# Patient Record
Sex: FEMALE | Race: WHITE | HISPANIC OR LATINO | Employment: FULL TIME | URBAN - METROPOLITAN AREA
[De-identification: names, ages, dates, MRNs, and addresses within clinical notes are randomized per-mention and may not be internally consistent; named-entity substitution may affect disease eponyms.]

---

## 2017-04-10 ENCOUNTER — OFFICE VISIT (OUTPATIENT)
Dept: URGENT CARE | Facility: URGENT CARE | Age: 27
End: 2017-04-10
Payer: MEDICAID

## 2017-04-10 VITALS
TEMPERATURE: 98.6 F | SYSTOLIC BLOOD PRESSURE: 133 MMHG | OXYGEN SATURATION: 99 % | RESPIRATION RATE: 24 BRPM | HEART RATE: 70 BPM | WEIGHT: 293 LBS | DIASTOLIC BLOOD PRESSURE: 94 MMHG

## 2017-04-10 DIAGNOSIS — J06.9 VIRAL URI: Primary | ICD-10-CM

## 2017-04-10 PROCEDURE — 99213 OFFICE O/P EST LOW 20 MIN: CPT | Performed by: FAMILY MEDICINE

## 2017-04-10 RX ORDER — BENZONATATE 200 MG/1
200 CAPSULE ORAL 3 TIMES DAILY PRN
Qty: 21 CAPSULE | Refills: 0 | Status: SHIPPED | OUTPATIENT
Start: 2017-04-10 | End: 2017-04-17

## 2017-04-10 NOTE — NURSING NOTE
Chief Complaint   Patient presents with     Cough     cough and wheezing x over 2 weeks. Pt was treated for bronchitis ( prednisone 20 and cefdinir 300 mg  ) few weeks ago and states she never finnished it.      Back Pain     x yesterday        Initial BP (!) 133/94 (BP Location: Other (Comment), Patient Position: Chair, Cuff Size: Adult Large)  Pulse 70  Temp 98.6  F (37  C) (Oral)  Resp 24  Wt (!) 344 lb 9.6 oz (156.3 kg)  SpO2 99% There is no height or weight on file to calculate BMI.  Medication Reconciliation: complete

## 2017-04-10 NOTE — MR AVS SNAPSHOT
After Visit Summary   4/10/2017    Princess TRISTIN Machado    MRN: [id number]           Patient Information     Date Of Birth          1990        Visit Information        Provider Department      4/10/2017 2:45 PM David Mendoza,  [facility]        Today's Diagnoses     Viral URI    -  1       Follow-ups after your visit        Your next 10 appointments already scheduled     May 09, 2017  8:45 AM CDT   RETURN GENERAL with Kristopher Franks MD   Eye Clinic ([facility])    [address]  [address] [facility]  9th Fl Clin 9a  [facility] [id number] [phone]              Who to contact     If you have questions or need follow up information about today's clinic visit or your schedule please contact [facility] directly at [phone].  Normal or non-critical lab and imaging results will be communicated to you by MyChart, letter or phone within 4 business days after the clinic has received the results. If you do not hear from us within 7 days, please contact the clinic through MyChart or phone. If you have a critical or abnormal lab result, we will notify you by phone as soon as possible.  Submit refill requests through [employer] or call your pharmacy and they will forward the refill request to us. Please allow 3 business days for your refill to be completed.          Additional Information About Your Visit        MyChart Information     [employer] gives you secure access to your electronic health record. If you see a primary care provider, you can also send messages to your care team and make appointments. If you have questions, please call your primary care clinic.  If you do not have a primary care provider, please call [phone] and they will assist you.        Care EveryWhere ID     This is your Care EveryWhere ID. This could be used by other organizations to access your [place] medical records  [id number]         Your Vitals Were     Pulse Temperature Respirations Pulse Oximetry          70 98.6  F (37  C) (Oral) 24 99%         Blood Pressure from Last 3 Encounters:   04/10/17 (!) 133/94   09/09/16 122/47   08/25/16 142/72    Weight from Last 3 Encounters:   04/10/17 (!) 344 lb 9.6 oz (156.3 kg)   04/30/14 (!) 322 lb (146.1 kg)   04/25/12 (!) 308 lb (139.7 kg)              Today, you had the following     No orders found for display         Today's Medication Changes          These changes are accurate as of: 4/10/17  4:40 PM.  If you have any questions, ask your nurse or doctor.               Start taking these medicines.        Dose/Directions    benzonatate 200 MG capsule   Commonly known as:  TESSALON   Used for:  Viral URI   Started by:  David Mendoza DO        Dose:  200 mg   Take 1 capsule (200 mg) by mouth 3 times daily as needed for cough   Quantity:  21 capsule   Refills:  0            Where to get your medicines      These medications were sent to [facility] [address] [place] [address] [facility]  [address] [facility] [address]     Phone:  [phone]     benzonatate 200 MG capsule                Primary Care Provider Office Phone # Fax #    Natividad Lopez [phone]       [facility]  [address] [place] [address]        Thank you!     Thank you for choosing [facility]  for your care. Our goal is always to provide you with excellent care. Hearing back from our patients is one way we can continue to improve our services. Please take a few minutes to complete the written survey that you may receive in the mail after your visit with us. Thank you!             Your Updated Medication List - Protect others around you: Learn how to safely use, store and throw away your medicines at www.disposemymeds.org.          This list is accurate as of: 4/10/17  4:40 PM.  Always use your most recent med list.                   Brand Name  Dispense Instructions for use    benzonatate 200 MG capsule    TESSALON    21 capsule    Take 1 capsule (200 mg) by mouth 3 times daily as needed for cough       BIOTIN PO      Take 10 mcg by mouth       COPAXONE SC          DOXY-CAPS PO      Take 100 mg by mouth 2 times daily       FLUOXETINE HCL PO      Take 20 mg by mouth daily       norethindrone 0.35 MG per tablet    MICRONOR     Take 1 tablet by mouth daily       predniSONE 20 MG tablet    DELTASONE    33 tablet    Take 1 tablet (20 mg) by mouth 3 times daily       prenatal multivitamin  plus iron 27-0.8 MG Tabs per tablet      Take 1 tablet by mouth daily       VITAMIN D3 PO      Take 1,000 Int'l Units by mouth daily

## 2017-04-10 NOTE — LETTER
[facility]  [address] [id number] [phone]      April 10, 2017    RE:  Princess TRISTIN Machado                                                                                                                                                       [address]   [facility] [address]            To whom it may concern:    Princess TRISTIN Machado is under my professional care for Medical.   She  may return to work with the following: No working or lifting restrictions on or about 4-11-17.          Sincerely,        David Mendoza    [facility]

## 2017-04-26 NOTE — PROGRESS NOTES
"SUBJECTIVE: Princess TRISTIN Machado is a 26 year old female presenting with a chief complaint of nasal congestion and cough .  Onset of symptoms was day(s) ago.  Course of illness is same.    Severity moderate  Current and Associated symptoms: \"cold symptoms\"  Treatment measures tried include OTC meds.  Predisposing factors include None.    Past Medical History:   Diagnosis Date     Anxiety      Depressive disorder      Hidradenitis suppurativa 2010     MS (multiple sclerosis) (H)     diagnosis 10/2016     Allergies   Allergen Reactions     Accutane Rash     Isotretinoin Rash     Social History   Substance Use Topics     Smoking status: Never Smoker     Smokeless tobacco: Never Used     Alcohol use No       ROS:  SKIN: no rash  GI: no vomiting    OBJECTIVE:  BP (!) 133/94 (BP Location: Other (Comment), Patient Position: Chair, Cuff Size: Adult Large)  Pulse 70  Temp 98.6  F (37  C) (Oral)  Resp 24  Wt (!) 344 lb 9.6 oz (156.3 kg)  SpO2 99%GENERAL APPEARANCE: healthy, alert and no distress  EYES: EOMI,  PERRL, conjunctiva clear  HENT: ear canals and TM's normal.  Nose and mouth without ulcers, erythema or lesions  NECK: supple, nontender, no lymphadenopathy  RESP: lungs clear to auscultation - no rales, rhonchi or wheezes  SKIN: no suspicious lesions or rashes      ICD-10-CM    1. Viral URI J06.9 benzonatate (TESSALON) 200 MG capsule    B97.89        Fluids/Rest, f/u if worse/not any better    "

## 2017-05-08 DIAGNOSIS — H46.9 LEFT OPTIC NEURITIS: Primary | ICD-10-CM

## 2017-05-09 ENCOUNTER — OFFICE VISIT (OUTPATIENT)
Dept: INTERNAL MEDICINE | Facility: CLINIC | Age: 27
End: 2017-05-09
Payer: COMMERCIAL

## 2017-05-09 VITALS
DIASTOLIC BLOOD PRESSURE: 74 MMHG | HEIGHT: 66 IN | TEMPERATURE: 98.4 F | HEART RATE: 85 BPM | OXYGEN SATURATION: 98 % | SYSTOLIC BLOOD PRESSURE: 114 MMHG

## 2017-05-09 DIAGNOSIS — F50.9 EATING DISORDER: ICD-10-CM

## 2017-05-09 DIAGNOSIS — G35 MULTIPLE SCLEROSIS (H): ICD-10-CM

## 2017-05-09 DIAGNOSIS — N92.6 MISSED PERIOD: Primary | ICD-10-CM

## 2017-05-09 DIAGNOSIS — F33.1 MAJOR DEPRESSIVE DISORDER, RECURRENT EPISODE, MODERATE (H): ICD-10-CM

## 2017-05-09 LAB — BETA HCG QUAL IFA URINE: NEGATIVE

## 2017-05-09 PROCEDURE — 99214 OFFICE O/P EST MOD 30 MIN: CPT | Performed by: INTERNAL MEDICINE

## 2017-05-09 PROCEDURE — 84703 CHORIONIC GONADOTROPIN ASSAY: CPT | Performed by: INTERNAL MEDICINE

## 2017-05-09 RX ORDER — CITALOPRAM HYDROBROMIDE 20 MG/1
20 TABLET ORAL DAILY
Qty: 30 TABLET | Refills: 1 | Status: SHIPPED | OUTPATIENT
Start: 2017-05-09 | End: 2017-06-27 | Stop reason: ALTCHOICE

## 2017-05-09 ASSESSMENT — ANXIETY QUESTIONNAIRES
6. BECOMING EASILY ANNOYED OR IRRITABLE: NEARLY EVERY DAY
1. FEELING NERVOUS, ANXIOUS, OR ON EDGE: NEARLY EVERY DAY
5. BEING SO RESTLESS THAT IT IS HARD TO SIT STILL: MORE THAN HALF THE DAYS
3. WORRYING TOO MUCH ABOUT DIFFERENT THINGS: NEARLY EVERY DAY
IF YOU CHECKED OFF ANY PROBLEMS ON THIS QUESTIONNAIRE, HOW DIFFICULT HAVE THESE PROBLEMS MADE IT FOR YOU TO DO YOUR WORK, TAKE CARE OF THINGS AT HOME, OR GET ALONG WITH OTHER PEOPLE: VERY DIFFICULT
2. NOT BEING ABLE TO STOP OR CONTROL WORRYING: MORE THAN HALF THE DAYS
7. FEELING AFRAID AS IF SOMETHING AWFUL MIGHT HAPPEN: NEARLY EVERY DAY
GAD7 TOTAL SCORE: 19

## 2017-05-09 ASSESSMENT — PATIENT HEALTH QUESTIONNAIRE - PHQ9: 5. POOR APPETITE OR OVEREATING: NEARLY EVERY DAY

## 2017-05-09 NOTE — PROGRESS NOTES
SUBJECTIVE:                                                    Princess TRISTIN Machado is a 26 year old female who presents to clinic today for the following health issues:     Pt is a 26 year old female who is seen here to day to establish care, and c/o symptoms of depression since 3 mths, c/o little interest in doing things, depressed, ,tired, sleep disturbed. Feeling bad about self, trouble concentrating on things. Restless, irritable, nervous, worries a lot, no suicidal thoughts or ideations. Pt was on Effexor and stopped 3 mths ago . Pt has also tried Zoloft, Prozac in the past.  Pt also c/o eating disorder, and would like referral . Pt also says she missed her periods , LMP 02/08/17. Has h/o irregular periods.       Patient Active Problem List   Diagnosis     Optic neuritis, left     Major depressive disorder, recurrent episode, moderate (H)     Multiple sclerosis (H)     Eating disorder     Past Surgical History:   Procedure Laterality Date     ENT SURGERY      tubes in ears     MYRINGOTOMY, INSERT TUBE BILATERAL, COMBINED  1990s       Social History   Substance Use Topics     Smoking status: Never Smoker     Smokeless tobacco: Never Used     Alcohol use No     Family History   Problem Relation Age of Onset     CEREBROVASCULAR DISEASE Mother      heart attack     CEREBROVASCULAR DISEASE Father      heart attack     MENTAL ILLNESS Sister      Post-Traumatic Stress Disorder (PTSD) Brother      Glaucoma No family hx of      Macular Degeneration No family hx of          Current Outpatient Prescriptions   Medication Sig Dispense Refill     glatiramer (COPAXONE) 20 MG/ML injection        citalopram (CELEXA) 20 MG tablet Take 1 tablet (20 mg) by mouth daily 30 tablet 1     norethindrone (MICRONOR) 0.35 MG per tablet Take 1 tablet by mouth daily       Cholecalciferol (VITAMIN D3 PO) Take 1,000 Int'l Units by mouth daily Reported on 5/9/2017       BIOTIN PO Take 10 mcg by mouth Reported on 5/9/2017         Reviewed and  "updated as needed this visit by clinical staff       Reviewed and updated as needed this visit by Provider         ROS:  C: NEGATIVE for fever, chills, change in weight  E/M: NEGATIVE for ear, mouth and throat problems  R: NEGATIVE for significant cough or SOB  CV: NEGATIVE for chest pain, palpitations or peripheral edema  ; missed period  PSYCHIATRIC: anxiety, depression  Cns; MS    OBJECTIVE:                                                    /74 (BP Location: Left arm, Cuff Size: Adult Large)  Pulse 85  Temp 98.4  F (36.9  C) (Oral)  Ht 5' 6\" (1.676 m)  LMP  (LMP Unknown)  SpO2 98%  Breastfeeding? No  There is no height or weight on file to calculate BMI.   GENERAL: healthy, alert, well nourished, well hydrated, no distress  EYES: Eyes grossly normal to inspection, extraocular movements - intact, and PERRL  NECK: no tenderness, no adenopathy, no asymmetry, no masses, no stiffness; thyroid- normal to palpation  RESP: lungs clear to auscultation - no rales, no rhonchi, no wheezes  CV: regular rates and rhythm, normal S1 S2, no S3 or S4 and no murmur, no click or rub -  MS: extremities- no gross deformities noted, no edema  NEURO: strength and tone- normal, sensory exam- grossly normal, mentation- intact, speech- normal, reflexes- symmetric         ASSESSMENT/PLAN:                                                      1. Major depressive disorder, recurrent episode, moderate (H)  --PHQ 9 score 16, has tried Zoloft, Prozac and Effexor in the past w/o much symptom relief. .   - started on citalopram (CELEXA) 20 MG tablet; Take 1 tablet (20 mg) by mouth daily as directed.explained clearly about the medication,insructions and side effects. Advised to f/u in 1 mth.     2. Multiple sclerosis (H)  --sees neurologist and is on Copaxone 3 x wk.    3. Missed period  - Beta HCG qual IFA urine- negative    4. Eating disorder  --resourses given to pt- [employer] program, [employer]. Pt will follow up with " them.    Please abstract the following data from this visit with this patient into the appropriate field in Epic:    Pap smear done on this date: 08/16  (approximately), by this group: [facility] , results were normal     .   Fallon Horne MD  [facility]

## 2017-05-09 NOTE — MR AVS SNAPSHOT
"              After Visit Summary   5/9/2017    Princess TRISTIN Machado    MRN: [id number]           Patient Information     Date Of Birth          1990        Visit Information        Provider Department      5/9/2017 1:40 PM Fallon Horne MD [facility]        Today's Diagnoses     Missed period    -  1    Major depressive disorder, recurrent episode, moderate (H)        Multiple sclerosis (H)        Eating disorder           Follow-ups after your visit        Who to contact     If you have questions or need follow up information about today's clinic visit or your schedule please contact [facility] directly at [phone].  Normal or non-critical lab and imaging results will be communicated to you by [employer], letter or phone within 4 business days after the clinic has received the results. If you do not hear from us within 7 days, please contact the clinic through [employer] or phone. If you have a critical or abnormal lab result, we will notify you by phone as soon as possible.  Submit refill requests through [employer] or call your pharmacy and they will forward the refill request to us. Please allow 3 business days for your refill to be completed.          Additional Information About Your Visit        MyChart Information     [employer] gives you secure access to your electronic health record. If you see a primary care provider, you can also send messages to your care team and make appointments. If you have questions, please call your primary care clinic.  If you do not have a primary care provider, please call [phone] and they will assist you.        Care EveryWhere ID     This is your Care EveryWhere ID. This could be used by other organizations to access your [place] medical records  [id number]        Your Vitals Were     Pulse Temperature Height Last Period Pulse Oximetry Breastfeeding?    85 98.4  F (36.9  C) (Oral) 5' 6\" (1.676 m) (LMP Unknown) 98% No       " Blood Pressure from Last 3 Encounters:   05/09/17 114/74   04/10/17 (!) 133/94   09/09/16 122/47    Weight from Last 3 Encounters:   04/10/17 (!) 344 lb 9.6 oz (156.3 kg)   04/30/14 (!) 322 lb (146.1 kg)   04/25/12 (!) 308 lb (139.7 kg)              We Performed the Following     Beta HCG qual IFA urine          Today's Medication Changes          These changes are accurate as of: 5/9/17 11:59 PM.  If you have any questions, ask your nurse or doctor.               Start taking these medicines.        Dose/Directions    citalopram 20 MG tablet   Commonly known as:  celeXA   Used for:  Major depressive disorder, recurrent episode, moderate (H)   Started by:  Fallon Horne MD        Dose:  20 mg   Take 1 tablet (20 mg) by mouth daily   Quantity:  30 tablet   Refills:  1            Where to get your medicines      These medications were sent to [facility] - [place], [place] - [address] [place] [address]     Phone:  [phone]     citalopram 20 MG tablet                Primary Care Provider Office Phone # Fax #    Fallon Horne [phone]       [facility] [address] [place] [address]        Thank you!     Thank you for choosing [facility]  for your care. Our goal is always to provide you with excellent care. Hearing back from our patients is one way we can continue to improve our services. Please take a few minutes to complete the written survey that you may receive in the mail after your visit with us. Thank you!             Your Updated Medication List - Protect others around you: Learn how to safely use, store and throw away your medicines at www.disposemymeds.org.          This list is accurate as of: 5/9/17 11:59 PM.  Always use your most recent med list.                   Brand Name Dispense Instructions for use    BIOTIN PO      Take 10 mcg by mouth Reported on 5/9/2017        citalopram 20 MG tablet    celeXA    30 tablet    Take 1 tablet (20 mg) by mouth daily       glatiramer 20 MG/ML injection    COPAXONE         norethindrone 0.35 MG per tablet    MICRONOR     Take 1 tablet by mouth daily       VITAMIN D3 PO      Take 1,000 Int'l Units by mouth daily Reported on 5/9/2017

## 2017-05-09 NOTE — NURSING NOTE
"Chief Complaint   Patient presents with     Recheck Medication     depression/anxiety-been on effexor, prozac, sertraline in past, sees a therapist, hx of bulemia       Initial /74 (BP Location: Left arm, Cuff Size: Adult Large)  Pulse 85  Temp 98.4  F (36.9  C) (Oral)  Ht 5' 6\" (1.676 m)  LMP  (LMP Unknown)  SpO2 98%  Breastfeeding? No There is no height or weight on file to calculate BMI.  Medication Reconciliation: complete   Jossie Guzman, CMA      "

## 2017-05-09 NOTE — Clinical Note
Please abstract the following data from this visit with this patient into the appropriate field in Epic:  Pap smear done on this date: 08/16  (approximately), by this group: [facility]ice vas[facility] , results were normal

## 2017-05-10 ASSESSMENT — PATIENT HEALTH QUESTIONNAIRE - PHQ9: SUM OF ALL RESPONSES TO PHQ QUESTIONS 1-9: 16

## 2017-05-10 ASSESSMENT — ANXIETY QUESTIONNAIRES: GAD7 TOTAL SCORE: 19

## 2017-05-14 RX ORDER — GLATIRAMER ACETATE 20 MG/ML
INJECTION, SOLUTION SUBCUTANEOUS
COMMUNITY
End: 2017-12-12

## 2017-05-16 ENCOUNTER — TRANSFERRED RECORDS (OUTPATIENT)
Dept: HEALTH INFORMATION MANAGEMENT | Facility: CLINIC | Age: 27
End: 2017-05-16

## 2017-05-30 ENCOUNTER — TRANSFERRED RECORDS (OUTPATIENT)
Dept: HEALTH INFORMATION MANAGEMENT | Facility: CLINIC | Age: 27
End: 2017-05-30

## 2017-06-13 ENCOUNTER — OFFICE VISIT (OUTPATIENT)
Dept: INTERNAL MEDICINE | Facility: CLINIC | Age: 27
End: 2017-06-13
Payer: COMMERCIAL

## 2017-06-13 VITALS
OXYGEN SATURATION: 99 % | DIASTOLIC BLOOD PRESSURE: 70 MMHG | HEART RATE: 76 BPM | TEMPERATURE: 98 F | SYSTOLIC BLOOD PRESSURE: 112 MMHG | HEIGHT: 66 IN

## 2017-06-13 DIAGNOSIS — N89.8 VAGINAL DISCHARGE: Primary | ICD-10-CM

## 2017-06-13 DIAGNOSIS — N89.8 VAGINAL ITCHING: ICD-10-CM

## 2017-06-13 DIAGNOSIS — L73.2 HYDRADENITIS: ICD-10-CM

## 2017-06-13 LAB
MICRO REPORT STATUS: NORMAL
SPECIMEN SOURCE: NORMAL
WET PREP SPEC: NORMAL

## 2017-06-13 PROCEDURE — 87210 SMEAR WET MOUNT SALINE/INK: CPT | Performed by: PHYSICIAN ASSISTANT

## 2017-06-13 PROCEDURE — 99213 OFFICE O/P EST LOW 20 MIN: CPT | Performed by: PHYSICIAN ASSISTANT

## 2017-06-13 RX ORDER — MUPIROCIN 20 MG/G
OINTMENT TOPICAL
COMMUNITY
Start: 2017-03-05

## 2017-06-13 RX ORDER — FLUCONAZOLE 150 MG/1
150 TABLET ORAL ONCE
Qty: 1 TABLET | Refills: 0 | Status: SHIPPED | OUTPATIENT
Start: 2017-06-13 | End: 2017-06-13

## 2017-06-13 RX ORDER — DOXYCYCLINE 100 MG/1
100 CAPSULE ORAL 2 TIMES DAILY
Qty: 20 CAPSULE | Refills: 0 | Status: SHIPPED | OUTPATIENT
Start: 2017-06-13 | End: 2017-06-27

## 2017-06-13 NOTE — MR AVS SNAPSHOT
After Visit Summary   6/13/2017    Princess TRISTIN Machado    MRN: [id number]           Patient Information     Date Of Birth          1990        Visit Information        Provider Department      6/13/2017 6:20 PM Margy Villalobos PA-C [facility]        Today's Diagnoses     Vaginal discharge    -  1    Vaginal itching        Hydradenitis           Follow-ups after your visit        Your next 10 appointments already scheduled     Jun 27, 2017  9:00 AM CDT   [facility]    [address]  [facility] [id number] [phone]              Who to contact     If you have questions or need follow up information about today's clinic visit or your schedule please contact [facility] directly at [phone].  Normal or non-critical lab and imaging results will be communicated to you by MyChart, letter or phone within 4 business days after the clinic has received the results. If you do not hear from us within 7 days, please contact the clinic through MyChart or phone. If you have a critical or abnormal lab result, we will notify you by phone as soon as possible.  Submit refill requests through [employer] or call your pharmacy and they will forward the refill request to us. Please allow 3 business days for your refill to be completed.          Additional Information About Your Visit        MyChart Information     [employer] gives you secure access to your electronic health record. If you see a primary care provider, you can also send messages to your care team and make appointments. If you have questions, please call your primary care clinic.  If you do not have a primary care provider, please call [phone] and they will assist you.        Care EveryWhere ID     This is your Care EveryWhere ID. This could be used by other organizations to access your [place]  "medical records  [id number]        Your Vitals Were     Pulse Temperature Height Last Period Pulse Oximetry       76 98  F (36.7  C) (Oral) 5' 6\" (1.676 m) 03/14/2017 99%        Blood Pressure from Last 3 Encounters:   06/13/17 112/70   05/09/17 114/74   04/10/17 (!) 133/94    Weight from Last 3 Encounters:   04/10/17 (!) 344 lb 9.6 oz (156.3 kg)   04/30/14 (!) 322 lb (146.1 kg)   04/25/12 (!) 308 lb (139.7 kg)              We Performed the Following     Wet prep          Today's Medication Changes          These changes are accurate as of: 6/13/17  6:55 PM.  If you have any questions, ask your nurse or doctor.               Start taking these medicines.        Dose/Directions    doxycycline 100 MG capsule   Commonly known as:  VIBRAMYCIN   Used for:  Hydradenitis   Started by:  Margy Villalobos PA-C        Dose:  100 mg   Take 1 capsule (100 mg) by mouth 2 times daily   Quantity:  20 capsule   Refills:  0       fluconazole 150 MG tablet   Commonly known as:  DIFLUCAN   Used for:  Vaginal discharge, Vaginal itching   Started by:  Margy Villalobos PA-C        Dose:  150 mg   Take 1 tablet (150 mg) by mouth once for 1 dose   Quantity:  1 tablet   Refills:  0            Where to get your medicines      These medications were sent to [address] [address]     Phone:  [phone]     doxycycline 100 MG capsule    fluconazole 150 MG tablet                Primary Care Provider Office Phone # Fax #    Krvadim Horne [phone]       [facility] [address] [address]        Thank you!     Thank you for choosing [facility]  for your care. Our goal is always to provide you with excellent care. Hearing back from our patients is one way we can continue to improve our services. Please take a few minutes to complete the written survey that you may " receive in the mail after your visit with us. Thank you!             Your Updated Medication List - Protect others around you: Learn how to safely use, store and throw away your medicines at www.disposemymeds.org.          This list is accurate as of: 6/13/17  6:55 PM.  Always use your most recent med list.                   Brand Name Dispense Instructions for use    BIOTIN PO      Take 10 mcg by mouth Reported on 5/9/2017       citalopram 20 MG tablet    celeXA    30 tablet    Take 1 tablet (20 mg) by mouth daily       doxycycline 100 MG capsule    VIBRAMYCIN    20 capsule    Take 1 capsule (100 mg) by mouth 2 times daily       fluconazole 150 MG tablet    DIFLUCAN    1 tablet    Take 1 tablet (150 mg) by mouth once for 1 dose       glatiramer 20 MG/ML injection    COPAXONE         mupirocin 2 % ointment    BACTROBAN         norethindrone 0.35 MG per tablet    MICRONOR     Take 1 tablet by mouth daily       VITAMIN D3 PO      Take 1,000 Int'l Units by mouth daily Reported on 5/9/2017

## 2017-06-13 NOTE — NURSING NOTE
"Chief Complaint   Patient presents with     Vaginal Problem     x1 month        Initial /70 (BP Location: Left arm, Patient Position: Chair, Cuff Size: Adult Large)  Pulse 76  Temp 98  F (36.7  C) (Oral)  Ht 5' 6\" (1.676 m)  LMP 03/14/2017  SpO2 99% There is no height or weight on file to calculate BMI.  Medication Reconciliation: complete    "

## 2017-06-13 NOTE — PROGRESS NOTES
"  SUBJECTIVE:                                                    Princess TRISTIN Machado is a 26 year old female who presents to clinic today for the following health issues:      Vaginal Symptoms     Onset: x1 month     Description:  Vaginal Discharge: white   Itching (Pruritis): YES  Burning sensation:  no   Odor: no     Accompanying Signs & Symptoms:  Pain with Urination: no   Abdominal Pain: no   Fever: no    History:   Sexually active: YES  New Partner: no   Possibility of Pregnancy:  No    Precipitating factors:   Recent Antibiotic Use: no     Alleviating factors:  na   Therapies Tried and outcome:   ? Cyst on the private area, - hydradenitis hx as well.   Has been having some small painful areas that open and drain, similar to hydradenitis that she has in the axilla.   Has appt with Derm in July but hoping something can be done before then      -------------------------------------    Problem list and histories reviewed & adjusted, as indicated.  Additional history: as documented    Labs reviewed in EPIC    Reviewed and updated as needed this visit by clinical staff  Tobacco  Allergies       Reviewed and updated as needed this visit by Provider  Allergies  Meds         ROS:  Constitutional, HEENT, cardiovascular, pulmonary, gi and gu systems are negative, except as otherwise noted.    OBJECTIVE:                                                    /70 (BP Location: Left arm, Patient Position: Chair, Cuff Size: Adult Large)  Pulse 76  Temp 98  F (36.7  C) (Oral)  Ht 5' 6\" (1.676 m)  LMP 03/14/2017  SpO2 99%  There is no height or weight on file to calculate BMI.  GENERAL: healthy, alert and no distress  RESP: lungs clear to auscultation - no rales, rhonchi or wheezes  CV: regular rate and rhythm, normal S1 S2, no S3 or S4, no murmur, click or rub, no peripheral edema and peripheral pulses strong  ABDOMEN: soft, nontender, no hepatosplenomegaly, no masses and bowel sounds normal   (female): normal " female external genitalia, normal urethral meatus , vaginal discharge - scant and brown, vaginal skin findings: with few scattered small draining nodules and normal cervix,     Diagnostic Test Results:  Results for orders placed or performed in visit on 06/13/17 (from the past 24 hour(s))   Wet prep   Result Value Ref Range    Specimen Description Vagina     Wet Prep       No Trichomonas seen  No clue cells seen  No yeast seen      Micro Report Status FINAL 06/13/2017         ASSESSMENT/PLAN:                                                            1. Vaginal discharge    - Wet prep  - fluconazole (DIFLUCAN) 150 MG tablet; Take 1 tablet (150 mg) by mouth once for 1 dose  Dispense: 1 tablet; Refill: 0    2. Vaginal itching    - Wet prep  - fluconazole (DIFLUCAN) 150 MG tablet; Take 1 tablet (150 mg) by mouth once for 1 dose  Dispense: 1 tablet; Refill: 0    3. Hydradenitis    - doxycycline (VIBRAMYCIN) 100 MG capsule; Take 1 capsule (100 mg) by mouth 2 times daily  Dispense: 20 capsule; Refill: 0    Reviewed exam and symptoms,   Treatment with diflucan for the itching, recheck if not improving.   For hydradenitis, 10 day course of doxycycline  Info given for Primary skin care clinic in [place]- to follow up on hydradenitis. - she was getting injections in the past as needed for axillary lesions.       Margy Villalobos PA-C  [facility]

## 2017-06-16 ENCOUNTER — OFFICE VISIT (OUTPATIENT)
Dept: FAMILY MEDICINE | Facility: CLINIC | Age: 27
End: 2017-06-16
Payer: COMMERCIAL

## 2017-06-16 VITALS
WEIGHT: 293 LBS | HEIGHT: 66 IN | DIASTOLIC BLOOD PRESSURE: 82 MMHG | OXYGEN SATURATION: 97 % | SYSTOLIC BLOOD PRESSURE: 136 MMHG | BODY MASS INDEX: 47.09 KG/M2 | HEART RATE: 81 BPM | TEMPERATURE: 98.6 F | RESPIRATION RATE: 20 BRPM

## 2017-06-16 DIAGNOSIS — L98.9 SKIN LESION: ICD-10-CM

## 2017-06-16 DIAGNOSIS — L70.0 ACNE VULGARIS: ICD-10-CM

## 2017-06-16 DIAGNOSIS — L08.9 SKIN PUSTULE: ICD-10-CM

## 2017-06-16 DIAGNOSIS — B07.8 COMMON WART: Primary | ICD-10-CM

## 2017-06-16 DIAGNOSIS — E66.01 MORBID OBESITY DUE TO EXCESS CALORIES (H): ICD-10-CM

## 2017-06-16 PROCEDURE — 17110 DESTRUCTION B9 LES UP TO 14: CPT | Performed by: FAMILY MEDICINE

## 2017-06-16 PROCEDURE — 99213 OFFICE O/P EST LOW 20 MIN: CPT | Mod: 25 | Performed by: FAMILY MEDICINE

## 2017-06-16 NOTE — PROGRESS NOTES
SUBJECTIVE:                                                    Princess TRISTIN Machado is a 26 year old female who presents to clinic today for the following health issues:    SKIN LESIONS      Onset: NOTED x 2 days     pPMH of palmar warts yrs ago     Description (location/number): Bothlat  Hands    Accompanying signs and symptoms: Painful: no     History: prior warts: no& no known exposure     Therapies tried and outcome: None     Pustule IN Pt with ACNE VULGARIS       Duration: x 1 month     piercing done in 3-17 and healed normally at 1st with only redness around the 2 external pins of the thru and thru piercing lat to OD but then over a mo developed a red pimple that she can squeeze pus out of     Description (location/character/radiation): Right Side of Face, Upper Cheek Bone    Intensity:  moderate    Accompanying signs and symptoms: Inflammation and Drainage    History (similar episodes/previous evaluation): None    Precipitating or alleviating factors: None as she has a hx of acne vulgaris, this is likely an infected acne pustule     Therapies tried and outcome: Sterile Water, Bacitracin and Bactroban/Some Relief but no real change        MORBID OBESITY    -BMI = 55   -sedentary job     Problem list and histories reviewed & adjusted, as indicated.  Additional history: as documented    Labs reviewed in EPIC    Reviewed and updated as needed this visit by clinical staff       Reviewed and updated as needed this visit by Provider         ROS:  C: NEGATIVE for fever, chills, change in weight  I: NEGATIVE for worrisome rashes, moles ; lesions-  On the lat of both hands   E: NEGATIVE for vision changes or irritation  E/M: NEGATIVE for ear, mouth and throat problems  R: NEGATIVE for significant cough or SOB  B: NEGATIVE for masses, tenderness or discharge  CV: NEGATIVE for chest pain, palpitations or peripheral edema  GI: NEGATIVE for nausea, abdominal pain, heartburn, or change in bowel habits  : NEGATIVE for  "frequency, dysuria, or hematuria  M: NEGATIVE for significant arthralgias or myalgia  N: NEGATIVE for weakness, dizziness or paresthesias  E: NEGATIVE for temperature intolerance, skin/hair changes  H: NEGATIVE for bleeding problems  P: NEGATIVE for changes in mood or affect    OBJECTIVE:                                                    /82  Pulse 81  Temp 98.6  F (37  C) (Temporal)  Resp 20  Ht 5' 6\" (1.676 m)  Wt (!) 344 lb (156 kg)  LMP 03/14/2017  SpO2 97%  Breastfeeding? No  BMI 55.52 kg/m2  Body mass index is 55.52 kg/(m^2).  GENERAL: healthy, alert, no distress and obese  EYES: Eyes grossly normal to inspection, PERRL and conjunctivae and sclerae normal  MS: no gross musculoskeletal defects noted, no edema  SKIN: no suspicious lesions or rashes  1) 3 small papular < 1mm and skin color: 2 on RT and one on Lt lat hand  2) 2mm diam prox Lt lat hand with lack of skin whorls in 2 small spots x 1 mm @ and yellow & scaley in color   3) 2mm diam pustule that is blue to red with central pointing and red around for 1-2 mm between the 2 posts of the under the sking pinning lat to OD   Of note is that she has other more quiet acne pimples, and that the other piercings of the ears x 2 and the Lt nose are all OK   NEURO: Normal strength and tone, mentation intact and speech normal  PSYCH: mentation appears normal, affect normal/bright    Diagnostic Test Results:  none      PROCEDURE     LIQUID NITROGEN  Application to Lt hand wart like structure with good white out weveral times and occluded with tape   ASSESSMENT/PLAN:                                                              ICD-10-CM    1. Common wart: L t lat hand - liquid nitrogen #1  B07.8    2. Skin pustule lat to OD in between piercing w 2 outlets  L08.9    3. Acne vulgaris L70.0    4. Skin lesion- papules on lat hands  L98.9    5. Morbid obesity due to excess calories (H) BMI 50-60 E66.01        Patient Instructions   1. I would observe the " remaining 3 as they are not typical of warts     2. Keep wart covered with several layers at all times.  Do not pick at it with your fingernails, as you might get warts deep under your nails  You may cause more acid burn to treat the wart by grinding up aspirin and placing on it   Duct tape is also acidic and can be used as the 1st cover under the occluding tape Return in 2-3 weeks for retreatment    3. For the sore near the eye.  This looks like an infected pimple.   Hydrogen peroxide , then warm water packs, then press dry to get the water and pus out  2 x a day      4.  Weight Loss Tips  1. Do not eat after 6 hrs before your expected bedtime  2. Have your heaviest meal for breakfast, a slightly lighter meal at lunch and a snack 6 hrs before bed  3. No sugar/calorie drinks except milk ie no fruit juice, pop, alcohol.  4. Drink milk 30min before meals to decrease your hunger. Also it is excellent as part of your last meal of the day snack  5. Drink lots of water  6. Increase fiber in diet: all bran cereal, salads, popcorn etc  7. Have only one small serving of fruit a day about 1/2 cup (as this is high in sugar)  8. EXERCISE is the bottom line. Without it, you will gain weight even on a low calorie diet. Best if done 2-3X a day as can    Being overweight contributes to high blood pressure and high cholesterol, both of which cause heart attacks, strokes and kidney failure, prediabetes and diabetes, arthritis, and liver disease         Noelle Anguiano MD  [facility]      as she has a hx of acne vulgaris, the one on the face  is likely an infected acne pustule     Not from the metal of the pin, as the 2 ears and the Lt nostril piercings are fine      the 2 on the Rt and the one on the left hand look like pimples     Weight management plan: Discussed healthy diet and exercise guidelines and patient will follow up in 1 month in clinic to re-evaluate.      Noelle Anguiano MD

## 2017-06-16 NOTE — PATIENT INSTRUCTIONS
1. I would observe the remaining 3 as they are not typical of warts     2. Keep wart covered with several layers at all times.  Do not pick at it with your fingernails, as you might get warts deep under your nails  You may cause more acid burn to treat the wart by grinding up aspirin and placing on it   Duct tape is also acidic and can be used as the 1st cover under the occluding tape Return in 2-3 weeks for retreatment    3. For the sore near the eye.  This looks like an infected pimple.   Hydrogen peroxide , then warm water packs, then press dry to get the water and pus out  2 x a day      4.  Weight Loss Tips  1. Do not eat after 6 hrs before your expected bedtime  2. Have your heaviest meal for breakfast, a slightly lighter meal at lunch and a snack 6 hrs before bed  3. No sugar/calorie drinks except milk ie no fruit juice, pop, alcohol.  4. Drink milk 30min before meals to decrease your hunger. Also it is excellent as part of your last meal of the day snack  5. Drink lots of water  6. Increase fiber in diet: all bran cereal, salads, popcorn etc  7. Have only one small serving of fruit a day about 1/2 cup (as this is high in sugar)  8. EXERCISE is the bottom line. Without it, you will gain weight even on a low calorie diet. Best if done 2-3X a day as can    Being overweight contributes to high blood pressure and high cholesterol, both of which cause heart attacks, strokes and kidney failure, prediabetes and diabetes, arthritis, and liver disease

## 2017-06-16 NOTE — MR AVS SNAPSHOT
After Visit Summary   6/16/2017    Princess TRISTIN Machado    MRN: [id number]           Patient Information     Date Of Birth          1990        Visit Information        Provider Department      6/16/2017 4:20 PM Noelle Anguiano MD [facility]        Care Instructions    1. I would observe the remaining 3 as they are not typical of warts     2. Keep wart covered with several layers at all times.  Do not pick at it with your fingernails, as you might get warts deep under your nails  You may cause more acid burn to treat the wart by grinding up aspirin and placing on it   Duct tape is also acidic and can be used as the 1st cover under the occluding tape Return in 2-3 weeks for retreatment    3. For the sore near the eye.  This looks like an infected pimple.   Hydrogen peroxide , then warm water packs, then press dry to get the water and pus out  2 x a day      4.  Weight Loss Tips  1. Do not eat after 6 hrs before your expected bedtime  2. Have your heaviest meal for breakfast, a slightly lighter meal at lunch and a snack 6 hrs before bed  3. No sugar/calorie drinks except milk ie no fruit juice, pop, alcohol.  4. Drink milk 30min before meals to decrease your hunger. Also it is excellent as part of your last meal of the day snack  5. Drink lots of water  6. Increase fiber in diet: all bran cereal, salads, popcorn etc  7. Have only one small serving of fruit a day about 1/2 cup (as this is high in sugar)  8. EXERCISE is the bottom line. Without it, you will gain weight even on a low calorie diet. Best if done 2-3X a day as can    Being overweight contributes to high blood pressure and high cholesterol, both of which cause heart attacks, strokes and kidney failure, prediabetes and diabetes, arthritis, and liver disease             Follow-ups after your visit        Your next 10 appointments already scheduled     Jun 27, 2017  9:00 AM CDT   [facility] with  Fallon Horne MD   [facility]    [address]  [facility] [id number] [phone]            Jun 27, 2017 10:40 AM CDT   Office Visit with Shelly Whipple MD   [facility] ([facility] )    [address] [place] [address]  [address]  [place] [address] [phone]           Bring a current list of meds and any records pertaining to this visit.  For Physicals, please bring immunization records and any forms needing to be filled out.  Please arrive 10 minutes early to complete paperwork.              Who to contact     If you have questions or need follow up information about today's clinic visit or your schedule please contact [facility] directly at [phone].  Normal or non-critical lab and imaging results will be communicated to you by MyChart, letter or phone within 4 business days after the clinic has received the results. If you do not hear from us within 7 days, please contact the clinic through [employer] or phone. If you have a critical or abnormal lab result, we will notify you by phone as soon as possible.  Submit refill requests through [employer] or call your pharmacy and they will forward the refill request to us. Please allow 3 business days for your refill to be completed.          Additional Information About Your Visit        MyChart Information     [employer] gives you secure access to your electronic health record. If you see a primary care provider, you can also send messages to your care team and make appointments. If you have questions, please call your primary care clinic.  If you do not have a primary care provider, please call [phone] and they will assist you.        Care EveryWhere ID     This is your Care EveryWhere ID. This could be used by other organizations to access your [place] medical records  [id number]        Your  "Vitals Were     Pulse Temperature Respirations Height Last Period Pulse Oximetry    81 98.6  F (37  C) (Temporal) 20 5' 6\" (1.676 m) 03/14/2017 97%    Breastfeeding? BMI (Body Mass Index)                No 55.52 kg/m2           Blood Pressure from Last 3 Encounters:   06/16/17 136/82   06/13/17 112/70   05/09/17 114/74    Weight from Last 3 Encounters:   06/16/17 (!) 344 lb (156 kg)   04/10/17 (!) 344 lb 9.6 oz (156.3 kg)   04/30/14 (!) 322 lb (146.1 kg)              We Performed the Following     MIGRAINE ACTION PLAN        Primary Care Provider Office Phone # Fax #    Freddycarson CASTILLO Horne [phone]       [facility] [address] [facility] [address]        Thank you!     Thank you for choosing [facility]  for your care. Our goal is always to provide you with excellent care. Hearing back from our patients is one way we can continue to improve our services. Please take a few minutes to complete the written survey that you may receive in the mail after your visit with us. Thank you!             Your Updated Medication List - Protect others around you: Learn how to safely use, store and throw away your medicines at www.disposemymeds.org.          This list is accurate as of: 6/16/17  5:04 PM.  Always use your most recent med list.                   Brand Name Dispense Instructions for use    BIOTIN PO      Take 10 mcg by mouth Reported on 5/9/2017       citalopram 20 MG tablet    celeXA    30 tablet    Take 1 tablet (20 mg) by mouth daily       doxycycline 100 MG capsule    VIBRAMYCIN    20 capsule    Take 1 capsule (100 mg) by mouth 2 times daily       glatiramer 20 MG/ML injection    COPAXONE         mupirocin 2 % ointment    BACTROBAN         norethindrone 0.35 MG per tablet    MICRONOR     Take 1 tablet by mouth daily       VITAMIN D3 PO      Take 1,000 Int'l Units by mouth daily Reported on 5/9/2017         "

## 2017-06-16 NOTE — NURSING NOTE
"Chief Complaint   Patient presents with     Derm Problem     /82  Pulse 81  Temp 98.6  F (37  C) (Temporal)  Resp 20  Ht 5' 6\" (1.676 m)  Wt (!) 344 lb (156 kg)  LMP 03/14/2017  SpO2 97%  Breastfeeding? No  BMI 55.52 kg/m2 Estimated body mass index is 55.52 kg/(m^2) as calculated from the following:    Height as of this encounter: 5' 6\" (1.676 m).    Weight as of this encounter: 344 lb (156 kg).  BP completed using cuff size: gordon Dykes CMA    There are no preventive care reminders to display for this patient.  Health Maintenance reviewed at today's visit patient asked to schedule/complete:   None, Health Maintenance up to date.    "

## 2017-06-27 ENCOUNTER — OFFICE VISIT (OUTPATIENT)
Dept: INTERNAL MEDICINE | Facility: CLINIC | Age: 27
End: 2017-06-27
Payer: COMMERCIAL

## 2017-06-27 ENCOUNTER — OFFICE VISIT (OUTPATIENT)
Dept: FAMILY MEDICINE | Facility: CLINIC | Age: 27
End: 2017-06-27
Payer: COMMERCIAL

## 2017-06-27 VITALS
OXYGEN SATURATION: 100 % | BODY MASS INDEX: 47.09 KG/M2 | WEIGHT: 293 LBS | HEIGHT: 66 IN | DIASTOLIC BLOOD PRESSURE: 72 MMHG | SYSTOLIC BLOOD PRESSURE: 112 MMHG | TEMPERATURE: 98 F | HEART RATE: 83 BPM

## 2017-06-27 DIAGNOSIS — E66.01 MORBID OBESITY DUE TO EXCESS CALORIES (H): ICD-10-CM

## 2017-06-27 DIAGNOSIS — L73.2 HIDRADENITIS AXILLARIS: Primary | ICD-10-CM

## 2017-06-27 DIAGNOSIS — F33.9 RECURRENT MAJOR DEPRESSIVE EPISODES (H): Primary | ICD-10-CM

## 2017-06-27 PROCEDURE — 11900 INJECT SKIN LESIONS </W 7: CPT | Performed by: FAMILY MEDICINE

## 2017-06-27 PROCEDURE — 99214 OFFICE O/P EST MOD 30 MIN: CPT | Performed by: INTERNAL MEDICINE

## 2017-06-27 PROCEDURE — 99212 OFFICE O/P EST SF 10 MIN: CPT | Mod: 25 | Performed by: FAMILY MEDICINE

## 2017-06-27 RX ORDER — DOXYCYCLINE 100 MG/1
CAPSULE ORAL
Qty: 28 CAPSULE | Refills: 0 | Status: SHIPPED | OUTPATIENT
Start: 2017-06-27 | End: 2017-12-12

## 2017-06-27 RX ORDER — BUPROPION HYDROCHLORIDE 150 MG/1
150 TABLET ORAL EVERY MORNING
Qty: 30 TABLET | Refills: 1 | Status: SHIPPED | OUTPATIENT
Start: 2017-06-27 | End: 2017-10-01 | Stop reason: ALTCHOICE

## 2017-06-27 ASSESSMENT — ANXIETY QUESTIONNAIRES
6. BECOMING EASILY ANNOYED OR IRRITABLE: SEVERAL DAYS
7. FEELING AFRAID AS IF SOMETHING AWFUL MIGHT HAPPEN: MORE THAN HALF THE DAYS
3. WORRYING TOO MUCH ABOUT DIFFERENT THINGS: NEARLY EVERY DAY
2. NOT BEING ABLE TO STOP OR CONTROL WORRYING: SEVERAL DAYS
IF YOU CHECKED OFF ANY PROBLEMS ON THIS QUESTIONNAIRE, HOW DIFFICULT HAVE THESE PROBLEMS MADE IT FOR YOU TO DO YOUR WORK, TAKE CARE OF THINGS AT HOME, OR GET ALONG WITH OTHER PEOPLE: SOMEWHAT DIFFICULT
1. FEELING NERVOUS, ANXIOUS, OR ON EDGE: MORE THAN HALF THE DAYS
5. BEING SO RESTLESS THAT IT IS HARD TO SIT STILL: NOT AT ALL
GAD7 TOTAL SCORE: 10

## 2017-06-27 ASSESSMENT — PATIENT HEALTH QUESTIONNAIRE - PHQ9: 5. POOR APPETITE OR OVEREATING: SEVERAL DAYS

## 2017-06-27 NOTE — NURSING NOTE
"Chief Complaint   Patient presents with     Recheck Medication     celexa, pt upped herself to 40 mg for the last 3 weeks       Initial /72 (BP Location: Right arm, Cuff Size: Adult Large)  Pulse 83  Temp 98  F (36.7  C) (Oral)  Ht 5' 6\" (1.676 m)  LMP 03/14/2017 (Approximate)  SpO2 100%  Breastfeeding? No Estimated body mass index is 55.52 kg/(m^2) as calculated from the following:    Height as of 6/16/17: 5' 6\" (1.676 m).    Weight as of 6/16/17: 344 lb (156 kg).  Medication Reconciliation: complete   Jossie Guzman CMA      "

## 2017-06-27 NOTE — PROGRESS NOTES
[facility]    CC : Hidradenitis   SUBJECTIVE:                                                    Princess TRISTIN Machado is a 26 year old female who presents to clinic today because of hidradenitis as well as breaking out under arms and groin area. Patient notes this has been a recurring issue throughout her life. Areas of involvement in the right and left axilla and groin.      Patient notes that her hidradenitis symptoms are severe. Reports that it'll soak through her clothes under her armpits. Her groin area has a distinct odor. States that the cysts have been draining for the last 2 - 2.5 months.    Patient has tried various kinds of treatments: celpahexin, Humira (which was somewhat helpful, but is now unable to take it), Hibiclens, clindamycin, Bactroban, amoxicillin, bactrim, and doxycycline (was thought to possibly have yeast infection). She reports that overall, these have not helped with her current situation. She has not tried any other antibiotics recently. She has not tried any surgeries. Has not tried zinc or tumeric.            Personal Medical History  Skin Cancer : NO  Eczema Psoriasis Rosacea Autoimmune   NO NO NO Yes - multiple sclerosis   Other : None    Family Medical History  Skin Cancer : NO  Eczema Psoriasis Rosacea Autoimmune   NO NO NO NO   Other : none.    Occupation : NA (NA).    Refer to electronic medical record (EMR) for past medical history and medications.    ROS : 14 point review of systems was negative except the symptoms listed above in the HPI.    This document serves as a record of the services and decisions personally performed and made by Tiffanie Whipple MD. It was created on her behalf by Lily Black, a trained medical scribe. The creation of this document is based the provider's statements to the medical scribe.    Sudeep Black 11:50 AM 6/27/2017      OBJECTIVE:                                                    GENERAL: healthy, alert and no  distress  SKIN: A[place]a Skin Type - II.  Axilla and Mons pubis were examined. The dermatoscope was used to help evaluate pigmented lesions.  Skin Pertinent Findings:  Axilla (Left>Right) - previous areas of hyperpigmentation from previous inflammation, with palpable tracks, sites of granulation tissue with drainage    Mons pubis - laterally, inflamed tender nodules with residual hyperpigmentation.     Name : Triamcinolone acetonide (Kenalog) injection.  Indication : inflamed cyst.  Location(s) : bilateral axilla, mons pubis.  Completed by : Tiffanie Whipple MD  Note : Prior to the procedure, we discussed possible hypo- and hyperpigmentation or depression of the skin post-procedure. Explained that more then one injection, up to six injections, spaced 4-6 weeks apart may be necessary.    Skin was cleansed with alcohol. 0.7 mL triamcinolone acetonide 7 mg/mL mixed with 0.3 mL of Lidocaine 2% plain and injected intralesionally into the left axilla.    Skin was cleansed with alcohol. 0.7 mL triamcinolone acetonide 3 mg/mL mixed with 0.3 mL of Lidocaine 2% plain and injected intralesionally into the right axilla    Skin was cleansed with alcohol. 0.7 mL triamcinolone acetonide 2 mg/mL mixed with 0.3 mL of Lidocaine 2% plain and injected intralesionally into the mons pubis    Total injected : 1.8 mL     Blanching was present during the injection. Minimal bleeding occurred. Patient left in satisfactory condition.  Total number treated : 5.    Diagnostic Test Results:  none     MDM : discussed alternative treatments with some supplements, intralesional injection with steroid. Ultimately if we cannot get good control, she is ready to consider surgery.      ASSESSMENT:                                                      Encounter Diagnosis   Name Primary?     Hidradenitis axillaris Yes           PLAN:                                                      Patient Instructions   Follow up appointment:   Follow up in 2  months.    Medications:  Recommended patient to begin alternative medicine as follows:   Zinc supplement - 40 mg daily   Tumeric supplement - 500 mg or 1000 mg daily.              Doxycycline 100 mg one tab two times per day for 14 days    Other non-surgical options:  Risks and perceived benefits of a steroid injection were discussed in clinic today. Patient elects to proceed.      PROCEDURES:                                                    None.    TT : 25 minutes.  CT : 15 minutes.      The information in this document, created by the medical scribe for me, accurately reflects the services I personally performed and the decisions made by me. I have reviewed and approved this document for accuracy prior to leaving the patient care area.  Tiffanie Whipple MD June 27, 2017 7:07 AM  [facility] - PR[facility]

## 2017-06-27 NOTE — MR AVS SNAPSHOT
After Visit Summary   6/27/2017    Princess TRISTIN Machado    MRN: [id number]           Patient Information     Date Of Birth          1990        Visit Information        Provider Department      6/27/2017 10:40 AM Shelly Whipple MD [facility]        Today's Diagnoses     Hidradenitis axillaris    -  1      Care Instructions    Follow up appointment:   Follow up in 2 months.    Medications:  Recommended patient to begin alternative medicine as follows:   Zinc supplement - 40 mg daily   Tumeric supplement - 500 mg or 1000 mg daily.              Doxycycline 100 mg one tab two times per day for 14 days    Other non-surgical options:  Risks and perceived benefits of a steroid injection were discussed in clinic today. Patient elects to proceed.           Follow-ups after your visit        Your next 10 appointments already scheduled     Aug 01, 2017 10:40 AM CDT   [facility] Fallon Horne [facility]   [facility]    [address] [facility] [address]   [phone]            Aug 29, 2017 10:40 AM CDT   Office Visit with Shelly Whipple MD   [facility] ([facility] )    [address] [place] [address]  [address] [place] [address] [phone]           Bring a current list of meds and any records pertaining to this visit.  For Physicals, please bring immunization records and any forms needing to be filled out.  Please arrive 10 minutes early to complete paperwork.              Who to contact     If you have questions or need follow up information about today's clinic visit or your schedule please contact [facility]ARE SK[facility] directly at [phone].  Normal or non-critical lab and imaging results will be communicated to you by MyChart, letter or phone within 4 business days after the clinic has received the results. If you do  not hear from us within 7 days, please contact the clinic through [employer] or phone. If you have a critical or abnormal lab result, we will notify you by phone as soon as possible.  Submit refill requests through [employer] or call your pharmacy and they will forward the refill request to us. Please allow 3 business days for your refill to be completed.          Additional Information About Your Visit        [employer] Information     [employer] gives you secure access to your electronic health record. If you see a primary care provider, you can also send messages to your care team and make appointments. If you have questions, please call your primary care clinic.  If you do not have a primary care provider, please call [phone] and they will assist you.        Care EveryWhere ID     This is your Care EveryWhere ID. This could be used by other organizations to access your [place] medical records  [id number]        Your Vitals Were     Last Period                   03/14/2017 (Approximate)            Blood Pressure from Last 3 Encounters:   06/27/17 112/72   06/16/17 136/82   06/13/17 112/70    Weight from Last 3 Encounters:   06/27/17 (!) 333 lb 9.6 oz (151.3 kg)   06/16/17 (!) 344 lb (156 kg)   04/10/17 (!) 344 lb 9.6 oz (156.3 kg)              Today, you had the following     No orders found for display         Today's Medication Changes          These changes are accurate as of: 6/27/17 11:12 AM.  If you have any questions, ask your nurse or doctor.               Start taking these medicines.        Dose/Directions    buPROPion 150 MG 24 hr tablet   Commonly known as:  WELLBUTRIN XL   Used for:  Recurrent major depressive episodes (H)   Started by:  Fallon Horne MD        Dose:  150 mg   Take 1 tablet (150 mg) by mouth every morning   Quantity:  30 tablet   Refills:  1         Stop taking these medicines if you haven't already. Please contact your care team if you have questions.     citalopram 20 MG  tablet   Commonly known as:  celeXA   Stopped by:  Fallon Horne MD                Where to get your medicines      These medications were sent to [facility] [address]  [address] [place] [address]     Phone:  [phone]     buPROPion 150 MG 24 hr tablet                Primary Care Provider Office Phone # Fax #    Fallon Horne [phone]       [facility] [address]  [facility] [address]        Equal Access to Services     [facility]: Hadii aad ku hadasho Soomaali, waaxda luqadaha, qaybta kaalmada adeegyada, amos ryan hayaan adetammy rehman . So [facility] [phone].    ATENCIÓN: Si habla español, tiene a shirley disposición servicios gratuitos de asistencia lingüística. Ll[facility] [phone].    We comply with applicable federal civil rights laws and [place] laws. We do not discriminate on the basis of race, color, national origin, age, disability sex, sexual orientation or gender identity.            Thank you!     Thank you for choosing [facility] - PRIMARY CARE SKIN  for your care. Our goal is always to provide you with excellent care. Hearing back from our patients is one way we can continue to improve our services. Please take a few minutes to complete the written survey that you may receive in the mail after your visit with us. Thank you!             Your Updated Medication List - Protect others around you: Learn how to safely use, store and throw away your medicines at www.disposemymeds.org.          This list is accurate as of: 6/27/17 11:12 AM.  Always use your most recent med list.                   Brand Name Dispense Instructions for use Diagnosis    BIOTIN PO      Take 10,000 mcg by mouth Reported on 5/9/2017        buPROPion 150 MG 24 hr tablet    WELLBUTRIN XL    30 tablet    Take 1 tablet (150 mg) by mouth every morning    Recurrent major depressive episodes (H)        glatiramer 20 MG/ML injection    COPAXONE          mupirocin 2 % ointment    BACTROBAN          norethindrone 0.35 MG per tablet    MICRONOR     Take 1 tablet by mouth daily        VITAMIN D3 PO      Take 1,000 Int'l Units by mouth daily Reported on 5/9/2017

## 2017-06-27 NOTE — PATIENT INSTRUCTIONS
Follow up appointment:   Follow up in 2 months.    Medications:  Recommended patient to begin alternative medicine as follows:   Zinc supplement - 40 mg daily   Tumeric supplement - 500 mg or 1000 mg daily.              Doxycycline 100 mg one tab two times per day for 14 days    Other non-surgical options:  Risks and perceived benefits of a steroid injection were discussed in clinic today. Patient elects to proceed.

## 2017-06-27 NOTE — MR AVS SNAPSHOT
After Visit Summary   6/27/2017    Princess TRISTIN Machado    MRN: [id number]           Patient Information     Date Of Birth          1990        Visit Information        Provider Department      6/27/2017 9:00 AM Fallon Horne MD [facility]        Today's Diagnoses     Recurrent major depressive episodes (H)    -  1    Morbid obesity due to excess calories (H) BMI 50-60           Follow-ups after your visit        Your next 10 appointments already scheduled     Aug 01, 2017 10:40 AM CDT   SHORT with Fallon Horne MD   [facility] ([facility])    [address]  [facility] [address]   [phone]            Aug 29, 2017 10:40 AM CDT   Office Visit with Shelly Whipple MD   [facility] ([facility] )    [address] [place] [address]  [address] [place] [id number] [phone]           Bring a current list of meds and any records pertaining to this visit.  For Physicals, please bring immunization records and any forms needing to be filled out.  Please arrive 10 minutes early to complete paperwork.              Who to contact     If you have questions or need follow up information about today's clinic visit or your schedule please contact [facility] directly at [phone].  Normal or non-critical lab and imaging results will be communicated to you by MyChart, letter or phone within 4 business days after the clinic has received the results. If you do not hear from us within 7 days, please contact the clinic through MyChart or phone. If you have a critical or abnormal lab result, we will notify you by phone as soon as possible.  Submit refill requests through [employer] or call your pharmacy and they will forward the refill request to us. Please allow 3 business days for your refill to be completed.          Additional Information About Your  "Visit        [employer] Information     [employer] gives you secure access to your electronic health record. If you see a primary care provider, you can also send messages to your care team and make appointments. If you have questions, please call your primary care clinic.  If you do not have a primary care provider, please call [phone] and they will assist you.        Care EveryWhere ID     This is your Care EveryWhere ID. This could be used by other organizations to access your [place] medical records  [id number]        Your Vitals Were     Pulse Temperature Height Last Period Pulse Oximetry Breastfeeding?    83 98  F (36.7  C) (Oral) 5' 6\" (1.676 m) 03/14/2017 (Approximate) 100% No    BMI (Body Mass Index)                   53.84 kg/m2            Blood Pressure from Last 3 Encounters:   06/27/17 112/72   06/16/17 136/82   06/13/17 112/70    Weight from Last 3 Encounters:   06/27/17 (!) 333 lb 9.6 oz (151.3 kg)   06/16/17 (!) 344 lb (156 kg)   04/10/17 (!) 344 lb 9.6 oz (156.3 kg)              Today, you had the following     No orders found for display         Today's Medication Changes          These changes are accurate as of: 6/27/17 12:33 PM.  If you have any questions, ask your nurse or doctor.               Start taking these medicines.        Dose/Directions    buPROPion 150 MG 24 hr tablet   Commonly known as:  WELLBUTRIN XL   Used for:  Recurrent major depressive episodes (H)   Started by:  Fallon Horne MD        Dose:  150 mg   Take 1 tablet (150 mg) by mouth every morning   Quantity:  30 tablet   Refills:  1         Stop taking these medicines if you haven't already. Please contact your care team if you have questions.     citalopram 20 MG tablet   Commonly known as:  celeXA   Stopped by:  Fallon Horne MD                Where to get your medicines      These medications were sent to [facility] [place], [place] - [address]  [address] " [facility] [address]     Phone:  [phone]     buPROPion 150 MG 24 hr tablet                Primary Care Provider Office Phone # Fax #    Fallon CASTILLO Horne [phone]       [facility] [address]  [facility] [address]        Equal Access to Services     DECLAN DAMIAN : Hadii aad ku hadasho Soomaali, waaxda luqadaha, qaybta kaalmada adeegyada, waxay idiin hayaan adeeg kharash la'aan ah. So [facility] [phone].    ATENCIÓN: Si habla español, tiene a shirley disposición servicios gratuitos de asistencia lingüística. Llame al [phone].    We comply with applicable federal civil rights laws and [place] laws. We do not discriminate on the basis of race, color, national origin, age, disability sex, sexual orientation or gender identity.            Thank you!     Thank you for choosing [facility]  for your care. Our goal is always to provide you with excellent care. Hearing back from our patients is one way we can continue to improve our services. Please take a few minutes to complete the written survey that you may receive in the mail after your visit with us. Thank you!             Your Updated Medication List - Protect others around you: Learn how to safely use, store and throw away your medicines at www.disposemymeds.org.          This list is accurate as of: 6/27/17 12:33 PM.  Always use your most recent med list.                   Brand Name Dispense Instructions for use Diagnosis    BIOTIN PO      Take 10,000 mcg by mouth Reported on 5/9/2017        buPROPion 150 MG 24 hr tablet    WELLBUTRIN XL    30 tablet    Take 1 tablet (150 mg) by mouth every morning    Recurrent major depressive episodes (H)       glatiramer 20 MG/ML injection    COPAXONE          mupirocin 2 % ointment    BACTROBAN          norethindrone 0.35 MG per tablet    MICRONOR     Take 1 tablet by mouth daily        VITAMIN D3 PO      Take 1,000 Int'l Units by mouth daily Reported on  5/9/2017

## 2017-06-28 ASSESSMENT — ANXIETY QUESTIONNAIRES: GAD7 TOTAL SCORE: 10

## 2017-06-28 ASSESSMENT — PATIENT HEALTH QUESTIONNAIRE - PHQ9: SUM OF ALL RESPONSES TO PHQ QUESTIONS 1-9: 14

## 2017-07-14 ENCOUNTER — TRANSFERRED RECORDS (OUTPATIENT)
Dept: HEALTH INFORMATION MANAGEMENT | Facility: CLINIC | Age: 27
End: 2017-07-14

## 2017-08-22 ENCOUNTER — MEDICAL CORRESPONDENCE (OUTPATIENT)
Dept: HEALTH INFORMATION MANAGEMENT | Facility: CLINIC | Age: 27
End: 2017-08-22

## 2017-08-22 DIAGNOSIS — G35 MULTIPLE SCLEROSIS (H): Primary | ICD-10-CM

## 2017-08-24 DIAGNOSIS — G35 MULTIPLE SCLEROSIS (H): ICD-10-CM

## 2017-08-24 LAB
ALBUMIN UR-MCNC: 30 MG/DL
APPEARANCE UR: CLEAR
BACTERIA #/AREA URNS HPF: ABNORMAL /HPF
BILIRUB UR QL STRIP: NEGATIVE
COLOR UR AUTO: YELLOW
GLUCOSE UR STRIP-MCNC: NEGATIVE MG/DL
HGB UR QL STRIP: ABNORMAL
KETONES UR STRIP-MCNC: ABNORMAL MG/DL
LEUKOCYTE ESTERASE UR QL STRIP: NEGATIVE
NITRATE UR QL: NEGATIVE
NON-SQ EPI CELLS #/AREA URNS LPF: ABNORMAL /LPF
PH UR STRIP: 7 PH (ref 5–7)
RBC #/AREA URNS AUTO: ABNORMAL /HPF
SOURCE: ABNORMAL
SP GR UR STRIP: 1.02 (ref 1–1.03)
UROBILINOGEN UR STRIP-ACNC: >=8 EU/DL (ref 0.2–1)
WBC #/AREA URNS AUTO: ABNORMAL /HPF

## 2017-08-24 PROCEDURE — 81001 URINALYSIS AUTO W/SCOPE: CPT | Performed by: PSYCHIATRY & NEUROLOGY

## 2017-09-03 ENCOUNTER — OFFICE VISIT (OUTPATIENT)
Dept: URGENT CARE | Facility: URGENT CARE | Age: 27
End: 2017-09-03
Payer: COMMERCIAL

## 2017-09-03 VITALS
TEMPERATURE: 99.8 F | DIASTOLIC BLOOD PRESSURE: 80 MMHG | OXYGEN SATURATION: 99 % | SYSTOLIC BLOOD PRESSURE: 120 MMHG | HEART RATE: 71 BPM

## 2017-09-03 DIAGNOSIS — J20.9 ACUTE BRONCHITIS WITH COEXISTING CONDITION REQUIRING PROPHYLACTIC TREATMENT: Primary | ICD-10-CM

## 2017-09-03 PROCEDURE — 99213 OFFICE O/P EST LOW 20 MIN: CPT | Performed by: PHYSICIAN ASSISTANT

## 2017-09-03 RX ORDER — ALBUTEROL SULFATE 90 UG/1
2 AEROSOL, METERED RESPIRATORY (INHALATION) EVERY 6 HOURS PRN
Qty: 1 INHALER | Refills: 0 | Status: SHIPPED | OUTPATIENT
Start: 2017-09-03 | End: 2017-12-12

## 2017-09-03 RX ORDER — DOXYCYCLINE 100 MG/1
100 CAPSULE ORAL 2 TIMES DAILY
Qty: 20 CAPSULE | Refills: 0 | Status: SHIPPED | OUTPATIENT
Start: 2017-09-03 | End: 2017-09-13

## 2017-09-03 NOTE — MR AVS SNAPSHOT
After Visit Summary   9/3/2017    Princess TRISTIN Machado    MRN: [id number]           Patient Information     Date Of Birth          1990        Visit Information        Provider Department      9/3/2017 3:20 PM Eleazar Mejia PA-C [facility]        Today's Diagnoses     Acute bronchitis with coexisting condition requiring prophylactic treatment    -  1      Care Instructions      Bronchitis, Antibiotic Treatment (Adult)    Bronchitis is an infection of the air passages (bronchial tubes) in your lungs. It often occurs when you have a cold. This illness is contagious during the first few days and is spread through the air by coughing and sneezing, or by direct contact (touching the sick person and then touching your own eyes, nose, or mouth).  Symptoms of bronchitis include cough with mucus (phlegm) and low-grade fever. Bronchitis usually lasts 7 to 14 days. Mild cases can be treated with simple home remedies. More severe infection is treated with an antibiotic.  Home care  Follow these guidelines when caring for yourself at home:    If your symptoms are severe, rest at home for the first 2 to 3 days. When you go back to your usual activities, don't let yourself get too tired.    Do not smoke. Also avoid being exposed to secondhand smoke.    You may use over-the-counter medicines to control fever or pain, unless another medicine was prescribed. (Note: If you have chronic liver or kidney disease or have ever had a stomach ulcer or gastrointestinal bleeding, talk with your healthcare provider before using these medicines. Also talk to your provider if you are taking medicine to prevent blood clots.) Aspirin should never be given to anyone younger than 18 years of age who is ill with a viral infection or fever. It may cause severe liver or brain damage.    Your appetite may be poor, so a light diet is fine. Avoid dehydration by drinking 6 to 8 glasses of fluids per day  (such as water, soft drinks, sports drinks, juices, tea, or soup). Extra fluids will help loosen secretions in the nose and lungs.    Over-the-counter cough, cold, and sore-throat medicines will not shorten the length of the illness, but they may be helpful to reduce symptoms. (Note: Do not use decongestants if you have high blood pressure.)    Finish all antibiotic medicine. Do this even if you are feeling better after only a few days.  Follow-up care  Follow up with your healthcare provider, or as advised. If you had an X-ray or ECG (electrocardiogram), a specialist will review it. You will be notified of any new findings that may affect your care.  Note: If you are age 65 or older, or if you have a chronic lung disease or condition that affects your immune system, or you smoke, talk to your healthcare provider about having pneumococcal vaccinations and a yearly influenza vaccination (flu shot).  When to seek medical advice  Call your healthcare provider right away if any of these occur:    Fever of 100.4 F (38 C) or higher    Coughing up increased amounts of colored sputum    Weakness, drowsiness, headache, facial pain, ear pain, or a stiff neck  Call 911, or get immediate medical care  Contact emergency services right away if any of these occur.    Coughing up blood    Worsening weakness, drowsiness, headache, or stiff neck    Trouble breathing, wheezing, or pain with breathing  Date Last Reviewed: 9/13/2015 2000-2017 [employer]. [address] [place] [address]. All rights reserved. This information is not intended as a substitute for professional medical care. Always follow your healthcare professional's instructions.                Follow-ups after your visit        Your next 10 appointments already scheduled     Sep 05, 2017 10:15 AM CDT   (Arrive by 10:00 AM)   [facility] Chiropractor with CANDIDA Adams CHIRO (BRYCE Delgadillo  )    [address] [facility]  [address]  [facility] [address] [phone]            Sep 26, 2017 10:15 AM CDT   [facility] Chiropractor with CANDIDA Adams Weston CHIRO (BRYCE Leona  )    [address] [facility]  [address]  [facility] [address] [phone]              Who to contact     If you have questions or need follow up information about today's clinic visit or your schedule please contact [facility] URGENT CARE directly at [phone].  Normal or non-critical lab and imaging results will be communicated to you by Atmailhart, letter or phone within 4 business days after the clinic has received the results. If you do not hear from us within 7 days, please contact the clinic through [employer] or phone. If you have a critical or abnormal lab result, we will notify you by phone as soon as possible.  Submit refill requests through [employer] or call your pharmacy and they will forward the refill request to us. Please allow 3 business days for your refill to be completed.          Additional Information About Your Visit        [employer] Information     [employer] gives you secure access to your electronic health record. If you see a primary care provider, you can also send messages to your care team and make appointments. If you have questions, please call your primary care clinic.  If you do not have a primary care provider, please call [phone] and they will assist you.        Care EveryWhere ID     This is your Care EveryWhere ID. This could be used by other organizations to access your [place] medical records  [id number]        Your Vitals Were     Pulse Temperature Pulse Oximetry             71 99.8  F (37.7  C) (Oral) 99%          Blood Pressure from Last 3 Encounters:   09/03/17 120/80   06/27/17 112/72   06/16/17 136/82    Weight from Last 3 Encounters:   06/27/17 (!) 333 lb 9.6 oz (151.3 kg)   06/16/17 (!) 344 lb (156 kg)   04/10/17 (!) 344 lb 9.6 oz (156.3 kg)              Today, you had the following     No  orders found for display         Today's Medication Changes          These changes are accurate as of: 9/3/17  3:47 PM.  If you have any questions, ask your nurse or doctor.               These medicines have changed or have updated prescriptions.        Dose/Directions    * doxycycline Monohydrate 100 MG Caps   This may have changed:  Another medication with the same name was added. Make sure you understand how and when to take each.   Used for:  Hidradenitis axillaris        1 tab po bid   Quantity:  28 capsule   Refills:  0       * doxycycline Monohydrate 100 MG Caps   This may have changed:  You were already taking a medication with the same name, and this prescription was added. Make sure you understand how and when to take each.   Used for:  Acute bronchitis with coexisting condition requiring prophylactic treatment        Dose:  100 mg   Take 1 capsule (100 mg) by mouth 2 times daily for 10 days   Quantity:  20 capsule   Refills:  0       * Notice:  This list has 2 medication(s) that are the same as other medications prescribed for you. Read the directions carefully, and ask your doctor or other care provider to review them with you.         Where to get your medicines      These medications were sent to [facility] [address] [place], [place] [address]     Phone:  [phone]     doxycycline Monohydrate 100 MG Caps                Primary Care Provider Office Phone # Fax #    Eyalmachocarson CASTILLO Horne [phone]       [address] [place] [address]        Equal Access to Services     [facility] : Hadii indy Garcia, waaxda lumary, qaybta kaalmada crissy, amos ramos. So [facility] [phone].    ATENCIÓN: Si habla español, tiene a shirley disposición servicios gratuitos de asistencia lingüística. Llame al [phone].    We comply with applicable federal civil rights laws and  [place] laws. We do not discriminate on the basis of race, color, national origin, age, disability sex, sexual orientation or gender identity.            Thank you!     Thank you for choosing [facility] URGENT CARE  for your care. Our goal is always to provide you with excellent care. Hearing back from our patients is one way we can continue to improve our services. Please take a few minutes to complete the written survey that you may receive in the mail after your visit with us. Thank you!             Your Updated Medication List - Protect others around you: Learn how to safely use, store and throw away your medicines at www.disposemymeds.org.          This list is accurate as of: 9/3/17  3:47 PM.  Always use your most recent med list.                   Brand Name Dispense Instructions for use Diagnosis    BIOTIN PO      Take 10,000 mcg by mouth Reported on 5/9/2017        buPROPion 150 MG 24 hr tablet    WELLBUTRIN XL    30 tablet    Take 1 tablet (150 mg) by mouth every morning    Recurrent major depressive episodes (H)       citalopram 40 MG tablet    celeXA    45 tablet    Take 1.5 tablets (60 mg) by mouth daily    Recurrent major depressive episodes (H)       * doxycycline Monohydrate 100 MG Caps     28 capsule    1 tab po bid    Hidradenitis axillaris       * doxycycline Monohydrate 100 MG Caps     20 capsule    Take 1 capsule (100 mg) by mouth 2 times daily for 10 days    Acute bronchitis with coexisting condition requiring prophylactic treatment       glatiramer 20 MG/ML injection    COPAXONE          mupirocin 2 % ointment    BACTROBAN          norethindrone 0.35 MG per tablet    MICRONOR     Take 1 tablet by mouth daily        REBIF TITRATION PACK 6X8.8 & 6X22 MCG Sosy   Generic drug:  Interferon Beta-1a      Inject 1 Units Subcutaneous three times a week        VITAMIN D3 PO      Take 1,000 Int'l Units by mouth daily Reported on 5/9/2017        * Notice:  This list has 2 medication(s) that are the  same as other medications prescribed for you. Read the directions carefully, and ask your doctor or other care provider to review them with you.

## 2017-09-03 NOTE — NURSING NOTE
"Chief Complaint   Patient presents with     Urgent Care     URI     Possible bronchitis- Severe cough, wheezing, runny nose, post nasal drip, chills, fever, nasal congestion x3 days.       Initial /80 (BP Location: Right arm, Patient Position: Chair, Cuff Size: Adult Regular)  Pulse 71  Temp 99.8  F (37.7  C) (Oral)  SpO2 99% Estimated body mass index is 53.84 kg/(m^2) as calculated from the following:    Height as of 6/27/17: 5' 6\" (1.676 m).    Weight as of 6/27/17: 333 lb 9.6 oz (151.3 kg).  Medication Reconciliation: complete     Suyapa Elizondo CMA (AAMA)        "

## 2017-09-03 NOTE — PATIENT INSTRUCTIONS
Bronchitis, Antibiotic Treatment (Adult)    Bronchitis is an infection of the air passages (bronchial tubes) in your lungs. It often occurs when you have a cold. This illness is contagious during the first few days and is spread through the air by coughing and sneezing, or by direct contact (touching the sick person and then touching your own eyes, nose, or mouth).  Symptoms of bronchitis include cough with mucus (phlegm) and low-grade fever. Bronchitis usually lasts 7 to 14 days. Mild cases can be treated with simple home remedies. More severe infection is treated with an antibiotic.  Home care  Follow these guidelines when caring for yourself at home:    If your symptoms are severe, rest at home for the first 2 to 3 days. When you go back to your usual activities, don't let yourself get too tired.    Do not smoke. Also avoid being exposed to secondhand smoke.    You may use over-the-counter medicines to control fever or pain, unless another medicine was prescribed. (Note: If you have chronic liver or kidney disease or have ever had a stomach ulcer or gastrointestinal bleeding, talk with your healthcare provider before using these medicines. Also talk to your provider if you are taking medicine to prevent blood clots.) Aspirin should never be given to anyone younger than 18 years of age who is ill with a viral infection or fever. It may cause severe liver or brain damage.    Your appetite may be poor, so a light diet is fine. Avoid dehydration by drinking 6 to 8 glasses of fluids per day (such as water, soft drinks, sports drinks, juices, tea, or soup). Extra fluids will help loosen secretions in the nose and lungs.    Over-the-counter cough, cold, and sore-throat medicines will not shorten the length of the illness, but they may be helpful to reduce symptoms. (Note: Do not use decongestants if you have high blood pressure.)    Finish all antibiotic medicine. Do this even if you are feeling better after only a  few days.  Follow-up care  Follow up with your healthcare provider, or as advised. If you had an X-ray or ECG (electrocardiogram), a specialist will review it. You will be notified of any new findings that may affect your care.  Note: If you are age 65 or older, or if you have a chronic lung disease or condition that affects your immune system, or you smoke, talk to your healthcare provider about having pneumococcal vaccinations and a yearly influenza vaccination (flu shot).  When to seek medical advice  Call your healthcare provider right away if any of these occur:    Fever of 100.4 F (38 C) or higher    Coughing up increased amounts of colored sputum    Weakness, drowsiness, headache, facial pain, ear pain, or a stiff neck  Call 911, or get immediate medical care  Contact emergency services right away if any of these occur.    Coughing up blood    Worsening weakness, drowsiness, headache, or stiff neck    Trouble breathing, wheezing, or pain with breathing  Date Last Reviewed: 9/13/2015 2000-2017 [employer]. [address] [place] [address], [place] [address]. All rights reserved. This information is not intended as a substitute for professional medical care. Always follow your healthcare professional's instructions.

## 2017-09-03 NOTE — PROGRESS NOTES
SUBJECTIVE:    Princess TRISTIN Machado is a 26 y.o. Woman with a pmhx that includes need for immunosuppressing medication for tx of MS and a self-reported hx of bronchitis with associated bronchospasms who presents to  today for evaluation of progressively worsening cough over past 4 days. States she is coughing up lots of green phlegm.     ROS:     HEENT: Positive nasal congestion.   RESP: Positive cough as per above. Despite cough, denies any severe SOB.  Positive hx of bronchospasms with URI trigger. Has used Albuterol and Methylprednisolone in past    GI: Denies any N/V/D. No abdominal pain. Normal BM's  SKIN: Denies rash          Past Medical History:   Diagnosis Date     Anxiety      Depressive disorder      Hidradenitis suppurativa 2010     MS (multiple sclerosis) (H)     diagnosis 10/2016     Current Outpatient Prescriptions   Medication     Interferon Beta-1a (REBIF TITRATION PACK) 6X8.8 & 6X22 MCG SOSY     doxycycline Monohydrate 100 MG CAPS     albuterol (PROAIR HFA/PROVENTIL HFA/VENTOLIN HFA) 108 (90 BASE) MCG/ACT Inhaler     citalopram (CELEXA) 40 MG tablet     buPROPion (WELLBUTRIN XL) 150 MG 24 hr tablet     doxycycline Monohydrate 100 MG CAPS     mupirocin (BACTROBAN) 2 % ointment     glatiramer (COPAXONE) 20 MG/ML injection     Cholecalciferol (VITAMIN D3 PO)     BIOTIN PO     norethindrone (MICRONOR) 0.35 MG per tablet     No current facility-administered medications for this visit.        Allergies   Allergen Reactions     Isotretinoin Rash and Hives     Accutane Rash       OBJECTIVE:  /80 (BP Location: Right arm, Patient Position: Chair, Cuff Size: Adult Regular)  Pulse 71  Temp 99.8  F (37.7  C) (Oral)  SpO2 99%    General appearance: alert and no apparent distress  Skin color is pink and without rash.  HEENT:   Conjunctiva not injected.  Sclera clear.  Left TM is normal: no effusions, no erythema, and normal landmarks.  Right TM is normal: no effusions, no erythema, and normal  "landmarks.  Nasal mucosa is congested   Oropharyngeal exam is normal: no lesions, erythema, adenopathy or exudate.  Neck is supple, FROM.  with no adenopathy  CARDIAC:NORMAL - regular rate and rhythm without murmur.  RESP: Normal - CTA without rales, rhonchi, or wheezing.      ASSESSMENT/PLAN:    (J20.9) Acute bronchitis with coexisting condition requiring prophylactic treatment  (primary encounter diagnosis)  Plan: doxycycline Monohydrate 100 MG CAPS, albuterol         (PROAIR HFA/PROVENTIL HFA/VENTOLIN HFA) 108 (90        BASE) MCG/ACT Inhaler       1. Doxy rx as per above   2. Albuterol MDI prn   3. Follow-up with PCP if sxs change, worsen or fail to fully resolve with above tx.  4.  In addition to the above, Bronchitis \"red flag\" signs and sxs are reviewed with pt both verbally and by way of printed educational material for home review.  Pt verbalizes understanding of and agrees to the above plan.         "

## 2017-09-05 ENCOUNTER — THERAPY VISIT (OUTPATIENT)
Dept: CHIROPRACTIC MEDICINE | Facility: CLINIC | Age: 27
End: 2017-09-05
Payer: COMMERCIAL

## 2017-09-05 DIAGNOSIS — M99.02 THORACIC SEGMENT DYSFUNCTION: ICD-10-CM

## 2017-09-05 DIAGNOSIS — M54.50 LUMBAGO: Primary | ICD-10-CM

## 2017-09-05 DIAGNOSIS — M99.04 SOMATIC DYSFUNCTION OF SACRAL REGION: ICD-10-CM

## 2017-09-05 DIAGNOSIS — M53.3 SACRAL PAIN: ICD-10-CM

## 2017-09-05 DIAGNOSIS — M62.838 MUSCLE SPASM: ICD-10-CM

## 2017-09-05 PROCEDURE — 98941 CHIROPRACT MANJ 3-4 REGIONS: CPT | Mod: AT | Performed by: CHIROPRACTOR

## 2017-09-05 PROCEDURE — 99213 OFFICE O/P EST LOW 20 MIN: CPT | Mod: 25 | Performed by: CHIROPRACTOR

## 2017-09-05 PROCEDURE — 97035 APP MDLTY 1+ULTRASOUND EA 15: CPT | Performed by: CHIROPRACTOR

## 2017-09-05 NOTE — PROGRESS NOTES
Initial Chiropractic Clinic Visit    PCP: Fallon Horne    Princess TRISTIN Machado is a 26 year old female who is seen  as a self referral presenting with chronic mid to low back pain . Patient reports that the onset was several years intermittently however worsened in the past 5 months. When asked, patient denies:, falling, slipping, bending and reaching or sleeping awkwardly. The pt reports mid to low back pain that has increased in the past 5 months. She grades the pain a 4/10 on VAS. She has been seeing a chiropractor for the issue with no long term improvement. She states she experiences 'flare-ups' in the mid to low back depending on what activity she is performing. She also reports pain in the upper shoulders. She is having difficulty performing Hermann exercises and washing dishes. She has MS and does not feel it is related to her current pain.   Prior to onset, the patient was able to wash dishes without pain. Patient notes that due to symptoms, they cannot perform exercises due to pain. Princess TRISTIN Machado notes   Washing dishes  rated at a 4/10 and  prior to this onset it was 2/10.        Injury: There was no injury related to this episode    Location of Pain: bilateral mid to low back  at the following level(s) T1 , T7 , T9 , L4 , Sacrum  and PSIS Right   Duration of Pain: several years however increase in the past 5 months   Rating of Pain at worst: 4/10  Rating of Pain Currently: 4/10  Symptoms are better with:  Previous Chiropractic however no long term results  Symptoms are worse with: standing, sitting, doing dishes  Additional Features: none      Health History  as reported by the patient:    How does the patient rate their own health:   Poor    Current or past medical history:   Depression, Overweight and other: multiple sclerosis    Medical allergies  Other: isotretinoin     Past Traumas/Surgeries  Other:  Ear tubes     Family History  Family History   Problem Relation Age of Onset      CEREBROVASCULAR DISEASE Mother      heart attack     CEREBROVASCULAR DISEASE Father      heart attack     MENTAL ILLNESS Sister      Post-Traumatic Stress Disorder (PTSD) Brother      Glaucoma No family hx of      Macular Degeneration No family hx of      DIABETES No family hx of      Coronary Artery Disease No family hx of      Hypertension No family hx of      Hyperlipidemia No family hx of      Breast Cancer No family hx of      Colon Cancer No family hx of      Prostate Cancer No family hx of      Other Cancer No family hx of      Depression No family hx of      Anxiety Disorder No family hx of      Substance Abuse No family hx of      Anesthesia Reaction No family hx of      Asthma No family hx of      OSTEOPOROSIS No family hx of      Genetic Disorder No family hx of      Thyroid Disease No family hx of      Obesity No family hx of      Unknown/Adopted No family hx of        Medications:  Anti-depressants and Pain    Occupation:  Medical Assistant     Primary job tasks:   Repetitive tasks    Barriers as home/work:   none    Additional health Issues:     MS Mayer was asked to complete the Oswestry Low Back Disability Index and Chantelle Start Back screening tool.  today in the office.  Disability score: 10%. Keel Start Total Score:5 Sub Score: 4   NDI: 12    Review of Systems  Musculoskeletal: as above  Remainder of review of systems is negative including constitutional, CV, pulmonary, GI, Skin and Neurologic except as noted in HPI or medical history.    Past Medical History:   Diagnosis Date     Anxiety      Depressive disorder      Hidradenitis suppurativa 2010     MS (multiple sclerosis) (H)     diagnosis 10/2016     Past Surgical History:   Procedure Laterality Date     ENT SURGERY      tubes in ears     MYRINGOTOMY, INSERT TUBE BILATERAL, COMBINED  1990s     Objective  There were no vitals taken for this visit.      GENERAL APPEARANCE: healthy, alert and no distress   GAIT: NORMAL  SKIN: no  "suspicious lesions or rashes  NEURO: Normal strength and tone, mentation intact and speech normal  PSYCH:  mentation appears normal and affect normal/bright    Low back exam:    Inspection:  \"     no visible deformity in the low back       normal skin\",    ROM:       limited flexion due to pain       limited extension due to pain    Tender:       paraspinal muscles       B QL, B thoracic paraspinals     Non Tender:       remainder of lumbar spine    Strength:       hip flexion 5/5       knee extension 5/5       ankle dorsiflexion 5/5       ankle plantarflexion 5/5       dorsiflexion of the great toe 5/5    Reflexes:       patellar (L3, L4) symmetric normal       achilles tendons (S1) symmetric normal    Sensation:      grossly intact throughout lower extremities    Special tests:  Kemps - Right positive, low back pain and Left positive, low back pain, SLR - Right negative and Left negative, Gaenslen's - Right negative and Left negative and Fabere - Right positive, hip and low back pain and Left negative    Segmental spinal dysfunction/restrictions found at::.T1 , T7 , T9 , L4 , Sacrum  and PSIS Right     The following soft tissue hypotonicities were observed:Gluteal: right, referred pain: no  Quad lumb: bilateral, referred pain: no  T paraspinals: ache and dull pain, no    Trigger points were found in:none     Muscle spasm found in:Lumbar erector spine, Quad lumb and T-spine paraspinal      Radiology:  None     Assessment:    1. Lumbago    2. Sacral pain    3. Somatic dysfunction of sacral region    4. Thoracic segment dysfunction    5. Muscle spasm        RX ordered/plan of care  Anticipated outcomes  Possible risks and side effects    After discussing the risk and benefits of care, patient consented to treatment    Prognosis: Guarded      Patient's condition:  Patient had restrictions pre-manipulation    Treatment effectiveness:  Post manipulation there is better intersegmental movement and Patient claims to feel " looser post manipulation      Plan:    Procedures:  Evaluation and Management:  72444 Moderate level exam 30 min    CMT:  47044 Chiropractic manipulative treatment 3-4 regions performed   Thoracic: Diversified, T6, T9, Prone  Lumbar: Diversified, L5, Side posture  Pelvis: Drop Table, Sacrum , PSIS Right , Prone    Modalities:  07307: US:  1.6  Ritter/cm squared for 8  minutes at 1 mhz  Location: B Thoracic paraspinals     Therapeutic procedures:  None    Response to Treatment  No increase in sx pt stable       Treatment plan and goals:  Goals:  Return to Hermann  Washing dishes  Performing workouts    Frequency of care  Duration of care is estimated to be 4-8  weeks, from the initial treatment.  It is estimated that the patient will need a total of 4-8 visits to resolve this episode.  For the initial therapeutic trial of care, the frequency is recommended at 1  X week, once daily.  A reevaluation would be clinically appropriate in 4-8  visits, to determine progress and further course of care.    In-Office Treatment  Evaluation  Spinal Chiropractic Manipulative Therapy  ACP  US therapy        Recommendations:    Instructions:expect soreness    Follow-up:  Return to care in 1 week with ACP.       Discussed the assessment with the patient.      Disclaimer: This note consists of symbols derived from keyboarding, dictation and/or voice recognition software. As a result, there may be errors in the script that have gone undetected. Please consider this when interpreting information found in this chart.

## 2017-09-05 NOTE — MR AVS SNAPSHOT
After Visit Summary   9/5/2017    Princess TRISTIN Machado    MRN: [id number]           Patient Information     Date Of Birth          1990        Visit Information        Provider Department      9/5/2017 10:15 AM Amparo Londono [facility]        Today's Diagnoses     Lumbago    -  1    Sacral pain        Somatic dysfunction of sacral region        Thoracic segment dysfunction        Muscle spasm           Follow-ups after your visit        Your next 10 appointments already scheduled     Sep 12, 2017 11:45 AM CDT   [facility] with CANDIDA Adams ([facility] [place]  )    [address]  [address]  [facility] [address]   [phone]            Sep 19, 2017  2:00 PM CDT   [facility]ractor with CANDIDA Adams ([facility] [place]  )    [address]  [address]  [facility] [id number] [phone]            Sep 26, 2017 10:15 AM CDT   [facility]ractor with CANDIDA Adams ([facility]  )    [address]  [address] [place] [id number] [phone]              Who to contact     If you have questions or need follow up information about today's clinic visit or your schedule please contact [facility] directly at [phone].  Normal or non-critical lab and imaging results will be communicated to you by MyChart, letter or phone within 4 business days after the clinic has received the results. If you do not hear from us within 7 days, please contact the clinic through [employer] or phone. If you have a critical or abnormal lab result, we will notify you by phone as soon as possible.  Submit refill requests through [employer] or call your pharmacy and they will forward the refill request to us. Please allow 3 business days for your refill to be completed.          Additional Information About Your Visit        MyChart Information     [facility] gives  you secure access to your electronic health record. If you see a primary care provider, you can also send messages to your care team and make appointments. If you have questions, please call your primary care clinic.  If you do not have a primary care provider, please call [phone] and they will assist you.        Care EveryWhere ID     This is your Care EveryWhere ID. This could be used by other organizations to access your [facility] medical records  [id number]         Blood Pressure from Last 3 Encounters:   09/03/17 120/80   06/27/17 112/72   06/16/17 136/82    Weight from Last 3 Encounters:   06/27/17 (!) 151.3 kg (333 lb 9.6 oz)   06/16/17 (!) 156 kg (344 lb)   04/10/17 (!) 156.3 kg (344 lb 9.6 oz)              We Performed the Following     CHIROPRAC MANIP,SPINAL,3-4 REGIONS     OFFICE/OUTPT VISIT,EST,LEVL III     ULTRASOUND THERAPY        Primary Care Provider Office Phone # Fax #    Krishnakumari G MD Ozzie [phone]       [address] [facility] [address]        Equal Access to Services     [facility]: Hadii aad ku hadasho Soomaali, waaxda luqadaha, qaybta kaalmada adeegyada, amos ryan hayyemin kiran rehman ah. So [facility] [phone].    ATENCIÓN: Si habla español, tiene a shirley disposición servicios gratuitos de asistencia lingüística. Llame al [phone].    We comply with applicable federal civil rights laws and [place] laws. We do not discriminate on the basis of race, color, national origin, age, disability sex, sexual orientation or gender identity.            Thank you!     Thank you for choosing [facility]  for your care. Our goal is always to provide you with excellent care. Hearing back from our patients is one way we can continue to improve our services. Please take a few minutes to complete the written survey that you may receive in the mail after your visit with us. Thank you!             Your Updated Medication List - Protect others around  you: Learn how to safely use, store and throw away your medicines at www.disposemymeds.org.          This list is accurate as of: 9/5/17 11:59 PM.  Always use your most recent med list.                   Brand Name Dispense Instructions for use Diagnosis    albuterol 108 (90 BASE) MCG/ACT Inhaler    PROAIR HFA/PROVENTIL HFA/VENTOLIN HFA    1 Inhaler    Inhale 2 puffs into the lungs every 6 hours as needed for shortness of breath / dyspnea or wheezing    Acute bronchitis with coexisting condition requiring prophylactic treatment       BIOTIN PO      Take 10,000 mcg by mouth Reported on 5/9/2017        buPROPion 150 MG 24 hr tablet    WELLBUTRIN XL    30 tablet    Take 1 tablet (150 mg) by mouth every morning    Recurrent major depressive episodes (H)       citalopram 40 MG tablet    celeXA    45 tablet    Take 1.5 tablets (60 mg) by mouth daily    Recurrent major depressive episodes (H)       * doxycycline Monohydrate 100 MG Caps     28 capsule    1 tab po bid    Hidradenitis axillaris       * doxycycline Monohydrate 100 MG Caps     20 capsule    Take 1 capsule (100 mg) by mouth 2 times daily for 10 days    Acute bronchitis with coexisting condition requiring prophylactic treatment       glatiramer 20 MG/ML injection    COPAXONE          mupirocin 2 % ointment    BACTROBAN          norethindrone 0.35 MG per tablet    MICRONOR     Take 1 tablet by mouth daily        REBIF TITRATION PACK 6X8.8 & 6X22 MCG Sosy   Generic drug:  Interferon Beta-1a      Inject 1 Units Subcutaneous three times a week        VITAMIN D3 PO      Take 1,000 Int'l Units by mouth daily Reported on 5/9/2017        * Notice:  This list has 2 medication(s) that are the same as other medications prescribed for you. Read the directions carefully, and ask your doctor or other care provider to review them with you.

## 2017-09-06 PROBLEM — M99.04 SOMATIC DYSFUNCTION OF SACRAL REGION: Status: ACTIVE | Noted: 2017-09-06

## 2017-09-06 PROBLEM — M99.02 THORACIC SEGMENT DYSFUNCTION: Status: ACTIVE | Noted: 2017-09-06

## 2017-09-06 PROBLEM — M53.3 SACRAL PAIN: Status: ACTIVE | Noted: 2017-09-06

## 2017-09-06 PROBLEM — M62.838 MUSCLE SPASM: Status: ACTIVE | Noted: 2017-09-06

## 2017-09-20 ENCOUNTER — MYC MEDICAL ADVICE (OUTPATIENT)
Dept: INTERNAL MEDICINE | Facility: CLINIC | Age: 27
End: 2017-09-20

## 2017-09-21 DIAGNOSIS — R53.83 FATIGUE: ICD-10-CM

## 2017-09-21 DIAGNOSIS — G35 RELAPSING REMITTING MULTIPLE SCLEROSIS (H): Primary | ICD-10-CM

## 2017-09-21 DIAGNOSIS — E55.9 VITAMIN D DEFICIENCY: ICD-10-CM

## 2017-09-26 ENCOUNTER — OFFICE VISIT (OUTPATIENT)
Dept: INTERNAL MEDICINE | Facility: CLINIC | Age: 27
End: 2017-09-26
Payer: COMMERCIAL

## 2017-09-26 DIAGNOSIS — M51.34 DDD (DEGENERATIVE DISC DISEASE), THORACIC: Primary | ICD-10-CM

## 2017-09-26 DIAGNOSIS — F41.9 ANXIETY: ICD-10-CM

## 2017-09-26 DIAGNOSIS — F33.9 RECURRENT MAJOR DEPRESSIVE EPISODES (H): ICD-10-CM

## 2017-09-26 PROCEDURE — 99214 OFFICE O/P EST MOD 30 MIN: CPT | Performed by: INTERNAL MEDICINE

## 2017-09-26 RX ORDER — DULOXETIN HYDROCHLORIDE 60 MG/1
60 CAPSULE, DELAYED RELEASE ORAL DAILY
Qty: 30 CAPSULE | Refills: 1 | Status: SHIPPED | OUTPATIENT
Start: 2017-09-26 | End: 2017-12-23

## 2017-09-26 RX ORDER — METHOCARBAMOL 750 MG/1
750 TABLET, FILM COATED ORAL 2 TIMES DAILY PRN
Qty: 40 TABLET | Refills: 1 | Status: SHIPPED | OUTPATIENT
Start: 2017-09-26 | End: 2018-04-17

## 2017-09-26 RX ORDER — CITALOPRAM HYDROBROMIDE 40 MG/1
20 TABLET ORAL DAILY
Qty: 45 TABLET | Refills: 0 | COMMUNITY
Start: 2017-09-26 | End: 2017-12-12

## 2017-09-26 ASSESSMENT — ANXIETY QUESTIONNAIRES
3. WORRYING TOO MUCH ABOUT DIFFERENT THINGS: SEVERAL DAYS
IF YOU CHECKED OFF ANY PROBLEMS ON THIS QUESTIONNAIRE, HOW DIFFICULT HAVE THESE PROBLEMS MADE IT FOR YOU TO DO YOUR WORK, TAKE CARE OF THINGS AT HOME, OR GET ALONG WITH OTHER PEOPLE: SOMEWHAT DIFFICULT
5. BEING SO RESTLESS THAT IT IS HARD TO SIT STILL: NOT AT ALL
1. FEELING NERVOUS, ANXIOUS, OR ON EDGE: MORE THAN HALF THE DAYS
6. BECOMING EASILY ANNOYED OR IRRITABLE: MORE THAN HALF THE DAYS
GAD7 TOTAL SCORE: 9
2. NOT BEING ABLE TO STOP OR CONTROL WORRYING: SEVERAL DAYS
7. FEELING AFRAID AS IF SOMETHING AWFUL MIGHT HAPPEN: MORE THAN HALF THE DAYS

## 2017-09-26 ASSESSMENT — PATIENT HEALTH QUESTIONNAIRE - PHQ9: 5. POOR APPETITE OR OVEREATING: SEVERAL DAYS

## 2017-09-26 NOTE — NURSING NOTE
"Chief Complaint   Patient presents with     Anxiety       Initial Pulse 82  Temp 97.3  F (36.3  C) (Oral)  Ht 5' 6\" (1.676 m)  Wt (!) 333 lb (151 kg)  LMP 09/13/2017  SpO2 99%  BMI 53.75 kg/m2 Estimated body mass index is 53.75 kg/(m^2) as calculated from the following:    Height as of this encounter: 5' 6\" (1.676 m).    Weight as of this encounter: 333 lb (151 kg).  Medication Reconciliation: complete     Pamela Wasserman CMA      "

## 2017-09-26 NOTE — PROGRESS NOTES
SUBJECTIVE:   Princess TRISTIN Machado is a 27 year old female who presents to clinic today for the following health issues:    Depression and Anxiety Follow-Up    Status since last visit: slightly Improved but still has symptoms of depression and anxiety, on Celexa 60 mg daily.    Other associated symptoms:None    Complicating factors:     Significant life event: No     Current substance abuse: None    Pt has tried Zoloft, Prozac, Wellbutrin in the past     PHQ-9 SCORE 5/9/2017 6/27/2017 9/26/2017   Total Score 16 14 12     STACIA-7 SCORE 5/9/2017 6/27/2017 9/26/2017   Total Score 19 10 9        Pt is also here to f/u on back pain , has h/o DDDz ,disc protrusion T6-7 and T8-9 and mild spinal stenosis and gets on and off muscle spasms and back pain, taking OTC meds w/o relief.   Also has MS and see neurologist.         Amount of exercise or physical activity: 2-3 days/week for an average of 30-45 minutes    Problems taking medications regularly: No    Medication side effects: none  Diet: regular (no restrictions)         Patient Active Problem List   Diagnosis     Optic neuritis, left     Recurrent major depressive episodes (H)     Multiple sclerosis (H)     Bulimia nervosa     Acne vulgaris     Anxiety     Chronic back pain     Freckles     Headache     Other specified health status     Hidradenitis suppurativa     Hypertriglyceridemia     Hidradenitis     Migraine without status migrainosus, not intractable     Biomechanical lesion     Numbness of finger     Somatic dysfunction of cervical region     Spasm     Vitamin D deficiency     Common wart: L t lat hand -      Morbid obesity due to excess calories (H) BMI 50-60     Lumbago     Sacral pain     Somatic dysfunction of sacral region     Thoracic segment dysfunction     Muscle spasm     Recurrent major depression (H)     Past Surgical History:   Procedure Laterality Date     ENT SURGERY      tubes in ears     MYRINGOTOMY, INSERT TUBE BILATERAL, COMBINED  1990s        Social History   Substance Use Topics     Smoking status: Never Smoker     Smokeless tobacco: Never Used     Alcohol use No     Family History   Problem Relation Age of Onset     CEREBROVASCULAR DISEASE Mother      heart attack     CEREBROVASCULAR DISEASE Father      heart attack     MENTAL ILLNESS Sister      Post-Traumatic Stress Disorder (PTSD) Brother      Glaucoma No family hx of      Macular Degeneration No family hx of      DIABETES No family hx of      Coronary Artery Disease No family hx of      Hypertension No family hx of      Hyperlipidemia No family hx of      Breast Cancer No family hx of      Colon Cancer No family hx of      Prostate Cancer No family hx of      Other Cancer No family hx of      Depression No family hx of      Anxiety Disorder No family hx of      Substance Abuse No family hx of      Anesthesia Reaction No family hx of      Asthma No family hx of      OSTEOPOROSIS No family hx of      Genetic Disorder No family hx of      Thyroid Disease No family hx of      Obesity No family hx of      Unknown/Adopted No family hx of          Current Outpatient Prescriptions   Medication Sig Dispense Refill     methocarbamol (ROBAXIN) 750 MG tablet Take 1 tablet (750 mg) by mouth 2 times daily as needed for muscle spasms 40 tablet 1     DULoxetine (CYMBALTA) 60 MG EC capsule Take 1 capsule (60 mg) by mouth daily 30 capsule 1     citalopram (CELEXA) 40 MG tablet Take 0.5 tablets (20 mg) by mouth daily 45 tablet 0     Interferon Beta-1a (REBIF TITRATION PACK) 6X8.8 & 6X22 MCG SOSY Inject 1 Units Subcutaneous three times a week       albuterol (PROAIR HFA/PROVENTIL HFA/VENTOLIN HFA) 108 (90 BASE) MCG/ACT Inhaler Inhale 2 puffs into the lungs every 6 hours as needed for shortness of breath / dyspnea or wheezing 1 Inhaler 0     doxycycline Monohydrate 100 MG CAPS 1 tab po bid 28 capsule 0     mupirocin (BACTROBAN) 2 % ointment        Cholecalciferol (VITAMIN D3 PO) Take 1,000 Int'l Units by mouth daily  "Reported on 5/9/2017       BIOTIN PO Take 10,000 mcg by mouth Reported on 5/9/2017       norethindrone (MICRONOR) 0.35 MG per tablet Take 1 tablet by mouth daily       glatiramer (COPAXONE) 20 MG/ML injection            Reviewed and updated as needed this visit by clinical staffTobacco  Allergies  Meds  Problems  Med Hx  Surg Hx  Fam Hx  Soc Hx        Reviewed and updated as needed this visit by Provider         ROS:  C: NEGATIVE for fever, chills, change in weight  R: NEGATIVE for significant cough or SOB  CV: NEGATIVE for chest pain, palpitations or peripheral edema  MUSCULOSKELETAL: back spasms  NEURO: MS  PSYCHIATRIC: anxiety/depression     OBJECTIVE:                                                    /74  Pulse 82  Temp 97.3  F (36.3  C) (Oral)  Ht 5' 6\" (1.676 m)  Wt (!) 333 lb (151 kg)  LMP 09/13/2017  SpO2 99%  BMI 53.75 kg/m2  Body mass index is 53.75 kg/(m^2).   GENERAL: healthy, alert, well nourished, well hydrated, no distress  EYES: Eyes grossly normal to inspection, extraocular movements - intact, and PERRL  HENT:   Mouth- no ulcers, no lesions  NECK: no tenderness, no adenopathy, no asymmetry, no masses, no stiffness; thyroid- normal to palpation  RESP: lungs clear to auscultation - no rales, no rhonchi, no wheezes  CV: regular rates and rhythm, normal S1 S2 -  MS: no vertebral tenderness, has tenderness on rt thoracic paravertebral muscles.  Extremities- no gross deformities noted, no edema, no calf tenderness   NEURO: strength and tone- normal, sensory exam- grossly normal, mentation- intact, speech- normal, reflexes- symmetric       ASSESSMENT/PLAN:                                                      1. Recurrent major depressive episodes (H)  2. Anxiety  - started on DULoxetine (CYMBALTA) 60 MG EC capsule; Take 1 capsule (60 mg) by mouth daily as directed.explained clearly about the medication,insructions and side effects. Decreased- citalopram (CELEXA) 40 MG tablet; Take 0.5 " tablets (20 mg) by mouth daily . Also referred to Psychiatrist.       2 DDD (degenerative disc disease), thoracic  - started on methocarbamol (ROBAXIN) 750 MG tablet; Take 1 tablet (750 mg) by mouth 2 times daily as needed for muscle spasms  explained clearly about the medication,insructions and side effects. Advised not to drive or operate any machinery while on this med.Advised not to drive or operate any machinery while on this med      Fallon Horne MD  [facility]

## 2017-09-26 NOTE — MR AVS SNAPSHOT
After Visit Summary   9/26/2017    Princess TRISTIN Machado    MRN: [id number]           Patient Information     Date Of Birth          1990        Visit Information        Provider Department      9/26/2017 1:40 PM Fallon Horne MD [facility]        Today's Diagnoses     DDD (degenerative disc disease), thoracic    -  1    Recurrent major depressive episodes (H)        Anxiety           Follow-ups after your visit        Your next 10 appointments already scheduled     Oct 31, 2017  7:20 AM CDT   Office Visit with Shelly Whipple MD   [facility] ([facility] )    [address] [place] [address]  [address]  [facility] [id number] [phone]           Bring a current list of meds and any records pertaining to this visit. For Physicals, please bring immunization records and any forms needing to be filled out. Please arrive 10 minutes early to complete paperwork.            Oct 31, 2017 11:00 AM CDT   MyChart Long with Fallon Horne MD   [facility]    [address]  [facility] [id number] [phone]              Who to contact     If you have questions or need follow up information about today's clinic visit or your schedule please contact [facility] directly at [phone].  Normal or non-critical lab and imaging results will be communicated to you by MyChart, letter or phone within 4 business days after the clinic has received the results. If you do not hear from us within 7 days, please contact the clinic through MyChart or phone. If you have a critical or abnormal lab result, we will notify you by phone as soon as possible.  Submit refill requests through [employer] or call your pharmacy and they will forward the refill request to us. Please allow 3 business days for your refill to be completed.          Additional Information  "About Your Visit        [employer] Information     [employer] gives you secure access to your electronic health record. If you see a primary care provider, you can also send messages to your care team and make appointments. If you have questions, please call your primary care clinic.  If you do not have a primary care provider, please call [phone] and they will assist you.        Care EveryWhere ID     This is your Care EveryWhere ID. This could be used by other organizations to access your [place] medical records  [id number]        Your Vitals Were     Pulse Temperature Height Last Period Pulse Oximetry BMI (Body Mass Index)    82 97.3  F (36.3  C) (Oral) 5' 6\" (1.676 m) 09/13/2017 99% 53.75 kg/m2       Blood Pressure from Last 3 Encounters:   09/26/17 120/74   09/03/17 120/80   06/27/17 112/72    Weight from Last 3 Encounters:   09/26/17 (!) 333 lb (151 kg)   06/27/17 (!) 333 lb 9.6 oz (151.3 kg)   06/16/17 (!) 344 lb (156 kg)              Today, you had the following     No orders found for display         Today's Medication Changes          These changes are accurate as of: 9/26/17 11:59 PM.  If you have any questions, ask your nurse or doctor.               Start taking these medicines.        Dose/Directions    DULoxetine 60 MG EC capsule   Commonly known as:  CYMBALTA   Used for:  Recurrent major depressive episodes (H)   Started by:  Fallon Horne MD        Dose:  60 mg   Take 1 capsule (60 mg) by mouth daily   Quantity:  30 capsule   Refills:  1       methocarbamol 750 MG tablet   Commonly known as:  ROBAXIN   Used for:  DDD (degenerative disc disease), thoracic   Started by:  Fallon Horne MD        Dose:  750 mg   Take 1 tablet (750 mg) by mouth 2 times daily as needed for muscle spasms   Quantity:  40 tablet   Refills:  1         These medicines have changed or have updated prescriptions.        Dose/Directions    citalopram 40 MG tablet   Commonly known as:  celeXA   This may " have changed:  how much to take   Used for:  Recurrent major depressive episodes (H)   Changed by:  Fallon Horne MD        Dose:  20 mg   Take 0.5 tablets (20 mg) by mouth daily   Quantity:  45 tablet   Refills:  0         Stop taking these medicines if you haven't already. Please contact your care team if you have questions.     buPROPion 150 MG 24 hr tablet   Commonly known as:  WELLBUTRIN XL   Stopped by:  Fallon Horne MD                Where to get your medicines      These medications were sent to [facility] [address] [address]     Phone:  [phone]     DULoxetine 60 MG EC capsule    methocarbamol 750 MG tablet                Primary Care Provider Office Phone # Fax #    Fallon Horne [phone]       [address] [facility] [address]        Equal Access to Services     [facility] AH: Hadii aad ku hadasho Soomaali, waaxda luqadaha, qaybta kaalmada adeegyada, waxay idiin hayaan kiran rehman . So [facility] [phone].    ATENCIÓN: Si habla español, tiene a shirley disposición servicios gratuitos de asistencia lingüística. Ll[facility] [phone].    We comply with applicable federal civil rights laws and [place] laws. We do not discriminate on the basis of race, color, national origin, age, disability, sex, sexual orientation, or gender identity.            Thank you!     Thank you for choosing [facility]  for your care. Our goal is always to provide you with excellent care. Hearing back from our patients is one way we can continue to improve our services. Please take a few minutes to complete the written survey that you may receive in the mail after your visit with us. Thank you!             Your Updated Medication List - Protect others around you: Learn how to safely use, store and throw away your medicines at www.disposemymeds.org.          This list is  accurate as of: 9/26/17 11:59 PM.  Always use your most recent med list.                   Brand Name Dispense Instructions for use Diagnosis    albuterol 108 (90 BASE) MCG/ACT Inhaler    PROAIR HFA/PROVENTIL HFA/VENTOLIN HFA    1 Inhaler    Inhale 2 puffs into the lungs every 6 hours as needed for shortness of breath / dyspnea or wheezing    Acute bronchitis with coexisting condition requiring prophylactic treatment       BIOTIN PO      Take 10,000 mcg by mouth Reported on 5/9/2017        citalopram 40 MG tablet    celeXA    45 tablet    Take 0.5 tablets (20 mg) by mouth daily    Recurrent major depressive episodes (H)       doxycycline Monohydrate 100 MG Caps     28 capsule    1 tab po bid    Hidradenitis axillaris       DULoxetine 60 MG EC capsule    CYMBALTA    30 capsule    Take 1 capsule (60 mg) by mouth daily    Recurrent major depressive episodes (H)       glatiramer 20 MG/ML injection    COPAXONE          methocarbamol 750 MG tablet    ROBAXIN    40 tablet    Take 1 tablet (750 mg) by mouth 2 times daily as needed for muscle spasms    DDD (degenerative disc disease), thoracic       mupirocin 2 % ointment    BACTROBAN          norethindrone 0.35 MG per tablet    MICRONOR     Take 1 tablet by mouth daily        REBIF TITRATION PACK 6X8.8 & 6X22 MCG Sosy   Generic drug:  Interferon Beta-1a      Inject 1 Units Subcutaneous three times a week        VITAMIN D3 PO      Take 1,000 Int'l Units by mouth daily Reported on 5/9/2017

## 2017-09-29 ASSESSMENT — PATIENT HEALTH QUESTIONNAIRE - PHQ9: SUM OF ALL RESPONSES TO PHQ QUESTIONS 1-9: 12

## 2017-09-30 ASSESSMENT — ANXIETY QUESTIONNAIRES: GAD7 TOTAL SCORE: 9

## 2017-10-01 VITALS
HEART RATE: 82 BPM | WEIGHT: 293 LBS | HEIGHT: 66 IN | SYSTOLIC BLOOD PRESSURE: 120 MMHG | BODY MASS INDEX: 47.09 KG/M2 | TEMPERATURE: 97.3 F | DIASTOLIC BLOOD PRESSURE: 74 MMHG | OXYGEN SATURATION: 99 %

## 2017-10-05 ENCOUNTER — MYC MEDICAL ADVICE (OUTPATIENT)
Dept: FAMILY MEDICINE | Facility: CLINIC | Age: 27
End: 2017-10-05

## 2017-10-05 DIAGNOSIS — L73.2 HIDRADENITIS SUPPURATIVA: Primary | ICD-10-CM

## 2017-10-06 RX ORDER — CLINDAMYCIN HCL 300 MG
300 CAPSULE ORAL 3 TIMES DAILY
Qty: 30 CAPSULE | Refills: 0 | Status: SHIPPED | OUTPATIENT
Start: 2017-10-06 | End: 2017-12-12

## 2017-10-24 ENCOUNTER — TRANSFERRED RECORDS (OUTPATIENT)
Dept: HEALTH INFORMATION MANAGEMENT | Facility: CLINIC | Age: 27
End: 2017-10-24

## 2017-10-26 DIAGNOSIS — E55.9 VITAMIN D DEFICIENCY: ICD-10-CM

## 2017-10-26 DIAGNOSIS — G35 RELAPSING REMITTING MULTIPLE SCLEROSIS (H): ICD-10-CM

## 2017-10-26 DIAGNOSIS — R53.83 FATIGUE: ICD-10-CM

## 2017-10-26 LAB
BASOPHILS # BLD AUTO: 0 10E9/L (ref 0–0.2)
BASOPHILS NFR BLD AUTO: 0.5 %
DIFFERENTIAL METHOD BLD: NORMAL
EOSINOPHIL # BLD AUTO: 0.2 10E9/L (ref 0–0.7)
EOSINOPHIL NFR BLD AUTO: 2.1 %
ERYTHROCYTE [DISTWIDTH] IN BLOOD BY AUTOMATED COUNT: 12.7 % (ref 10–15)
HCT VFR BLD AUTO: 37.2 % (ref 35–47)
HGB BLD-MCNC: 12.5 G/DL (ref 11.7–15.7)
LYMPHOCYTES # BLD AUTO: 2.7 10E9/L (ref 0.8–5.3)
LYMPHOCYTES NFR BLD AUTO: 34.3 %
MCH RBC QN AUTO: 31.1 PG (ref 26.5–33)
MCHC RBC AUTO-ENTMCNC: 33.6 G/DL (ref 31.5–36.5)
MCV RBC AUTO: 93 FL (ref 78–100)
MONOCYTES # BLD AUTO: 0.6 10E9/L (ref 0–1.3)
MONOCYTES NFR BLD AUTO: 8.2 %
NEUTROPHILS # BLD AUTO: 4.3 10E9/L (ref 1.6–8.3)
NEUTROPHILS NFR BLD AUTO: 54.9 %
PLATELET # BLD AUTO: 225 10E9/L (ref 150–450)
RBC # BLD AUTO: 4.02 10E12/L (ref 3.8–5.2)
WBC # BLD AUTO: 7.8 10E9/L (ref 4–11)

## 2017-10-26 PROCEDURE — 84436 ASSAY OF TOTAL THYROXINE: CPT | Performed by: PSYCHIATRY & NEUROLOGY

## 2017-10-26 PROCEDURE — 82607 VITAMIN B-12: CPT | Performed by: PSYCHIATRY & NEUROLOGY

## 2017-10-26 PROCEDURE — 36415 COLL VENOUS BLD VENIPUNCTURE: CPT | Performed by: PSYCHIATRY & NEUROLOGY

## 2017-10-26 PROCEDURE — 80050 GENERAL HEALTH PANEL: CPT | Performed by: PSYCHIATRY & NEUROLOGY

## 2017-10-26 PROCEDURE — 82306 VITAMIN D 25 HYDROXY: CPT | Performed by: PSYCHIATRY & NEUROLOGY

## 2017-10-27 LAB
ALBUMIN SERPL-MCNC: 3.3 G/DL (ref 3.4–5)
ALP SERPL-CCNC: 79 U/L (ref 40–150)
ALT SERPL W P-5'-P-CCNC: 25 U/L (ref 0–50)
ANION GAP SERPL CALCULATED.3IONS-SCNC: 8 MMOL/L (ref 3–14)
AST SERPL W P-5'-P-CCNC: 17 U/L (ref 0–45)
BILIRUB SERPL-MCNC: 0.1 MG/DL (ref 0.2–1.3)
BUN SERPL-MCNC: 9 MG/DL (ref 7–30)
CALCIUM SERPL-MCNC: 8.8 MG/DL (ref 8.5–10.1)
CHLORIDE SERPL-SCNC: 107 MMOL/L (ref 94–109)
CO2 SERPL-SCNC: 25 MMOL/L (ref 20–32)
CREAT SERPL-MCNC: 0.85 MG/DL (ref 0.52–1.04)
DEPRECATED CALCIDIOL+CALCIFEROL SERPL-MC: 21 UG/L (ref 20–75)
GFR SERPL CREATININE-BSD FRML MDRD: 79 ML/MIN/1.7M2
GLUCOSE SERPL-MCNC: 74 MG/DL (ref 70–99)
POTASSIUM SERPL-SCNC: 3.7 MMOL/L (ref 3.4–5.3)
PROT SERPL-MCNC: 7.5 G/DL (ref 6.8–8.8)
SODIUM SERPL-SCNC: 140 MMOL/L (ref 133–144)
T4 SERPL-MCNC: 7.9 UG/DL (ref 4.5–13.9)
TSH SERPL DL<=0.005 MIU/L-ACNC: 3.48 MU/L (ref 0.4–4)
VIT B12 SERPL-MCNC: 259 PG/ML (ref 193–986)

## 2017-11-07 ENCOUNTER — TELEPHONE (OUTPATIENT)
Dept: INTERNAL MEDICINE | Facility: CLINIC | Age: 27
End: 2017-11-07

## 2017-11-07 ASSESSMENT — PATIENT HEALTH QUESTIONNAIRE - PHQ9: SUM OF ALL RESPONSES TO PHQ QUESTIONS 1-9: 5

## 2017-11-07 NOTE — TELEPHONE ENCOUNTER
Panel Management Review      Patient has the following on her problem list:     Depression / Dysthymia review    Measure:  Needs PHQ-9 score of 4 or less during index window.  Administer PHQ-9 and if score is 5 or more, send encounter to provider for next steps.    5   7 month window range:     PHQ-9 SCORE 6/27/2017 9/26/2017 11/7/2017   Total Score 14 12 5       If PHQ-9 recheck is 5 or more, route to provider for next steps.    Patient is due for:  PHQ9        Composite cancer screening  Chart review shows that this patient is due/due soon for the following None  Summary:    Patient is due/failing the following:   PHQ9    Action needed:   Patient needs to do PHQ9.    Type of outreach:    Phone, spoke to patient.  PHQ-P completed    Questions for provider review:    None                                                                                                                                    Pamela Wasserman CMA       Chart routed to N/A .

## 2017-11-09 DIAGNOSIS — F33.9 RECURRENT MAJOR DEPRESSION (H): ICD-10-CM

## 2017-11-09 DIAGNOSIS — H46.9 OPTIC NEURITIS, LEFT: Primary | ICD-10-CM

## 2017-11-09 DIAGNOSIS — E55.9 VITAMIN D DEFICIENCY: ICD-10-CM

## 2017-11-09 DIAGNOSIS — G35 MULTIPLE SCLEROSIS (H): ICD-10-CM

## 2017-11-21 ENCOUNTER — NURSE TRIAGE (OUTPATIENT)
Dept: NURSING | Facility: CLINIC | Age: 27
End: 2017-11-21

## 2017-11-21 NOTE — TELEPHONE ENCOUNTER
"Onset of headache above and in the right eye since around 8pm last night.   Rates pain currently 7/10.   \"It's throbbing\"  History of MS and optic neuritis.   Denies fever.   No pain in the left eye but states she has redness and change in vision in that eye. \"I can see a fringe at the top of my vision.\"   Has not been taking any pain medication    Protocol and care advice reviewed  Patient is going to call her neurologist first, if she cannot speak with someone immediately she will go to the ER at [facility].    Reason for Disposition    Loss of vision or double vision (Exception: same as prior migraines)    Additional Information    Negative: Difficult to awaken or acting confused  (e.g., disoriented, slurred speech)    Negative: [1] Weakness of the face, arm or leg on one side of the body AND [2] new onset    Negative: [1] Numbness of the face, arm or leg on one side of the body AND [2] new onset    Negative: [1] Loss of speech or garbled speech AND [2] new onset    Negative: Passed out (i.e., lost consciousness, collapsed and was not responding)    Negative: Sounds like a life-threatening emergency to the triager    Negative: Followed a head injury within last 3 days    Negative: Pregnant    Negative: Traumatic Brain Injury (TBI) is suspected    Negative: Unable to walk, or can only walk with assistance (e.g., requires support)    Negative: Stiff neck (can't touch chin to chest)    Negative: Severe pain in one eye    Negative: [1] Other family members (or roommates) with headaches AND [2] possibility of carbon monoxide exposure    Negative: [1] SEVERE headache (e.g., excruciating) AND [2] \"worst headache\" of life    Negative: [1] SEVERE headache AND [2] sudden-onset (i.e., reaching maximum intensity within seconds)    Negative: [1] SEVERE headache AND [2] fever    Protocols used: HEADACHE-ADULT-    "

## 2017-12-05 DIAGNOSIS — Z86.69 HISTORY OF OPTIC NEURITIS: Primary | ICD-10-CM

## 2017-12-07 ENCOUNTER — TELEPHONE (OUTPATIENT)
Dept: FAMILY MEDICINE | Facility: CLINIC | Age: 27
End: 2017-12-07

## 2017-12-07 DIAGNOSIS — N92.0 EXCESSIVE OR FREQUENT MENSTRUATION: Primary | ICD-10-CM

## 2017-12-07 NOTE — TELEPHONE ENCOUNTER
Patient called reporting abnormal vaginal bleeding    Vaginal Bleeding (Dysmenorrhea)  Onset: 1 week    Description:   Duration of bleeding episodes: 2 weeks  Frequency between periods:  Every month  Describe bleeding/flow:   Clots: no  Number of pads/hour: < 1 an hour    Cramping: mild and just started today. 1/10    Accompanying Signs & Symptoms:  Weakness: no  Lightheadedness: no  Hot flashes: no  Nosebleeds/Easy bruising: no  Vaginal Discharge: YES, brownish discharge    History:  No LMP recorded.  Previous normal periods: YES  Contraceptive use: Yes, Mini Pill  Possibility of Pregnancy: YES  Any bleeding after intercourse: no  Age of first period (menarche): 10  Abnormal PAP Smears: YES, 10 years ago, did repeat and it was normal    Precipitating factors:   Period from 11/15/17-11/20/17, bleeding is intermittent and goes from brownish red to bright red.    Vaginal Symptoms  Onset: 3 days    Description:  Vaginal Discharge: brownish discharge    Itching (Pruritis): no   Burning sensation:  no   Odor: YES    Accompanying Signs & Symptoms:  Pain with Urination: no   Abdominal Pain: YES  Fever: no     History:   Sexually active: YES  New Partner: no   Possibility of Pregnancy:  Yes    Precipitating factors:   Recent Antibiotic Use: no     Alleviating factors:  None     Therapies Tried and outcome: showers instead of baths.              Recent Medication changes: could maybe have missed a mini pill       Advised: Follow up with clinic if: indicated by the provider, told patient to wait and see if odor gets worse or discharge changes to signs of infection to follow up with a provider/make an appointment.  Follow up with Emergent Care if: bright red bleeding, abdominal pain, fever over 103    References used: Telephone Triage Protocols for Nurses fifth edition    Claudia Martin RN  Triage RN  [facility]    Routing to provider for review and advice, And to ask for a serum HCG test to rule out  pregnancy/miscarriage, if provider thinks it appropriate.

## 2017-12-08 DIAGNOSIS — N92.0 EXCESSIVE OR FREQUENT MENSTRUATION: ICD-10-CM

## 2017-12-08 LAB — HCG SERPL QL: NEGATIVE

## 2017-12-08 PROCEDURE — 84703 CHORIONIC GONADOTROPIN ASSAY: CPT | Performed by: FAMILY MEDICINE

## 2017-12-12 ENCOUNTER — OFFICE VISIT (OUTPATIENT)
Dept: INTERNAL MEDICINE | Facility: CLINIC | Age: 27
End: 2017-12-12
Payer: COMMERCIAL

## 2017-12-12 VITALS
OXYGEN SATURATION: 99 % | TEMPERATURE: 97.9 F | BODY MASS INDEX: 53.94 KG/M2 | HEART RATE: 97 BPM | SYSTOLIC BLOOD PRESSURE: 122 MMHG | DIASTOLIC BLOOD PRESSURE: 70 MMHG | WEIGHT: 293 LBS

## 2017-12-12 DIAGNOSIS — G89.29 CHRONIC RIGHT-SIDED LOW BACK PAIN WITHOUT SCIATICA: Primary | ICD-10-CM

## 2017-12-12 DIAGNOSIS — M54.50 CHRONIC RIGHT-SIDED LOW BACK PAIN WITHOUT SCIATICA: Primary | ICD-10-CM

## 2017-12-12 DIAGNOSIS — F33.40 RECURRENT MAJOR DEPRESSIVE DISORDER, IN REMISSION (H): ICD-10-CM

## 2017-12-12 PROCEDURE — 99214 OFFICE O/P EST MOD 30 MIN: CPT | Performed by: INTERNAL MEDICINE

## 2017-12-12 RX ORDER — IBUPROFEN 600 MG/1
600 TABLET, FILM COATED ORAL EVERY 6 HOURS PRN
Qty: 60 TABLET | Refills: 0 | Status: SHIPPED | OUTPATIENT
Start: 2017-12-12 | End: 2018-02-05

## 2017-12-12 NOTE — PROGRESS NOTES
SUBJECTIVE:   Princess TRISTIN Machado is a 27 year old female who presents to clinic today for the following health issues:      She has back pain and would like to be put on tramadol. No injury.    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  Date 10/2016  Sees a specialist for back pain:  No  Therapies tried without relief: acetaminophen (Tylenol)    Alleviating factors:   Improved by: massage and surgery      Precipitating factors:  Worsened by: Bending and Exercise    Functional and Psychosocial Screen (Chantelle STarT Back):      Not performed today          Accompanying Signs & Symptoms:  Risk of Fracture:  None  Risk of Cauda Equina:  None  Risk of Infection:  None  Risk of Cancer:  None  Risk of Ankylosing Spondylitis:  Onset at age <35, male, AND morning back stiffness. no         Problem list and histories reviewed & adjusted, as indicated.  Additional history: as documented    Patient Active Problem List   Diagnosis     Optic neuritis, left     Recurrent major depressive episodes (H)     Multiple sclerosis (H)     Bulimia nervosa     Acne vulgaris     Anxiety     Chronic back pain     Freckles     Headache     Other specified health status     Hidradenitis suppurativa     Hypertriglyceridemia     Hidradenitis     Migraine without status migrainosus, not intractable     Biomechanical lesion     Numbness of finger     Somatic dysfunction of cervical region     Spasm     Vitamin D deficiency     Common wart: L t lat hand  -      Morbid obesity due to excess calories (H) BMI 50-60     Lumbago     Sacral pain     Somatic dysfunction of sacral region     Thoracic segment dysfunction     Muscle spasm     Recurrent major depression (H)     Past Surgical History:   Procedure Laterality Date     ENT SURGERY      tubes in ears     MYRINGOTOMY, INSERT TUBE BILATERAL, COMBINED  1990s       Social History   Substance Use Topics     Smoking status: Never Smoker     Smokeless tobacco: Never Used     Alcohol use No     Family History   Problem Relation Age of Onset     CEREBROVASCULAR DISEASE Mother      heart attack     CEREBROVASCULAR DISEASE Father      heart attack     MENTAL ILLNESS Sister      Post-Traumatic Stress Disorder (PTSD) Brother      MENTAL ILLNESS Brother      Glaucoma No family hx of      Macular Degeneration No family hx of      DIABETES No family hx of      Coronary Artery Disease No family hx of      Hypertension No family hx of      Hyperlipidemia No family hx of      Breast Cancer No family hx of      Colon Cancer No family hx of      Prostate Cancer No family hx of      Other Cancer No family hx of      Depression No family hx of      Anxiety Disorder No family hx of      Substance Abuse No family hx of      Anesthesia Reaction No family hx of      Asthma No family hx of      OSTEOPOROSIS No family hx of      Genetic Disorder No family hx of      Thyroid Disease No family hx of      Obesity No family hx of      Unknown/Adopted No family hx of              Reviewed and updated as needed this visit by clinical staffTobacco  Allergies  Med Hx  Surg Hx  Fam Hx  Soc Hx      Reviewed and updated as needed this visit by Provider         ROS:  C: NEGATIVE for fever, chills, change in weight  I: NEGATIVE for worrisome rashes, moles or lesions  E: NEGATIVE for vision changes or irritation  E/M: NEGATIVE for ear, mouth and throat problems  R: NEGATIVE for significant cough or SOB  CV: NEGATIVE for chest pain, palpitations or  peripheral edema  GI: NEGATIVE for nausea, abdominal pain, heartburn, or change in bowel habits  : NEGATIVE for frequency, dysuria, or hematuria  M: UPPER BACK: Back pain worse with strenuous exercise   N: NEGATIVE for weakness, dizziness or paresthesias  E: NEGATIVE for temperature intolerance, skin/hair changes  H: NEGATIVE for bleeding problems  P: NEGATIVE for changes in mood or affect    OBJECTIVE:     /70 (BP Location: Right arm, Patient Position: Sitting, Cuff Size: Adult Large)  Pulse 97  Temp 97.9  F (36.6  C) (Oral)  Wt (!) 334 lb 3.2 oz (151.6 kg)  LMP 11/21/2017 (Exact Date)  SpO2 99%  BMI 53.94 kg/m2  Body mass index is 53.94 kg/(m^2).  GENERAL: healthy, alert and no distress  EYES: Eyes grossly normal to inspection, PERRL and conjunctivae and sclerae normal  HENT: ear canals and TM's normal, nose and mouth without ulcers or lesions  NECK: no adenopathy, no asymmetry, masses, or scars and thyroid normal to palpation  RESP: lungs clear to auscultation - no rales, rhonchi or wheezes  CV: regular rate and rhythm, normal S1 S2, no S3 or S4, no murmur, click or rub, no peripheral edema and peripheral pulses strong  ABDOMEN: soft, nontender, no hepatosplenomegaly, no masses and bowel sounds normal  MS: no gross musculoskeletal defects noted, no edema  SKIN: no suspicious lesions or rashes  NEURO: Normal strength and tone, mentation intact and speech normal  PSYCH: mentation appears normal, affect normal/bright    Diagnostic Test Results:  none     ASSESSMENT/PLAN:     Depression; recurrent episode-- Partial remission   Associated with the following complications:    None  Patient reports improvement in mood on cymbalta   Plan:  No changes in the patient's current treatment plan        (M54.5,  G89.29) Chronic right-sided low back pain without sciatica  (primary encounter diagnosis)  Comment:   Had extensive discussion with patient today about her back pain.  Reviewed MRI images showing  multilevel degenerative disc disease of spine.  Patient is exercising with the aim of weight loss, but this also worsens her pain.  She is requesting tramadol but after discussion I talked to her about guidelines recommending we do a trial of PT before this, and NSAIDs.  I referred her to [facility] spine clinic.  In addition, I told her we should start with NSAIDS so I prescribed ibuprofen 600mg.  I went over side effects with her including risk of ulcers(upper GI bleeding) and kidney damage and instructed her only to take this as necessary.  I have also referred her to ortho as this was discussed earlier, to Ortho spine.    Plan: [facility] PT, HAND, AND CHIROPRACTIC REFERRAL,         ibuprofen (ADVIL/MOTRIN) 600 MG tablet, ORTHO          REFERRAL       (F33.40) Recurrent major depressive disorder, in remission (H)  Comment: Patient reports improved mood after she was started on cymbalta  Plan: Continue on current medication regimen      Mayra Weems MD  [facility]

## 2017-12-12 NOTE — NURSING NOTE
"Chief Complaint   Patient presents with     Pain     She has back pain and would like tramadol. No injury.       Initial /70 (BP Location: Right arm, Patient Position: Sitting, Cuff Size: Adult Large)  Pulse 97  Temp 97.9  F (36.6  C) (Oral)  Wt (!) 334 lb 3.2 oz (151.6 kg)  LMP 11/21/2017 (Exact Date)  SpO2 99%  BMI 53.94 kg/m2 Estimated body mass index is 53.94 kg/(m^2) as calculated from the following:    Height as of 9/26/17: 5' 6\" (1.676 m).    Weight as of this encounter: 334 lb 3.2 oz (151.6 kg).  Medication Reconciliation: complete   Martha Osuna MA      "

## 2017-12-12 NOTE — MR AVS SNAPSHOT
After Visit Summary   12/12/2017    Princess TRISTIN Machado    MRN: [id number]           Patient Information     Date Of Birth          1990        Visit Information        Provider Department      12/12/2017 5:00 PM Mayra Weems MD [facility]        Today's Diagnoses     Chronic right-sided low back pain without sciatica    -  1    Recurrent major depressive disorder, in remission (H)          Care Instructions    I have ordered a referral for PT, they will contact you with a referral              Follow-ups after your visit        Additional Services     [facility] PT, HAND, AND CHIROPRACTIC REFERRAL       **This order will print in the [facility] Scheduling Office**    Physical Therapy, Hand Therapy and Chiropractic Care are available through:    *[facility]  *[facility]  *[facility]    Call one number to schedule at any of the above locations: [phone].    Your provider has referred you to: Physical Therapy at [facility] or [facility]    Indication/Reason for Referral: Low Back Pain, Patient has Thoracic disease  Onset of Illness: Several months  Therapy Orders: Evaluate and Treat, Also please evaluate for massage therapy for patient for her thoracic cervical disease  Special Programs: Back clinic if possible  Special Request: Manual Therapy: Myofascial Release/Massage    Chantelle Lockhart      Additional Comments for the Therapist or Chiropractor: Patient has multilevel DJD of thoracic spine    Please be aware that coverage of these services is subject to the terms and limitations of your health insurance plan.  Call member services at your health plan with any benefit or coverage questions.      Please bring the following to your appointment:    *Your personal calendar for scheduling future appointments  *Comfortable clothing            ORTHO  REFERRAL       [facility] is referring you to the Orthopedic  Services  at [facility].       The  Representative will assist you in the coordination of your Orthopedic and Musculoskeletal Care as prescribed by your physician.    The  Representative will call you within 1 business day to help schedule your appointment, or you may contact the  Representative at:    All areas ~ [phone]     Type of Referral : Spine: Cervical / Thoracic: Cervical / Thoracic Spine Surgeon        Timeframe requested: 3 - 5 days    Coverage of these services is subject to the terms and limitations of your health insurance plan.  Please call member services at your health plan with any benefit or coverage questions.      If X-rays, CT or MRI's have been performed, please contact the facility where they were done to arrange for , prior to your scheduled appointment.  Please bring this referral request to your appointment and present it to your specialist.                  Your next 10 appointments already scheduled     Dec 26, 2017  8:45 AM CST   RETURN GENERAL with Kristopher Franks MD   Eye Clinic ([facility])    [facility]  [address]  [facility] [id number] [phone]            Jan 02, 2018 10:00 AM CST   Office Visit with Shelly Whipple MD   [facility] ([facility] )    [address] [place] [address]  [address] [place] [address] [phone]           Bring a current list of meds and any records pertaining to this visit. For Physicals, please bring immunization records and any forms needing to be filled out. Please arrive 10 minutes early to complete paperwork.            Jan 23, 2018  9:40 AM CST   MyChart Long with Fallon Horne MD   [facility] ([facility])    [address]  [facility] [address] [phone]              Who to contact     If you have questions or  need follow up information about today's clinic visit or your schedule please contact [facility] directly at [phone].  Normal or non-critical lab and imaging results will be communicated to you by Open mHealthhart, letter or phone within 4 business days after the clinic has received the results. If you do not hear from us within 7 days, please contact the clinic through [facility] or phone. If you have a critical or abnormal lab result, we will notify you by phone as soon as possible.  Submit refill requests through [employer] or call your pharmacy and they will forward the refill request to us. Please allow 3 business days for your refill to be completed.          Additional Information About Your Visit        Open mHealthharGoodApril Information     [employer] gives you secure access to your electronic health record. If you see a primary care provider, you can also send messages to your care team and make appointments. If you have questions, please call your primary care clinic.  If you do not have a primary care provider, please call [phone] and they will assist you.        Care EveryWhere ID     This is your Care EveryWhere ID. This could be used by other organizations to access your [facility] medical records  [id number]        Your Vitals Were     Pulse Temperature Last Period Pulse Oximetry BMI (Body Mass Index)       97 97.9  F (36.6  C) (Oral) 11/21/2017 (Exact Date) 99% 53.94 kg/m2        Blood Pressure from Last 3 Encounters:   12/12/17 122/70   09/26/17 120/74   09/03/17 120/80    Weight from Last 3 Encounters:   12/12/17 (!) 334 lb 3.2 oz (151.6 kg)   09/26/17 (!) 333 lb (151 kg)   06/27/17 (!) 333 lb 9.6 oz (151.3 kg)              We Performed the Following     [facility] PT, HAND, AND CHIROPRACTIC REFERRAL     ORTHO  REFERRAL          Today's Medication Changes          These changes are accurate as of: 12/12/17  5:49 PM.  If you have any questions, ask your nurse or doctor.               Start taking these  medicines.        Dose/Directions    ibuprofen 600 MG tablet   Commonly known as:  ADVIL/MOTRIN   Used for:  Chronic right-sided low back pain without sciatica   Started by:  Mayra Weems MD        Dose:  600 mg   Take 1 tablet (600 mg) by mouth every 6 hours as needed for moderate pain   Quantity:  60 tablet   Refills:  0            Where to get your medicines      These medications were sent to [facility] [address] [address]     Phone:  [phone]     ibuprofen 600 MG tablet                Primary Care Provider Office Phone # Fax #    Fallon CHONG MD [place] [phone]       [address]  [facility] [address]        Equal Access to Services     [facility] : Javier tiwari hadamandao Sodaoali, waaxda luqadaha, qaybta kaalmada adeegyada, amos rehman . So [facility] [phone].    ATENCIÓN: Si habla español, tiene a shirley disposición servicios gratuitos de asistencia lingüística. [facility] [phone].    We comply with applicable federal civil rights laws and [place] laws. We do not discriminate on the basis of race, color, national origin, age, disability, sex, sexual orientation, or gender identity.            Thank you!     Thank you for choosing [facility]  for your care. Our goal is always to provide you with excellent care. Hearing back from our patients is one way we can continue to improve our services. Please take a few minutes to complete the written survey that you may receive in the mail after your visit with us. Thank you!             Your Updated Medication List - Protect others around you: Learn how to safely use, store and throw away your medicines at www.disposemymeds.org.          This list is accurate as of: 12/12/17  5:49 PM.  Always use your most recent med list.                   Brand Name Dispense Instructions for use Diagnosis    BIOTIN PO      Take  10,000 mcg by mouth Reported on 5/9/2017        DULoxetine 60 MG EC capsule    CYMBALTA    30 capsule    Take 1 capsule (60 mg) by mouth daily    Recurrent major depressive episodes (H)       ibuprofen 600 MG tablet    ADVIL/MOTRIN    60 tablet    Take 1 tablet (600 mg) by mouth every 6 hours as needed for moderate pain    Chronic right-sided low back pain without sciatica       methocarbamol 750 MG tablet    ROBAXIN    40 tablet    Take 1 tablet (750 mg) by mouth 2 times daily as needed for muscle spasms    DDD (degenerative disc disease), thoracic       mupirocin 2 % ointment    BACTROBAN          norethindrone 0.35 MG per tablet    MICRONOR     Take 1 tablet by mouth daily        REBIF TITRATION PACK 6X8.8 & 6X22 MCG Sosy   Generic drug:  Interferon Beta-1a      Inject 1 Units Subcutaneous three times a week        VITAMIN D3 PO      Take 1,000 Int'l Units by mouth daily Reported on 5/9/2017

## 2017-12-23 ENCOUNTER — MYC REFILL (OUTPATIENT)
Dept: INTERNAL MEDICINE | Facility: CLINIC | Age: 27
End: 2017-12-23

## 2017-12-23 DIAGNOSIS — F33.9 RECURRENT MAJOR DEPRESSIVE EPISODES (H): ICD-10-CM

## 2017-12-26 RX ORDER — DULOXETIN HYDROCHLORIDE 60 MG/1
60 CAPSULE, DELAYED RELEASE ORAL DAILY
Qty: 30 CAPSULE | Refills: 1 | Status: SHIPPED | OUTPATIENT
Start: 2017-12-26 | End: 2018-03-06 | Stop reason: ALTCHOICE

## 2017-12-26 NOTE — TELEPHONE ENCOUNTER
Message from MyChart:  Original authorizing provider: MD Princess CONI Dueñas would like a refill of the following medications:  DULoxetine (CYMBALTA) 60 MG EC capsule [Fallon Horne MD]    Preferred pharmacy: [address]    Comment:

## 2017-12-26 NOTE — TELEPHONE ENCOUNTER
Cymbalta     Last Written Prescription Date: 9/26/17  Last Fill Quantity: 30, # refills: 1  Last Office Visit with [facility], [facility] or  Health prescribing provider: 12/12/17   Next 5 appointments (look out 90 days)     Jan 02, 2018 10:00 AM CST   Office Visit with Shelly Whipple MD   [facility] ([facility] )    [address] [place] [address]  [address] [place] [address]   [phone]            Jan 02, 2018  1:20 PM CST   MyChart Long with Mayra Weems MD   [facility] ([facility]    [address]  [facility] [address]   [phone]            Jan 23, 2018  9:40 AM CST   MyChart Long with Fallon Horne MD   [facility] ([facility])    [address]  [facility] [address]   [phone]                   BP Readings from Last 3 Encounters:   12/12/17 122/70   09/26/17 120/74   09/03/17 120/80     Pulse: (for Fetzima)  Creatinine   Date Value Ref Range Status   10/26/2017 0.85 0.52 - 1.04 mg/dL Final   ]    Last PHQ-9 score on record=   PHQ-9 SCORE 11/7/2017   Total Score 5         Labs showing if normal/abnormal  Data Unavailable  Lab Results   Component Value Date    AST 17 10/26/2017    ALT 25 10/26/2017      Lab Results   Component Value Date    CHOL 111 08/11/2016    TRIG 114 08/11/2016    HDL 26 08/11/2016    LDL 62 08/11/2016

## 2018-01-02 ENCOUNTER — OFFICE VISIT (OUTPATIENT)
Dept: INTERNAL MEDICINE | Facility: CLINIC | Age: 28
End: 2018-01-02
Payer: COMMERCIAL

## 2018-01-02 ENCOUNTER — OFFICE VISIT (OUTPATIENT)
Dept: NEUROSURGERY | Facility: CLINIC | Age: 28
End: 2018-01-02
Attending: NURSE PRACTITIONER
Payer: COMMERCIAL

## 2018-01-02 ENCOUNTER — OFFICE VISIT (OUTPATIENT)
Dept: FAMILY MEDICINE | Facility: CLINIC | Age: 28
End: 2018-01-02
Payer: COMMERCIAL

## 2018-01-02 ENCOUNTER — THERAPY VISIT (OUTPATIENT)
Dept: PHYSICAL THERAPY | Facility: CLINIC | Age: 28
End: 2018-01-02
Payer: COMMERCIAL

## 2018-01-02 VITALS
TEMPERATURE: 97.9 F | BODY MASS INDEX: 47.09 KG/M2 | SYSTOLIC BLOOD PRESSURE: 150 MMHG | HEART RATE: 62 BPM | HEIGHT: 66 IN | WEIGHT: 293 LBS | OXYGEN SATURATION: 100 % | DIASTOLIC BLOOD PRESSURE: 105 MMHG

## 2018-01-02 VITALS
WEIGHT: 293 LBS | SYSTOLIC BLOOD PRESSURE: 148 MMHG | OXYGEN SATURATION: 99 % | DIASTOLIC BLOOD PRESSURE: 98 MMHG | HEART RATE: 86 BPM | BODY MASS INDEX: 47.09 KG/M2 | TEMPERATURE: 98.1 F | HEIGHT: 66 IN

## 2018-01-02 DIAGNOSIS — M54.6 THORACOLUMBAR BACK PAIN: Primary | ICD-10-CM

## 2018-01-02 DIAGNOSIS — E55.9 VITAMIN D DEFICIENCY: ICD-10-CM

## 2018-01-02 DIAGNOSIS — G89.29 CHRONIC RIGHT-SIDED LOW BACK PAIN WITHOUT SCIATICA: Primary | ICD-10-CM

## 2018-01-02 DIAGNOSIS — R03.0 ELEVATED BLOOD PRESSURE READING WITHOUT DIAGNOSIS OF HYPERTENSION: ICD-10-CM

## 2018-01-02 DIAGNOSIS — M54.50 CHRONIC RIGHT-SIDED LOW BACK PAIN WITHOUT SCIATICA: Primary | ICD-10-CM

## 2018-01-02 DIAGNOSIS — F41.9 ANXIETY: ICD-10-CM

## 2018-01-02 DIAGNOSIS — M54.16 LUMBAR RADICULAR PAIN: Primary | ICD-10-CM

## 2018-01-02 DIAGNOSIS — F33.9 RECURRENT MAJOR DEPRESSIVE EPISODES (H): ICD-10-CM

## 2018-01-02 DIAGNOSIS — L73.2 HIDRADENITIS SUPPURATIVA: Primary | ICD-10-CM

## 2018-01-02 DIAGNOSIS — M54.50 THORACOLUMBAR BACK PAIN: Primary | ICD-10-CM

## 2018-01-02 PROCEDURE — 99204 OFFICE O/P NEW MOD 45 MIN: CPT | Performed by: NURSE PRACTITIONER

## 2018-01-02 PROCEDURE — G0463 HOSPITAL OUTPT CLINIC VISIT: HCPCS | Performed by: NURSE PRACTITIONER

## 2018-01-02 PROCEDURE — 99214 OFFICE O/P EST MOD 30 MIN: CPT | Performed by: INTERNAL MEDICINE

## 2018-01-02 PROCEDURE — 97110 THERAPEUTIC EXERCISES: CPT | Mod: GP | Performed by: PHYSICAL THERAPIST

## 2018-01-02 PROCEDURE — 99213 OFFICE O/P EST LOW 20 MIN: CPT | Performed by: FAMILY MEDICINE

## 2018-01-02 PROCEDURE — 97161 PT EVAL LOW COMPLEX 20 MIN: CPT | Mod: GP | Performed by: PHYSICAL THERAPIST

## 2018-01-02 RX ORDER — RIFAMPIN 300 MG/1
600 CAPSULE ORAL DAILY
Qty: 60 CAPSULE | Refills: 1 | Status: SHIPPED | OUTPATIENT
Start: 2018-01-02 | End: 2018-08-07

## 2018-01-02 RX ORDER — PROPRANOLOL HYDROCHLORIDE 10 MG/1
10 TABLET ORAL 3 TIMES DAILY PRN
Qty: 90 TABLET | Refills: 1 | Status: SHIPPED | OUTPATIENT
Start: 2018-01-02 | End: 2018-04-17

## 2018-01-02 RX ORDER — RIFAMPIN 300 MG/1
600 CAPSULE ORAL DAILY
Qty: 30 CAPSULE | Refills: 1 | Status: SHIPPED | OUTPATIENT
Start: 2018-01-02 | End: 2018-01-02

## 2018-01-02 RX ORDER — CLINDAMYCIN HCL 300 MG
CAPSULE ORAL
Qty: 60 CAPSULE | Refills: 1 | Status: SHIPPED | OUTPATIENT
Start: 2018-01-02 | End: 2018-05-15

## 2018-01-02 ASSESSMENT — ANXIETY QUESTIONNAIRES
IF YOU CHECKED OFF ANY PROBLEMS ON THIS QUESTIONNAIRE, HOW DIFFICULT HAVE THESE PROBLEMS MADE IT FOR YOU TO DO YOUR WORK, TAKE CARE OF THINGS AT HOME, OR GET ALONG WITH OTHER PEOPLE: SOMEWHAT DIFFICULT
GAD7 TOTAL SCORE: 8
3. WORRYING TOO MUCH ABOUT DIFFERENT THINGS: SEVERAL DAYS
2. NOT BEING ABLE TO STOP OR CONTROL WORRYING: MORE THAN HALF THE DAYS
1. FEELING NERVOUS, ANXIOUS, OR ON EDGE: SEVERAL DAYS
7. FEELING AFRAID AS IF SOMETHING AWFUL MIGHT HAPPEN: SEVERAL DAYS
5. BEING SO RESTLESS THAT IT IS HARD TO SIT STILL: NOT AT ALL
6. BECOMING EASILY ANNOYED OR IRRITABLE: SEVERAL DAYS

## 2018-01-02 ASSESSMENT — PATIENT HEALTH QUESTIONNAIRE - PHQ9: 5. POOR APPETITE OR OVEREATING: MORE THAN HALF THE DAYS

## 2018-01-02 NOTE — PATIENT INSTRUCTIONS
FUTURE APPOINTMENTS  Follow up in 4 weeks.  Follow up with referral for consultation with general surgery.    ORAL ANTIBIOTIC    Take by mouth 1 capsule/tablet of clindamycin 300 mg two time(s) a day for 4 weeks.    Take by mouth 2 capsule/tablet of rifampin 300 mg one time(s) a day for 4 weeks. While taking rifampin, use condoms as an extra measure due to potential of decreased effectiveness of oral contraceptive.    SUPPLEMENTS  Take by mouth a supplement of zinc gluconate 80 mg/day. If not tolerable, you may decrease to 40 mg/day.  Take by mouth a supplement of turmeric 500 mg/day.

## 2018-01-02 NOTE — PROGRESS NOTES
[facility] Initial Evaluation  Subjective:  Patient is a 27 year old female presenting with rehab back hpi. The history is provided by the patient. No  was used.   Princess TRISTIN Machado is a 27 year old female with a thoracic and lumbar condition.  Condition occurred with:  Insidious onset.  Condition occurred: for unknown reasons.  This is a chronic condition  Onset of right thoracolumbar pain of unknown etiology Summer 2017. Pt referred by MD for physical therapy on 12-12-17..    Patient reports pain:  Thoracic spine right and lumbar spine right.  Radiates to:  Gluteals right.  Pain is described as sharp and aching and is constant and reported as 6/10.  Associated symptoms:  Loss of strength and loss of motion. Pain is the same all the time.  Symptoms are exacerbated by twisting, walking, standing and lying down and relieved by rest and NSAID's.  Since onset symptoms are gradually worsening.  Special tests:  MRI.  Previous treatment includes chiropractic.  There was moderate improvement following previous treatment.  General health as reported by patient is poor.  Pertinent medical history includes:  Multiple sclerosis, depression and overweight.  Medical allergies: yes (isotretinoin).  Other surgeries include:  Other (ear tubes).  Current medications:  Anti-inflammatory and anti-depressants.  Current occupation is Medical assistant.  Patient is working in normal job without restrictions.          Red flags:  Changes in bowel and bladder habits.                        Objective:  Standing Alignment:        Lumbar deviations alignment: increased lumbar lordosis.  Pelvis deviations alignment: anterior rotation right inominate.              Flexibility/Screens:       Lower Extremity:  Decreased left lower extremity flexibility:Hamstrings    Decreased right lower extremity flexibility:  Hamstrings               Lumbar/SI Evaluation  ROM:    AROM Lumbar:   Flexion:             70%  Ext:                    60% pain   Side Bend:        Left:  75%    Right:  75%  Rotation:           Left:  60% pain    Right:  60% pain  Side Glide:        Left:     Right:         Strength: weak lower abdominals    Lumbar DTR's:  not assessed        Lumbar Dermtomes:  not assessed                Neural Tension/Mobility:      Left side:SLR  negative.     Right side:   SLR  negative.   Lumbar Palpation:  Palpation (lumbar): point tenderness lower thoracic spine and right sacral sulcus.            SI joint/Sacrum:    positive right Ishan's test          Sacral conclusion right:  Anterior inominate                                             General     ROS    Assessment/Plan:    Patient is a 27 year old female with thoracic, lumbar and sacral complaints.    Patient has the following significant findings with corresponding treatment plan.                Diagnosis 1:  Right thoracolumbar pain  Pain -  hot/cold therapy, manual therapy, self management, education and home program  Decreased ROM/flexibility - manual therapy, therapeutic exercise, therapeutic activity and home program  Decreased strength - therapeutic exercise, therapeutic activities and home program    Therapy Evaluation Codes:   1) History comprised of:   Personal factors that impact the plan of care:      None.    Comorbidity factors that impact the plan of care are:      Depression, Multiple sclerosis, Overweight and Weakness.     Medications impacting care: Anti-depressant and Anti-inflammatory.  2) Examination of Body Systems comprised of:   Body structures and functions that impact the plan of care:      Lumbar spine, Sacral illiac joint and Thoracic Spine.   Activity limitations that impact the plan of care are:      Bending, Dressing, Sitting, Standing, Walking and Sleeping.  3) Clinical presentation characteristics are:   Stable/Uncomplicated.  4) Decision-Making    Low complexity using standardized patient assessment instrument and/or  measureable assessment of functional outcome.  Cumulative Therapy Evaluation is: Low complexity.    Previous and current functional limitations:  (See Goal Flow Sheet for this information)    Short term and Long term goals: (See Goal Flow Sheet for this information)     Communication ability:  Patient appears to be able to clearly communicate and understand verbal and written communication and follow directions correctly.  Treatment Explanation - The following has been discussed with the patient:   RX ordered/plan of care  Anticipated outcomes  Possible risks and side effects  This patient would benefit from PT intervention to resume normal activities.   Rehab potential is good.    Frequency:  1 X week, once daily  Duration:  for 6 weeks  Discharge Plan:  Achieve all LTG.  Independent in home treatment program.  Reach maximal therapeutic benefit.    Please refer to the daily flowsheet for treatment today, total treatment time and time spent performing 1:1 timed codes.

## 2018-01-02 NOTE — MR AVS SNAPSHOT
After Visit Summary   1/2/2018    Princess TRISTIN Machado    MRN: [id number]           Patient Information     Date Of Birth          1990        Visit Information        Provider Department      1/2/2018 1:20 PM Mayra Weems MD [facility]        Today's Diagnoses     Chronic right-sided low back pain without sciatica    -  1    Anxiety           Follow-ups after your visit        Additional Services     PAIN MANAGEMENT REFERRAL       Chronic back pain.      Your provider has referred you to: N: [facility]  [place] [phone]   [email or web address]       Please call clinic directly to schedule appointment.    **ANY DIAGNOSTIC TESTS THAT ARE NOT IN EPIC SHOULD BE SENT TO THE PAIN CENTER**    REGARDING OPIOID MEDICATIONS:  We will always address appropriateness of opioid pain medications, but we generally will not automatically take on a prescribing role. When we do take on prescribing of opioids for chronic pain, it is in collaboration with the referring physician for an intermediate period of time (months), with an expectation that the primary physician or provider will assume the prescribing role if medications are effective at stable doses with demonstrated compliance.  Therefore, please do not assume that your prescribing responsibilities end on the day of pain clinic consultation.  Is this agreeable to you? YES    Please be aware that coverage of these services is subject to the terms and limitations of your health insurance plan.  Call member services at your health plan with any benefit or coverage questions.      Please bring the following with you to your appointment:    (1) Any X-Rays, CTs or MRIs which have been performed.  Contact the facility where they were done to arrange for  prior to your scheduled appointment.    (2) List of current medications   (3) This referral request   (4) Any documents/labs given to you for this referral                   Your next 10 appointments already scheduled     Jan 16, 2018 11:30 AM CST   CONSULT with Nguyen Deal MD   [facility]    [address]  [facility] [id number] [phone]            Jan 23, 2018  9:40 AM CST   MyChart Long with Fallon Horne MD   [facility]    [address]  [facility] [id number] [phone]              Future tests that were ordered for you today     Open Future Orders        Priority Expected Expires Ordered    MR Lumbar Spine w/o Contrast Routine  1/2/2019 1/2/2018            Who to contact     If you have questions or need follow up information about today's clinic visit or your schedule please contact [facility] directly at [phone].  Normal or non-critical lab and imaging results will be communicated to you by MyChart, letter or phone within 4 business days after the clinic has received the results. If you do not hear from us within 7 days, please contact the clinic through Sandboxhart or phone. If you have a critical or abnormal lab result, we will notify you by phone as soon as possible.  Submit refill requests through [employer] or call your pharmacy and they will forward the refill request to us. Please allow 3 business days for your refill to be completed.          Additional Information About Your Visit        Sandboxhart Information     [employer] gives you secure access to your electronic health record. If you see a primary care provider, you can also send messages to your care team and make appointments. If you have questions, please call your primary care clinic.  If you do not have a primary care provider, please call [phone] and they will assist you.        Care EveryWhere ID     This is your Care EveryWhere ID. This could be used by other organizations to access your [facility]  "records  [id number]        Your Vitals Were     Pulse Temperature Height Last Period Pulse Oximetry Breastfeeding?    86 98.1  F (36.7  C) (Oral) 5' 6\" (1.676 m) 12/05/2017 (Approximate) 99% No    BMI (Body Mass Index)                   54.23 kg/m2            Blood Pressure from Last 3 Encounters:   01/02/18 (!) 148/98   01/02/18 (!) 150/105   12/12/17 122/70    Weight from Last 3 Encounters:   01/02/18 (!) 336 lb (152.4 kg)   01/02/18 (!) 335 lb (152 kg)   12/12/17 (!) 334 lb 3.2 oz (151.6 kg)              We Performed the Following     PAIN MANAGEMENT REFERRAL          Today's Medication Changes          These changes are accurate as of: 1/2/18  2:15 PM.  If you have any questions, ask your nurse or doctor.               Start taking these medicines.        Dose/Directions    clindamycin 300 MG capsule   Commonly known as:  CLEOCIN   Used for:  Hidradenitis suppurativa   Started by:  Shelly Whipple MD        1 cap po bid   Quantity:  60 capsule   Refills:  1       propranolol 10 MG tablet   Commonly known as:  INDERAL   Used for:  Anxiety   Started by:  Mayra Weems MD        Dose:  10 mg   Take 1 tablet (10 mg) by mouth 3 times daily as needed   Quantity:  90 tablet   Refills:  1       rifampin 300 MG capsule   Commonly known as:  RIFADIN   Used for:  Hidradenitis suppurativa   Started by:  Shelly Whipple MD        Dose:  600 mg   Take 2 capsules (600 mg) by mouth daily   Quantity:  60 capsule   Refills:  1            Where to get your medicines      These medications were sent to [facility] [address] [address]     Phone:  [phone]     clindamycin 300 MG capsule    propranolol 10 MG tablet    rifampin 300 MG capsule                Primary Care Provider Office Phone # Fax #    Fallon Horne [phone]       [address]  [facility] [address]        Equal Access to Services  "    DECLAN DAMIAN : Hadii aad ku ayla Garcia, waaxda luqadaha, qaybta kaalmada adeariel, amos shelby cesardavid beck gorancynthia rehman . So [facility] [phone].    ATENCIÓN: Si habla casa, tiene a shirley disposición servicios gratuitos de asistencia lingüística. Llame al [phone].    We comply with applicable federal civil rights laws and [place] laws. We do not discriminate on the basis of race, color, national origin, age, disability, sex, sexual orientation, or gender identity.            Thank you!     Thank you for choosing [facility]  for your care. Our goal is always to provide you with excellent care. Hearing back from our patients is one way we can continue to improve our services. Please take a few minutes to complete the written survey that you may receive in the mail after your visit with us. Thank you!             Your Updated Medication List - Protect others around you: Learn how to safely use, store and throw away your medicines at www.disposemymeds.org.          This list is accurate as of: 1/2/18  2:15 PM.  Always use your most recent med list.                   Brand Name Dispense Instructions for use Diagnosis    BIOTIN PO      Take 10,000 mcg by mouth Reported on 5/9/2017        clindamycin 300 MG capsule    CLEOCIN    60 capsule    1 cap po bid    Hidradenitis suppurativa       DULoxetine 60 MG EC capsule    CYMBALTA    30 capsule    Take 1 capsule (60 mg) by mouth daily    Recurrent major depressive episodes (H)       ibuprofen 600 MG tablet    ADVIL/MOTRIN    60 tablet    Take 1 tablet (600 mg) by mouth every 6 hours as needed for moderate pain    Chronic right-sided low back pain without sciatica       methocarbamol 750 MG tablet    ROBAXIN    40 tablet    Take 1 tablet (750 mg) by mouth 2 times daily as needed for muscle spasms    DDD (degenerative disc disease), thoracic       mupirocin 2 % ointment    BACTROBAN          norethindrone 0.35 MG per tablet    MICRONOR      Take 1 tablet by mouth daily        propranolol 10 MG tablet    INDERAL    90 tablet    Take 1 tablet (10 mg) by mouth 3 times daily as needed    Anxiety       REBIF TITRATION PACK 6X8.8 & 6X22 MCG Sosy   Generic drug:  Interferon Beta-1a      Inject 1 Units Subcutaneous three times a week        rifampin 300 MG capsule    RIFADIN    60 capsule    Take 2 capsules (600 mg) by mouth daily    Hidradenitis suppurativa       VITAMIN D3 PO      Take 1,000 Int'l Units by mouth daily Reported on 5/9/2017

## 2018-01-02 NOTE — PROGRESS NOTES
[facility] - PRIMARY CARE SKIN    CC : hidradenitis suppurativa  SUBJECTIVE:                                                    Princess TRISTIN Machado is a 26 year old female who presents to clinic today for follow-up of chronic hidradenitis suppurativa in axillae and in groin. Drainage and odor have persisted in axillae and groin.    Current treatment : She has not been consistent with taking zinc and turmeric supplements due to side effects. Treatment of intralesional triamcinolone at most recent visit in June 2017.  Previous therapies include : Humira, oral doxycycline, cephalexin, amoxicillin, bactrim, topical clindamycin, Bactroban, Hibiclens.    Personal Medical History  Skin Cancer : NO  Eczema Psoriasis Rosacea Autoimmune   NO NO NO Yes - multiple sclerosis     Family Medical History  Skin Cancer : NO  Eczema Psoriasis Rosacea Autoimmune   NO NO NO NO     Refer to electronic medical record (EMR) for past medical history and medications.    ROS : 14 point review of systems was negative except the symptoms listed above in the HPI.    This document serves as a record of the services and decisions personally performed and made by Tiffanie Whipple MD. It was created on her behalf by Angel Mixon, a trained medical scribe.  The creation of this document is based on the scribe's personal observations and the provider's statements to the medical scribe.  Angel Mixon, January 2, 2018 9:30 AM      OBJECTIVE:                                                    GENERAL: healthy, alert and no distress  SKIN: Araya Skin Type - II.  Axilla and Mons pubis were examined. The dermatoscope was used to help evaluate pigmented lesions.  Skin Pertinent Findings:  Axillae : Significant residual scarring and evidence of tracking. 1 mm erythematous openings with spontaneous drainage in both axillae. No erythema, no induration.    Mons pubis : Scattered areas of residual inflammation and scarring.    Diagnostic Test Results:  none      Previous MDM : Because of the multiple scarring and tracking a surgical consult will be obtained regarding excsion.    ASSESSMENT:                                                      Encounter Diagnosis   Name Primary?     Hidradenitis suppurativa Yes         PLAN:                                                    Patient Instructions   FUTURE APPOINTMENTS  Follow up in 4 weeks.  Follow up with referral for consultation with general surgery.    ORAL ANTIBIOTIC  Take by mouth 1 capsule/tablet of clindamycin 300 mg two time(s) a day for 4 weeks.  Take by mouth 2 capsule/tablet of rifampin 300 mg one time(s) a day for 4 weeks. While taking rifampin, use condoms as an extra measure due to potential of decreased effectiveness of oral contraceptive.    SUPPLEMENTS  Take by mouth a supplement of zinc gluconate 80 mg/day. If not tolerable, you may decrease to 40 mg/day.  Take by mouth a supplement of turmeric 500 mg/day.        PROCEDURES:                                                    None.    TT : 20 minutes.  CT : 15 minutes.      The information in this document, created by the medical scribe for me, accurately reflects the services I personally performed and the decisions made by me. I have reviewed and approved this document for accuracy prior to leaving the patient care area.  Tiffanie Whipple MD January 2, 2018 9:30 AM  [facility] - PRIMARY CARE SKIN

## 2018-01-02 NOTE — MR AVS SNAPSHOT
After Visit Summary   1/2/2018    Princess TRISTIN Machado    MRN: [id number]           Patient Information     Date Of Birth          1990        Visit Information        Provider Department      1/2/2018 10:00 AM Shelly Whipple MD [facility] - Primary Care Skin        Today's Diagnoses     Hidradenitis suppurativa    -  1      Care Instructions    FUTURE APPOINTMENTS  Follow up in 4 weeks.  Follow up with referral for consultation with general surgery.    ORAL ANTIBIOTIC    Take by mouth 1 capsule/tablet of clindamycin 300 mg two time(s) a day for 4 weeks.    Take by mouth 2 capsule/tablet of rifampin 300 mg one time(s) a day for 4 weeks. While taking rifampin, use condoms as an extra measure due to potential of decreased effectiveness of oral contraceptive.    SUPPLEMENTS  Take by mouth a supplement of zinc gluconate 80 mg/day. If not tolerable, you may decrease to 40 mg/day.  Take by mouth a supplement of turmeric 500 mg/day.          Follow-ups after your visit        Additional Services     GENERAL SURG ADULT REFERRAL       Your provider has referred you to: FMG: [facility] [place] [phone]   [email or web address] consult for surgical intervention for severe scarring from hidradenitis suppurativa axillary     Please be aware that coverage of these services is subject to the terms and limitations of your health insurance plan.  Call member services at your health plan with any benefit or coverage questions.      Please bring the following with you to your appointment:    (1) Any X-Rays, CTs or MRIs which have been performed.  Contact the facility where they were done to arrange for  prior to your scheduled appointment.  Any new CT, MRI or other procedures ordered by your specialist must be performed at a [place] facility or coordinated by your clinic's referral office.    (2) List of current medications   (3) This  referral request   (4) Any documents/labs given to you for this referral                  Your next 10 appointments already scheduled     Jan 02, 2018 11:40 AM CST   New Visit with ARIEL Bassett CNP   [facility] ([facility]    [address] [place] [id number]   [phone]            Jan 02, 2018  1:20 PM CST   Medardot Long with Mayra Weems MD   [facility]    [address] [place]  [facility] [id number] [phone]            Jan 23, 2018  9:40 AM CST   Norbertohart Long with Fallon Horne MD   [facility]    [address] [place]  [facility] [id number] [phone]              Who to contact     If you have questions or need follow up information about today's clinic visit or your schedule please contact [facility] - PRIMARY CARE SKIN directly at [phone].  Normal or non-critical lab and imaging results will be communicated to you by Intercasting[employer], letter or phone within 4 business days after the clinic has received the results. If you do not hear from us within 7 days, please contact the clinic through Intercasting[employer] or phone. If you have a critical or abnormal lab result, we will notify you by phone as soon as possible.  Submit refill requests through [employer] or call your pharmacy and they will forward the refill request to us. Please allow 3 business days for your refill to be completed.          Additional Information About Your Visit        Inter[employer] Information     [employer] gives you secure access to your electronic health record. If you see a primary care provider, you can also send messages to your care team and make appointments. If you have questions, please call your primary care clinic.  If you do not have a primary care provider, please call [phone] and they will assist you.        Care EveryWhere ID     This  is your Care EveryWhere ID. This could be used by other organizations to access your [facility] medical records  [id number]        Your Vitals Were     Last Period                   11/21/2017 (Exact Date)            Blood Pressure from Last 3 Encounters:   12/12/17 122/70   09/26/17 120/74   09/03/17 120/80    Weight from Last 3 Encounters:   12/12/17 (!) 334 lb 3.2 oz (151.6 kg)   09/26/17 (!) 333 lb (151 kg)   06/27/17 (!) 333 lb 9.6 oz (151.3 kg)              We Performed the Following     GENERAL SURG ADULT REFERRAL          Today's Medication Changes          These changes are accurate as of: 1/2/18 10:12 AM.  If you have any questions, ask your nurse or doctor.               Start taking these medicines.        Dose/Directions    clindamycin 300 MG capsule   Commonly known as:  CLEOCIN   Used for:  Hidradenitis suppurativa   Started by:  Shelly Whipple MD        1 cap po bid   Quantity:  60 capsule   Refills:  1       rifampin 300 MG capsule   Commonly known as:  RIFADIN   Used for:  Hidradenitis suppurativa   Started by:  Shelly Whipple MD        Dose:  600 mg   Take 2 capsules (600 mg) by mouth daily   Quantity:  60 capsule   Refills:  1            Where to get your medicines      These medications were sent to [facility] [address] [address]     Phone:  [phone]     clindamycin 300 MG capsule    rifampin 300 MG capsule                Primary Care Provider Office Phone # Fax #    Freddyi CASTILLO Horne [phone]       [address] [facility] [address]        Equal Access to Services     BEBE DAMIAN AH: Hadii indy Garcia, wamargaritoda luqadaha, qaybta kaalmada crissy, amos ramos. So [facility] [phone].    ATENCIÓN: Si habla español, tiene a shirley disposición servicios gratuitos de asistencia lingüística. Llame al [phone].    We comply with  applicable federal civil rights laws and [place] laws. We do not discriminate on the basis of race, color, national origin, age, disability, sex, sexual orientation, or gender identity.            Thank you!     Thank you for choosing [facility] - PRIMARY CARE [facility]  for your care. Our goal is always to provide you with excellent care. Hearing back from our patients is one way we can continue to improve our services. Please take a few minutes to complete the written survey that you may receive in the mail after your visit with us. Thank you!             Your Updated Medication List - Protect others around you: Learn how to safely use, store and throw away your medicines at www.disposemymeds.org.          This list is accurate as of: 1/2/18 10:12 AM.  Always use your most recent med list.                   Brand Name Dispense Instructions for use Diagnosis    BIOTIN PO      Take 10,000 mcg by mouth Reported on 5/9/2017        clindamycin 300 MG capsule    CLEOCIN    60 capsule    1 cap po bid    Hidradenitis suppurativa       DULoxetine 60 MG EC capsule    CYMBALTA    30 capsule    Take 1 capsule (60 mg) by mouth daily    Recurrent major depressive episodes (H)       ibuprofen 600 MG tablet    ADVIL/MOTRIN    60 tablet    Take 1 tablet (600 mg) by mouth every 6 hours as needed for moderate pain    Chronic right-sided low back pain without sciatica       methocarbamol 750 MG tablet    ROBAXIN    40 tablet    Take 1 tablet (750 mg) by mouth 2 times daily as needed for muscle spasms    DDD (degenerative disc disease), thoracic       mupirocin 2 % ointment    BACTROBAN          norethindrone 0.35 MG per tablet    MICRONOR     Take 1 tablet by mouth daily        REBIF TITRATION PACK 6X8.8 & 6X22 MCG Sosy   Generic drug:  Interferon Beta-1a      Inject 1 Units Subcutaneous three times a week        rifampin 300 MG capsule    RIFADIN    60 capsule    Take 2 capsules (600 mg) by mouth daily    Hidradenitis suppurativa        VITAMIN D3 PO      Take 1,000 Int'l Units by mouth daily Reported on 5/9/2017

## 2018-01-02 NOTE — NURSING NOTE
"Chief Complaint   Patient presents with     Musculoskeletal Problem       Initial BP (!) 148/98 (BP Location: Right arm, Patient Position: Sitting, Cuff Size: Adult Large)  Pulse 86  Temp 98.1  F (36.7  C) (Oral)  Ht 5' 6\" (1.676 m)  Wt (!) 336 lb (152.4 kg)  LMP 12/05/2017 (Approximate)  SpO2 99%  Breastfeeding? No  BMI 54.23 kg/m2 Estimated body mass index is 54.23 kg/(m^2) as calculated from the following:    Height as of this encounter: 5' 6\" (1.676 m).    Weight as of this encounter: 336 lb (152.4 kg).  Medication Reconciliation: complete   Eduardo GARZA      "

## 2018-01-02 NOTE — LETTER
1/2/2018         RE: Princess TRISTIN Machado  [address]  [facility] [facility] [address]        Dear Colleague,    Thank you for referring your patient, Princess TRISTIN Machado, to the [facility] - PRIMARY CARE SKIN. Please see a copy of my visit note below.    [facility] - PRIMARY CARE SKIN    CC : hidradenitis suppurativa  SUBJECTIVE:                                                    Princess TRISTIN Machado is a 26 year old female who presents to clinic today for follow-up of chronic hidradenitis suppurativa in axillae and in groin. Drainage and odor have persisted in axillae and groin.    Current treatment : She has not been consistent with taking zinc and turmeric supplements due to side effects. Treatment of intralesional triamcinolone at most recent visit in June 2017.  Previous therapies include : Humira, oral doxycycline, cephalexin, amoxicillin, bactrim, topical clindamycin, Bactroban, Hibiclens.    Personal Medical History  Skin Cancer : NO  Eczema Psoriasis Rosacea Autoimmune   NO NO NO Yes - multiple sclerosis     Family Medical History  Skin Cancer : NO  Eczema Psoriasis Rosacea Autoimmune   NO NO NO NO     Refer to electronic medical record (EMR) for past medical history and medications.    ROS : 14 point review of systems was negative except the symptoms listed above in the HPI.    This document serves as a record of the services and decisions personally performed and made by Tiffanie Whipple MD. It was created on her behalf by Angel Mixon, a trained medical scribe.  The creation of this document is based on the scribe's personal observations and the provider's statements to the medical scribe.  Angel Mixon, January 2, 2018 9:30 AM      OBJECTIVE:                                                    GENERAL: healthy, alert and no distress  SKIN: Araya Skin Type - II.  Axilla and Mons pubis were examined. The dermatoscope was used to help evaluate pigmented lesions.  Skin Pertinent  Findings:  Axillae : Significant residual scarring and evidence of tracking. 1 mm erythematous openings with spontaneous drainage in both axillae. No erythema, no induration.    Mons pubis : Scattered areas of residual inflammation and scarring.    Diagnostic Test Results:  none     Previous MDM : Because of the multiple scarring and tracking a surgical consult will be obtained regarding excsion.    ASSESSMENT:                                                      Encounter Diagnosis   Name Primary?     Hidradenitis suppurativa Yes         PLAN:                                                    Patient Instructions   FUTURE APPOINTMENTS  Follow up in 4 weeks.  Follow up with referral for consultation with general surgery.    ORAL ANTIBIOTIC  Take by mouth 1 capsule/tablet of clindamycin 300 mg two time(s) a day for 4 weeks.  Take by mouth 2 capsule/tablet of rifampin 300 mg one time(s) a day for 4 weeks. While taking rifampin, use condoms as an extra measure due to potential of decreased effectiveness of oral contraceptive.    SUPPLEMENTS  Take by mouth a supplement of zinc gluconate 80 mg/day. If not tolerable, you may decrease to 40 mg/day.  Take by mouth a supplement of turmeric 500 mg/day.        PROCEDURES:                                                    None.    TT : 20 minutes.  CT : 15 minutes.      The information in this document, created by the medical scribe for me, accurately reflects the services I personally performed and the decisions made by me. I have reviewed and approved this document for accuracy prior to leaving the patient care area.  Tiffanie Whipple MD January 2, 2018 9:30 AM  [facility] - PRIMARY CARE SKIN      Again, thank you for allowing me to participate in the care of your patient.        Sincerely,        Shelly Whipple MD

## 2018-01-02 NOTE — NURSING NOTE
"Princess TRISTIN Machado is a 27 year old female who presents for:  Chief Complaint   Patient presents with     Neurologic Problem     referral from Dr. Weems for right low back pain without sciatica        Initial Vitals:  LMP 11/21/2017 (Exact Date) Estimated body mass index is 53.94 kg/(m^2) as calculated from the following:    Height as of 9/26/17: 5' 6\" (1.676 m).    Weight as of 12/12/17: 334 lb 3.2 oz (151.6 kg).. There is no height or weight on file to calculate BSA. BP completed using cuff size: regular, forearm  Data Unavailable    Do you feel safe in your environment?  Yes  Do you need any refills today? No    Nursing Comments: referral from Dr. Weems for right low back pain without sciatica.  Patient rates her pain today as 5 pressure and ache      5 min. nursing intake time  Claire Patel CMA, AAS      Discharge plan:   See providers dictation   2 min. nursing discharge time  Claire Patel CMA, AAS       "

## 2018-01-02 NOTE — LETTER
1/2/2018         RE: Princess TRISTIN Machado  [address]  [place] [address]        Dear Colleague,    Thank you for referring your patient, Princess TRISTIN Machado, to the [facility] NEUROSURGERY CLINIC. Please see a copy of my visit note below.    Dr. Akhil ySkes  [facility]  Neurosurgery Clinic Visit        CC: low back and right leg pain    Primary care Provider: Fallon Horne      Reason For Visit:   I was asked by Dr. Horne to consult on the patient for lumbar radicular pain.      HPI: Princess TRISTIN Machado is a 27 year old female with lumbar radicular pain.  She notes that this began this past summer.  She notes mid and low back pain with radicular pain down her right leg.  She was diagnosed with MS in 2016 and is being followed by Dr. Harman.  She states that she has been very active in the past but has had more difficulty. 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  She feels that activity makes her pain worse and rest makes it better.      Pain at its worst 10  Pain right now:  5    Past Medical History:   Diagnosis Date     Anxiety      Hidradenitis suppurativa 2010     MS (multiple sclerosis) (H)     diagnosis 10/2016     Recurrent major depression (H)     Pt has tried Zoloft, Prozac, Wellbutrin in the past        Past Medical History reviewed with patient during visit.    Past Surgical History:   Procedure Laterality Date     ENT SURGERY      tubes in ears     MYRINGOTOMY, INSERT TUBE BILATERAL, COMBINED  1990s     Past Surgical History reviewed with patient during visit.    Current Outpatient Prescriptions   Medication     clindamycin (CLEOCIN) 300 MG capsule     rifampin (RIFADIN) 300 MG capsule     DULoxetine (CYMBALTA) 60 MG EC capsule     ibuprofen (ADVIL/MOTRIN) 600 MG tablet     methocarbamol (ROBAXIN) 750 MG tablet     Interferon Beta-1a (REBIF TITRATION PACK) 6X8.8 & 6X22 MCG SOSY      mupirocin (BACTROBAN) 2 % ointment     Cholecalciferol (VITAMIN D3 PO)     BIOTIN PO     norethindrone (MICRONOR) 0.35 MG per tablet     No current facility-administered medications for this visit.        Allergies   Allergen Reactions     Isotretinoin Rash and Hives     Accutane Rash       Social History     Social History     Marital status:      Spouse name: N/A     Number of children: N/A     Years of education: N/A     Social History Main Topics     Smoking status: Never Smoker     Smokeless tobacco: Never Used     Alcohol use No     Drug use: No     Sexual activity: Yes     Partners: Male     Birth control/ protection: Pill     Other Topics Concern     Parent/Sibling W/ Cabg, Mi Or Angioplasty Before 65f 55m? No     Social History Narrative       Family History   Problem Relation Age of Onset     CEREBROVASCULAR DISEASE Mother      heart attack     CEREBROVASCULAR DISEASE Father      heart attack     MENTAL ILLNESS Sister      Post-Traumatic Stress Disorder (PTSD) Brother      MENTAL ILLNESS Brother      Glaucoma No family hx of      Macular Degeneration No family hx of      DIABETES No family hx of      Coronary Artery Disease No family hx of      Hypertension No family hx of      Hyperlipidemia No family hx of      Breast Cancer No family hx of      Colon Cancer No family hx of      Prostate Cancer No family hx of      Other Cancer No family hx of      Depression No family hx of      Anxiety Disorder No family hx of      Substance Abuse No family hx of      Anesthesia Reaction No family hx of      Asthma No family hx of      OSTEOPOROSIS No family hx of      Genetic Disorder No family hx of      Thyroid Disease No family hx of      Obesity No family hx of      Unknown/Adopted No family hx of          Review Of Systems  Skin: negative  Eyes: negative  Ears/Nose/Throat: negative  Respiratory: No shortness of breath, dyspnea on exertion, cough, or hemoptysis  Cardiovascular:  "negative  Gastrointestinal: negative  Genitourinary: negative  Musculoskeletal: back pain  Neurologic: leg pain, MS  Psychiatric: bulimia   Hematologic/Lymphatic/Immunologic: negative  Endocrine: negative     ROS: 10 point ROS neg other than the symptoms noted above in the HPI.      Vital Signs: BP (!) 150/105 (BP Location: Left arm, Cuff Size: Adult Regular)  Pulse 62  Temp 97.9  F (36.6  C) (Oral)  Ht 5' 6.25\" (1.683 m)  Wt (!) 335 lb (152 kg)  LMP 12/05/2017 (Approximate)  SpO2 100%  Breastfeeding? No  BMI 53.66 kg/m2    Examination:  Constitutional:  Alert, well nourished, NAD.  Memory: recent and remote memory intact  HEENT: Normocephalic, atraumatic.   Pulm:  Without shortness of breath   CV:  No pitting edema of BLE.    Neurological:  Awake  Alert  Oriented x 3  Speech clear  Cranial nerves II - XII intact  PERRL  EOMI  Face symmetric  Tongue midline  Motor exam   Shoulder Abduction:  Right:  5/5   Left:  5/5  Biceps:                      Right:  5/5   Left:  5/5  Triceps:                     Right:  5/5   Left:  5/5  Wrist Extensors:       Right:  5/5   Left:  5/5  Wrist Flexors:           Right:  5/5   Left:  5/5  Intrinsics:                   Right:  5/5   Left:  5/5   Hip Flexor:                Right: 5/5  Left:  5/5  Hip Adductor:             Right:  5/5  Left:  5/5  Hip Abductor:             Right:  5/5  Left:  5/5  Gastroc Soleus:        Right:  5/5  Left:  5/5  Tib/Ant:                      Right:  5/5  Left:  5/5  EHL:                          Right:  5/5  Left:  5/5   Sensation normal to bilateral upper and lower extremities  Muscle tone to bilateral upper and lower extremities normal  Gait: Able to stand from a seated position. Normal non-antalgic, non-myelopathic gait.  Able to heel/toe walk without loss of balance    Lumbar examination reveals mild tenderness of the spine and paraspinous muscles. Full ROM noted.  Hip height is symmetrical. Negative SI joint, sciatic notch or greater " trochanteric tenderness to palpation bilaterally.  Straight leg raise is negative bilaterally.      Imaging:     Thoracic MRI 10/24/17    1. T2 lesion burden: Mild with two new lesions a tthe right frontal and right parietal lobe.  2.  T1 lesion burden, mild increased  3.  Lesion enhancement: right parietal lesion enhancement compatible with active inflammation.      Cervical MRI 10-24-17    Chronic demyelination at the upper and lower cervical spinal cord stable since  May of 2017.     Assessment/Plan:   Princess TRISTIN Machado is a 27 year old female with lumbar radicular pain.  She notes that this began this past summer.  She notes mid and low back pain with radicular pain down her right leg.  She was diagnosed with MS in 2016 and is being followed by Dr. Harman.  She states that she has been very active in the past but has had more difficulty. 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  She feels that activity makes her pain worse and rest makes it better.  She has started PT. She is anxious because she has a history of bulimia and would like to lose weight before her wedding in September of 2018. She does not want to be bulimic again.  Her current BMI is 53.78. We had her most recent scans pushed over from [facility] of neurology. No lumbar MRI was noted.  She does have lesions at T1 and T2.  It was explained that I would like to see an updated lumbar MRI.  She is open to this.     Patient Instructions   1.  Please schedule your lumbar MRI.  I will contact you with the results.                Gia Dee CNP  [facility]  [address] [address]    Tel [phone]  Pager [phone]      Again, thank you for allowing me to participate in the care of your patient.        Sincerely,        ARIEL Bassett CNP

## 2018-01-02 NOTE — PROGRESS NOTES
Dr. Akhil Sykes  [facility]  Neurosurgery Clinic Visit        CC: low back and right leg pain    Primary care Provider: Fallon Horne      Reason For Visit:   I was asked by Dr. Horne to consult on the patient for lumbar radicular pain.      HPI: Princess TRISTIN Machado is a 27 year old female with lumbar radicular pain.  She notes that this began this past summer.  She notes mid and low back pain with radicular pain down her right leg.  She was diagnosed with MS in 2016 and is being followed by Dr. Harman.  She states that she has been very active in the past but has had more difficulty. 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  She feels that activity makes her pain worse and rest makes it better.      Pain at its worst 10  Pain right now:  5    Past Medical History:   Diagnosis Date     Anxiety      Hidradenitis suppurativa 2010     MS (multiple sclerosis) (H)     diagnosis 10/2016     Recurrent major depression (H)     Pt has tried Zoloft, Prozac, Wellbutrin in the past        Past Medical History reviewed with patient during visit.    Past Surgical History:   Procedure Laterality Date     ENT SURGERY      tubes in ears     MYRINGOTOMY, INSERT TUBE BILATERAL, COMBINED  1990s     Past Surgical History reviewed with patient during visit.    Current Outpatient Prescriptions   Medication     clindamycin (CLEOCIN) 300 MG capsule     rifampin (RIFADIN) 300 MG capsule     DULoxetine (CYMBALTA) 60 MG EC capsule     ibuprofen (ADVIL/MOTRIN) 600 MG tablet     methocarbamol (ROBAXIN) 750 MG tablet     Interferon Beta-1a (REBIF TITRATION PACK) 6X8.8 & 6X22 MCG SOSY     mupirocin (BACTROBAN) 2 % ointment     Cholecalciferol (VITAMIN D3 PO)     BIOTIN PO     norethindrone (MICRONOR) 0.35 MG per tablet     No current facility-administered medications for this visit.        Allergies   Allergen Reactions     Isotretinoin Rash and Hives      Accutane Rash       Social History     Social History     Marital status:      Spouse name: N/A     Number of children: N/A     Years of education: N/A     Social History Main Topics     Smoking status: Never Smoker     Smokeless tobacco: Never Used     Alcohol use No     Drug use: No     Sexual activity: Yes     Partners: Male     Birth control/ protection: Pill     Other Topics Concern     Parent/Sibling W/ Cabg, Mi Or Angioplasty Before 65f 55m? No     Social History Narrative       Family History   Problem Relation Age of Onset     CEREBROVASCULAR DISEASE Mother      heart attack     CEREBROVASCULAR DISEASE Father      heart attack     MENTAL ILLNESS Sister      Post-Traumatic Stress Disorder (PTSD) Brother      MENTAL ILLNESS Brother      Glaucoma No family hx of      Macular Degeneration No family hx of      DIABETES No family hx of      Coronary Artery Disease No family hx of      Hypertension No family hx of      Hyperlipidemia No family hx of      Breast Cancer No family hx of      Colon Cancer No family hx of      Prostate Cancer No family hx of      Other Cancer No family hx of      Depression No family hx of      Anxiety Disorder No family hx of      Substance Abuse No family hx of      Anesthesia Reaction No family hx of      Asthma No family hx of      OSTEOPOROSIS No family hx of      Genetic Disorder No family hx of      Thyroid Disease No family hx of      Obesity No family hx of      Unknown/Adopted No family hx of          Review Of Systems  Skin: negative  Eyes: negative  Ears/Nose/Throat: negative  Respiratory: No shortness of breath, dyspnea on exertion, cough, or hemoptysis  Cardiovascular: negative  Gastrointestinal: negative  Genitourinary: negative  Musculoskeletal: back pain  Neurologic: leg pain, MS  Psychiatric: bulimia   Hematologic/Lymphatic/Immunologic: negative  Endocrine: negative     ROS: 10 point ROS neg other than the symptoms noted above in the HPI.      Vital Signs: BP  "(!) 150/105 (BP Location: Left arm, Cuff Size: Adult Regular)  Pulse 62  Temp 97.9  F (36.6  C) (Oral)  Ht 5' 6.25\" (1.683 m)  Wt (!) 335 lb (152 kg)  LMP 12/05/2017 (Approximate)  SpO2 100%  Breastfeeding? No  BMI 53.66 kg/m2    Examination:  Constitutional:  Alert, well nourished, NAD.  Memory: recent and remote memory intact  HEENT: Normocephalic, atraumatic.   Pulm:  Without shortness of breath   CV:  No pitting edema of BLE.    Neurological:  Awake  Alert  Oriented x 3  Speech clear  Cranial nerves II - XII intact  PERRL  EOMI  Face symmetric  Tongue midline  Motor exam   Shoulder Abduction:  Right:  5/5   Left:  5/5  Biceps:                      Right:  5/5   Left:  5/5  Triceps:                     Right:  5/5   Left:  5/5  Wrist Extensors:       Right:  5/5   Left:  5/5  Wrist Flexors:           Right:  5/5   Left:  5/5  Intrinsics:                   Right:  5/5   Left:  5/5   Hip Flexor:                Right: 5/5  Left:  5/5  Hip Adductor:             Right:  5/5  Left:  5/5  Hip Abductor:             Right:  5/5  Left:  5/5  Gastroc Soleus:        Right:  5/5  Left:  5/5  Tib/Ant:                      Right:  5/5  Left:  5/5  EHL:                          Right:  5/5  Left:  5/5   Sensation normal to bilateral upper and lower extremities  Muscle tone to bilateral upper and lower extremities normal  Gait: Able to stand from a seated position. Normal non-antalgic, non-myelopathic gait.  Able to heel/toe walk without loss of balance    Lumbar examination reveals mild tenderness of the spine and paraspinous muscles. Full ROM noted.  Hip height is symmetrical. Negative SI joint, sciatic notch or greater trochanteric tenderness to palpation bilaterally.  Straight leg raise is negative bilaterally.      Imaging:     Thoracic MRI 10/24/17    1. T2 lesion burden: Mild with two new lesions a tthe right frontal and right parietal lobe.  2.  T1 lesion burden, mild increased  3.  Lesion enhancement: right " parietal lesion enhancement compatible with active inflammation.      Cervical MRI 10-24-17    Chronic demyelination at the upper and lower cervical spinal cord stable since  May of 2017.     Assessment/Plan:   Princess TRISTIN Machado is a 27 year old female with lumbar radicular pain.  She notes that this began this past summer.  She notes mid and low back pain with radicular pain down her right leg.  She was diagnosed with MS in 2016 and is being followed by Dr. Harman.  She states that she has been very active in the past but has had more difficulty. 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  She feels that activity makes her pain worse and rest makes it better.  She has started PT. She is anxious because she has a history of bulimia and would like to lose weight before her wedding in September of 2018. She does not want to be bulimic again.  Her current BMI is 53.78. We had her most recent scans pushed over from [facility] of [facility]. No lumbar MRI was noted.  She does have lesions at T1 and T2.  It was explained that I would like to see an updated lumbar MRI.  She is open to this.     Patient Instructions   1.  Please schedule your lumbar MRI.  I will contact you with the results.                Gia Dee [facility]  [facility]  [address] [address]    Tel [phone]  Pager [phone]

## 2018-01-02 NOTE — PROGRESS NOTES
SUBJECTIVE:   Princess TRISTIN Machado is a 27 year old female who presents to clinic today for the following health issues:    Multiple Sclerosis   On Rebif, follows with Dr. Harman    Thoracolumbar back pain  Referred to PT, she had her first eval today  MRI Cervical shows disc protrusion T6-T7, causing mild spinal stenosis  Requesting tramadol    Anxiety  Patient feels overwhelmed with all her new diagnosis.  Her wedding is coming up this summer and there is a lot of planning with this.  She finds herself in tears recently. Her anxiety mikaela often before excercising        Problem list and histories reviewed & adjusted, as indicated.  Additional history: as documented    Patient Active Problem List   Diagnosis     Optic neuritis, left     Recurrent major depressive episodes (H)     Multiple sclerosis (H)     Bulimia nervosa     Acne vulgaris     Anxiety     Chronic back pain     Freckles     Headache     Other specified health status     Hidradenitis suppurativa     Hypertriglyceridemia     Hidradenitis     Migraine without status migrainosus, not intractable     Biomechanical lesion     Numbness of finger     Somatic dysfunction of cervical region     Spasm     Vitamin D deficiency     Common wart: L t lat hand -      Morbid obesity due to excess calories (H) BMI 50-60     Lumbago     Sacral pain     Somatic dysfunction of sacral region     Thoracic segment dysfunction     Muscle spasm     Recurrent major depression (H)     Thoracolumbar back pain     Past Surgical History:   Procedure Laterality Date     ENT SURGERY      tubes in ears     MYRINGOTOMY, INSERT TUBE BILATERAL, COMBINED  1990s       Social History   Substance Use Topics     Smoking status: Never Smoker     Smokeless tobacco: Never Used     Alcohol use No     Family History   Problem Relation Age of Onset     CEREBROVASCULAR DISEASE Mother      heart attack     CEREBROVASCULAR DISEASE Father      heart attack     MENTAL ILLNESS Sister       "Post-Traumatic Stress Disorder (PTSD) Brother      MENTAL ILLNESS Brother      Glaucoma No family hx of      Macular Degeneration No family hx of      DIABETES No family hx of      Coronary Artery Disease No family hx of      Hypertension No family hx of      Hyperlipidemia No family hx of      Breast Cancer No family hx of      Colon Cancer No family hx of      Prostate Cancer No family hx of      Other Cancer No family hx of      Depression No family hx of      Anxiety Disorder No family hx of      Substance Abuse No family hx of      Anesthesia Reaction No family hx of      Asthma No family hx of      OSTEOPOROSIS No family hx of      Genetic Disorder No family hx of      Thyroid Disease No family hx of      Obesity No family hx of      Unknown/Adopted No family hx of              Reviewed and updated as needed this visit by clinical staffTobacco  Allergies  Meds  Med Hx  Surg Hx  Fam Hx  Soc Hx      Reviewed and updated as needed this visit by Provider         ROS:  C: NEGATIVE for fever, chills, change in weight  E/M: NEGATIVE for ear, mouth and throat problems  R: NEGATIVE for significant cough or SOB  CV: NEGATIVE for chest pain, palpitations or peripheral edema  MSK:  ++For mid-low back pain  PSYCH:  +Worsening anxiety    OBJECTIVE:     BP (!) 148/98 (BP Location: Right arm, Patient Position: Sitting, Cuff Size: Adult Large)  Pulse 86  Temp 98.1  F (36.7  C) (Oral)  Ht 5' 6\" (1.676 m)  Wt (!) 336 lb (152.4 kg)  LMP 12/05/2017 (Approximate)  SpO2 99%  Breastfeeding? No  BMI 54.23 kg/m2  Body mass index is 54.23 kg/(m^2).  GENERAL: healthy, alert and no distress  NECK: no adenopathy, no asymmetry, masses, or scars and thyroid normal to palpation  RESP: lungs clear to auscultation - no rales, rhonchi or wheezes  CV: regular rate and rhythm, normal S1 S2, no S3 or S4, no murmur, click or rub, no peripheral edema and peripheral pulses strong  ABDOMEN: soft, nontender, no hepatosplenomegaly, no " masses and bowel sounds normal  MS: no gross musculoskeletal defects noted, no edema  +tenderness to palpation of paraspinal muscles of lower back  Diagnostic Test Results:  none     ASSESSMENT/PLAN:       BP Screening:   Last 3 BP Readings:    BP Readings from Last 3 Encounters:   01/02/18 (!) 148/98   01/02/18 (!) 150/105   12/12/17 122/70       The following was recommended to the patient:  Re-screen within 4 weeks and recommend lifestyle modifications      (M54.5,  G89.29) Chronic right-sided low back pain without sciatica  (primary encounter diagnosis)  Comment:   -MRI Cervical shows disc protrusion T6-T7, causing mild spinal stenosis.  -Overall, multilevel degenerative disc disease   -She is requesting tramadol at visit.  As discussed at previous visits, I think for her nature of back pain we should stick to NSAIDs and she will continue with PT sessions  -In addition, I will refer her to pain management to see if she would be a candidate for epidural steroid injections  Plan: PAIN MANAGEMENT REFERRAL            (F41.9) Anxiety  Comment: Patient reports worsening anxiety and a lot of stress due to wedding planning and around her back pain and not being able to exercise.  Her anxiety often mikaela before exercise.  Since this is a situational anxiety, I will give her an rx for propranolol to use before stressful situations.  In addition, this may help somewhat with her elevated pressures as below.   Plan: propranolol (INDERAL) 10 MG tablet            (F33.9) Recurrent major depressive episodes (H)  Comment:She reports good mood overall however becomes tearful during our conversation. I think she has a component of heightened anxiety with all above. No SI  Plan: Continue cymbalta at current dose 60mg    (E55.9) Vitamin D deficiency  Comment: Last VitD level borderline  Plan: Encouraged supplementation    (R03.0) Elevated blood pressure reading without diagnosis of hypertension  Comment: Two BP readings from today  with systolics in 150s. Recheck BP at 137/90. Previous readings have been all within 120s systolics.  I believe this may be elevated due to pain but need to keep a close eye on thi  Plan: Recheck BP at next visit to determine if anti-htn should be initiated    Follow up in 4 weeks    Mayra Weems MD  [facility]

## 2018-01-02 NOTE — MR AVS SNAPSHOT
After Visit Summary   1/2/2018    Princess TRISTIN Machado    MRN: [id number]           Patient Information     Date Of Birth          1990        Visit Information        Provider Department      1/2/2018 11:40 AM Gia Dee APRN CNP [facility] Neurosurgery [facility]        Today's Diagnoses     Lumbar radicular pain    -  1      Care Instructions    1.  Please schedule your lumbar MRI.  I will contact you with the results.             Follow-ups after your visit        Your next 10 appointments already scheduled     Jan 02, 2018  1:20 PM CST   [facility]   [facility]    [address] [place]  [facility] [address]   [phone]            Jan 16, 2018 11:30 AM CST   CONSULT with Nguyen Deal MD   [facility]    [address] [facility] [address]  [facility] [address]   [phone]            Jan 23, 2018  9:40 AM CST   [facility]Chart Long with Fallon Horne MD   [facility]    [address]  [facility] [address]   [phone]              Future tests that were ordered for you today     Open Future Orders        Priority Expected Expires Ordered    MR Lumbar Spine w/o Contrast Routine  1/2/2019 1/2/2018            Who to contact     If you have questions or need follow up information about today's clinic visit or your schedule please contact [facility] NEUROSURGERY [facility] directly at [phone].  Normal or non-critical lab and imaging results will be communicated to you by MyChart, letter or phone within 4 business days after the clinic has received the results. If you do not hear from us within 7 days, please contact the clinic through MyChart or phone. If you have a critical or abnormal lab result, we will notify you by phone as soon as possible.  Submit refill requests  "through [employer] or call your pharmacy and they will forward the refill request to us. Please allow 3 business days for your refill to be completed.          Additional Information About Your Visit        [employer] Information     [employer] gives you secure access to your electronic health record. If you see a primary care provider, you can also send messages to your care team and make appointments. If you have questions, please call your primary care clinic.  If you do not have a primary care provider, please call [phone] and they will assist you.        Care EveryWhere ID     This is your Care EveryWhere ID. This could be used by other organizations to access your [facility] medical records  [id number]        Your Vitals Were     Pulse Temperature Height Last Period Pulse Oximetry Breastfeeding?    62 97.9  F (36.6  C) (Oral) 5' 6.25\" (1.683 m) 12/05/2017 (Approximate) 100% No    BMI (Body Mass Index)                   53.66 kg/m2            Blood Pressure from Last 3 Encounters:   01/02/18 (!) 150/105   12/12/17 122/70   09/26/17 120/74    Weight from Last 3 Encounters:   01/02/18 (!) 335 lb (152 kg)   12/12/17 (!) 334 lb 3.2 oz (151.6 kg)   09/26/17 (!) 333 lb (151 kg)                 Today's Medication Changes          These changes are accurate as of: 1/2/18 12:03 PM.  If you have any questions, ask your nurse or doctor.               Start taking these medicines.        Dose/Directions    clindamycin 300 MG capsule   Commonly known as:  CLEOCIN   Used for:  Hidradenitis suppurativa   Started by:  Shelly Whipple MD        1 cap po bid   Quantity:  60 capsule   Refills:  1       rifampin 300 MG capsule   Commonly known as:  RIFADIN   Used for:  Hidradenitis suppurativa   Started by:  Shelly Whipple MD        Dose:  600 mg   Take 2 capsules (600 mg) by mouth daily   Quantity:  60 capsule   Refills:  1            Where to get your medicines      These [facility] sent to [facility] [address]  [address] [address]     Phone:  [phone]     clindamycin 300 MG capsule    rifampin 300 MG capsule                Primary Care Provider Office Phone # Fax #    Fallon CHONG MD O[place]ie [phone]       [address] [facility] [address]        Equal Access to Services     DECLAN DAMIAN : Hadii aad ku hadasho Soomaali, waaxda luqadaha, qaybta kaalmada adeegyada, waxay idiin hayaan adeeg kharash la'tosin . So [facility] [phone].    ATENCIÓN: Si habla español, tiene a shirley disposición servicios gratuitos de asistencia lingüística. Llame al [phone].    We comply with applicable federal civil rights laws and [place] laws. We do not discriminate on the basis of race, color, national origin, age, disability, sex, sexual orientation, or gender identity.            Thank you!     Thank you for choosing [facility]  for your care. Our goal is always to provide you with excellent care. Hearing back from our patients is one way we can continue to improve our services. Please take a few minutes to complete the written survey that you may receive in the mail after your visit with us. Thank you!             Your Updated Medication List - Protect others around you: Learn how to safely use, store and throw away your medicines at www.disposemymeds.org.          This list is accurate as of: 1/2/18 12:03 PM.  Always use your most recent med list.                   Brand Name Dispense Instructions for use Diagnosis    BIOTIN PO      Take 10,000 mcg by mouth Reported on 5/9/2017        clindamycin 300 MG capsule    CLEOCIN    60 capsule    1 cap po bid    Hidradenitis suppurativa       DULoxetine 60 MG EC capsule    CYMBALTA    30 capsule    Take 1 capsule (60 mg) by mouth daily    Recurrent major depressive episodes (H)       ibuprofen 600 MG tablet    ADVIL/MOTRIN    60 tablet    Take 1 tablet (600 mg) by  mouth every 6 hours as needed for moderate pain    Chronic right-sided low back pain without sciatica       methocarbamol 750 MG tablet    ROBAXIN    40 tablet    Take 1 tablet (750 mg) by mouth 2 times daily as needed for muscle spasms    DDD (degenerative disc disease), thoracic       mupirocin 2 % ointment    BACTROBAN          norethindrone 0.35 MG per tablet    MICRONOR     Take 1 tablet by mouth daily        REBIF TITRATION PACK 6X8.8 & 6X22 MCG Sosy   Generic drug:  Interferon Beta-1a      Inject 1 Units Subcutaneous three times a week        rifampin 300 MG capsule    RIFADIN    60 capsule    Take 2 capsules (600 mg) by mouth daily    Hidradenitis suppurativa       VITAMIN D3 PO      Take 1,000 Int'l Units by mouth daily Reported on 5/9/2017

## 2018-01-03 ASSESSMENT — ANXIETY QUESTIONNAIRES: GAD7 TOTAL SCORE: 8

## 2018-01-10 ENCOUNTER — MYC MEDICAL ADVICE (OUTPATIENT)
Dept: INTERNAL MEDICINE | Facility: CLINIC | Age: 28
End: 2018-01-10

## 2018-01-10 DIAGNOSIS — Z13.1 SCREENING FOR DIABETES MELLITUS: Primary | ICD-10-CM

## 2018-01-10 DIAGNOSIS — Z13.220 SCREENING FOR HYPERLIPIDEMIA: ICD-10-CM

## 2018-01-11 NOTE — TELEPHONE ENCOUNTER
Please see pt's mychart message below.    Recent Labs   Lab Test 08/11/16 04/05/16   CHOL  111  122   HDL  26  24   LDL  62  70*   TRIG  114  141     Last OV 1-2-18    Please advise, thanks.

## 2018-01-14 ENCOUNTER — OFFICE VISIT (OUTPATIENT)
Dept: URGENT CARE | Facility: URGENT CARE | Age: 28
End: 2018-01-14
Payer: COMMERCIAL

## 2018-01-14 VITALS
SYSTOLIC BLOOD PRESSURE: 130 MMHG | DIASTOLIC BLOOD PRESSURE: 80 MMHG | OXYGEN SATURATION: 91 % | HEART RATE: 98 BPM | BODY MASS INDEX: 54.23 KG/M2 | TEMPERATURE: 98.5 F | WEIGHT: 293 LBS

## 2018-01-14 DIAGNOSIS — R11.0 NAUSEA: ICD-10-CM

## 2018-01-14 DIAGNOSIS — N92.6 IRREGULAR MENSES: ICD-10-CM

## 2018-01-14 DIAGNOSIS — R10.11 ABDOMINAL PAIN, RIGHT UPPER QUADRANT: Primary | ICD-10-CM

## 2018-01-14 LAB
BASOPHILS # BLD AUTO: 0 10E9/L (ref 0–0.2)
BASOPHILS NFR BLD AUTO: 0.3 %
DIFFERENTIAL METHOD BLD: NORMAL
EOSINOPHIL # BLD AUTO: 0.2 10E9/L (ref 0–0.7)
EOSINOPHIL NFR BLD AUTO: 1.6 %
ERYTHROCYTE [DISTWIDTH] IN BLOOD BY AUTOMATED COUNT: 12.9 % (ref 10–15)
HCT VFR BLD AUTO: 38.3 % (ref 35–47)
HGB BLD-MCNC: 12.9 G/DL (ref 11.7–15.7)
LYMPHOCYTES # BLD AUTO: 2 10E9/L (ref 0.8–5.3)
LYMPHOCYTES NFR BLD AUTO: 20.7 %
MCH RBC QN AUTO: 30.8 PG (ref 26.5–33)
MCHC RBC AUTO-ENTMCNC: 33.7 G/DL (ref 31.5–36.5)
MCV RBC AUTO: 91 FL (ref 78–100)
MONOCYTES # BLD AUTO: 0.6 10E9/L (ref 0–1.3)
MONOCYTES NFR BLD AUTO: 6.3 %
NEUTROPHILS # BLD AUTO: 6.7 10E9/L (ref 1.6–8.3)
NEUTROPHILS NFR BLD AUTO: 71.1 %
PLATELET # BLD AUTO: 251 10E9/L (ref 150–450)
RBC # BLD AUTO: 4.19 10E12/L (ref 3.8–5.2)
WBC # BLD AUTO: 9.4 10E9/L (ref 4–11)

## 2018-01-14 PROCEDURE — 83690 ASSAY OF LIPASE: CPT | Performed by: FAMILY MEDICINE

## 2018-01-14 PROCEDURE — 36415 COLL VENOUS BLD VENIPUNCTURE: CPT | Performed by: FAMILY MEDICINE

## 2018-01-14 PROCEDURE — 84702 CHORIONIC GONADOTROPIN TEST: CPT | Performed by: FAMILY MEDICINE

## 2018-01-14 PROCEDURE — 85025 COMPLETE CBC W/AUTO DIFF WBC: CPT | Performed by: FAMILY MEDICINE

## 2018-01-14 PROCEDURE — 99214 OFFICE O/P EST MOD 30 MIN: CPT | Performed by: FAMILY MEDICINE

## 2018-01-14 PROCEDURE — 82150 ASSAY OF AMYLASE: CPT | Performed by: FAMILY MEDICINE

## 2018-01-14 PROCEDURE — 80053 COMPREHEN METABOLIC PANEL: CPT | Performed by: FAMILY MEDICINE

## 2018-01-14 RX ORDER — ONDANSETRON 4 MG/1
4 TABLET, ORALLY DISINTEGRATING ORAL ONCE
Qty: 1 TABLET | Refills: 0
Start: 2018-01-14 | End: 2018-01-14

## 2018-01-14 RX ORDER — ONDANSETRON 4 MG/1
4-8 TABLET, ORALLY DISINTEGRATING ORAL EVERY 8 HOURS PRN
Qty: 12 TABLET | Refills: 0 | Status: SHIPPED | OUTPATIENT
Start: 2018-01-14 | End: 2018-03-06

## 2018-01-14 NOTE — PROGRESS NOTES
SUBJECTIVE:   Princess TRISTIN Machado is a 27 year old female presenting with a chief complaint of nausea and diarrhea, having RUQ abdominal pain.  Developed nausea yesterday, has been tired and slept most of the day.  Had loose diarrhea X3 over the past couple of hours.  No blood in stools, no one else with similar symptoms.  No recent travel.  Endorsed body aches.  No fever.    Onset of symptoms was 1 day(s) ago.  Course of illness is worsening.    Severity moderate  Current and Associated symptoms: abd pain, nausea, fatigue  Treatment measures tried include Fluids and Rest.  Predisposing factors include None.    Still has gallbladder and appendix.    LMP , menses irregular, on minipill.   X2    Sister with gallstones.    Past Medical History:   Diagnosis Date     Anxiety      Hidradenitis suppurativa      MS (multiple sclerosis) (H)     diagnosis 10/2016     Recurrent major depression (H)     Pt has tried Zoloft, Prozac, Wellbutrin in the past      Current Outpatient Prescriptions   Medication Sig Dispense Refill     ondansetron (ZOFRAN ODT) 4 MG ODT tab Take 1 tablet (4 mg) by mouth once for 1 dose 1 tablet 0     ondansetron (ZOFRAN ODT) 4 MG ODT tab Take 1-2 tablets (4-8 mg) by mouth every 8 hours as needed for nausea 12 tablet 0     clindamycin (CLEOCIN) 300 MG capsule 1 cap po bid 60 capsule 1     rifampin (RIFADIN) 300 MG capsule Take 2 capsules (600 mg) by mouth daily 60 capsule 1     propranolol (INDERAL) 10 MG tablet Take 1 tablet (10 mg) by mouth 3 times daily as needed 90 tablet 1     DULoxetine (CYMBALTA) 60 MG EC capsule Take 1 capsule (60 mg) by mouth daily 30 capsule 1     ibuprofen (ADVIL/MOTRIN) 600 MG tablet Take 1 tablet (600 mg) by mouth every 6 hours as needed for moderate pain 60 tablet 0     methocarbamol (ROBAXIN) 750 MG tablet Take 1 tablet (750 mg) by mouth 2 times daily as needed for muscle spasms 40 tablet 1     Interferon Beta-1a (REBIF TITRATION PACK) 6X8.8 & 6X22 MCG SOSY  Inject 1 Units Subcutaneous three times a week       mupirocin (BACTROBAN) 2 % ointment        Cholecalciferol (VITAMIN D3 PO) Take 1,000 Int'l Units by mouth daily Reported on 5/9/2017       BIOTIN PO Take 10,000 mcg by mouth Reported on 5/9/2017       norethindrone (MICRONOR) 0.35 MG per tablet Take 1 tablet by mouth daily       Social History   Substance Use Topics     Smoking status: Never Smoker     Smokeless tobacco: Never Used     Alcohol use No       ROS:  CONSTITUTIONAL:NEGATIVE for fever, chills, change in weight and POSITIVE  for fatigue and malaise  INTEGUMENTARY/SKIN: NEGATIVE for worrisome rashes, moles or lesions  ENT/MOUTH: NEGATIVE for ear, mouth and throat problems  RESP:NEGATIVE for significant cough or SOB  CV: NEGATIVE for chest pain, palpitations or peripheral edema  GI: POSITIVE for abdominal pain, diarrhea, nausea and poor appetite  : negative for, dysuria, frequency and POSITIVE for irregular menses    OBJECTIVE:  /80 (Cuff Size: Adult Regular)  Pulse 98  Temp 98.5  F (36.9  C) (Oral)  Wt (!) 336 lb (152.4 kg)  LMP 12/05/2017 (Approximate)  SpO2 91%  BMI 54.23 kg/m2  GENERAL APPEARANCE: healthy, alert and no distress, tired  EYES: EOMI,  PERRL, conjunctiva clear  HENT: ear canals and TM's normal.  Nose and mouth without ulcers, erythema or lesions  NECK: supple, nontender, no lymphadenopathy  RESP: lungs clear to auscultation - no rales, rhonchi or wheezes  CV: regular rates and rhythm, normal S1 S2, no murmur noted  ABDOMEN:  soft, mild tenderness RUQ, epigastric, LUQ, no HSM or masses and bowel sounds normal.  Equivocal rebound, no guarding  BACK: no flank tenderness  NEURO: Normal strength and tone, sensory exam grossly normal,  normal speech and mentation  SKIN: no suspicious lesions or rashes  PSYCH: mentation appears normal, affect normal/bright and fatigued    Results for orders placed or performed in visit on 01/14/18   CBC with platelets and differential   Result Value  Ref Range    WBC 9.4 4.0 - 11.0 10e9/L    RBC Count 4.19 3.8 - 5.2 10e12/L    Hemoglobin 12.9 11.7 - 15.7 g/dL    Hematocrit 38.3 35.0 - 47.0 %    MCV 91 78 - 100 fl    MCH 30.8 26.5 - 33.0 pg    MCHC 33.7 31.5 - 36.5 g/dL    RDW 12.9 10.0 - 15.0 %    Platelet Count 251 150 - 450 10e9/L    Diff Method Automated Method     % Neutrophils 71.1 %    % Lymphocytes 20.7 %    % Monocytes 6.3 %    % Eosinophils 1.6 %    % Basophils 0.3 %    Absolute Neutrophil 6.7 1.6 - 8.3 10e9/L    Absolute Lymphocytes 2.0 0.8 - 5.3 10e9/L    Absolute Monocytes 0.6 0.0 - 1.3 10e9/L    Absolute Eosinophils 0.2 0.0 - 0.7 10e9/L    Absolute Basophils 0.0 0.0 - 0.2 10e9/L       ASSESSMENT/PLAN:  (R10.11) Abdominal pain, right upper quadrant  (primary encounter diagnosis)  Plan: CBC with platelets and differential,         Comprehensive metabolic panel (BMP + Alb, Alk         Phos, ALT, AST, Total. Bili, TP), Lipase,         Amylase            (R11.0) Nausea  Plan: ondansetron (ZOFRAN ODT) 4 MG ODT tab, CBC with        platelets and differential, Comprehensive         metabolic panel (BMP + Alb, Alk Phos, ALT, AST,        Total. Bili, TP), Lipase, Amylase, ondansetron         (ZOFRAN ODT) 4 MG ODT tab            (N92.6) Irregular menses  Plan: HCG quantitative pregnancy            Reviewed initial labs, reassurance given, does not appear toxic or acute surgical abdomen.  Discussed symptomatic treatment, plenty of fluids and rest.  Reviewed possible gallstones, may still have gallstone in setting of normal LFTs and follow up with primary for further workup of abdominal pain recommended.  To ER if symptoms acute worsening.  Zofran 4 mg given in clinic with improvement of nausea, RX for Zofran given.    Discussed irregular menses, on minipill, unsure if pregnant, awaiting serum pregnancy test and if negative may require further evaluation by primary if symptoms persists.    Follow up with primary if no resolution within 1 week.    Osito Castro,  MD

## 2018-01-14 NOTE — MR AVS SNAPSHOT
After Visit Summary   1/14/2018    Princess TRISTIN Machado    MRN: [id number]           Patient Information     Date Of Birth          1990        Visit Information        Provider Department      1/14/2018 5:45 PM Osito Castro MD [facility]        Today's Diagnoses     Nausea    -  1    Abdominal pain, right upper quadrant        Irregular menses          Care Instructions    Okay for zofran to help with nausea.  Okay for tylenol for discomfort.  BRAT - bananas, rice, applesauce, toast      *Abdominal Pain, Unknown Cause (Female)    The exact cause of your abdominal (stomach) pain is not certain. This does not mean that this is something to worry about, or the right tests were not done. Everyone likes to know the exact cause of the problem, but sometimes with abdominal pain, there is no clear-cut cause, and this could be a good thing. The good news is that your symptoms can be treated, and you will feel better.   Your condition does not seem serious now; however, sometimes the signs of a serious problem may take more time to appear. For this reason, it is important for you to watch for any new symptoms, problems, or worsening of your condition.  Over the next few days, the abdominal pain may come and go, or be continuous. Other common symptoms can include nausea and vomiting. Sometimes it can be difficult to tell if you feel nauseous, you may just feel bad and not associate that feeling with nausea. Constipation, diarrhea, and a fever may go along with the pain.  The pain may continue even if treated correctly over the following days. Depending on how things go, sometimes the cause can become clear and may require further or different treatment. Additional evaluations, medications, or tests may be needed.  Home care  Your health care provider may prescribe medications for pain, symptoms, or an infection.  Follow the health care provider's instructions for taking these  medications.  General care    Rest until your next exam. No strenuous activities.    Try to find positions that ease discomfort. A small pillow placed on the abdomen may help relieve pain.    Something warm on your abdomen (such as a heating pad) may help, but be careful not to burn yourself.  Diet    Do not force yourself to eat, especially if having cramps, vomiting, or diarrhea.    Water is important so you do not get dehydrated. Soup may also be good. Sports drinks may also help, especially if they are not too acidic. Make sure you don't drink sugary drinks as this can make things worse. Take liquids in small amounts. Do not guzzle them.    Caffeine sometimes makes the pain and cramping worse.    Avoid dairy products if you have vomiting or diarrhea.    Don't eat large amounts at a time. Wait a few minutes between bites.    Eat a diet low in fiber (called a low-residue diet). Foods allowed include refined breads, white rice, fruit and vegetable juices without pulp, tender meats. These foods will pass more easily through the intestine.    Avoid fried or fatty foods, dairy, alcohol and spicy foods until your symptoms go away.  Follow-up care  Follow up with your health care provider as instructed, or if your pain does not begin to improve in the next 24 hours.  When to seek medical care  Seek prompt medical care if any of the following occur:    Pain gets worse or moves to the right lower abdomen    New or worsening vomiting or diarrhea    Swelling of the abdomen    Unable to pass stool for more than three days    New fever over 101  F (38.3 C), or rising fever    Blood in vomit or bowel movements (dark red or black color)    Jaundice (yellow color of eyes and skin)    Weakness, dizziness    Chest, arm, back, neck or jaw pain    Unexpected vaginal bleeding or missed period  Call 911  Call emergency services if any of the following occur:    Trouble breathing    Confusion    Fainting or loss of  consciousness    Rapid heart rate    Seizure    2267-4416 [facility], [address] [place] [address], [place] [address]. All rights reserved. This information is not intended as a substitute for professional medical care. Always follow your healthcare professional's instructions.        What are Gallstones?    The gallbladder stores bile, a fluid made by the liver. Bile helps digest fats in the foods you eat. Gallstones form when certain substances in the bile crystallize and become solid. In some cases, the stones don t cause any symptoms. In others, they irritate the walls of the gallbladder. More serious problems can occur if stones move into nearby ducts (such as the common bile duct) and cause blockages. This can block the flow of bile and lead to pain, nausea, and infection.  Common symptoms  Gallbladder problems can cause painful attacks, often after a meal. Some people have only one attack. Others have many. Common symptoms include:    Severe, steady pain or aching in the upper right belly (abdomen), back, or right shoulder blade, lasting from 30 minutes to several hours    A dull ache beneath the ribs or breastbone    Nausea, upset stomach, or vomiting    A buildup of too much bile in the blood (jaundice), which causes yellowing of the skin and eyes, dark urine, and itching. Call your healthcare provider right away if this occurs.  Risk factors for gallstones  You are more at risk for gallstones if you:    Are a woman    Are obese    Have a history of very fast (rapid) weight loss    Have certain intestinal diseases, such as Crohn s disease    Have certain blood diseases, such as sickle cell anemia    Have a family background that includes Native Americans or Polish Americans  Diagnosis  Ultrasound is often used to look at the gallbladder and measure the size and exact location of gallstone.  Blood tests are used to measure liver enzymes and bilirubin. Both of these may be high if the gallstones are blocking  "bile flow or irritating the liver.  Treating gallstones  If your stones are not causing symptoms, you may choose to delay treatment. But if you ve had one or more painful attacks, your healthcare provider will likely recommend removing your gallbladder. This prevents more stones from forming and causing attacks. It also helps prevent problems, such as stones passing into the ducts and causing infection or pancreatitis. After the gallbladder is removed, your liver will still make bile to aid digestion.  If you re pregnant  Hormone changes during pregnancy can make bile more likely to form stones. If your gallbladder needs to be removed, your doctor will talk with you about the timing for surgery. In some cases, it can be delayed until after childbirth. In others, you may have surgery during pregnancy. This helps protect you and your baby s health.   Date Last Reviewed: 12/1/2016 2000-2017 [employer]. [address] [place] [address]. All rights reserved. This information is not intended as a substitute for professional medical care. Always follow your healthcare professional's instructions.         * VOMITING [6yr-Adult]  Vomiting is a common symptom that may be due to different causes. These include gastroenteritis (\"stomach-flu\"), food poisoning and gastritis. There are other more serious causes of vomiting which may be hard to diagnose early in the illness. Therefore, it is important to watch for the warning signs listed below.  The main danger from repeated vomiting is \"dehydration\". This is due to excess loss of water and minerals from the body. When this occurs, body fluids must be replaced.`  HOME CARE:    If symptoms are severe, rest at home for the next 24 hours.    You may use acetaminophen (Tylenol) 650-1000 mg every 6 hours to control fever, unless another medicine was prescribed. [ NOTE : If you have chronic liver disease, talk with your doctor before using acetaminophen.] " (Aspirin should never be used in anyone under 18 years of age who is ill with a fever. It may cause severe liver damage.)    Avoid tobacco and alcohol use, which may worsen your symptoms.    If medicines for vomiting were prescribed, take as directed.  DURING THE FIRST 12-24 HOURS follow the diet below. Try to take frequent small sips even if you vomit occasionally:    FRUIT JUICES: Apple, grape juice, clear fruit drinks, electrolyte replacement and sports drinks.    BEVERAGES: Sport drinks such as Gatorade, soft drinks without caffeine; mineral water (plain or flavored), decaffeinated tea and coffee.    SOUPS: Clear broth, consommé and bouillon    DESSERTS: Plain gelatin (Jell-O), popsicles and fruit juice bars.  DURING THE NEXT 24 HOURS you may add the following to the above:    Hot cereal, plain toast, bread, rolls, crackers    Plain noodles, rice, mashed potatoes, chicken noodle or rice soup    Unsweetened canned fruit (avoid pineapple), bananas    Avoid dairy products    Limit caffeine and chocolate. No spices or seasonings except salt.  DURING THE NEXT 24 HOURS  Slowly go back to a normal diet, as you feel better and your symptoms lessen.  FOLLOW UP with your doctor as advised if you are not improving over the next 2-3 days.  GET PROMPT MEDICAL ATTENTION if any of the following occur:    Constant abdominal pain that stays in the same spot or gets worse    Continued vomiting (unable to keep liquids down) for 24 hours    Frequent diarrhea (more than 5 times a day); blood (red or black color) in diarrhea    No urine output for 12 hours or extreme thirst    Weakness, dizziness or fainting    Unusually drowsy or confused    Fever over 101.0  F (38.3  C) for more than 3 days    Yellow color of the eyes or skin    6096-3010 [employer]. [address] [place] [address], [place] [address]. All rights reserved. This information is not intended as a substitute for professional medical care. Always follow your  healthcare professional's instructions.  This information has been modified by your health care provider with permission from the publisher.            Follow-ups after your visit        Your next 10 appointments already scheduled     Jan 16, 2018  8:30 AM CST   MyChart Eye Care Return with Kristopher Franks MD   Eye Clinic ([email or web address] Clinics)    [address]  [address]  9[address]  [facility] [address]   [phone]            Jan 16, 2018 11:30 AM CST   CONSULT with Nguyen Deal MD   [facility]    [address] [facility] [address] [facility]  [facility] [id number] [phone]            Jan 20, 2018  9:20 AM CST   MyChart Physical Therapy Spine Follow Up with JAYDEN Carlos PT ([facility]  )    [address]  [address]  [facility] [address]   [phone]           If you have your physician's order in hand, please bring it with you to your appointment            Jan 27, 2018 11:20 AM CST   MyChart Physical Therapy Spine Follow Up with JAYDEN Carlos PT ([facility]  )    [address]  [address] [place] [address]   [phone]           If you have your physician's order in hand, please bring it with you to your appointment            Feb 03, 2018  9:20 AM CST   MyChart Physical Therapy Spine Follow Up with JAYDEN Carlos PT ([facility]  )    [address]  [address]  [place] [address]   [phone]           If you have your physician's order in hand, please bring it with you to your appointment              Who to contact     If you have questions or need follow up information about today's clinic visit or your schedule please contact [facility] directly at [phone].  Normal or non-critical lab and imaging results will be communicated to you by MyChart, letter or phone within 4 business days after the  clinic has received the results. If you do not hear from us within 7 days, please contact the clinic through [employer] or phone. If you have a critical or abnormal lab result, we will notify you by phone as soon as possible.  Submit refill requests through [employer] or call your pharmacy and they will forward the refill request to us. Please allow 3 business days for your refill to be completed.          Additional Information About Your Visit        [employer] Information     [employer] gives you secure access to your electronic health record. If you see a primary care provider, you can also send messages to your care team and make appointments. If you have questions, please call your primary care clinic.  If you do not have a primary care provider, please call [phone] and they will assist you.        Care EveryWhere ID     This is your Care EveryWhere ID. This could be used by other organizations to access your [facility] medical records  [id number]        Your Vitals Were     Pulse Temperature Last Period Pulse Oximetry BMI (Body Mass Index)       98 98.5  F (36.9  C) (Oral) 12/05/2017 (Approximate) 91% 54.23 kg/m2        Blood Pressure from Last 3 Encounters:   01/14/18 130/80   01/02/18 (!) 148/98   01/02/18 (!) 150/105    Weight from Last 3 Encounters:   01/14/18 (!) 336 lb (152.4 kg)   01/02/18 (!) 336 lb (152.4 kg)   01/02/18 (!) 335 lb (152 kg)              We Performed the Following     Amylase     CBC with platelets and differential     Comprehensive metabolic panel (BMP + Alb, Alk Phos, ALT, AST, Total. Bili, TP)     HCG quantitative pregnancy     Lipase          Today's Medication Changes          These changes are accurate as of: 1/14/18  7:08 PM.  If you have any questions, ask your nurse or doctor.               Start taking these medicines.        Dose/Directions    * ondansetron 4 MG ODT tab   Commonly known as:  ZOFRAN ODT   Used for:  Nausea   Started by:  Osito Castro MD        Dose:  4 mg   Take 1  tablet (4 mg) by mouth once for 1 dose   Quantity:  1 tablet   Refills:  0       * ondansetron 4 MG ODT tab   Commonly known as:  ZOFRAN ODT   Used for:  Nausea   Started by:  Osito Castro MD        Dose:  4-8 mg   Take 1-2 tablets (4-8 mg) by mouth every 8 hours as needed for nausea   Quantity:  12 tablet   Refills:  0       * Notice:  This list has 2 medication(s) that are the same as other medications prescribed for you. Read the directions carefully, and ask your doctor or other care provider to review them with you.         Where to get your medicines      These medications were sent to [facility] [address] [address]     Phone:  [phone]     ondansetron 4 MG ODT tab         Some of these will need a paper prescription and others can be bought over the counter.  Ask your nurse if you have questions.     You don't need a prescription for these medications     ondansetron 4 MG ODT tab                Primary Care Provider Office Phone # Fax #    Eugenionakumari CASTILLO Horne [phone]       [address] [facility] [address]        Equal Access to Services     BEBE DAMIAN AH: Hadii indy tiwari hadasho Soomaali, waaxda luqadaha, qaybta kaalmada adeegyada, amos ramos. So [facility] [phone].    ATENCIÓN: Si habla español, tiene a shirley disposición servicios gratuitos de asistencia lingüística. Llame al [phone].    We comply with applicable federal civil rights laws and [place] laws. We do not discriminate on the basis of race, color, national origin, age, disability, sex, sexual orientation, or gender identity.            Thank you!     Thank you for choosing [facility]  for your care. Our goal is always to provide you with excellent care. Hearing back from our patients is one way we can continue to improve our services. Please take a few minutes to complete the written survey  that you may receive in the mail after your visit with us. Thank you!             Your Updated Medication List - Protect others around you: Learn how to safely use, store and throw away your medicines at www.disposemymeds.org.          This list is accurate as of: 1/14/18  7:08 PM.  Always use your most recent med list.                   Brand Name Dispense Instructions for use Diagnosis    BIOTIN PO      Take 10,000 mcg by mouth Reported on 5/9/2017        clindamycin 300 MG capsule    CLEOCIN    60 capsule    1 cap po bid    Hidradenitis suppurativa       DULoxetine 60 MG EC capsule    CYMBALTA    30 capsule    Take 1 capsule (60 mg) by mouth daily    Recurrent major depressive episodes (H)       ibuprofen 600 MG tablet    ADVIL/MOTRIN    60 tablet    Take 1 tablet (600 mg) by mouth every 6 hours as needed for moderate pain    Chronic right-sided low back pain without sciatica       methocarbamol 750 MG tablet    ROBAXIN    40 tablet    Take 1 tablet (750 mg) by mouth 2 times daily as needed for muscle spasms    DDD (degenerative disc disease), thoracic       mupirocin 2 % ointment    BACTROBAN          norethindrone 0.35 MG per tablet    MICRONOR     Take 1 tablet by mouth daily        * ondansetron 4 MG ODT tab    ZOFRAN ODT    1 tablet    Take 1 tablet (4 mg) by mouth once for 1 dose    Nausea       * ondansetron 4 MG ODT tab    ZOFRAN ODT    12 tablet    Take 1-2 tablets (4-8 mg) by mouth every 8 hours as needed for nausea    Nausea       propranolol 10 MG tablet    INDERAL    90 tablet    Take 1 tablet (10 mg) by mouth 3 times daily as needed    Anxiety       REBIF TITRATION PACK 6X8.8 & 6X22 MCG Sosy   Generic drug:  Interferon Beta-1a      Inject 1 Units Subcutaneous three times a week        rifampin 300 MG capsule    RIFADIN    60 capsule    Take 2 capsules (600 mg) by mouth daily    Hidradenitis suppurativa       VITAMIN D3 PO      Take 1,000 Int'l Units by mouth daily Reported on 5/9/2017        *  Notice:  This list has 2 medication(s) that are the same as other medications prescribed for you. Read the directions carefully, and ask your doctor or other care provider to review them with you.

## 2018-01-15 LAB
ALBUMIN SERPL-MCNC: 3.4 G/DL (ref 3.4–5)
ALP SERPL-CCNC: 90 U/L (ref 40–150)
ALT SERPL W P-5'-P-CCNC: 23 U/L (ref 0–50)
AMYLASE SERPL-CCNC: 39 U/L (ref 30–110)
ANION GAP SERPL CALCULATED.3IONS-SCNC: 9 MMOL/L (ref 3–14)
AST SERPL W P-5'-P-CCNC: 18 U/L (ref 0–45)
B-HCG SERPL-ACNC: <1 IU/L (ref 0–5)
BILIRUB SERPL-MCNC: 0.4 MG/DL (ref 0.2–1.3)
BUN SERPL-MCNC: 6 MG/DL (ref 7–30)
CALCIUM SERPL-MCNC: 7.8 MG/DL (ref 8.5–10.1)
CHLORIDE SERPL-SCNC: 107 MMOL/L (ref 94–109)
CO2 SERPL-SCNC: 23 MMOL/L (ref 20–32)
CREAT SERPL-MCNC: 0.72 MG/DL (ref 0.52–1.04)
GFR SERPL CREATININE-BSD FRML MDRD: >90 ML/MIN/1.7M2
GLUCOSE SERPL-MCNC: 80 MG/DL (ref 70–99)
LIPASE SERPL-CCNC: 144 U/L (ref 73–393)
POTASSIUM SERPL-SCNC: 3.9 MMOL/L (ref 3.4–5.3)
PROT SERPL-MCNC: 7.8 G/DL (ref 6.8–8.8)
SODIUM SERPL-SCNC: 139 MMOL/L (ref 133–144)

## 2018-01-15 NOTE — PATIENT INSTRUCTIONS
Okay for zofran to help with nausea.  Okay for tylenol for discomfort.  BRAT - bananas, rice, applesauce, toast      *Abdominal Pain, Unknown Cause (Female)    The exact cause of your abdominal (stomach) pain is not certain. This does not mean that this is something to worry about, or the right tests were not done. Everyone likes to know the exact cause of the problem, but sometimes with abdominal pain, there is no clear-cut cause, and this could be a good thing. The good news is that your symptoms can be treated, and you will feel better.   Your condition does not seem serious now; however, sometimes the signs of a serious problem may take more time to appear. For this reason, it is important for you to watch for any new symptoms, problems, or worsening of your condition.  Over the next few days, the abdominal pain may come and go, or be continuous. Other common symptoms can include nausea and vomiting. Sometimes it can be difficult to tell if you feel nauseous, you may just feel bad and not associate that feeling with nausea. Constipation, diarrhea, and a fever may go along with the pain.  The pain may continue even if treated correctly over the following days. Depending on how things go, sometimes the cause can become clear and may require further or different treatment. Additional evaluations, medications, or tests may be needed.  Home care  Your health care provider may prescribe medications for pain, symptoms, or an infection.  Follow the health care provider's instructions for taking these medications.  General care    Rest until your next exam. No strenuous activities.    Try to find positions that ease discomfort. A small pillow placed on the abdomen may help relieve pain.    Something warm on your abdomen (such as a heating pad) may help, but be careful not to burn yourself.  Diet    Do not force yourself to eat, especially if having cramps, vomiting, or diarrhea.    Water is important so you do not get  dehydrated. Soup may also be good. Sports drinks may also help, especially if they are not too acidic. Make sure you don't drink sugary drinks as this can make things worse. Take liquids in small amounts. Do not guzzle them.    Caffeine sometimes makes the pain and cramping worse.    Avoid dairy products if you have vomiting or diarrhea.    Don't eat large amounts at a time. Wait a few minutes between bites.    Eat a diet low in fiber (called a low-residue diet). Foods allowed include refined breads, white rice, fruit and vegetable juices without pulp, tender meats. These foods will pass more easily through the intestine.    Avoid fried or fatty foods, dairy, alcohol and spicy foods until your symptoms go away.  Follow-up care  Follow up with your health care provider as instructed, or if your pain does not begin to improve in the next 24 hours.  When to seek medical care  Seek prompt medical care if any of the following occur:    Pain gets worse or moves to the right lower abdomen    New or worsening vomiting or diarrhea    Swelling of the abdomen    Unable to pass stool for more than three days    New fever over 101  F (38.3 C), or rising fever    Blood in vomit or bowel movements (dark red or black color)    Jaundice (yellow color of eyes and skin)    Weakness, dizziness    Chest, arm, back, neck or jaw pain    Unexpected vaginal bleeding or missed period  Call 911  Call emergency services if any of the following occur:    Trouble breathing    Confusion    Fainting or loss of consciousness    Rapid heart rate    Seizure    2846-6793 [address] [address]. All rights reserved. This information is not intended as a substitute for professional medical care. Always follow your healthcare professional's instructions.        What are Gallstones?    The gallbladder stores bile, a fluid made by the liver. Bile helps digest fats in the foods you eat. Gallstones form when certain  substances in the bile crystallize and become solid. In some cases, the stones don t cause any symptoms. In others, they irritate the walls of the gallbladder. More serious problems can occur if stones move into nearby ducts (such as the common bile duct) and cause blockages. This can block the flow of bile and lead to pain, nausea, and infection.  Common symptoms  Gallbladder problems can cause painful attacks, often after a meal. Some people have only one attack. Others have many. Common symptoms include:    Severe, steady pain or aching in the upper right belly (abdomen), back, or right shoulder blade, lasting from 30 minutes to several hours    A dull ache beneath the ribs or breastbone    Nausea, upset stomach, or vomiting    A buildup of too much bile in the blood (jaundice), which causes yellowing of the skin and eyes, dark urine, and itching. Call your healthcare provider right away if this occurs.  Risk factors for gallstones  You are more at risk for gallstones if you:    Are a woman    Are obese    Have a history of very fast (rapid) weight loss    Have certain intestinal diseases, such as Crohn s disease    Have certain blood diseases, such as sickle cell anemia    Have a family background that includes Native Americans or [place]an Americans  Diagnosis  Ultrasound is often used to look at the gallbladder and measure the size and exact location of gallstone.  Blood tests are used to measure liver enzymes and bilirubin. Both of these may be high if the gallstones are blocking bile flow or irritating the liver.  Treating gallstones  If your stones are not causing symptoms, you may choose to delay treatment. But if you ve had one or more painful attacks, your healthcare provider will likely recommend removing your gallbladder. This prevents more stones from forming and causing attacks. It also helps prevent problems, such as stones passing into the ducts and causing infection or pancreatitis. After the  "gallbladder is removed, your liver will still make bile to aid digestion.  If you re pregnant  Hormone changes during pregnancy can make bile more likely to form stones. If your gallbladder needs to be removed, your doctor will talk with you about the timing for surgery. In some cases, it can be delayed until after childbirth. In others, you may have surgery during pregnancy. This helps protect you and your baby s health.   Date Last Reviewed: 12/1/2016 2000-2017 [employer]. [address] [place] [address]. All rights reserved. This information is not intended as a substitute for professional medical care. Always follow your healthcare professional's instructions.         * VOMITING [6yr-Adult]  Vomiting is a common symptom that may be due to different causes. These include gastroenteritis (\"stomach-flu\"), food poisoning and gastritis. There are other more serious causes of vomiting which may be hard to diagnose early in the illness. Therefore, it is important to watch for the warning signs listed below.  The main danger from repeated vomiting is \"dehydration\". This is due to excess loss of water and minerals from the body. When this occurs, body fluids must be replaced.`  HOME CARE:    If symptoms are severe, rest at home for the next 24 hours.    You may use acetaminophen (Tylenol) 650-1000 mg every 6 hours to control fever, unless another medicine was prescribed. [ NOTE : If you have chronic liver disease, talk with your doctor before using acetaminophen.] (Aspirin should never be used in anyone under 18 years of age who is ill with a fever. It may cause severe liver damage.)    Avoid tobacco and alcohol use, which may worsen your symptoms.    If medicines for vomiting were prescribed, take as directed.  DURING THE FIRST 12-24 HOURS follow the diet below. Try to take frequent small sips even if you vomit occasionally:    FRUIT JUICES: Apple, grape juice, clear fruit drinks, electrolyte " replacement and sports drinks.    BEVERAGES: Sport drinks such as Gatorade, soft drinks without caffeine; mineral water (plain or flavored), decaffeinated tea and coffee.    SOUPS: Clear broth, consommé and bouillon    DESSERTS: Plain gelatin (Jell-O), popsicles and fruit juice bars.  DURING THE NEXT 24 HOURS you may add the following to the above:    Hot cereal, plain toast, bread, rolls, crackers    Plain noodles, rice, mashed potatoes, chicken noodle or rice soup    Unsweetened canned fruit (avoid pineapple), bananas    Avoid dairy products    Limit caffeine and chocolate. No spices or seasonings except salt.  DURING THE NEXT 24 HOURS  Slowly go back to a normal diet, as you feel better and your symptoms lessen.  FOLLOW UP with your doctor as advised if you are not improving over the next 2-3 days.  GET PROMPT MEDICAL ATTENTION if any of the following occur:    Constant abdominal pain that stays in the same spot or gets worse    Continued vomiting (unable to keep liquids down) for 24 hours    Frequent diarrhea (more than 5 times a day); blood (red or black color) in diarrhea    No urine output for 12 hours or extreme thirst    Weakness, dizziness or fainting    Unusually drowsy or confused    Fever over 101.0  F (38.3  C) for more than 3 days    Yellow color of the eyes or skin    8754-1978 [employer]. [address] [place] [address]. All rights reserved. This information is not intended as a substitute for professional medical care. Always follow your healthcare professional's instructions.  This information has been modified by your health care provider with permission from the publisher.

## 2018-01-16 ENCOUNTER — OFFICE VISIT (OUTPATIENT)
Dept: SURGERY | Facility: CLINIC | Age: 28
End: 2018-01-16
Payer: COMMERCIAL

## 2018-01-16 ENCOUNTER — OFFICE VISIT (OUTPATIENT)
Dept: OPHTHALMOLOGY | Facility: CLINIC | Age: 28
End: 2018-01-16
Attending: OPHTHALMOLOGY
Payer: COMMERCIAL

## 2018-01-16 VITALS
RESPIRATION RATE: 16 BRPM | BODY MASS INDEX: 47.09 KG/M2 | SYSTOLIC BLOOD PRESSURE: 122 MMHG | HEART RATE: 83 BPM | DIASTOLIC BLOOD PRESSURE: 78 MMHG | HEIGHT: 66 IN | WEIGHT: 293 LBS | OXYGEN SATURATION: 100 %

## 2018-01-16 DIAGNOSIS — L73.2 AXILLARY HIDRADENITIS SUPPURATIVA: Primary | ICD-10-CM

## 2018-01-16 DIAGNOSIS — H10.89 PAPILLARY CONJUNCTIVITIS: ICD-10-CM

## 2018-01-16 DIAGNOSIS — Z78.9 CONTACT LENS INTOLERANCE: ICD-10-CM

## 2018-01-16 DIAGNOSIS — H04.123 BILATERAL DRY EYES: Primary | ICD-10-CM

## 2018-01-16 PROCEDURE — G0463 HOSPITAL OUTPT CLINIC VISIT: HCPCS | Mod: ZF

## 2018-01-16 PROCEDURE — 99203 OFFICE O/P NEW LOW 30 MIN: CPT | Performed by: SURGERY

## 2018-01-16 ASSESSMENT — ENCOUNTER SYMPTOMS: WEIGHT GAIN: 1

## 2018-01-16 ASSESSMENT — TONOMETRY
IOP_METHOD: TONOPEN
OD_IOP_MMHG: 21
OS_IOP_MMHG: 22

## 2018-01-16 ASSESSMENT — VISUAL ACUITY
CORRECTION_TYPE: CONTACTS
OD_CC+: -1
OS_CC+: -1
METHOD: SNELLEN - LINEAR
OD_CC: 20/25
OS_CC: 20/30

## 2018-01-16 ASSESSMENT — EXTERNAL EXAM - RIGHT EYE: OD_EXAM: NORMAL

## 2018-01-16 ASSESSMENT — CONF VISUAL FIELD
METHOD: COUNTING FINGERS
OD_NORMAL: 1
OS_NORMAL: 1

## 2018-01-16 ASSESSMENT — SLIT LAMP EXAM - LIDS
COMMENTS: NORMAL
COMMENTS: NORMAL

## 2018-01-16 ASSESSMENT — CUP TO DISC RATIO
OS_RATIO: 0.2
OD_RATIO: 0.2

## 2018-01-16 ASSESSMENT — EXTERNAL EXAM - LEFT EYE: OS_EXAM: NORMAL

## 2018-01-16 NOTE — PATIENT INSTRUCTIONS
Start preservative free artificial tears four times a day  Both eyes     No overnight contact lens wear  Start Clearcare or other hydrogren peroxide solution for lens cleaning   Consider switch to biweekly or daily disposable contact lens wear

## 2018-01-16 NOTE — PROGRESS NOTES
HPI      ROS (Review of Systems):      Positive for weight gain.   MUSCULOSKELETAL: Positive for back pain.          Physical Exam      Assessment:   Princess TRISTIN Machado is seen in consultation for hidradenitis suppurativa of bilateral axillae, at the request of Shelly Whipple MD.    Extensive hidradenitis suppurativa in bilateral axillae with scarring, tracking, and purulent drainage.  There is no evidence of abscess or cellulitis.    The patient's disease has been persistent and refractory to treatment which has included Humira, oral doxycycline, cephalexin, amoxicillin, Bactrim, topical clindamycin, Bactroban, Hibiclens.  Her current treatment includes zinc and turmeric supplementation.  She has also been treated with intralesional triamcinolone.    Plan:  Due to the patient's extensive and refractory disease, I recommend she consult with a plastic surgeon for wide excision of the affected bilateral axillary skin and possible skin grafting.  Contact information has been given.      HPI:   presents today to discuss surgical treatment options for extensive chronic, refractory hidradenitis suppurativa of bilateral axillae.  The patient follows with Dr. Whipple of dermatology.  The patient's disease has been persistent and refractory to treatment which has included Humira, oral doxycycline, cephalexin, amoxicillin, Bactrim, topical clindamycin, Bactroban, Hibiclens.  Her current treatment includes zinc and turmeric supplementation.  She has also been treated with intralesional triamcinolone.  Despite these treatments she has persistent purulent drainage at multiple sites in bilateral axillae with foul odor.  She denies fever or chills.      Past Medical History:   has a past medical history of Anxiety; Hidradenitis suppurativa (2010); MS (multiple sclerosis) (H); and Recurrent major depression (H).    Past Surgical History:  Past Surgical History:   Procedure Laterality Date     ENT SURGERY      tubes in  "ears     MYRINGOTOMY, INSERT TUBE BILATERAL, COMBINED  1990s        Social History:  Social History     Social History     Marital status:      Spouse name: N/A     Number of children: N/A     Years of education: N/A     Occupational History     Not on file.     Social History Main Topics     Smoking status: Never Smoker     Smokeless tobacco: Never Used     Alcohol use No     Drug use: No     Sexual activity: Yes     Partners: Male     Birth control/ protection: Pill     Other Topics Concern     Parent/Sibling W/ Cabg, Mi Or Angioplasty Before 65f 55m? No     Social History Narrative        Family History:  Family History   Problem Relation Age of Onset     CEREBROVASCULAR DISEASE Mother      heart attack     CEREBROVASCULAR DISEASE Father      heart attack     MENTAL ILLNESS Sister      Post-Traumatic Stress Disorder (PTSD) Brother      MENTAL ILLNESS Brother      Glaucoma No family hx of      Macular Degeneration No family hx of      DIABETES No family hx of      Coronary Artery Disease No family hx of      Hypertension No family hx of      Hyperlipidemia No family hx of      Breast Cancer No family hx of      Colon Cancer No family hx of      Prostate Cancer No family hx of      Other Cancer No family hx of      Depression No family hx of      Anxiety Disorder No family hx of      Substance Abuse No family hx of      Anesthesia Reaction No family hx of      Asthma No family hx of      OSTEOPOROSIS No family hx of      Genetic Disorder No family hx of      Thyroid Disease No family hx of      Obesity No family hx of      Unknown/Adopted No family hx of      Family history reviewed and not pertinent.    ROS:  The 10 point review of systems is negative other than noted in the HPI and above.    PE:  /78  Pulse 83  Resp 16  Ht 5' 6\" (1.676 m)  Wt (!) 336 lb (152.4 kg)  LMP 12/05/2017 (Approximate)  SpO2 100%  BMI 54.23 kg/m2  General appearance: well-developed, well-nourished, no apparent " distress  HEENT:  Head normocephalic and atraumatic, pupils equal and round, conjunctivae clear, mucous membranes moist, external ears and nose normal  Neurologic: alert, speech is clear, moves all extremities with good strength  Psychiatric: Mood and affect are appropriate  Skin: There is > 10cm of affected skin in each axilla with numerous draining punctae, with associated tracking and scarring.  There is no erythema of the skin or focal palpable fluctuance.    This note was created using voice recognition software. Undetected word substitutions or other errors may have occurred.     Time spent with the patient with greater that 50% of the time in discussion was 5 minutes.     Nguyen Deal MD    Please route or send letter to:  Primary Care Provider (PCP) and Referring Provider

## 2018-01-16 NOTE — MR AVS SNAPSHOT
After Visit Summary   1/16/2018    Princess TRISTIN Machado    MRN: [id number]           Patient Information     Date Of Birth          1990        Visit Information        Provider Department      1/16/2018 2:15 PM [facility]        Today's Diagnoses     Bilateral dry eyes    -  1    Papillary conjunctivitis - Both Eyes        Contact lens intolerance - Both Eyes          Care Instructions    Start preservative free artificial tears four times a day  Both eyes     No overnight contact lens wear  Start Clearcare or other hydrogren peroxide solution for lens cleaning   Consider switch to biweekly or daily disposable contact lens wear            Follow-ups after your visit        Follow-up notes from your care team     Return in about 1 year (around 1/16/2019) for DFE.      Your next 10 appointments already scheduled     Jan 20, 2018  9:20 AM CST   MyChart Physical Therapy Spine Follow Up with JAYDEN Carlos PT ([facility]  )    [id number] [place] [address]   [phone]           If you have your physician's order in hand, please bring it with you to your appointment            Jan 23, 2018  8:40 AM CST   [facility]   [facility]    [address] [place] [id number] [phone]            Jan 27, 2018 11:20 AM CST   MyChart Physical Therapy Spine Follow Up with JAYDEN Carlos PT ([facility]  )    [address] [place] [address] [phone]           If you have your physician's order in hand, please bring it with you to your appointment            Feb 03, 2018  9:20 AM CST   MyChart Physical Therapy Spine Follow Up with JAYDEN Carlos PT ([facility]  )    [address] [address] [address] [phone]           If you have  your physician's order in hand, please bring it with you to your appointment              Who to contact     Please call your clinic at [phone] to:    Ask questions about your health    Make or cancel appointments    Discuss your medicines    Learn about your test results    Speak to your doctor   If you have compliments or concerns about an experience at your clinic, or if you wish to file a complaint, please contact [facility] Patient Relations at [phone] or email us at [email or web address]         Additional Information About Your Visit        i[employer] Information     [employer] gives you secure access to your electronic health record. If you see a primary care provider, you can also send messages to your care team and make appointments. If you have questions, please call your primary care clinic.  If you do not have a primary care provider, please call [phone] and they will assist you.      [employer] is an electronic gateway that provides easy, online access to your medical records. With [employer], you can request a clinic appointment, read your test results, renew a prescription or communicate with your care team.     To access your existing account, please contact your [facility] Clinic or call [phone] for assistance.        Care EveryWhere ID     This is your Care EveryWhere ID. This could be used by other organizations to access your [facility] medical records  [id number]        Your Vitals Were     Last Period                   12/05/2017 (Approximate)            Blood Pressure from Last 3 Encounters:   01/16/18 122/78   01/14/18 130/80   01/02/18 (!) 148/98    Weight from Last 3 Encounters:   01/16/18 (!) 152.4 kg (336 lb)   01/14/18 (!) 152.4 kg (336 lb)   01/02/18 (!) 152.4 kg (336 lb)              Today, you had the following     No orders found for display       Primary Care Provider Office Phone # Fax #    Krrenatokmaryi CASTILLO  MD Ozzie [phone]       [address] [facility] [address]        Equal Access to Services     DECLNA DAMIAN : Javier aad ku hadamandakiki Garcia, zohrehmaxx ndiayeradhaha, anjelica marscamden woodward, amos calzada sarabjittammy dominguez laallandavid ramos. So [facility] [phone].    ATENCIÓN: Si habla español, tiene a shirley disposición servicios gratuitos de asistencia lingüística. Llame al [phone].    We comply with applicable federal civil rights laws and [place] laws. We do not discriminate on the basis of race, color, national origin, age, disability, sex, sexual orientation, or gender identity.            Thank you!     Thank you for choosing [facility]  for your care. Our goal is always to provide you with excellent care. Hearing back from our patients is one way we can continue to improve our services. Please take a few minutes to complete the written survey that you may receive in the mail after your visit with us. Thank you!             Your Updated Medication List - Protect others around you: Learn how to safely use, store and throw away your medicines at www.disposemymeds.org.          This list is accurate as of: 1/16/18  4:54 PM.  Always use your most recent med list.                   Brand Name Dispense Instructions for use Diagnosis    BIOTIN PO      Take 10,000 mcg by mouth Reported on 5/9/2017        clindamycin 300 MG capsule    CLEOCIN    60 capsule    1 cap po bid    Hidradenitis suppurativa       DULoxetine 60 MG EC capsule    CYMBALTA    30 capsule    Take 1 capsule (60 mg) by mouth daily    Recurrent major depressive episodes (H)       ibuprofen 600 MG tablet    ADVIL/MOTRIN    60 tablet    Take 1 tablet (600 mg) by mouth every 6 hours as needed for moderate pain    Chronic right-sided low back pain without sciatica       methocarbamol 750 MG tablet    ROBAXIN    40 tablet    Take 1 tablet (750 mg) by mouth 2 times daily as needed for muscle spasms    DDD (degenerative disc disease), thoracic        mupirocin 2 % ointment    BACTROBAN          norethindrone 0.35 MG per tablet    MICRONOR     Take 1 tablet by mouth daily        ondansetron 4 MG ODT tab    ZOFRAN ODT    12 tablet    Take 1-2 tablets (4-8 mg) by mouth every 8 hours as needed for nausea    Nausea       propranolol 10 MG tablet    INDERAL    90 tablet    Take 1 tablet (10 mg) by mouth 3 times daily as needed    Anxiety       REBIF TITRATION PACK 6X8.8 & 6X22 MCG Sosy   Generic drug:  Interferon Beta-1a      Inject 1 Units Subcutaneous three times a week        rifampin 300 MG capsule    RIFADIN    60 capsule    Take 2 capsules (600 mg) by mouth daily    Hidradenitis suppurativa       VITAMIN D3 PO      Take 1,000 Int'l Units by mouth daily Reported on 5/9/2017

## 2018-01-16 NOTE — LETTER
"2018    Re: Princess Machado, : 1990    Assessment:   Princess TRISTIN Machado is seen in consultation for hidradenitis suppurativa of bilateral axillae, at the request of Shelly Whipple MD.     Extensive hidradenitis suppurativa in bilateral axillae with scarring, tracking, and purulent drainage. There is no evidence of abscess or cellulitis.     The patient's disease has been persistent and refractory to treatment which has included Humira, oral doxycycline, cephalexin, amoxicillin, Bactrim, topical clindamycin, Bactroban, Hibiclens.  Her current treatment includes zinc and turmeric supplementation.  She has also been treated with intralesional triamcinolone.     Plan:  Due to the patient's extensive and refractory disease, I recommend she consult with a plastic surgeon for wide excision of the affected bilateral axillary skin and possible skin grafting.  Contact information has been given.     HPI:   presents today to discuss surgical treatment options for extensive chronic, refractory hidradenitis suppurativa of bilateral axillae.  The patient follows with Dr. Whipple of dermatology. The patient's disease has been persistent and refractory to treatment which has included Humira, oral doxycycline, cephalexin, amoxicillin, Bactrim, topical clindamycin, Bactroban, Hibiclens.  Her current treatment includes zinc and turmeric supplementation.  She has also been treated with intralesional triamcinolone.  Despite these treatments she has persistent purulent drainage at multiple sites in bilateral axillae with foul odor.  She denies fever or chills.        Past Medical History:  Has a past medical history of Anxiety; Hidradenitis suppurativa (2010); MS (multiple sclerosis) (H); and Recurrent major depression (H).     ROS:  The 10 point review of systems is negative other than noted in the HPI and above.     PE:  /78  Pulse 83  Resp 16  Ht 5' 6\" (1.676 m)  Wt (!) 336 lb (152.4 kg)  LMP " 12/05/2017 (Approximate)  SpO2 100%  BMI 54.23 kg/m2  General appearance: well-developed, well-nourished, no apparent distress  HEENT:  Head normocephalic and atraumatic, pupils equal and round, conjunctivae clear, mucous membranes moist, external ears and nose normal  Neurologic: alert, speech is clear, moves all extremities with good strength  Psychiatric: Mood and affect are appropriate  Skin: There is > 10cm of affected skin in each axilla with numerous draining punctae, with associated tracking and scarring.  There is no erythema of the skin or focal palpable fluctuance.     Nguyen Deal MD

## 2018-01-16 NOTE — PROGRESS NOTES
Assessment & Plan      Princess TRISTIN Machado is a 26 year old female with the following diagnoses:   1. Bilateral dry eyes    2. Papillary conjunctivitis - Both Eyes    3. Contact lens intolerance - Both Eyes           Intermittent vision changes for 2-3 mo.  Has been in contact lenses full time as her glasses are broken  Wearing overnight 2-3x per week  Monthly disposable, but wearing longer as she needs new prescription  Using multipurpose solution    Moderate papillary conjunctivitis and dry eye syndrome   Discussed need to improve contact lens hygiene and wear  No overnight wear recommended  Start Preservative free artificial tears four times a day    Switch to ClearCare solution  See optometry for glasses and contact lens update   Consider more frequent disposable        Patient disposition:   Return in about 1 year (around 1/16/2019) for DFE.     Attending Physician Attestation:  Complete documentation of historical and exam elements from today's encounter can be found in the full encounter summary report (not reduplicated in this progress note).  I personally obtained the chief complaint(s) and history of present illness.  I confirmed and edited as necessary the review of systems, past medical/surgical history, family history, social history, and examination findings as documented by others; and I examined the patient myself.  I personally reviewed the relevant tests, images, and reports as documented above.  I formulated and edited as necessary the assessment and plan and discussed the findings and management plan with the patient and family. . - Kristopher Franks MD

## 2018-01-16 NOTE — MR AVS SNAPSHOT
After Visit Summary   1/16/2018    Princess TRISTIN Machado    MRN: [id number]           Patient Information     Date Of Birth          1990        Visit Information        Provider Department      1/16/2018 11:30 AM Nguyen Deal MD [facility] General Surgery      Today's Diagnoses     Axillary hidradenitis suppurativa    -  1       Follow-ups after your visit        Your next 10 appointments already scheduled     Jan 16, 2018  2:15 PM CST   MyChart Eye Care Return with Kristopher Franks MD   Eye Clinic ([facility])    [address] [facility]  [address]  [facility] [address]   [phone]            Jan 20, 2018  9:20 AM CST   [facility] Follow Up with JAYDEN Carlos PT ([facility]  )    [id number] [place] [address]   [phone]           If you have your physician's order in hand, please bring it with you to your appointment            Jan 27, 2018 11:20 AM CST   [facility] Follow Up with JAYDEN Carlos PT ([facility]  )    [address] [facility]  [address] [place] [address]   [phone]           If you have your physician's order in hand, please bring it with you to your appointment            Feb 03, 2018  9:20 AM CST   [facility] Follow Up with JAYDEN Carlos PT ([facility]  )    [address] [place] [address]   [phone]           If you have your physician's order in hand, please bring it with you to your appointment              Who to contact     If you have questions or need follow up information about today's clinic visit or your schedule please contact [facility] directly at [phone].  Normal or non-critical lab and imaging results will be communicated to you by MyChart, letter or  "phone within 4 business days after the clinic has received the results. If you do not hear from us within 7 days, please contact the clinic through [employer] or phone. If you have a critical or abnormal lab result, we will notify you by phone as soon as possible.  Submit refill requests through [employer] or call your pharmacy and they will forward the refill request to us. Please allow 3 business days for your refill to be completed.          Additional Information About Your Visit        [employer] Information     [employer] gives you secure access to your electronic health record. If you see a primary care provider, you can also send messages to your care team and make appointments. If you have questions, please call your primary care clinic.  If you do not have a primary care provider, please call [phone] and they will assist you.        Care EveryWhere ID     This is your Care EveryWhere ID. This could be used by other organizations to access your [facility] medical records  [id number]        Your Vitals Were     Pulse Respirations Height Last Period Pulse Oximetry BMI (Body Mass Index)    83 16 5' 6\" (1.676 m) 12/05/2017 (Approximate) 100% 54.23 kg/m2       Blood Pressure from Last 3 Encounters:   01/16/18 122/78   01/14/18 130/80   01/02/18 (!) 148/98    Weight from Last 3 Encounters:   01/16/18 (!) 336 lb (152.4 kg)   01/14/18 (!) 336 lb (152.4 kg)   01/02/18 (!) 336 lb (152.4 kg)              Today, you had the following     No orders found for display       Primary Care Provider Office Phone # Fax #    Krishnakumari CASTILLO Horne [phone]       [address] [facility] [address]        Equal Access to Services     [facility]: Hadii indy aguila Solino, waaxda luqadaha, qaybta kaalmada adeegyada, amos ramos. So [facility] [phone].    ATENCIÓN: Si habla español, tiene a shirley disposición servicios gratuitos de asistencia lingüística. Llame al " [phone].    We comply with applicable federal civil rights laws and [place] laws. We do not discriminate on the basis of race, color, national origin, age, disability, sex, sexual orientation, or gender identity.            Thank you!     Thank you for choosing [facility]  for your care. Our goal is always to provide you with excellent care. Hearing back from our patients is one way we can continue to improve our services. Please take a few minutes to complete the written survey that you may receive in the mail after your visit with us. Thank you!             Your Updated Medication List - Protect others around you: Learn how to safely use, store and throw away your medicines at www.disposemymeds.org.          This list is accurate as of: 1/16/18 11:58 AM.  Always use your most recent med list.                   Brand Name Dispense Instructions for use Diagnosis    BIOTIN PO      Take 10,000 mcg by mouth Reported on 5/9/2017        clindamycin 300 MG capsule    CLEOCIN    60 capsule    1 cap po bid    Hidradenitis suppurativa       DULoxetine 60 MG EC capsule    CYMBALTA    30 capsule    Take 1 capsule (60 mg) by mouth daily    Recurrent major depressive episodes (H)       ibuprofen 600 MG tablet    ADVIL/MOTRIN    60 tablet    Take 1 tablet (600 mg) by mouth every 6 hours as needed for moderate pain    Chronic right-sided low back pain without sciatica       methocarbamol 750 MG tablet    ROBAXIN    40 tablet    Take 1 tablet (750 mg) by mouth 2 times daily as needed for muscle spasms    DDD (degenerative disc disease), thoracic       mupirocin 2 % ointment    BACTROBAN          norethindrone 0.35 MG per tablet    MICRONOR     Take 1 tablet by mouth daily        ondansetron 4 MG ODT tab    ZOFRAN ODT    12 tablet    Take 1-2 tablets (4-8 mg) by mouth every 8 hours as needed for nausea    Nausea       propranolol 10 MG tablet    INDERAL    90 tablet    Take 1 tablet (10 mg) by mouth 3  times daily as needed    Anxiety       REBIF TITRATION PACK 6X8.8 & 6X22 MCG Sosy   Generic drug:  Interferon Beta-1a      Inject 1 Units Subcutaneous three times a week        rifampin 300 MG capsule    RIFADIN    60 capsule    Take 2 capsules (600 mg) by mouth daily    Hidradenitis suppurativa       VITAMIN D3 PO      Take 1,000 Int'l Units by mouth daily Reported on 5/9/2017

## 2018-01-16 NOTE — NURSING NOTE
Chief Complaints and History of Present Illnesses   Patient presents with     Yearly Exam     blurry va     HPI    Affected eye(s):  Both   Symptoms:        Frequency:  Constant       Do you have eye pain now?:  No      Comments:  States that the va will come and go in more in the RE  Declines MR today  Switching cls out monthly  +floaters not new  Marisa Bates COT 2:12 PM January 16, 2018

## 2018-01-16 NOTE — LETTER
1/16/2018       RE: Princess TRISTIN Machado  [address]  [facility] [address]     Dear Colleague,    Thank you for referring your patient, Princess TRISTIN Machado, to the EYE CLINIC at [facility]. Please see a copy of my visit note below.    Assessment & Plan      Princess TRISTIN Machado is a 26 year old female with the following diagnoses:   1. Bilateral dry eyes    2. Papillary conjunctivitis - Both Eyes    3. Contact lens intolerance - Both Eyes           Intermittent vision changes for 2-3 mo.  Has been in contact lenses full time as her glasses are broken  Wearing overnight 2-3x per week  Monthly disposable, but wearing longer as she needs new prescription  Using multipurpose solution    Moderate papillary conjunctivitis and dry eye syndrome   Discussed need to improve contact lens hygiene and wear  No overnight wear recommended  Start Preservative free artificial tears four times a day    Switch to ClearCare solution  See optometry for glasses and contact lens update   Consider more frequent disposable        Patient disposition:   Return in about 1 year (around 1/16/2019) for DFE.     Attending Physician Attestation:  Complete documentation of historical and exam elements from today's encounter can be found in the full encounter summary report (not reduplicated in this progress note).  I personally obtained the chief complaint(s) and history of present illness.  I confirmed and edited as necessary the review of systems, past medical/surgical history, family history, social history, and examination findings as documented by others; and I examined the patient myself.  I personally reviewed the relevant tests, images, and reports as documented above.  I formulated and edited as necessary the assessment and plan and discussed the findings and management plan with the patient and family. . - Kristopher Franks MD       Again, thank you for allowing me to participate in the  care of your patient.      Sincerely,    Kristopher Franks MD

## 2018-01-23 ENCOUNTER — TRANSFERRED RECORDS (OUTPATIENT)
Dept: HEALTH INFORMATION MANAGEMENT | Facility: CLINIC | Age: 28
End: 2018-01-23

## 2018-01-23 ENCOUNTER — OFFICE VISIT (OUTPATIENT)
Dept: OPTOMETRY | Facility: CLINIC | Age: 28
End: 2018-01-23
Payer: COMMERCIAL

## 2018-01-23 DIAGNOSIS — H52.13 HIGH MYOPIA, BILATERAL: Primary | ICD-10-CM

## 2018-01-23 PROCEDURE — 92012 INTRM OPH EXAM EST PATIENT: CPT | Performed by: OPTOMETRIST

## 2018-01-23 PROCEDURE — 92310 CONTACT LENS FITTING OU: CPT | Performed by: OPTOMETRIST

## 2018-01-23 PROCEDURE — 92015 DETERMINE REFRACTIVE STATE: CPT | Performed by: OPTOMETRIST

## 2018-01-23 ASSESSMENT — VISUAL ACUITY
METHOD: SNELLEN - LINEAR
OD_CC: 20/25
OD_CC+: -1
CORRECTION_TYPE: CONTACTS
OD_CC: 20/30
OS_CC: 20/40
OS_CC: 20/30

## 2018-01-23 ASSESSMENT — EXTERNAL EXAM - LEFT EYE: OS_EXAM: NORMAL

## 2018-01-23 ASSESSMENT — REFRACTION_MANIFEST
OD_CYLINDER: +0.50
METHOD_AUTOREFRACTION: 1
OD_SPHERE: -10.25
OS_SPHERE: -9.75
OS_SPHERE: -10.50
OS_CYLINDER: +0.50
OD_AXIS: 036
OD_SPHERE: -11.00
OS_AXIS: 81

## 2018-01-23 ASSESSMENT — REFRACTION_CURRENTRX
OS_SPHERE: -9.50
OD_SPHERE: -10.00
OS_BRAND: J&J ACUVUE OASYS BC 8.4, D 14.0
OD_BRAND: J&J ACUVUE OASYS BC 8.4, D 14.0

## 2018-01-23 ASSESSMENT — KERATOMETRY
OD_AXISANGLE_DEGREES: 80
OS_K1POWER_DIOPTERS: 46.37
METHOD_AUTO_MANUAL: AUTOMATED
OS_AXISANGLE2_DEGREES: 4
OD_K1POWER_DIOPTERS: 46.50
OS_K2POWER_DIOPTERS: 47.50
OS_AXISANGLE_DEGREES: 94
OD_K2POWER_DIOPTERS: 48.12
OD_AXISANGLE2_DEGREES: 170

## 2018-01-23 ASSESSMENT — EXTERNAL EXAM - RIGHT EYE: OD_EXAM: NORMAL

## 2018-01-23 ASSESSMENT — SLIT LAMP EXAM - LIDS
COMMENTS: NORMAL
COMMENTS: NORMAL

## 2018-01-23 NOTE — PROGRESS NOTES
Chief Complaint   Patient presents with     Contact Lens Evaluation     Would like contacts     Refraction       Previous contact lens wearer? Yes: Monthly Acuvue Oasys  Comfort of contact lenses :Good  Satisfied with current lenses: Yes        Last Eye Exam: 1week  Dilated Previously: Yes    What are you currently using to see?  contacts    Distance Vision Acuity: Satisfied with vision    Near Vision Acuity: Satisfied with vision while reading  unaided    Eye Comfort: good  Do you use eye drops? : Yes: Refresh  Occupation or Hobbies: Everything           Medical, surgical and family histories reviewed and updated 1/23/2018.       OBJECTIVE: See Ophthalmology exam    ASSESSMENT:  No diagnosis found.   PLAN:     There are no Patient Instructions on file for this visit.

## 2018-01-23 NOTE — LETTER
1/23/2018         RE: Princess TRISTIN Machado  [address]  [facility] [address]        Dear Colleague,    Thank you for referring your patient, Princess TRISTIN Machado, to the [facility]. Please see a copy of my visit note below.    Chief Complaint   Patient presents with     Contact Lens Evaluation     Would like contacts     Refraction       Previous contact lens wearer? Yes: Monthly Acuvue Oasys  Comfort of contact lenses :Good  Satisfied with current lenses: Yes        Last Eye Exam: 1week  Dilated Previously: Yes    What are you currently using to see?  contacts    Distance Vision Acuity: Satisfied with vision    Near Vision Acuity: Satisfied with vision while reading  unaided    Eye Comfort: good  Do you use eye drops? : Yes: Refresh  Occupation or Hobbies: Everything           Medical, surgical and family histories reviewed and updated 1/23/2018.       OBJECTIVE: See Ophthalmology exam    ASSESSMENT:  No diagnosis found.   PLAN:     There are no Patient Instructions on file for this visit.                     Chief Complaint   Patient presents with     Contact Lens Evaluation     Would like contacts     Refraction     She was just seen at the  dx with papillae and contact lens  overwear was instructed for dw only and PF artificial tears . She used drops over contacts for a couple of days  (they stung), and using clearcare dw no ew    History of mild optic neuritis OS    Do you wear contact lenses? Yes monthly ew     HPI    Symptoms:     Blurred vision                      Dionna William, OD     See Review Of Systems     HPI and ROS performed by Optometrist      Medical, surgical and family histories reviewed and updated 1/23/2018.         OBJECTIVE: See Ophthalmology exam    ASSESSMENT:    ICD-10-CM    1. High myopia, bilateral H52.13 REFRACTION    she was wearing acuvue oasys 8.4 for a month or longer    PLAN:  Can not afford dailies, will do more frequent replacement and  clearcare  Needs dailies or at  Minimum q2 w  Ocular health exam reviewed     Dionna William OD     Again, thank you for allowing me to participate in the care of your patient.        Sincerely,        Dionna William, OD

## 2018-01-23 NOTE — PROGRESS NOTES
Chief Complaint   Patient presents with     Contact Lens Evaluation     Would like contacts     Refraction     She was just [facility] at [facility] with papillae and contact lens  overwear was instructed for dw only and PF artificial tears . She used drops over contacts for a couple of days  (they stung), and using clearcare dw no ew    History of mild optic neuritis OS    Do you wear contact lenses? Yes monthly ew     HPI    Symptoms:     Blurred vision                      Dionna William, OD     See Review Of Systems     HPI and ROS performed by Optometrist      Medical, surgical and family histories reviewed and updated 1/23/2018.         OBJECTIVE: See Ophthalmology exam    ASSESSMENT:    ICD-10-CM    1. High myopia, bilateral H52.13 REFRACTION    she was wearing acuvue oasys 8.4 for a month or longer    PLAN:  Can not afford dailies, will do more frequent replacement and clearcare  Needs dailies or at  Minimum q2 w  Ocular health exam reviewed     Dionna William OD

## 2018-01-23 NOTE — PATIENT INSTRUCTIONS
Recommend no Extended wear , use preservative free artificial tears  When lenses are out  You can order your contact lenses online at www.[email or web address].[email or web address].  Click on services at the top of the page, then eye care and you will see the link to order contact lenses.  You can also order contact lenses at [phone]. There is no shipping fee if you order an annual supply otherwise  be sure to let them know which office you would like to  the lenses, [facility] [phone].    240 for 2- 24 packs   Continue with clearcare  Recommend  Back up glasses

## 2018-01-23 NOTE — MR AVS SNAPSHOT
After Visit Summary   1/23/2018    Princess TRISTIN Machado    MRN: [id number]           Patient Information     Date Of Birth          1990        Visit Information        Provider Department      1/23/2018 10:00 AM Dionna William OD [facility]        Today's Diagnoses     High myopia, bilateral    -  1      Care Instructions    Recommend no Extended wear , use preservative free artificial tears  When lenses are out  You can order your contact lenses online at www.[email or web address].  Click on services at the top of the page, then eye care and you will see the link to order contact lenses.  You can also order contact lenses at [phone]. There is no shipping fee if you order an annual supply otherwise  be sure to let them know which office you would like to  the lenses, [facility] [phone].    240 for 2- 24 packs   Continue with clearcare  Recommend  Back up glasses             Follow-ups after your visit        Follow-up notes from your care team     Return in about 1 year (around 1/23/2019) for Annual Visit.      Your next 10 appointments already scheduled     Jan 27, 2018  8:40 AM CST   [facility] Follow Up with JAYDEN Carlos PT ([facility]  )    [address] [facility]  [address] [place] [address]   [phone]           If you have your physician's order in hand, please bring it with you to your appointment            Feb 03, 2018  9:20 AM CST   [facility] Follow Up with JAYDEN Carlos PT ([facility]  )    [address] [facility]  [address] [place] [address]   [phone]           If you have your physician's order in hand, please bring it with you to your appointment              Who to contact     If you have questions or need follow up information about today's clinic visit or your schedule please contact [facility] directly at [phone].  Normal or  non-critical lab and imaging results will be communicated to you by MyChart, letter or phone within 4 business days after the clinic has received the results. If you do not hear from us within 7 days, please contact the clinic through [employer] or phone. If you have a critical or abnormal lab result, we will notify you by phone as soon as possible.  Submit refill requests through [employer] or call your pharmacy and they will forward the refill request to us. Please allow 3 business days for your refill to be completed.          Additional Information About Your Visit        [employer] Information     [employer] gives you secure access to your electronic health record. If you see a primary care provider, you can also send messages to your care team and make appointments. If you have questions, please call your primary care clinic.  If you do not have a primary care provider, please call [phone] and they will assist you.        Care EveryWhere ID     This is your Care EveryWhere ID. This could be used by other organizations to access your [facility] medical records  [id number]        Your Vitals Were     Last Period                   12/05/2017 (Approximate)            Blood Pressure from Last 3 Encounters:   01/16/18 122/78   01/14/18 130/80   01/02/18 (!) 148/98    Weight from Last 3 Encounters:   01/16/18 (!) 152.4 kg (336 lb)   01/14/18 (!) 152.4 kg (336 lb)   01/02/18 (!) 152.4 kg (336 lb)              We Performed the Following     CONTACT LENS FITTING,BILAT     EYE EXAM (SIMPLE-NONBILLABLE)     REFRACTION        Primary Care Provider Office Phone # Fax #    Krishnakumari CASTILLO Horne [phone]       [address] [facility] [address]        Equal Access to Services     [facility] : Hadii indy aguila Solino, waaxda luqadaha, qaybta kaalmaamos chamberlain. So [facility] [phone].    ATENCIÓN: Si habla español, tiene a shirley disposición servicios  pasha de asistencia lingüística. Micaela gould [phone].    We comply with applicable federal civil rights laws and [place] laws. We do not discriminate on the basis of race, color, national origin, age, disability, sex, sexual orientation, or gender identity.            Thank you!     Thank you for choosing [facility]  for your care. Our goal is always to provide you with excellent care. Hearing back from our patients is one way we can continue to improve our services. Please take a few minutes to complete the written survey that you may receive in the mail after your visit with us. Thank you!             Your Updated Medication List - Protect others around you: Learn how to safely use, store and throw away your medicines at www.disposemymeds.org.          This list is accurate as of: 1/23/18 11:36 AM.  Always use your most recent med list.                   Brand Name Dispense Instructions for use Diagnosis    BIOTIN PO      Take 10,000 mcg by mouth Reported on 5/9/2017        clindamycin 300 MG capsule    CLEOCIN    60 capsule    1 cap po bid    Hidradenitis suppurativa       DULoxetine 60 MG EC capsule    CYMBALTA    30 capsule    Take 1 capsule (60 mg) by mouth daily    Recurrent major depressive episodes (H)       ibuprofen 600 MG tablet    ADVIL/MOTRIN    60 tablet    Take 1 tablet (600 mg) by mouth every 6 hours as needed for moderate pain    Chronic right-sided low back pain without sciatica       methocarbamol 750 MG tablet    ROBAXIN    40 tablet    Take 1 tablet (750 mg) by mouth 2 times daily as needed for muscle spasms    DDD (degenerative disc disease), thoracic       mupirocin 2 % ointment    BACTROBAN          norethindrone 0.35 MG per tablet    MICRONOR     Take 1 tablet by mouth daily        ondansetron 4 MG ODT tab    ZOFRAN ODT    12 tablet    Take 1-2 tablets (4-8 mg) by mouth every 8 hours as needed for nausea    Nausea       propranolol 10 MG tablet    INDERAL    90 tablet     Take 1 tablet (10 mg) by mouth 3 times daily as needed    Anxiety       REBIF TITRATION PACK 6X8.8 & 6X22 MCG Sosy   Generic drug:  Interferon Beta-1a      Inject 1 Units Subcutaneous three times a week        rifampin 300 MG capsule    RIFADIN    60 capsule    Take 2 capsules (600 mg) by mouth daily    Hidradenitis suppurativa       VITAMIN D3 PO      Take 1,000 Int'l Units by mouth daily Reported on 5/9/2017

## 2018-01-30 ENCOUNTER — TRANSFERRED RECORDS (OUTPATIENT)
Dept: HEALTH INFORMATION MANAGEMENT | Facility: CLINIC | Age: 28
End: 2018-01-30

## 2018-01-30 ENCOUNTER — OFFICE VISIT (OUTPATIENT)
Dept: INTERNAL MEDICINE | Facility: CLINIC | Age: 28
End: 2018-01-30
Payer: COMMERCIAL

## 2018-01-30 VITALS
DIASTOLIC BLOOD PRESSURE: 77 MMHG | OXYGEN SATURATION: 99 % | HEIGHT: 66 IN | HEART RATE: 96 BPM | BODY MASS INDEX: 47.09 KG/M2 | SYSTOLIC BLOOD PRESSURE: 132 MMHG | TEMPERATURE: 97.4 F | WEIGHT: 293 LBS

## 2018-01-30 DIAGNOSIS — N63.0 LUMP OR MASS IN BREAST: Primary | ICD-10-CM

## 2018-01-30 PROCEDURE — 99213 OFFICE O/P EST LOW 20 MIN: CPT | Performed by: INTERNAL MEDICINE

## 2018-01-30 RX ORDER — OXYBUTYNIN CHLORIDE 5 MG/1
5 TABLET, EXTENDED RELEASE ORAL DAILY PRN
COMMUNITY
End: 2021-03-22

## 2018-01-30 RX ORDER — LEVONORGESTREL 1.5 MG/1
1.5 TABLET ORAL DAILY PRN
COMMUNITY
End: 2020-05-10

## 2018-01-30 NOTE — NURSING NOTE
"Chief Complaint   Patient presents with     Mass     She felt a lump under her right breast yesterday and it's painful.       Initial /77 (BP Location: Right arm, Patient Position: Sitting, Cuff Size: Adult Large)  Pulse 96  Temp 97.4  F (36.3  C) (Oral)  Ht 5' 6\" (1.676 m)  Wt (!) 328 lb 12.8 oz (149.1 kg)  LMP 01/15/2018 (Exact Date)  SpO2 99%  BMI 53.07 kg/m2 Estimated body mass index is 53.07 kg/(m^2) as calculated from the following:    Height as of this encounter: 5' 6\" (1.676 m).    Weight as of this encounter: 328 lb 12.8 oz (149.1 kg).  Medication Reconciliation: complete   Martha Osuna MA      "

## 2018-01-30 NOTE — PROGRESS NOTES
SUBJECTIVE:   Princess TRISTIN Machado is a 27 year old female who presents to clinic today for the following health issues:      She found a lump under her right breast yesterday and it hurts when touch.    Right Breast Lump  Noticed a lump since yesterday.  Some tenderness with this.  She also had a previous lump in right breast.  She has a history of hidradenitis suppurativa which she thought her previous lump was bc there was some drainage.    LMP 1/15/18    Some spotting    No fevers/chills.        Problem list and histories reviewed & adjusted, as indicated.  Additional history: as documented    Patient Active Problem List   Diagnosis     Optic neuritis, left     Recurrent major depressive episodes (H)     Multiple sclerosis (H)     Bulimia nervosa     Acne vulgaris     Anxiety     Chronic back pain     Freckles     Headache     Other specified health status     Hidradenitis suppurativa     Hypertriglyceridemia     Hidradenitis     Migraine without status migrainosus, not intractable     Biomechanical lesion     Numbness of finger     Somatic dysfunction of cervical region     Spasm     Vitamin D deficiency     Common wart: L t lat hand -      Morbid obesity due to excess calories (H) BMI 50-60     Lumbago     Sacral pain     Somatic dysfunction of sacral region     Thoracic segment dysfunction     Muscle spasm     Recurrent major depression (H)     Thoracolumbar back pain     Past Surgical History:   Procedure Laterality Date     ENT SURGERY      tubes in ears     MYRINGOTOMY, INSERT TUBE BILATERAL, COMBINED  1990s       Social History   Substance Use Topics     Smoking status: Never Smoker     Smokeless tobacco: Never Used     Alcohol use No     Family History   Problem Relation Age of Onset     CEREBROVASCULAR DISEASE Mother      heart attack     CEREBROVASCULAR DISEASE Father      heart attack     MENTAL ILLNESS Sister      Post-Traumatic Stress Disorder (PTSD) Brother      MENTAL ILLNESS Brother      Glaucoma  "No family hx of      Macular Degeneration No family hx of      DIABETES No family hx of      Coronary Artery Disease No family hx of      Hypertension No family hx of      Hyperlipidemia No family hx of      Breast Cancer No family hx of      Colon Cancer No family hx of      Prostate Cancer No family hx of      Other Cancer No family hx of      Depression No family hx of      Anxiety Disorder No family hx of      Substance Abuse No family hx of      Anesthesia Reaction No family hx of      Asthma No family hx of      OSTEOPOROSIS No family hx of      Genetic Disorder No family hx of      Thyroid Disease No family hx of      Obesity No family hx of      Unknown/Adopted No family hx of            Reviewed and updated as needed this visit by clinical staff  Tobacco  Allergies  Med Hx  Surg Hx  Fam Hx  Soc Hx      Reviewed and updated as needed this visit by Provider         ROS:  Constitutional, HEENT, cardiovascular, pulmonary, gi and gu systems are negative, except as otherwise noted.    OBJECTIVE:     /77 (BP Location: Right arm, Patient Position: Sitting, Cuff Size: Adult Large)  Pulse 96  Temp 97.4  F (36.3  C) (Oral)  Ht 5' 6\" (1.676 m)  Wt (!) 328 lb 12.8 oz (149.1 kg)  LMP 01/15/2018 (Exact Date)  SpO2 99%  BMI 53.07 kg/m2  Body mass index is 53.07 kg/(m^2).  GENERAL: healthy, alert and no distress  NECK: no adenopathy, no asymmetry, masses, or scars and thyroid normal to palpation  RESP: lungs clear to auscultation - no rales, rhonchi or wheezes  BREAST: small 0.5cm lump in posteriorly in right breast  CV: regular rate and rhythm, normal S1 S2, no S3 or S4, no murmur, click or rub, no peripheral edema and peripheral pulses strong  ABDOMEN: soft, nontender, no hepatosplenomegaly, no masses and bowel sounds normal  MS: no gross musculoskeletal defects noted, no edema    Diagnostic Test Results:  none     ASSESSMENT/PLAN:       (N63.0) Lump or mass in breast  (primary encounter " diagnosis)  Comment:     Discussed today with patient given her age, this is likely a simple cyst or benign fibroadenoma and may coincide with her menstrual cycle.  I have ordered a breast ultrasound, but we discussed she can wait two weeks until next menstrual cycle to see if it goes away before getting the US.     Plan: US Breast Right Complete 4 Quadrants      Also encouraged patient to return for HgbA1c and LDL panel (future order placed)    Mayra Weems MD  [facility]

## 2018-01-30 NOTE — MR AVS SNAPSHOT
After Visit Summary   1/30/2018    Princess TRISTIN Machado    MRN: [id number]           Patient Information     Date Of Birth          1990        Visit Information        Provider Department      1/30/2018 1:30 PM Mayra Weems MD [facility]        Today's Diagnoses     Lump or mass in breast    -  1       Follow-ups after your visit        Your next 10 appointments already scheduled     Feb 03, 2018  9:20 AM [facility] Follow Up with Toya Boyd, PT   [facility]ES PT (BRYCE Nieves  )    [address] [facility]  [address] [place] [address]   [phone]           If you have your physician's order in hand, please bring it with you to your appointment              Future tests that were ordered for you today     Open Future Orders        Priority Expected Expires Ordered    US Breast Right Complete 4 Quadrants Routine  1/30/2019 1/30/2018            Who to contact     If you have questions or need follow up information about today's clinic visit or your schedule please contact [facility] directly at [phone].  Normal or non-critical lab and imaging results will be communicated to you by MyChart, letter or phone within 4 business days after the clinic has received the results. If you do not hear from us within 7 days, please contact the clinic through Adlyfehart or phone. If you have a critical or abnormal lab result, we will notify you by phone as soon as possible.  Submit refill requests through [employer] or call your pharmacy and they will forward the refill request to us. Please allow 3 business days for your refill to be completed.          Additional Information About Your Visit        MyChart Information     [employer] gives you secure access to your electronic health record. If you see a primary care provider, you can also send messages to your care team and make appointments. If you have questions, please call your primary care  "clinic.  If you do not have a primary care provider, please call [phone] and they will assist you.        Care EveryWhere ID     This is your Care EveryWhere ID. This could be used by other organizations to access your [facility] medical records  [id number]        Your Vitals Were     Pulse Temperature Height Last Period Pulse Oximetry BMI (Body Mass Index)    96 97.4  F (36.3  C) (Oral) 5' 6\" (1.676 m) 01/15/2018 (Exact Date) 99% 53.07 kg/m2       Blood Pressure from Last 3 Encounters:   01/30/18 132/77   01/16/18 122/78   01/14/18 130/80    Weight from Last 3 Encounters:   01/30/18 (!) 328 lb 12.8 oz (149.1 kg)   01/16/18 (!) 336 lb (152.4 kg)   01/14/18 (!) 336 lb (152.4 kg)               Primary Care Provider Office Phone # Fax #    Krishnakumari G MD Ozzie [phone]       [address] [facility] [address]        Equal Access to Services     [facility] Center[facility] : Hadii aad ku hadasho Soomaali, waaxda luqadaha, qaybta kaalmada adetammyyada, amos rehman . So [facility] [phone].    ATENCIÓN: Si habla español, tiene a shirley disposición servicios gratuitos de asistencia lingüística. Ll[facility]ho[facility] [phone].    We comply with applicable federal civil rights laws and [place] laws. We do not discriminate on the basis of race, color, national origin, age, disability, sex, sexual orientation, or gender identity.            Thank you!     Thank you for choosing [facility]  for your care. Our goal is always to provide you with excellent care. Hearing back from our patients is one way we can continue to improve our services. Please take a few minutes to complete the written survey that you may receive in the mail after your visit with us. Thank you!             Your Updated Medication List - Protect others around you: Learn how to safely use, store and throw away your medicines at [email or web address].          This list is accurate as of 1/30/18  2:10 PM.  " Always use your most recent med list.                   Brand Name Dispense Instructions for use Diagnosis    BIOTIN PO      Take 10,000 mcg by mouth Reported on 5/9/2017        clindamycin 300 MG capsule    CLEOCIN    60 capsule    1 cap po bid    Hidradenitis suppurativa       DULoxetine 60 MG EC capsule    CYMBALTA    30 capsule    Take 1 capsule (60 mg) by mouth daily    Recurrent major depressive episodes (H)       ECONTRA EZ 1.5 MG Tabs tablet   Generic drug:  levonorgestrel      Take 1.5 mg by mouth daily as needed        ibuprofen 600 MG tablet    ADVIL/MOTRIN    60 tablet    Take 1 tablet (600 mg) by mouth every 6 hours as needed for moderate pain    Chronic right-sided low back pain without sciatica       methocarbamol 750 MG tablet    ROBAXIN    40 tablet    Take 1 tablet (750 mg) by mouth 2 times daily as needed for muscle spasms    DDD (degenerative disc disease), thoracic       mupirocin 2 % ointment    BACTROBAN          norethindrone 0.35 MG per tablet    MICRONOR     Take 1 tablet by mouth daily        ondansetron 4 MG ODT tab    ZOFRAN ODT    12 tablet    Take 1-2 tablets (4-8 mg) by mouth every 8 hours as needed for nausea    Nausea       oxybutynin 5 MG 24 hr tablet    DITROPAN-XL     Take 5 mg by mouth daily as needed for bladder spasms        propranolol 10 MG tablet    INDERAL    90 tablet    Take 1 tablet (10 mg) by mouth 3 times daily as needed    Anxiety       PROTONIX PO      Take 20 mg by mouth daily as needed for heartburn        REBIF TITRATION PACK 6X8.8 & 6X22 MCG Sosy   Generic drug:  Interferon Beta-1a      Inject 1 Units Subcutaneous three times a week        rifampin 300 MG capsule    RIFADIN    60 capsule    Take 2 capsules (600 mg) by mouth daily    Hidradenitis suppurativa       VITAMIN D3 PO      Take 1,000 Int'l Units by mouth daily Reported on 5/9/2017

## 2018-01-31 ENCOUNTER — MYC MEDICAL ADVICE (OUTPATIENT)
Dept: INTERNAL MEDICINE | Facility: CLINIC | Age: 28
End: 2018-01-31

## 2018-01-31 DIAGNOSIS — E55.9 VITAMIN D DEFICIENCY: Primary | ICD-10-CM

## 2018-02-02 NOTE — TELEPHONE ENCOUNTER
See her my chart message. She is asking for Rx for Vitamin D. She may be due for recheck.     Last Vitamin D level = 21 on 10/26/17.   Future labs by her Neurologist.

## 2018-02-05 DIAGNOSIS — G89.29 CHRONIC RIGHT-SIDED LOW BACK PAIN WITHOUT SCIATICA: ICD-10-CM

## 2018-02-05 DIAGNOSIS — M54.50 CHRONIC RIGHT-SIDED LOW BACK PAIN WITHOUT SCIATICA: ICD-10-CM

## 2018-02-05 NOTE — TELEPHONE ENCOUNTER
Vitamin D 50,000 U was prescribed for her to take 1x/weekly for the next 8 weeks    Please inform patient this rx was sent and instructions    Thank you!    Mayra Weems MD

## 2018-02-07 RX ORDER — IBUPROFEN 600 MG/1
600 TABLET, FILM COATED ORAL EVERY 6 HOURS PRN
Qty: 60 TABLET | Refills: 5 | Status: SHIPPED | OUTPATIENT
Start: 2018-02-07 | End: 2019-05-28

## 2018-02-07 NOTE — TELEPHONE ENCOUNTER
"Requested Prescriptions   Pending Prescriptions Disp Refills     ibuprofen (ADVIL/MOTRIN) 600 MG tablet  Last Written Prescription Date:  12/12/17  Last Fill Quantity: 60,  # refills: 0   Last Office Visit with [facility] provider:  1/30/18   Future Office Visit:     60 tablet 0     Sig: Take 1 tablet (600 mg) by mouth every 6 hours as needed for moderate pain    NSAID Medications Passed    2/5/2018  2:43 PM       Passed - Blood pressure under 140/90    BP Readings from Last 3 Encounters:   01/30/18 132/77   01/16/18 122/78   01/14/18 130/80                Passed - Normal ALT on file in past 12 months    Recent Labs   Lab Test  01/14/18   1827   ALT  23            Passed - Normal AST on file in past 12 months    Recent Labs   Lab Test  01/14/18   1827   AST  18            Passed - Recent or future visit with authorizing provider's specialty    Patient had office visit in the last year or has a visit in the next 30 days with authorizing provider.  See \"Patient Info\" tab in inbasket, or \"Choose Columns\" in Meds & Orders section of the refill encounter.            Passed - Patient is age 6-64 years       Passed - Normal CBC on file in past 12 months    Recent Labs   Lab Test  01/14/18   1827   WBC  9.4   RBC  4.19   HGB  12.9   HCT  38.3   PLT  251            Passed - No active pregnancy on record       Passed - Normal serum creatinine on file in past 12 months    Recent Labs   Lab Test  01/14/18   1827   CR  0.72            Passed - No positive pregnancy test in past 12 months         Prescription approved per [facility] Refill Protocol.   "

## 2018-02-09 ENCOUNTER — MYC MEDICAL ADVICE (OUTPATIENT)
Dept: INTERNAL MEDICINE | Facility: CLINIC | Age: 28
End: 2018-02-09

## 2018-02-09 DIAGNOSIS — E66.01 MORBID OBESITY DUE TO EXCESS CALORIES (H): Primary | ICD-10-CM

## 2018-02-10 DIAGNOSIS — R73.03 PREDIABETES: Primary | ICD-10-CM

## 2018-02-10 DIAGNOSIS — G35 MULTIPLE SCLEROSIS (H): ICD-10-CM

## 2018-02-10 DIAGNOSIS — Z13.220 SCREENING FOR HYPERLIPIDEMIA: ICD-10-CM

## 2018-02-10 DIAGNOSIS — Z13.1 SCREENING FOR DIABETES MELLITUS: ICD-10-CM

## 2018-02-10 DIAGNOSIS — F33.9 RECURRENT MAJOR DEPRESSION (H): ICD-10-CM

## 2018-02-10 DIAGNOSIS — E66.01 MORBID OBESITY DUE TO EXCESS CALORIES (H): ICD-10-CM

## 2018-02-10 DIAGNOSIS — H46.9 OPTIC NEURITIS, LEFT: ICD-10-CM

## 2018-02-10 DIAGNOSIS — E55.9 VITAMIN D DEFICIENCY: ICD-10-CM

## 2018-02-10 LAB
ALBUMIN SERPL-MCNC: 3.1 G/DL (ref 3.4–5)
ALP SERPL-CCNC: 96 U/L (ref 40–150)
ALT SERPL W P-5'-P-CCNC: 20 U/L (ref 0–50)
AST SERPL W P-5'-P-CCNC: 18 U/L (ref 0–45)
BASOPHILS # BLD AUTO: 0 10E9/L (ref 0–0.2)
BASOPHILS NFR BLD AUTO: 0.5 %
BILIRUB DIRECT SERPL-MCNC: 0.1 MG/DL (ref 0–0.2)
BILIRUB SERPL-MCNC: 0.4 MG/DL (ref 0.2–1.3)
CHOLEST SERPL-MCNC: 123 MG/DL
DIFFERENTIAL METHOD BLD: NORMAL
EOSINOPHIL # BLD AUTO: 0.3 10E9/L (ref 0–0.7)
EOSINOPHIL NFR BLD AUTO: 3.1 %
ERYTHROCYTE [DISTWIDTH] IN BLOOD BY AUTOMATED COUNT: 13.1 % (ref 10–15)
HBA1C MFR BLD: 5.8 % (ref 4.3–6)
HCT VFR BLD AUTO: 38.4 % (ref 35–47)
HDLC SERPL-MCNC: 40 MG/DL
HGB BLD-MCNC: 13 G/DL (ref 11.7–15.7)
LDLC SERPL CALC-MCNC: 67 MG/DL
LYMPHOCYTES # BLD AUTO: 2.1 10E9/L (ref 0.8–5.3)
LYMPHOCYTES NFR BLD AUTO: 24 %
MCH RBC QN AUTO: 31.2 PG (ref 26.5–33)
MCHC RBC AUTO-ENTMCNC: 33.9 G/DL (ref 31.5–36.5)
MCV RBC AUTO: 92 FL (ref 78–100)
MONOCYTES # BLD AUTO: 0.6 10E9/L (ref 0–1.3)
MONOCYTES NFR BLD AUTO: 7.1 %
NEUTROPHILS # BLD AUTO: 5.7 10E9/L (ref 1.6–8.3)
NEUTROPHILS NFR BLD AUTO: 65.3 %
NONHDLC SERPL-MCNC: 83 MG/DL
PLATELET # BLD AUTO: 234 10E9/L (ref 150–450)
PROT SERPL-MCNC: 7.5 G/DL (ref 6.8–8.8)
RBC # BLD AUTO: 4.17 10E12/L (ref 3.8–5.2)
TRIGL SERPL-MCNC: 79 MG/DL
TSH SERPL DL<=0.005 MIU/L-ACNC: 3.04 MU/L (ref 0.4–4)
VIT B12 SERPL-MCNC: 264 PG/ML (ref 193–986)
WBC # BLD AUTO: 8.8 10E9/L (ref 4–11)

## 2018-02-10 PROCEDURE — 85025 COMPLETE CBC W/AUTO DIFF WBC: CPT | Performed by: PSYCHIATRY & NEUROLOGY

## 2018-02-10 PROCEDURE — 82306 VITAMIN D 25 HYDROXY: CPT | Performed by: PSYCHIATRY & NEUROLOGY

## 2018-02-10 PROCEDURE — 80061 LIPID PANEL: CPT | Performed by: PSYCHIATRY & NEUROLOGY

## 2018-02-10 PROCEDURE — 36415 COLL VENOUS BLD VENIPUNCTURE: CPT | Performed by: PSYCHIATRY & NEUROLOGY

## 2018-02-10 PROCEDURE — 82607 VITAMIN B-12: CPT | Performed by: PSYCHIATRY & NEUROLOGY

## 2018-02-10 PROCEDURE — 83036 HEMOGLOBIN GLYCOSYLATED A1C: CPT | Performed by: PSYCHIATRY & NEUROLOGY

## 2018-02-10 PROCEDURE — 80076 HEPATIC FUNCTION PANEL: CPT | Performed by: PSYCHIATRY & NEUROLOGY

## 2018-02-10 PROCEDURE — 84443 ASSAY THYROID STIM HORMONE: CPT | Performed by: PSYCHIATRY & NEUROLOGY

## 2018-02-12 ENCOUNTER — TELEPHONE (OUTPATIENT)
Dept: FAMILY MEDICINE | Facility: CLINIC | Age: 28
End: 2018-02-12

## 2018-02-12 DIAGNOSIS — Z20.828 EXPOSURE TO INFLUENZA: Primary | ICD-10-CM

## 2018-02-12 PROBLEM — F33.9 RECURRENT MAJOR DEPRESSIVE EPISODES (H): Status: ACTIVE | Noted: 2017-05-09

## 2018-02-12 LAB — DEPRECATED CALCIDIOL+CALCIFEROL SERPL-MC: 13 UG/L (ref 20–75)

## 2018-02-12 RX ORDER — OSELTAMIVIR PHOSPHATE 75 MG/1
75 CAPSULE ORAL 2 TIMES DAILY
Qty: 10 CAPSULE | Refills: 0 | Status: SHIPPED | OUTPATIENT
Start: 2018-02-12 | End: 2018-03-06

## 2018-02-12 RX ORDER — METFORMIN HCL 500 MG
500 TABLET, EXTENDED RELEASE 24 HR ORAL
Qty: 90 TABLET | Refills: 3 | Status: SHIPPED | OUTPATIENT
Start: 2018-02-12 | End: 2018-05-22

## 2018-02-12 NOTE — TELEPHONE ENCOUNTER
Patient has a low grade fever, body aches, chills, headache, nausea     Is requesting a prescription for tamilfu.      Influenza-Like Illness (GERARDO) Protocol    Princess TRISTIN Machado      Age: 27 year old     YOB: 1990    Are you currently sick or have you had close contact with someone who is currently sick?     Yes, this patient is currently sick.     Adult Clinical Evaluation    Is this patient experiencing ANY of the following?  Unconsciousness or unresponsiveness No   Difficulty breathing or swallowing No   Blue or dusky lips, skin, or nail beds No   Chest pain No   Severe confusion or delirium No   Seizure activity: ongoing or stopped No   Severe dehydration or signs of shock No   Patient sounds very sick on the phone No     Is this patient experiencing ANY of the following?  Fever > 104 or shaking chills No   Wheezing with minimal response to usual wheezing medications or new wheezing No   Repeated vomiting or diarrhea with signs of dehydration (no urination within last 12 hours) No   Flu-like symptoms that initially improved but returned with fever and a worse cough No   Stiff or painful neck No   Severe headache No     Does the patient have any of the following?  Measured fever > 100 degrees Yes   Chills or feels very warm to the touch Yes   Cough no   Sore throat No   Muscle/ body aches Yes   Headaches Yes   Fatigue (tiredness) Yes     Nursing Plan  Routed chart to provider, patient is requesting tamiflu    Provided home care instructions    General home care instruction:      Avoid contact with people in your household who are at increased risk for more severe complications of influenza (such as pregnant women or people who have a chronic health condition, for example diabetes, heart disease, asthma, or emphysema).    Stay home from work, school, childcare or other public places until your fever (37.8 degrees Celsius [100 degrees Fahrenheit]) has been gone for at least 24 hours, except to seek  medical care. (Fever should be gone without the use of fever-reducing medications.) Use a surgical mask if available, or cover your mouth and nose with a tissue if possible if you need to seek medical care. Contact your work place, school, or  as they may have longer exclusion times.    You may continue to shed virus after your fever is gone. Limit your contact with high-risk individuals for 10 days after your symptoms started and be especially careful to cover your coughs/sneezes and wash your hands.    Cover your cough and wash your hands often, and especially after coughing, sneezing, blowing your nose.    Drink plenty of fluids (such as water, broth, sports drinks, electrolyte beverages for children) to prevent dehydration.    Avoid tobacco and second hand smoke.    Get plenty of rest.    Use over-the-counter pain relievers as needed per  instructions.    Do not give aspirin (acetylsalicylic acid) or products that contain aspirin (e.g. bismuth subsalicylate - Pepto Bismol) to children or teenagers 18 years or younger.    A small number of people with influenza do not have fever. If you have respiratory symptoms and are at increased risk for complications of influenza, contact your health care provider to discuss these symptoms.    For parents of infants:    If possible, only family members who are not sick should care for infants.    Wash your hands with soap and water, or an alcohol-based hand rub (if your hands are not visibly soiled) before caring for your infant.    Cover your mouth and nose with a tissue when coughing or sneezing, and clean your hands.    Contact a health care provider to discuss your illness within 1-2 days if you are    Pregnant    Immunocompromised    Call 911 if you experience:    Difficulty breathing or shortness of breath    Pain or pressure in the chest    Confusion or less responsive than normal    Seizure activity: ongoing or stopped    Severe dehydration or  signs of shock     Blue or dusky lips, skin, or nail beds    If further questions/concerns or if new symptoms develop, call your PCP or [facility] as soon as possible.    When to seek medical attention    Contact your health care provider right away if you experience:    A painful sore throat accompanied by fever persists for more than 48 hours    Ear pain, sinus pain, persistent vomiting and/or diarrhea    Oral temperature greater than 104  Fahrenheit (40  Celsius)    Dehydration (e.g., mouth feeling dry, dizzy when sitting/standing, decreased urine output)    Severe or persistent vomiting; unable to keep fluids down    Improvement in flu-like symptoms (fever and cough or sore throat) but then return of fever and worse cough or sore throat    Not drinking enough fluid    Any other concerns not stated above      Additional educational resources include:    http://www.charity: water.com    http://www.cdc.gov/flu/  Claudia Martin

## 2018-02-28 ENCOUNTER — MYC MEDICAL ADVICE (OUTPATIENT)
Dept: INTERNAL MEDICINE | Facility: CLINIC | Age: 28
End: 2018-02-28

## 2018-02-28 DIAGNOSIS — F33.9 RECURRENT MAJOR DEPRESSIVE EPISODES (H): Primary | ICD-10-CM

## 2018-03-02 NOTE — TELEPHONE ENCOUNTER
Yes I can try to do an e-visit for her, haven't done it before but I know her well now    Thank you!

## 2018-03-06 ENCOUNTER — OFFICE VISIT (OUTPATIENT)
Dept: INTERNAL MEDICINE | Facility: CLINIC | Age: 28
End: 2018-03-06
Payer: COMMERCIAL

## 2018-03-06 VITALS
SYSTOLIC BLOOD PRESSURE: 128 MMHG | DIASTOLIC BLOOD PRESSURE: 78 MMHG | OXYGEN SATURATION: 100 % | HEART RATE: 80 BPM | TEMPERATURE: 97.8 F | WEIGHT: 293 LBS | HEIGHT: 66 IN | BODY MASS INDEX: 47.09 KG/M2

## 2018-03-06 DIAGNOSIS — E66.01 MORBID OBESITY DUE TO EXCESS CALORIES (H): ICD-10-CM

## 2018-03-06 DIAGNOSIS — F33.40 RECURRENT MAJOR DEPRESSIVE DISORDER, IN REMISSION (H): Primary | ICD-10-CM

## 2018-03-06 PROCEDURE — 99213 OFFICE O/P EST LOW 20 MIN: CPT | Performed by: INTERNAL MEDICINE

## 2018-03-06 RX ORDER — ESCITALOPRAM OXALATE 10 MG/1
10 TABLET ORAL DAILY
Qty: 90 TABLET | Refills: 1 | Status: SHIPPED | OUTPATIENT
Start: 2018-03-06 | End: 2018-05-22

## 2018-03-06 NOTE — MR AVS SNAPSHOT
After Visit Summary   3/6/2018    Princess TRISTIN Machado    MRN: [id number]           Patient Information     Date Of Birth          1990        Visit Information        Provider Department      3/6/2018 3:30 PM Mayra Weems MD [facility]        Today's Diagnoses     Recurrent major depressive disorder, in remission (H)    -  1    Chronic right-sided low back pain without sciatica        Morbid obesity due to excess calories (H) BMI 50-60          Care Instructions    Stop the cymbalta.     Start taking the lexapro at 10 mg daily, you may take up to 2 tablets per day.           Follow-ups after your visit        Additional Services     NUTRITION REFERRAL       Your provider has referred you to: [facility]: [facility] [phone]   [email or web address]    Please be aware that coverage of these services is subject to the terms and limitations of your health insurance plan.  Call member services at your health plan with any benefit or coverage questions.      Please bring the following with you to your appointment:    (1) This referral request  (2) Any documents given to you regarding this referral  (3) Any specific questions you have about diet and/or food choices                  Your next 10 appointments already scheduled     May 29, 2018  9:15 AM CDT   (Arrive by 9:00 AM)   New Visit with Cesilia Cannon NP   [facility]    [address]  [address]  [facility] [address] [phone]              Who to contact     If you have questions or need follow up information about today's clinic visit or your schedule please contact [facility] directly at [phone].  Normal or non-critical lab and imaging results will be communicated to you by MyChart, letter or phone within 4 business days after the clinic has received the results. If you do not hear from us  "within 7 days, please contact the clinic through [employer] or phone. If you have a critical or abnormal lab result, we will notify you by phone as soon as possible.  Submit refill requests through [employer] or call your pharmacy and they will forward the refill request to us. Please allow 3 business days for your refill to be completed.          Additional Information About Your Visit        [employer] Information     [employer] gives you secure access to your electronic health record. If you see a primary care provider, you can also send messages to your care team and make appointments. If you have questions, please call your primary care clinic.  If you do not have a primary care provider, please call [phone] and they will assist you.        Care EveryWhere ID     This is your Care EveryWhere ID. This could be used by other organizations to access your [place] medical records  [id number]        Your Vitals Were     Pulse Temperature Height Last Period Pulse Oximetry Breastfeeding?    80 97.8  F (36.6  C) (Oral) 5' 6\" (1.676 m) 02/12/2018 100% No    BMI (Body Mass Index)                   54.04 kg/m2            Blood Pressure from Last 3 Encounters:   03/06/18 128/78   01/30/18 132/77   01/16/18 122/78    Weight from Last 3 Encounters:   03/06/18 (!) 334 lb 12.8 oz (151.9 kg)   01/30/18 (!) 328 lb 12.8 oz (149.1 kg)   01/16/18 (!) 336 lb (152.4 kg)              We Performed the Following     NUTRITION REFERRAL          Today's Medication Changes          These changes are accurate as of 3/6/18  4:05 PM.  If you have any questions, ask your nurse or doctor.               Start taking these medicines.        Dose/Directions    escitalopram 10 MG tablet   Commonly known as:  LEXAPRO   Used for:  Recurrent major depressive disorder, in remission (H)   Started by:  Mayra Weems MD        Dose:  10 mg   Take 1 tablet (10 mg) by mouth daily   Quantity:  90 tablet   Refills:  1         Stop taking these medicines if you " haven't already. Please contact your care team if you have questions.     DULoxetine 60 MG EC capsule   Commonly known as:  CYMBALTA   Stopped by:  Mayra Weems MD                Where to get your medicines      These medications were sent to [facility] [address]  [address] [address]     Phone:  [phone]     escitalopram 10 MG tablet                Primary Care Provider Office Phone # Fax #    Mayra Weems [phone]       [address]  [facility] [address]        Equal Access to Services     [facility]: Hadii aad ku hadasho Soomaali, waaxda luqadaha, qaybta kaalmada adeegyada, waxay idiin hayaan adeeg angelica rehman . So [facility] [phone].    ATENCIÓN: Si habla español, tiene a shirley disposición servicios gratuitos de asistencia lingüística. Llame al [phone].    We comply with applicable federal civil rights laws and [place] laws. We do not discriminate on the basis of race, color, national origin, age, disability, sex, sexual orientation, or gender identity.            Thank you!     Thank you for choosing [facility]  for your care. Our goal is always to provide you with excellent care. Hearing back from our patients is one way we can continue to improve our services. Please take a few minutes to complete the written survey that you may receive in the mail after your visit with us. Thank you!             Your Updated Medication List - Protect others around you: Learn how to safely use, store and throw away your medicines at www.disposemymeds.org.          This list is accurate as of 3/6/18  4:05 PM.  Always use your most recent med list.                   Brand Name Dispense Instructions for use Diagnosis    BIOTIN PO      Take 10,000 mcg by mouth Reported on 5/9/2017        clindamycin 300 MG capsule    CLEOCIN    60 capsule    1 cap po bid    Hidradenitis suppurativa       ECONTRA EZ 1.5 MG Tabs  tablet   Generic drug:  levonorgestrel      Take 1.5 mg by mouth daily as needed        escitalopram 10 MG tablet    LEXAPRO    90 tablet    Take 1 tablet (10 mg) by mouth daily    Recurrent major depressive disorder, in remission (H)       hydroquinone 4 % Crea     30 g    Apply to AA q hs    Hyperpigmentation of skin       ibuprofen 600 MG tablet    ADVIL/MOTRIN    60 tablet    Take 1 tablet (600 mg) by mouth every 6 hours as needed for moderate pain    Chronic right-sided low back pain without sciatica       metFORMIN 500 MG 24 hr tablet    GLUCOPHAGE-XR    90 tablet    Take 1 tablet (500 mg) by mouth daily (with dinner)    Prediabetes       methocarbamol 750 MG tablet    ROBAXIN    40 tablet    Take 1 tablet (750 mg) by mouth 2 times daily as needed for muscle spasms    DDD (degenerative disc disease), thoracic       mupirocin 2 % ointment    BACTROBAN          norethindrone 0.35 MG per tablet    MICRONOR     Take 1 tablet by mouth daily        oxybutynin 5 MG 24 hr tablet    DITROPAN-XL     Take 5 mg by mouth daily as needed for bladder spasms        propranolol 10 MG tablet    INDERAL    90 tablet    Take 1 tablet (10 mg) by mouth 3 times daily as needed    Anxiety       PROTONIX PO      Take 20 mg by mouth daily as needed for heartburn        REBIF TITRATION PACK 6X8.8 & 6X22 MCG Sosy   Generic drug:  Interferon Beta-1a      Inject 1 Units Subcutaneous three times a week        rifampin 300 MG capsule    RIFADIN    60 capsule    Take 2 capsules (600 mg) by mouth daily    Hidradenitis suppurativa       * VITAMIN D3 PO      Take 1,000 Int'l Units by mouth daily Reported on 5/9/2017        * cholecalciferol 38804 UNITS capsule    VITAMIN D3    8 capsule    Take 1 capsule (50,000 Units) by mouth once a week    Vitamin D deficiency       * Notice:  This list has 2 medication(s) that are the same as other medications prescribed for you. Read the directions carefully, and ask your doctor or other care provider to  review them with you.

## 2018-03-06 NOTE — PROGRESS NOTES
SUBJECTIVE:   Princess TRISTIN Machado is a 27 year old female who presents to clinic today for the following health issues:    Follow up anxiety/depression.    She has had depression for some time, more recently was switched to cymbalta and that seemed to control mood symptoms for sometime.  She is on cymbalta 60mg but she says sometimes she takes two tablets, up to 120mg daily.  She was on zoloft previously and this does not help.  Social stressors include fiance at risk for deportation with new immigration laws, and he is also out of town for work. She feels recently she is more irritable due to all above.   No SI      Problem list and histories reviewed & adjusted, as indicated.  Additional history: as documented    Patient Active Problem List   Diagnosis     Optic neuritis, left     Recurrent major depressive episodes (H)     Multiple sclerosis (H)     Bulimia nervosa     Acne vulgaris     Anxiety     Chronic back pain     Freckles     Headache     Other specified health status     Hidradenitis suppurativa     Hypertriglyceridemia     Hidradenitis     Migraine without status migrainosus, not intractable     Biomechanical lesion     Numbness of finger     Somatic dysfunction of cervical region     Spasm     Vitamin D deficiency     Common wart: L t lat hand -      Morbid obesity due to excess calories (H) BMI 50-60     Lumbago     Sacral pain     Somatic dysfunction of sacral region     Thoracic segment dysfunction     Muscle spasm     Recurrent major depression (H)     Thoracolumbar back pain     Past Surgical History:   Procedure Laterality Date     ENT SURGERY      tubes in ears     MYRINGOTOMY, INSERT TUBE BILATERAL, COMBINED  1990s       Social History   Substance Use Topics     Smoking status: Never Smoker     Smokeless tobacco: Never Used     Alcohol use No     Family History   Problem Relation Age of Onset     CEREBROVASCULAR DISEASE Mother      heart attack     CEREBROVASCULAR DISEASE Father      heart  "attack     MENTAL ILLNESS Sister      Post-Traumatic Stress Disorder (PTSD) Brother      MENTAL ILLNESS Brother      Glaucoma No family hx of      Macular Degeneration No family hx of      DIABETES No family hx of      Coronary Artery Disease No family hx of      Hypertension No family hx of      Hyperlipidemia No family hx of      Breast Cancer No family hx of      Colon Cancer No family hx of      Prostate Cancer No family hx of      Other Cancer No family hx of      Depression No family hx of      Anxiety Disorder No family hx of      Substance Abuse No family hx of      Anesthesia Reaction No family hx of      Asthma No family hx of      OSTEOPOROSIS No family hx of      Genetic Disorder No family hx of      Thyroid Disease No family hx of      Obesity No family hx of      Unknown/Adopted No family hx of            Reviewed and updated as needed this visit by clinical staff  Tobacco  Allergies  Meds       Reviewed and updated as needed this visit by Provider         ROS:  Constitutional, HEENT, cardiovascular, pulmonary, gi and gu systems are negative, except as otherwise noted.    OBJECTIVE:     /78 (BP Location: Right arm, Patient Position: Chair, Cuff Size: Adult Large)  Pulse 80  Temp 97.8  F (36.6  C) (Oral)  Ht 5' 6\" (1.676 m)  Wt (!) 334 lb 12.8 oz (151.9 kg)  LMP 02/12/2018  SpO2 100%  Breastfeeding? No  BMI 54.04 kg/m2  Body mass index is 54.04 kg/(m^2).  GENERAL: healthy, alert and no distress  PSYCH: mentation appears normal, affect normal/bright    Diagnostic Test Results:  none     ASSESSMENT/PLAN:         (F33.40) Recurrent major depressive disorder, in remission (H)  (primary encounter diagnosis)  Comment: See HPI for history of social stressors. She was previously on cymbalta but I will opt to switch to lexapro for anxiolytic affects.  In addition, instructed she can take 2 tabs (20 mg which is max dose) on days where she is feeling more anxiety.  This medications is beneficial " for both anxiety and depression and may be a better fit for her    Plan: escitalopram (LEXAPRO) 10 MG tablet        Instructed on use today    (E66.01) Morbid obesity due to excess calories (H) BMI 50-60  Comment: Patient has informational meeting at [facility] weight loss clinic.  She is doing all the right things and continues to exercise several times a week when she has time.  She is also requesting nutrition referral which is a good idea, placed today.   Plan: NUTRITION REFERRAL              Mayra Weems MD  [facility]

## 2018-03-06 NOTE — NURSING NOTE
"Chief Complaint   Patient presents with     RECHECK     Anxiety     Depression       Initial /78 (BP Location: Right arm, Patient Position: Chair, Cuff Size: Adult Large)  Pulse 80  Temp 97.8  F (36.6  C) (Oral)  Ht 5' 6\" (1.676 m)  Wt (!) 334 lb 12.8 oz (151.9 kg)  LMP 02/12/2018  SpO2 100%  Breastfeeding? No  BMI 54.04 kg/m2 Estimated body mass index is 54.04 kg/(m^2) as calculated from the following:    Height as of this encounter: 5' 6\" (1.676 m).    Weight as of this encounter: 334 lb 12.8 oz (151.9 kg).  Medication Reconciliation: complete    "

## 2018-03-13 ENCOUNTER — OFFICE VISIT (OUTPATIENT)
Dept: BEHAVIORAL HEALTH | Facility: CLINIC | Age: 28
End: 2018-03-13
Payer: COMMERCIAL

## 2018-03-13 DIAGNOSIS — F41.1 GAD (GENERALIZED ANXIETY DISORDER): ICD-10-CM

## 2018-03-13 DIAGNOSIS — F33.40 RECURRENT MAJOR DEPRESSIVE DISORDER, IN REMISSION (H): Primary | ICD-10-CM

## 2018-03-13 PROCEDURE — 90791 PSYCH DIAGNOSTIC EVALUATION: CPT | Performed by: MARRIAGE & FAMILY THERAPIST

## 2018-03-13 ASSESSMENT — ANXIETY QUESTIONNAIRES
6. BECOMING EASILY ANNOYED OR IRRITABLE: SEVERAL DAYS
2. NOT BEING ABLE TO STOP OR CONTROL WORRYING: SEVERAL DAYS
5. BEING SO RESTLESS THAT IT IS HARD TO SIT STILL: NOT AT ALL
7. FEELING AFRAID AS IF SOMETHING AWFUL MIGHT HAPPEN: SEVERAL DAYS
3. WORRYING TOO MUCH ABOUT DIFFERENT THINGS: NEARLY EVERY DAY
IF YOU CHECKED OFF ANY PROBLEMS ON THIS QUESTIONNAIRE, HOW DIFFICULT HAVE THESE PROBLEMS MADE IT FOR YOU TO DO YOUR WORK, TAKE CARE OF THINGS AT HOME, OR GET ALONG WITH OTHER PEOPLE: SOMEWHAT DIFFICULT
GAD7 TOTAL SCORE: 12
1. FEELING NERVOUS, ANXIOUS, OR ON EDGE: NEARLY EVERY DAY

## 2018-03-13 ASSESSMENT — PATIENT HEALTH QUESTIONNAIRE - PHQ9: 5. POOR APPETITE OR OVEREATING: NEARLY EVERY DAY

## 2018-03-13 NOTE — MR AVS SNAPSHOT
After Visit Summary   3/13/2018    Princess TRISTIN Machado    MRN: [id number]           Patient Information     Date Of Birth          1990        Visit Information        Provider Department      3/13/2018 3:00 PM Olesya Varela LMFT [facility]        Today's Diagnoses     Recurrent major depressive disorder, in remission (H)    -  1    STACIA (generalized anxiety disorder)           Follow-ups after your visit        Additional Services     MENTAL HEALTH REFERRAL  - Adult; Psychiatry and Medication Management; Psychiatry; Other: [facility] [phone]; We will contact you to schedule the appointment or please call with any questions       All scheduling is subject to the client's specific insurance plan & benefits, provider/location availability, and provider clinical specialities.  Please arrive 15 minutes early for your first appointment and bring your completed paperwork.    Please be aware that coverage of these services is subject to the terms and limitations of your health insurance plan.  Call member services at your health plan with any benefit or coverage questions.                            Your next 10 appointments already scheduled     May 29, 2018  9:15 AM CDT   (Arrive by 9:00 AM)   New Visit with Cesilia Cannon NP   [facility]    [address]  [address]  [facility] [address] [phone]              Who to contact     If you have questions or need follow up information about today's clinic visit or your schedule please contact [facility] directly at [phone].  Normal or non-critical lab and imaging results will be communicated to you by MyChart, letter or phone within 4 business days after the clinic has received the results. If you do not hear from us within 7 days, please contact the clinic through MyChart or phone. If you have a critical or abnormal  lab result, we will notify you by phone as soon as possible.  Submit refill requests through [employer] or call your pharmacy and they will forward the refill request to us. Please allow 3 business days for your refill to be completed.          Additional Information About Your Visit        [employer] Information     [employer] gives you secure access to your electronic health record. If you see a primary care provider, you can also send messages to your care team and make appointments. If you have questions, please call your primary care clinic.  If you do not have a primary care provider, please call [phone] and they will assist you.        Care EveryWhere ID     This is your Care EveryWhere ID. This could be used by other organizations to access your [place] medical records  [id number]        Your Vitals Were     Last Period                   02/12/2018            Blood Pressure from Last 3 Encounters:   03/06/18 128/78   01/30/18 132/77   01/16/18 122/78    Weight from Last 3 Encounters:   03/06/18 (!) 151.9 kg (334 lb 12.8 oz)   01/30/18 (!) 149.1 kg (328 lb 12.8 oz)   01/16/18 (!) 152.4 kg (336 lb)              We Performed the Following     MENTAL HEALTH REFERRAL  - Adult; Psychiatry and Medication Management; Psychiatry; Other: [facility] [phone]; We will contact you to schedule the appointment or please call with any questions        Primary Care Provider Office Phone # Fax #    Mayra Weems [phone]       [address]  [facility] [address]        Equal Access to Services     [facility]: Hadii aad ku hadasho Soomaali, waaxda luqadaha, qaybta kaalmada adeegyada, amos rehman . So [facility] [phone].    ATENCIÓN: Si habla español, tiene a shirley disposición servicios gratuitos de asistencia lingüística. Llame al [phone].    We comply with applicable federal civil rights laws and [place] laws. We do not discriminate on the  basis of race, color, national origin, age, disability, sex, sexual orientation, or gender identity.            Thank you!     Thank you for choosing [facility]  for your care. Our goal is always to provide you with excellent care. Hearing back from our patients is one way we can continue to improve our services. Please take a few minutes to complete the written survey that you may receive in the mail after your visit with us. Thank you!             Your Updated Medication List - Protect others around you: Learn how to safely use, store and throw away your medicines at www.disposemymeds.org.          This list is accurate as of 3/13/18  3:54 PM.  Always use your most recent med list.                   Brand Name Dispense Instructions for use Diagnosis    BIOTIN PO      Take 10,000 mcg by mouth Reported on 5/9/2017        clindamycin 300 MG capsule    CLEOCIN    60 capsule    1 cap po bid    Hidradenitis suppurativa       ECONTRA EZ 1.5 MG Tabs tablet   Generic drug:  levonorgestrel      Take 1.5 mg by mouth daily as needed        escitalopram 10 MG tablet    LEXAPRO    90 tablet    Take 1 tablet (10 mg) by mouth daily    Recurrent major depressive disorder, in remission (H)       hydroquinone 4 % Crea     30 g    Apply to AA q hs    Hyperpigmentation of skin       ibuprofen 600 MG tablet    ADVIL/MOTRIN    60 tablet    Take 1 tablet (600 mg) by mouth every 6 hours as needed for moderate pain    Chronic right-sided low back pain without sciatica       metFORMIN 500 MG 24 hr tablet    GLUCOPHAGE-XR    90 tablet    Take 1 tablet (500 mg) by mouth daily (with dinner)    Prediabetes       methocarbamol 750 MG tablet    ROBAXIN    40 tablet    Take 1 tablet (750 mg) by mouth 2 times daily as needed for muscle spasms    DDD (degenerative disc disease), thoracic       mupirocin 2 % ointment    BACTROBAN          norethindrone 0.35 MG per tablet    MICRONOR     Take 1 tablet by mouth daily        oxybutynin  5 MG 24 hr tablet    DITROPAN-XL     Take 5 mg by mouth daily as needed for bladder spasms        propranolol 10 MG tablet    INDERAL    90 tablet    Take 1 tablet (10 mg) by mouth 3 times daily as needed    Anxiety       PROTONIX PO      Take 20 mg by mouth daily as needed for heartburn        REBIF TITRATION PACK 6X8.8 & 6X22 MCG Sosy   Generic drug:  Interferon Beta-1a      Inject 1 Units Subcutaneous three times a week        rifampin 300 MG capsule    RIFADIN    60 capsule    Take 2 capsules (600 mg) by mouth daily    Hidradenitis suppurativa       * VITAMIN D3 PO      Take 1,000 Int'l Units by mouth daily Reported on 5/9/2017        * cholecalciferol 45853 UNITS capsule    VITAMIN D3    8 capsule    Take 1 capsule (50,000 Units) by mouth once a week    Vitamin D deficiency       * Notice:  This list has 2 medication(s) that are the same as other medications prescribed for you. Read the directions carefully, and ask your doctor or other care provider to review them with you.
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
## 2018-03-14 ASSESSMENT — PATIENT HEALTH QUESTIONNAIRE - PHQ9: SUM OF ALL RESPONSES TO PHQ QUESTIONS 1-9: 9

## 2018-03-14 ASSESSMENT — ANXIETY QUESTIONNAIRES: GAD7 TOTAL SCORE: 12

## 2018-03-16 PROBLEM — M54.50 THORACOLUMBAR BACK PAIN: Status: RESOLVED | Noted: 2018-01-02 | Resolved: 2018-03-16

## 2018-03-16 PROBLEM — M54.6 THORACOLUMBAR BACK PAIN: Status: RESOLVED | Noted: 2018-01-02 | Resolved: 2018-03-16

## 2018-03-19 ENCOUNTER — TELEPHONE (OUTPATIENT)
Dept: BEHAVIORAL HEALTH | Facility: CLINIC | Age: 28
End: 2018-03-19

## 2018-03-19 NOTE — TELEPHONE ENCOUNTER
----- Message from Cesilia Grove sent at 3/19/2018 11:18 AM CDT -----  Regarding: MENTAL HEALTH ORDER   Patient was contacted by [facility]. Patient was scheduled for  medication management services with [facility] on 3.27.18 at 9am.      Cesilia Grove   , [facility]

## 2018-03-20 ENCOUNTER — TELEPHONE (OUTPATIENT)
Dept: SURGERY | Facility: CLINIC | Age: 28
End: 2018-03-20

## 2018-03-20 ENCOUNTER — OFFICE VISIT (OUTPATIENT)
Dept: SURGERY | Facility: CLINIC | Age: 28
End: 2018-03-20
Payer: COMMERCIAL

## 2018-03-20 VITALS
RESPIRATION RATE: 14 BRPM | BODY MASS INDEX: 45.99 KG/M2 | HEIGHT: 67 IN | SYSTOLIC BLOOD PRESSURE: 130 MMHG | TEMPERATURE: 97.9 F | HEART RATE: 81 BPM | WEIGHT: 293 LBS | DIASTOLIC BLOOD PRESSURE: 67 MMHG

## 2018-03-20 DIAGNOSIS — E66.01 MORBID OBESITY WITH BMI OF 50.0-59.9, ADULT (H): Primary | ICD-10-CM

## 2018-03-20 PROCEDURE — 99201 ZZC OFFICE/OUTPT VISIT, NEW, LEVEL I: CPT | Performed by: PHYSICIAN ASSISTANT

## 2018-03-20 NOTE — TELEPHONE ENCOUNTER
----- Message from Liana Aguilar sent at 3/20/2018  7:53 AM CDT -----  Regarding: Active Eating Disorder  Hi David,    Was doing a chart review on our evaluation this morning and noticed she indicates history of bulimia in her questionnaire. Some of her visits with other providers document this as well. I was able to get a little bit more information from a [place] [facility] note on 10/19/16, attached to her [place] EPIC notes on 5/9/17. These notes indicate that she was referred to [facility], diagnosed with binge eating disorder, and declined to follow through with their outpatient treatment plan d/t too many appointments.     Just wanted to give you a head's up. Seems she's going to need to follow up with them and get treatment/clearance before pursuing surgery.     Thanks!  Liana

## 2018-03-20 NOTE — MR AVS SNAPSHOT
After Visit Summary   3/20/2018    Princess TRISTIN Machado    MRN: [id number]           Patient Information     Date Of Birth          1990        Visit Information        Provider Department      3/20/2018 10:00 AM David Emery PA-C [facility]      Today's Diagnoses     Morbid obesity with BMI of 50.0-59.9, adult (H)    -  1       Follow-ups after your visit        Your next 10 appointments already scheduled     Mar 20, 2018 11:00 AM CDT   Evaluation with Pita Nassar 3, RD   [facility] ([facility])    [address]  [address]  [facility] [id number] [phone]            Mar 27, 2018  1:30 PM CDT   [facility] with TANNER Mar   [facility]    [address] [facility] [address]  [facility] [id number] [phone]            Apr 03, 2018  2:30 PM CDT   [facility] with TANNER Mar   [facility]    [address] [facility] [address]  [facility] [id number] [phone]            May 29, 2018  9:15 AM CDT   (Arrive by 9:00 AM)   New Visit with Cesilia Cannon NP   [facility]    [address]  [address]  [facility] [id number] [phone]              Who to contact     If you have questions or need follow up information about today's clinic visit or your schedule please contact [facility] directly at [phone].  Normal or non-critical lab and imaging results will be communicated to you by MyChart, letter or phone within 4 business days after the clinic has received the results. If you do not hear from us within 7 days, please contact the clinic through MyChart or phone. If you  "have a critical or abnormal lab result, we will notify you by phone as soon as possible.  Submit refill requests through [employer] or call your pharmacy and they will forward the refill request to us. Please allow 3 business days for your refill to be completed.          Additional Information About Your Visit        [employer]yp[employer] Information     [employer] gives you secure access to your electronic health record. If you see a primary care provider, you can also send messages to your care team and make appointments. If you have questions, please call your primary care clinic.  If you do not have a primary care provider, please call [phone] and they will assist you.        Care EveryWhere ID     This is your Care EveryWhere ID. This could be used by other organizations to access your [facility] medical records  [id number]        Your Vitals Were     Pulse Temperature Respirations Height BMI (Body Mass Index)       81 97.9  F (36.6  C) 14 5' 6.5\" (1.689 m) 53.98 kg/m2        Blood Pressure from Last 3 Encounters:   03/20/18 130/67   03/06/18 128/78   01/30/18 132/77    Weight from Last 3 Encounters:   03/20/18 (!) 339 lb 8 oz (154 kg)   03/06/18 (!) 334 lb 12.8 oz (151.9 kg)   01/30/18 (!) 328 lb 12.8 oz (149.1 kg)              Today, you had the following     No orders found for display       Primary Care Provider Office Phone # Fax #    Mayra Weems [phone]       [address]  [facility] [address]        Equal Access to Services     BEBE DAMIAN : Hadii indy tiwari hadamandao Solino, waaxda luqadaha, qaybta kaalmada crissy, amos ramos. So [facility] [phone].    ATENCIÓN: Si habla español, tiene a shirley disposición servicios gratuitos de asistencia lingüística. Llame al [phone].    We comply with applicable federal civil rights laws and [place] laws. We do not discriminate on the basis of race, color, national origin, age, disability, sex, sexual orientation, or " gender identity.            Thank you!     Thank you for choosing [facility]  for your care. Our goal is always to provide you with excellent care. Hearing back from our patients is one way we can continue to improve our services. Please take a few minutes to complete the written survey that you may receive in the mail after your visit with us. Thank you!             Your Updated Medication List - Protect others around you: Learn how to safely use, store and throw away your medicines at www.disposemymeds.org.          This list is accurate as of 3/20/18 10:48 AM.  Always use your most recent med list.                   Brand Name Dispense Instructions for use Diagnosis    BIOTIN PO      Take 10,000 mcg by mouth Reported on 5/9/2017        clindamycin 300 MG capsule    CLEOCIN    60 capsule    1 cap po bid    Hidradenitis suppurativa       ECONTRA EZ 1.5 MG Tabs tablet   Generic drug:  levonorgestrel      Take 1.5 mg by mouth daily as needed        escitalopram 10 MG tablet    LEXAPRO    90 tablet    Take 1 tablet (10 mg) by mouth daily    Recurrent major depressive disorder, in remission (H)       hydroquinone 4 % Crea     30 g    Apply to AA q hs    Hyperpigmentation of skin       ibuprofen 600 MG tablet    ADVIL/MOTRIN    60 tablet    Take 1 tablet (600 mg) by mouth every 6 hours as needed for moderate pain    Chronic right-sided low back pain without sciatica       metFORMIN 500 MG 24 hr tablet    GLUCOPHAGE-XR    90 tablet    Take 1 tablet (500 mg) by mouth daily (with dinner)    Prediabetes       methocarbamol 750 MG tablet    ROBAXIN    40 tablet    Take 1 tablet (750 mg) by mouth 2 times daily as needed for muscle spasms    DDD (degenerative disc disease), thoracic       mupirocin 2 % ointment    BACTROBAN          norethindrone 0.35 MG per tablet    MICRONOR     Take 1 tablet by mouth daily        oxybutynin 5 MG 24 hr tablet    DITROPAN-XL     Take 5 mg by mouth daily as  needed for bladder spasms        propranolol 10 MG tablet    INDERAL    90 tablet    Take 1 tablet (10 mg) by mouth 3 times daily as needed    Anxiety       PROTONIX PO      Take 20 mg by mouth daily as needed for heartburn        REBIF TITRATION PACK 6X8.8 & 6X22 MCG Sosy   Generic drug:  Interferon Beta-1a      Inject 1 Units Subcutaneous three times a week        rifampin 300 MG capsule    RIFADIN    60 capsule    Take 2 capsules (600 mg) by mouth daily    Hidradenitis suppurativa       * VITAMIN D3 PO      Take 1,000 Int'l Units by mouth daily Reported on 5/9/2017        * cholecalciferol 57745 UNITS capsule    VITAMIN D3    8 capsule    Take 1 capsule (50,000 Units) by mouth once a week    Vitamin D deficiency       * Notice:  This list has 2 medication(s) that are the same as other medications prescribed for you. Read the directions carefully, and ask your doctor or other care provider to review them with you.

## 2018-03-20 NOTE — PROGRESS NOTES
"[facility]    March 20, 2018      Princess TRISTIN Machado is in today for initial bariatric evaluation.  Upon reviewing her chart it is noted that she does have a history of bulemia nervosa.  Patient states 2015/2016 went to [facility] for treatment.  Had testing done and met with psychologist.  She did not complete the program because she said it was too intensive and she could not get off of work to attend.  Said it has been a year and a half since she last binged.      Reviewed her chart from a May 2017 visit in which a referral was given to an eating disorder clinic.  Patient said she actually did not end up going at that time.  Said she was looking for support in case she was to return to any bad habits.      Let her know that her records from [facility] will need to be reviewed before evaluation can continue.  Pt agreed to sign a TAZ.  Let her know once the records are received and reviewed a call will be made to her.  She verbalized understanding.       /67  Pulse 81  Temp 97.9  F (36.6  C)  Resp 14  Ht 5' 6.5\" (1.689 m)  Wt (!) 339 lb 8 oz (154 kg)  BMI 53.98 kg/m2  General:  NAD      Plan:  Records for review.      This was a 10 minute visit with greater than 50% spent on counseling.    David Emery MS, PA-C    "

## 2018-03-23 ENCOUNTER — TELEPHONE (OUTPATIENT)
Dept: SURGERY | Facility: CLINIC | Age: 28
End: 2018-03-23

## 2018-03-23 NOTE — TELEPHONE ENCOUNTER
Patient had called and left message stating that [facility] is in the [facility] system and that this could be viewed through Care Everywhere.      Called patient back to let her know that in her Care Everywhere there are some viewable items from other healthcare facilities but not [facility].  Also let her know it is hard to find full visit notes using this and so would still need to have the notes brought in.      Let patient know that the TAZ was faxed to [facility] the day she was in clinic and not much can be said until they are received and reviewed.        David Emery MS, PA-C

## 2018-03-27 ENCOUNTER — TRANSFERRED RECORDS (OUTPATIENT)
Dept: HEALTH INFORMATION MANAGEMENT | Facility: CLINIC | Age: 28
End: 2018-03-27

## 2018-03-27 LAB — PHQ9 SCORE: 8

## 2018-03-28 ENCOUNTER — TELEPHONE (OUTPATIENT)
Dept: SURGERY | Facility: CLINIC | Age: 28
End: 2018-03-28

## 2018-03-28 NOTE — TELEPHONE ENCOUNTER
Called several times and NO option for voicemail.    Will let patient know that her [facility] visit notes were reviewed and she is NOT a candidate at this time due to her having an bulemia nervosa - untreated.    Patient would need to return to [facility] or [facility] ( As long as they have all of [facility] Records ) complete the treatment program (which could be an undetermined amount of time) and have a very supportive letter or recommendation for bariatric surgery from therapist involved in the E/D program.      Before hearing back from patient visit notes from [facility] were received.  The intake did not state that the visit notes from [facility] were reviewed as part of the assessment.      After consulting with the Weight Loss team patient was contacted again.      She was informed that the assessment at the [facility] was incomplete due to records from [facility] not being reviewed prior to the evaluation.  Let patient know that the [facility] notes are what this clinic will be using.  With that she was diagnosed with Bulimia nervosa - severe - outpatient treatment with full team recommended.    Patient confirmed she has not received any treatment.  Said that she did sign a TAZ at [facility] to get her [facility] notes.  Let her know that in order for her to be considered a candidate for bariatric surgery she has 3 conditions:    1.  Full treatment and completion at an eating disorder clinic ([facility] or [facility]) WITH a letter stating highly recommends for bariatric surgery    2.  If you go to [facility] they MUST have ALL records from [facility] regarding diagnosis of Severe Bulimia.  Also MUST have primary care notes dated 5/9/2017 and 6/27/2017 - that mention eating disorder    3. Have TAZ where therapist can speak to VICTORINA at [facility]    If all the above are done patient can contact [facility].

## 2018-03-28 NOTE — LETTER
[facility]          [address] [facility]  [address]  [place] [address]                                         Tel:  [phone]  Fax: [phone]    April 3, 2018    Leyla TRISTIN Machado  [address]   [facility] [address]        Dear ,    Thank you for your recent interest in bariatric surgery.  As we discussed on the phone, due to your diagnosis of severe bulimia nervosa by [facility] you are not currently a candidate for bariatric surgery.  If you desire to move forward at a later date there are some necessary items to complete.     1.   Need to complete full treatment at an eating disorder clinic ([facility] or [facility]).     A..  If you chose [facility] all previous records from [facility] regarding diagnosis of Severe Bulimia and primary care notes dated 5/9/2017 and 6/27/2017 - that mention eating-must be available for review by your treating team.      B. Have TAZ where therapist can speak to VICTORINA at [facility].     2.  Once treatment is completed you must have a clearance from current therapist or psychologist in order to resume surgical process.  A plan for ongoing therapy to keep patient in remission should be clearly outlined in the clearance letter.     If all the above are done patient can contact [facility].      Sincerely,          David Emery MS, VICTORINA    [facility]

## 2018-03-31 DIAGNOSIS — L81.9 HYPERPIGMENTATION OF SKIN: ICD-10-CM

## 2018-04-02 ENCOUNTER — TELEPHONE (OUTPATIENT)
Dept: SURGERY | Facility: CLINIC | Age: 28
End: 2018-04-02

## 2018-04-02 RX ORDER — HYDROQUINONE 40 MG/G
CREAM TOPICAL
Qty: 28.35 G | Refills: 3 | Status: SHIPPED | OUTPATIENT
Start: 2018-04-02 | End: 2018-04-11

## 2018-04-02 NOTE — TELEPHONE ENCOUNTER
"Requested Prescriptions   Pending Prescriptions Disp Refills     hydroquinone 4 % CREA [Pharmacy Med Name: HYDROQUINONE 4% CREA] 28.35 g 0     Sig: APPLY TO AFFECTED AREA(S) EVERY NIGHT AT BEDTIME    Miscellaneous Dermatologic Agents Passed    4/2/2018  8:07 AM       Passed - Recent (12 mo) or future (30 days) visit within the authorizing provider's specialty    Patient had office visit in the last 12 months or has a visit in the next 30 days with authorizing provider or within the authorizing provider's specialty.  See \"Patient Info\" tab in inbasket, or \"Choose Columns\" in Meds & Orders section of the refill encounter.           Passed - Refill request is not for Imiquimod, 5-Fluorouracil, or Finasteride     If Imiquimod, 5-Fluorouracil, or Finasteride, may refill if indicated in progress notes.          Passed - Patient is 24 mos old or older            Jyoti Castrejon RN BSN  [facility]  [phone]    "

## 2018-04-02 NOTE — TELEPHONE ENCOUNTER
Patient had initial eval and deemed not a candidate.  VICTORINA Hernandez has been trying to contact patient.    Patient called today.  She stated that her VM was turned off because she didn't pay for it.  She now has it back on.    She would like a return call from David.  She requested David call Janet Muñiz, therapist at [facility]: [phone].  Informed patient that I would pass request along to David who isn't in until tomorrow.    Patient verbalized understanding and is agreeable to plan.  Alivia Del Castillo, MS, RD, RN

## 2018-04-03 ENCOUNTER — OFFICE VISIT (OUTPATIENT)
Dept: BEHAVIORAL HEALTH | Facility: CLINIC | Age: 28
End: 2018-04-03
Payer: COMMERCIAL

## 2018-04-03 ENCOUNTER — OFFICE VISIT (OUTPATIENT)
Dept: INTERNAL MEDICINE | Facility: CLINIC | Age: 28
End: 2018-04-03
Payer: COMMERCIAL

## 2018-04-03 VITALS
DIASTOLIC BLOOD PRESSURE: 80 MMHG | RESPIRATION RATE: 20 BRPM | HEART RATE: 78 BPM | BODY MASS INDEX: 45.99 KG/M2 | WEIGHT: 293 LBS | SYSTOLIC BLOOD PRESSURE: 120 MMHG | TEMPERATURE: 98.2 F | HEIGHT: 67 IN | OXYGEN SATURATION: 100 %

## 2018-04-03 DIAGNOSIS — H66.002 ACUTE SUPPURATIVE OTITIS MEDIA OF LEFT EAR WITHOUT SPONTANEOUS RUPTURE OF TYMPANIC MEMBRANE, RECURRENCE NOT SPECIFIED: Primary | ICD-10-CM

## 2018-04-03 DIAGNOSIS — F33.9 RECURRENT MAJOR DEPRESSIVE EPISODES (H): Primary | ICD-10-CM

## 2018-04-03 DIAGNOSIS — F41.1 GAD (GENERALIZED ANXIETY DISORDER): ICD-10-CM

## 2018-04-03 DIAGNOSIS — J06.9 UPPER RESPIRATORY TRACT INFECTION, UNSPECIFIED TYPE: ICD-10-CM

## 2018-04-03 PROCEDURE — 90832 PSYTX W PT 30 MINUTES: CPT | Performed by: MARRIAGE & FAMILY THERAPIST

## 2018-04-03 PROCEDURE — 99213 OFFICE O/P EST LOW 20 MIN: CPT | Performed by: PHYSICIAN ASSISTANT

## 2018-04-03 RX ORDER — AMOXICILLIN 500 MG/1
500 CAPSULE ORAL 3 TIMES DAILY
Qty: 21 CAPSULE | Refills: 0 | Status: SHIPPED | OUTPATIENT
Start: 2018-04-03 | End: 2019-02-12

## 2018-04-03 RX ORDER — CODEINE PHOSPHATE AND GUAIFENESIN 10; 100 MG/5ML; MG/5ML
1-2 SOLUTION ORAL
Qty: 120 ML | Refills: 0 | Status: SHIPPED | OUTPATIENT
Start: 2018-04-03 | End: 2018-04-17

## 2018-04-03 ASSESSMENT — PAIN SCALES - GENERAL: PAINLEVEL: NO PAIN (0)

## 2018-04-03 NOTE — LETTER
April 3, 2018      Princess TRISTIN Machado  [address]   [facility] [address]        To Whom It May Concern:    Princess TRISTIN Machado  was seen on 4/3/2018.  Please excuse her from 4/4/2018 due to illness      Sincerely,        Margy Villalobos PA-C

## 2018-04-03 NOTE — PROGRESS NOTES
"  SUBJECTIVE:   Princess TRISTIN Machado is a 27 year old female who presents to clinic today for the following health issues:        Acute Illness   Acute illness concerns: Cough   Onset: x1 day     Fever: yes- low grade yesterday     99.8 last night     Chills/Sweats: no     Headache (location?): YES    Sinus Pressure:no    Conjunctivitis:  no    Ear Pain: YES- left     Rhinorrhea: no     Congestion: YES    Sore Throat: no      Cough: YES    Wheeze: YES    Decreased Appetite: no     Nausea: no     Vomiting: no     Diarrhea:  YES    Dysuria/Freq.: no     Fatigue/Achiness: no     Sick/Strep Exposure: YES- works at [employer]      Therapies Tried and outcome: nothing       -------------------------------------    Problem list and histories reviewed & adjusted, as indicated.  Additional history: as documented    Labs reviewed in EPIC    Reviewed and updated as needed this visit by clinical staff  Tobacco  Allergies  Meds       Reviewed and updated as needed this visit by Provider         ROS:  Constitutional, HEENT, cardiovascular, pulmonary, gi and gu systems are negative, except as otherwise noted.    OBJECTIVE:     /80 (BP Location: Left arm, Patient Position: Chair, Cuff Size: Adult Large)  Pulse 78  Temp 98.2  F (36.8  C) (Oral)  Resp 20  Ht 5' 6.5\" (1.689 m)  Wt (!) 336 lb 4.8 oz (152.5 kg)  SpO2 100%  BMI 53.47 kg/m2  Body mass index is 53.47 kg/(m^2).  GENERAL: healthy, alert and no distress  HENT: normal cephalic/atraumatic, left ear: erythematous and bulging membrane, nose and mouth without ulcers or lesions, oropharynx clear and oral mucous membranes moist  NECK: no adenopathy, no asymmetry, masses, or scars and thyroid normal to palpation  RESP: lungs clear to auscultation - no rales, rhonchi or wheezes  CV: regular rates and rhythm and normal S1 S2, no S3 or S4  SKIN: no suspicious lesions or rashes    Diagnostic Test Results:  none     ASSESSMENT/PLAN:             1. Acute suppurative otitis " media of left ear without spontaneous rupture of tympanic membrane, recurrence not specified    - amoxicillin (AMOXIL) 500 MG capsule; Take 1 capsule (500 mg) by mouth 3 times daily for 7 days  Dispense: 21 capsule; Refill: 0  - guaiFENesin-codeine (ROBITUSSIN AC) 100-10 MG/5ML SOLN solution; Take 5-10 mLs by mouth nightly as needed  Dispense: 120 mL; Refill: 0    2. Upper respiratory tract infection, unspecified type        Patient Instructions   Ava Zavaletaex DM     Day time coughing.           Margy Villalobos PA-C  [facility]

## 2018-04-03 NOTE — MR AVS SNAPSHOT
After Visit Summary   4/3/2018    Princess TIRSTIN Machado    MRN: [id number]           Patient Information     Date Of Birth          1990        Visit Information        Provider Department      4/3/2018 8:40 AM Margy Villalobos PA-C [facility]        Today's Diagnoses     Acute suppurative otitis media of left ear without spontaneous rupture of tympanic membrane, recurrence not specified    -  1    Upper respiratory tract infection, unspecified type          Care Instructions    Delsym    Mucinex DM     Day time coughing.               Follow-ups after your visit        Your next 10 appointments already scheduled     Apr 03, 2018  2:30 PM CDT   [facility] TANNER Mar   [facility]    [address] [facility] [address]  [facility] [address] [phone]            May 29, 2018  9:15 AM CDT   (Arrive by 9:00 AM)   New Visit with Cesilia Cannon NP   [facility]    [address]  [address]  [facility] [address] [phone]              Who to contact     If you have questions or need follow up information about today's clinic visit or your schedule please contact [facility] directly at [phone].  Normal or non-critical lab and imaging results will be communicated to you by MyChart, letter or phone within 4 business days after the clinic has received the results. If you do not hear from us within 7 days, please contact the clinic through [facility] or phone. If you have a critical or abnormal lab result, we will notify you by phone as soon as possible.  Submit refill requests through [facility] or call your pharmacy and they will forward the refill request to us. Please allow 3 business days for your refill to be completed.          Additional Information About Your Visit        [facility]  "Information     Josue gives you secure access to your electronic health record. If you see a primary care provider, you can also send messages to your care team and make appointments. If you have questions, please call your primary care clinic.  If you do not have a primary care provider, please call [phone] and they will assist you.        Care EveryWhere ID     This is your Care EveryWhere ID. This could be used by other organizations to access your [place] medical records  [id number]        Your Vitals Were     Pulse Temperature Respirations Height Pulse Oximetry BMI (Body Mass Index)    78 98.2  F (36.8  C) (Oral) 20 5' 6.5\" (1.689 m) 100% 53.47 kg/m2       Blood Pressure from Last 3 Encounters:   04/03/18 120/80   03/20/18 130/67   03/06/18 128/78    Weight from Last 3 Encounters:   04/03/18 (!) 336 lb 4.8 oz (152.5 kg)   03/20/18 (!) 339 lb 8 oz (154 kg)   03/06/18 (!) 334 lb 12.8 oz (151.9 kg)              Today, you had the following     No orders found for display         Today's Medication Changes          These changes are accurate as of 4/3/18  9:18 AM.  If you have any questions, ask your nurse or doctor.               Start taking these medicines.        Dose/Directions    amoxicillin 500 MG capsule   Commonly known as:  AMOXIL   Used for:  Acute suppurative otitis media of left ear without spontaneous rupture of tympanic membrane, recurrence not specified   Started by:  Margy Villalobos PA-C        Dose:  500 mg   Take 1 capsule (500 mg) by mouth 3 times daily for 7 days   Quantity:  21 capsule   Refills:  0       guaiFENesin-codeine 100-10 MG/5ML Soln solution   Commonly known as:  ROBITUSSIN AC   Used for:  Acute suppurative otitis media of left ear without spontaneous rupture of tympanic membrane, recurrence not specified   Started by:  Margy Villalobos PA-C        Dose:  1-2 tsp.   Take 5-10 mLs by mouth nightly as needed   Quantity:  120 mL   Refills:  0            Where " to get your medicines      These medications were sent to [facility] [place], [place] - [address]  [address] [facility] [address]     Phone:  [phone]     amoxicillin 500 MG capsule         Some of these will need a paper prescription and others can be bought over the counter.  Ask your nurse if you have questions.     Bring a paper prescription for each of these medications     guaiFENesin-codeine 100-10 MG/5ML Soln solution                Primary Care Provider Office Phone # Fax #    Mayra Weems [phone]       [address]  [facility] [address]        Equal Access to Services     [facility] : Hadii aad ku hadasho Soomaali, waaxda luqadaha, qaybta kaalmada adeegyada, waxay anjaliin hayyemin adetammy rehman . So [facility] [phone].    ATENCIÓN: Si habla español, tiene a shirley disposición servicios gratuitos de asistencia lingüística. [facility] [phone].    We comply with applicable federal civil rights laws and [place] laws. We do not discriminate on the basis of race, color, national origin, age, disability, sex, sexual orientation, or gender identity.            Thank you!     Thank you for choosing [facility]  for your care. Our goal is always to provide you with excellent care. Hearing back from our patients is one way we can continue to improve our services. Please take a few minutes to complete the written survey that you may receive in the mail after your visit with us. Thank you!             Your Updated Medication List - Protect others around you: Learn how to safely use, store and throw away your medicines at www.disposemymeds.org.          This list is accurate as of 4/3/18  9:18 AM.  Always use your most recent med list.                   Brand Name Dispense Instructions for use Diagnosis    amoxicillin 500 MG capsule    AMOXIL    21 capsule    Take 1 capsule (500 mg) by mouth 3 times daily for 7 days    Acute  suppurative otitis media of left ear without spontaneous rupture of tympanic membrane, recurrence not specified       BIOTIN PO      Take 10,000 mcg by mouth Reported on 5/9/2017        clindamycin 300 MG capsule    CLEOCIN    60 capsule    1 cap po bid    Hidradenitis suppurativa       ECONTRA EZ 1.5 MG Tabs tablet   Generic drug:  levonorgestrel      Take 1.5 mg by mouth daily as needed        escitalopram 10 MG tablet    LEXAPRO    90 tablet    Take 1 tablet (10 mg) by mouth daily    Recurrent major depressive disorder, in remission (H)       guaiFENesin-codeine 100-10 MG/5ML Soln solution    ROBITUSSIN AC    120 mL    Take 5-10 mLs by mouth nightly as needed    Acute suppurative otitis media of left ear without spontaneous rupture of tympanic membrane, recurrence not specified       hydroquinone 4 % Crea     28.35 g    APPLY TO AFFECTED AREA(S) EVERY NIGHT AT BEDTIME    Hyperpigmentation of skin       ibuprofen 600 MG tablet    ADVIL/MOTRIN    60 tablet    Take 1 tablet (600 mg) by mouth every 6 hours as needed for moderate pain    Chronic right-sided low back pain without sciatica       metFORMIN 500 MG 24 hr tablet    GLUCOPHAGE-XR    90 tablet    Take 1 tablet (500 mg) by mouth daily (with dinner)    Prediabetes       methocarbamol 750 MG tablet    ROBAXIN    40 tablet    Take 1 tablet (750 mg) by mouth 2 times daily as needed for muscle spasms    DDD (degenerative disc disease), thoracic       mupirocin 2 % ointment    BACTROBAN          norethindrone 0.35 MG per tablet    MICRONOR     Take 1 tablet by mouth daily        oxybutynin 5 MG 24 hr tablet    DITROPAN-XL     Take 5 mg by mouth daily as needed for bladder spasms        propranolol 10 MG tablet    INDERAL    90 tablet    Take 1 tablet (10 mg) by mouth 3 times daily as needed    Anxiety       PROTONIX PO      Take 20 mg by mouth daily as needed for heartburn        REBIF TITRATION PACK 6X8.8 & 6X22 MCG Sosy   Generic drug:  Interferon Beta-1a       Inject 1 Units Subcutaneous three times a week        rifampin 300 MG capsule    RIFADIN    60 capsule    Take 2 capsules (600 mg) by mouth daily    Hidradenitis suppurativa       * VITAMIN D3 PO      Take 1,000 Int'l Units by mouth daily Reported on 5/9/2017        * cholecalciferol 42670 UNITS capsule    VITAMIN D3    8 capsule    Take 1 capsule (50,000 Units) by mouth once a week    Vitamin D deficiency       * Notice:  This list has 2 medication(s) that are the same as other medications prescribed for you. Read the directions carefully, and ask your doctor or other care provider to review them with you.

## 2018-04-03 NOTE — MR AVS SNAPSHOT
After Visit Summary   4/3/2018    Princess TRISTIN Machado    MRN: [id number]           Patient Information     Date Of Birth          1990        Visit Information        Provider Department      4/3/2018 2:30 PM Olesya Varela LMFT [facility]        Today's Diagnoses     Recurrent major depressive episodes (H)    -  1    STACIA (generalized anxiety disorder)           Follow-ups after your visit        Additional Services     MENTAL HEALTH REFERRAL  - Adult; Psychiatry and Medication Management; Psychiatry; Other: [facility] [phone]; Patient call to schedule       All scheduling is subject to the client's specific insurance plan & benefits, provider/location availability, and provider clinical specialities.  Please arrive 15 minutes early for your first appointment and bring your completed paperwork.    Please be aware that coverage of these services is subject to the terms and limitations of your health insurance plan.  Call member services at your health plan with any benefit or coverage questions.                            Your next 10 appointments already scheduled     Apr 10, 2018  7:20 AM CDT   Office Visit with Shelly Whipple MD   [facility] ([address] [address] [phone]           Bring a current list of meds and any records pertaining to this visit. For Physicals, please bring immunization records and any forms needing to be filled out. Please arrive 10 minutes early to complete paperwork.            May 22, 2018  3:15 PM CDT   New Visit with Cesilia Cannon NP   [facility] ([facility])    [address]  [address]  [facility] [address] [phone]              Who to contact     If you have questions or need follow up information about today's clinic visit or your schedule please contact [facility] directly at [phone].  Normal or non-critical lab and imaging results will be communicated to you by MyChart, letter or phone within 4 business days after the clinic has received the results. If you do not hear from us within 7 days, please contact the clinic through 500pxhart or phone. If you have a critical or abnormal lab result, we will notify you by phone as soon as possible.  Submit refill requests through [facility] or call your pharmacy and they will forward the refill request to us. Please allow 3 business days for your refill to be completed.          Additional Information About Your Visit        [email or web address] Information     [facility] gives you secure access to your electronic health record. If you see a primary care provider, you can also send messages to your care team and make appointments. If you have questions, please call your primary care clinic.  If you do not have a primary care provider, please call [phone] and they will assist you.        Care EveryWhere ID     This is your Care EveryWhere ID. This could be used by other organizations to access your [place] medical records  [id number]         Blood Pressure from Last 3 Encounters:   04/03/18 120/80   03/20/18 130/67   03/06/18 128/78    Weight from Last 3 Encounters:   04/03/18 (!) 152.5 kg (336 lb 4.8 oz)   03/20/18 (!) 154 kg (339 lb 8 oz)   03/06/18 (!) 151.9 kg (334 lb 12.8 oz)              We Performed the Following     MENTAL HEALTH REFERRAL  - Adult; Psychiatry and Medication Management; Psychiatry; Other: [facility] [phone]; Patient call to schedule          Today's Medication Changes          These changes are accurate as of 4/3/18  3:03 PM.  If you have any questions, ask your nurse or doctor.               Start taking these medicines.        Dose/Directions    amoxicillin 500 MG capsule   Commonly known as:  AMOXIL   Used for:  Acute suppurative otitis media of left ear without spontaneous  rupture of tympanic membrane, recurrence not specified   Started by:  Margy Villalobos PA-C        Dose:  500 mg   Take 1 capsule (500 mg) by mouth 3 times daily for 7 days   Quantity:  21 capsule   Refills:  0       guaiFENesin-codeine 100-10 MG/5ML Soln solution   Commonly known as:  ROBITUSSIN AC   Used for:  Acute suppurative otitis media of left ear without spontaneous rupture of tympanic membrane, recurrence not specified   Started by:  Margy Vlilalobos PA-C        Dose:  1-2 tsp.   Take 5-10 mLs by mouth nightly as needed   Quantity:  120 mL   Refills:  0            Where to get your medicines      These medications were sent to [facility] [address] [address]     Phone:  [phone]     amoxicillin 500 MG capsule         Some of these will need a paper prescription and others can be bought over the counter.  Ask your nurse if you have questions.     Bring a paper prescription for each of these medications     guaiFENesin-codeine 100-10 MG/5ML Soln solution                Primary Care Provider Office Phone # Fax #    Mayra Weems [phone]       [address]  [facility] [address]        Equal Access to Services     BEBE DAMIAN AH: Hadii aad ku hadasho Soomaali, waaxda luqadaha, qaybta kaalmada adeegyada, waxay idiin hayyemin kiran ramos. So [facility] [phone].    ATENCIÓN: Si habla español, tiene a shirley disposición servicios gratuitos de asistencia lingüística. Llreji al [phone].    We comply with applicable federal civil rights laws and [place] laws. We do not discriminate on the basis of race, color, national origin, age, disability, sex, sexual orientation, or gender identity.            Thank you!     Thank you for choosing [facility]  for your care. Our goal is always to provide you with excellent care. Hearing back from our patients is one way we can continue to  improve our services. Please take a few minutes to complete the written survey that you may receive in the mail after your visit with us. Thank you!             Your Updated Medication List - Protect others around you: Learn how to safely use, store and throw away your medicines at www.disposemymeds.org.          This list is accurate as of 4/3/18  3:03 PM.  Always use your most recent med list.                   Brand Name Dispense Instructions for use Diagnosis    amoxicillin 500 MG capsule    AMOXIL    21 capsule    Take 1 capsule (500 mg) by mouth 3 times daily for 7 days    Acute suppurative otitis media of left ear without spontaneous rupture of tympanic membrane, recurrence not specified       BIOTIN PO      Take 10,000 mcg by mouth Reported on 5/9/2017        clindamycin 300 MG capsule    CLEOCIN    60 capsule    1 cap po bid    Hidradenitis suppurativa       ECONTRA EZ 1.5 MG Tabs tablet   Generic drug:  levonorgestrel      Take 1.5 mg by mouth daily as needed        escitalopram 10 MG tablet    LEXAPRO    90 tablet    Take 1 tablet (10 mg) by mouth daily    Recurrent major depressive disorder, in remission (H)       guaiFENesin-codeine 100-10 MG/5ML Soln solution    ROBITUSSIN AC    120 mL    Take 5-10 mLs by mouth nightly as needed    Acute suppurative otitis media of left ear without spontaneous rupture of tympanic membrane, recurrence not specified       hydroquinone 4 % Crea     28.35 g    APPLY TO AFFECTED AREA(S) EVERY NIGHT AT BEDTIME    Hyperpigmentation of skin       ibuprofen 600 MG tablet    ADVIL/MOTRIN    60 tablet    Take 1 tablet (600 mg) by mouth every 6 hours as needed for moderate pain    Chronic right-sided low back pain without sciatica       metFORMIN 500 MG 24 hr tablet    GLUCOPHAGE-XR    90 tablet    Take 1 tablet (500 mg) by mouth daily (with dinner)    Prediabetes       methocarbamol 750 MG tablet    ROBAXIN    40 tablet    Take 1 tablet (750 mg) by mouth 2 times daily as needed  for muscle spasms    DDD (degenerative disc disease), thoracic       mupirocin 2 % ointment    BACTROBAN          norethindrone 0.35 MG per tablet    MICRONOR     Take 1 tablet by mouth daily        oxybutynin 5 MG 24 hr tablet    DITROPAN-XL     Take 5 mg by mouth daily as needed for bladder spasms        propranolol 10 MG tablet    INDERAL    90 tablet    Take 1 tablet (10 mg) by mouth 3 times daily as needed    Anxiety       PROTONIX PO      Take 20 mg by mouth daily as needed for heartburn        REBIF TITRATION PACK 6X8.8 & 6X22 MCG Sosy   Generic drug:  Interferon Beta-1a      Inject 1 Units Subcutaneous three times a week        rifampin 300 MG capsule    RIFADIN    60 capsule    Take 2 capsules (600 mg) by mouth daily    Hidradenitis suppurativa       * VITAMIN D3 PO      Take 1,000 Int'l Units by mouth daily Reported on 5/9/2017        * cholecalciferol 24339 UNITS capsule    VITAMIN D3    8 capsule    Take 1 capsule (50,000 Units) by mouth once a week    Vitamin D deficiency       * Notice:  This list has 2 medication(s) that are the same as other medications prescribed for you. Read the directions carefully, and ask your doctor or other care provider to review them with you.

## 2018-04-03 NOTE — PROGRESS NOTES
[facility]  April 3, 2018      Behavioral Health Clinician Progress Note    Patient Name: Princess TRISTIN Machado           Service Type:  Individual      Service Location:   Face to Face in Clinic     Session Start Time: 2;35  Session End Time: 3.08      Session Length: 38 - 52      Attendees: Client    Visit Activities (Refresh list every visit): [facility] Only    Diagnostic Assessment Date: 3/13/18  Treatment Plan Review Date: 7/3/18  See Flowsheets for today's PHQ-9 and STACIA-7 results  Previous PHQ-9:   PHQ-9 SCORE 9/26/2017 11/7/2017 3/13/2018   Total Score 12 5 9     Previous STACIA-7:   STACIA-7 SCORE 9/26/2017 1/2/2018 3/13/2018   Total Score 9 8 12       CONSTANZA LEVEL:  CONSTANZA Score (Last Two) 3/13/2018   CONSTANZA Raw Score 30   Activation Score 56   CONSTANZA Level 3       DATA  Extended Session (60+ minutes): No  Interactive Complexity: No  Crisis: No  [facility] Patient: No    Treatment Objective(s) Addressed in This Session:  Target Behavior(s): diet/weight loss    Depressed Mood: Increase interest, engagement, and pleasure in doing things  Decrease frequency and intensity of feeling down, depressed, hopeless  Improve quantity and quality of night time sleep / decrease daytime naps  Feel less tired and more energy during the day   Improve diet, appetite, mindful eating, and / or meal planning  Identify negative self-talk and behaviors: challenge core beliefs, myths, and actions  Improve concentration, focus, and mindfulness in daily activities   Feel less fidgety, restless or slow in daily activities / interpersonal interactions  Discuss motivation / ambivalence about a referral to specialty mental health services (Therapy, Day Tx, PHP)  Anxiety: will experience a reduction in anxiety, will develop more effective coping skills to manage anxiety symptoms, will develop healthy cognitive patterns and beliefs and will increase ability to function adaptively    Current Stressors / Issues:  Feeling better- but had a few  set backs.  Pt states that she was turned down for weight loss clinic and the med management NP was rude to her.  Patient states that she doesn't feel depressed, but irritable.     Cognitive Therapy    Primary Care Cognitive Therapy    Noticing and Questioning Thoughts that Don t Work    The idea behind the following script is to let people know they can manage distress differently by questioning their thoughts and how their thoughts are working or not working for them.       Often when people get stressed/anxious/depressed their minds will tell them all kinds of things that can get them more stressed/anxious/depressed than they would like to be.    You can t stop your mind from talking to you, that s its job;  But you can get really good at noticing what your mind is telling you .. stepping back from those thoughts and  asking yourself some questions about how useful or helpful those thoughts are.      The idea here is to have you get really good at choosing how do you want to respond to a situation and your thoughts instead of just reacting.     Questioning your thoughts gives you the opportunity to respond in a manner consistent with your values and how you want to represent yourself to other.      This can allow you to turn the volume down on any distressing responses you might be having.  The idea is not that you will think happy or positive thought that will make everything better, but instead be able to look at your initial thinking and determine if it is helpful, useful and/or accurate and how would you need to think in order to change how you feel and/or live your life in a way consistent with your values.      Questioning you thoughts is a skill that you can learn and get better at with practice.  What I would encourage you to do before you go to bed tonight is look at the 18 groups of questions and statements here and pick a few that really jump off the page at you as being good to ask or tell yourself  when you are getting more stressed/anxious/depressed and or not living your life in a satisfactory way.  By doing this you can start to be more aware of your unhelpful thinking, interrupt its effect on you and start to change your thinking so it works for you instead of against you.          How to Question Stressful/Anxious/Depressed Thinking  ______________________________________________________________________________     1. Am I upsetting myself unnecessarily?  How can I see this another way?     2. Is my thinking working for or against me?  How could I view this in a less    upsetting way?     3. What am I demanding must happen?  What do I want or prefer, rather than need?     4. Am I making something too terrible?  Is it really that awful?  What would be so     terrible about that?     5. Am I labeling a person?  What is the action that I don t like?     6. What s untrue about my thoughts?  How can I stick to the facts?  What s the proof    for what I am thinking or believing about this?     7. Am I using extreme, black-and-white language?  What less extreme words might     be more accurate?     8. Am I fortune telling or mind reading in a way that gets me upset or unhappy?      What are the odds (percent chance -- e.g., there is a 5% chance...) that it will      really turn out the way I m thinking or imagining?     9. What are my options in this situation?  How would I like to respond?     10. Create more moderate, helpful, or realistic statements to replace the upsetting    ones.    11.   Have I had any experiences that show that this thought might not be completely true?    12.   If my best friend or someone I loved had this thought, what would I tell them?    13. If my best friend or someone I loved knew I was thinking this thought, what would they say to me?  What evidence would they point out to me that would suggest that my thought is not completely true?    14. Are there strengths in me or positives  in the situation that I am ignoring?  Am I underestimating my ability to cope with unfortunate circumstances?    15. When I am not feeling this way, do I think about this situation any differently? How?    16. Have I been in this type of situation before? What happened?  What have I learned from prior experiences that could help me now?    17. Five years from now, if I look back on this situation, will I look at it any differently?  Will I focus on any different part of my experience?    18. Am I blaming myself for something over which I do not have complete control?    COMMON COGNITIVE ERRORS     We all have patterns of thinking, and this may impact our emotional state and behavior. Sometimes our patterns are less than accurate. These are cognitive errors or cognitive distortions, and they typically fall into certain categories. Learning to recognize our own cognitive errors increases our ability to ignore the negative thought or actively change it, which enables us to intentionally change our emotions and our behaviors. The following is a list of the most common cognitive distortions:     1. All-or-Nothing Thinking Putting experiences in one of two categories Examples: 1) People are all good or all bad. 2) Projects are perfect or failures. 3) I am a sinner, or I am a saint.   2. Overgeneralizing Believing that something will always happen because it happened once Examples: 1) I will never be able to make friends at a party because I once made an awkward statement to someone, and they didn t want to be my friend. 2) I will never be able to speak in public because I once had a panic attack before giving a speech.   3. Discounting the Positive Deciding that if a good thing happens, it must not be important or doesn t count Examples: 1) I passed the exam this time, but it was a fluke. 2) I didn t have a panic attack today, but it s only because I was too busy to be worried.   4. Jumping to Conclusions Deciding how to  respond to a situation without having all the information Examples: 1) The man/woman I am interested in never called me back because he thinks I m stupid. 2) That person cut me off in traffic because he/she is a jerk!   5. Mind Reading Believing that you know how someone else is feeling or what they are thinking without any evidence Examples: 1) I know she hates my guts. 2) That person thinks I m a loser.   6. Fortunetelling Believing that you can predict a future outcome, while ignoring other alternatives Examples: 1) I m going to fail this test. 2) I m going to have a panic attack if I go out in public.   7. Magnifying (Catastrophizing) or Minimizing Distorting the importance of positive and negative events Examples: 1) I said the wrong thing so I will never have a boyfriend/girlfriend. 2) My nose is so big that no one will ever love me. 3) It doesn t matter if I m smart because I will never be attractive, athletic, popular, rich, etc. 4) Making a mountain out of a molehill.    8. Emotional Reasoning Believing something to be true because it feels true. Examples: 1) I am a failure because I feel like a failure. 2) I am worthless because I feel worthless.   9.  Should-y  Thinking Telling yourself you should, should not, or should have done something when it is more accurate to say that you would have preferred or wished you had or had not done something Examples: 1) I should be perfect. 2) I should never make mistakes. 3) I should not be anxious. 4) I should have done something to help.   10. Labeling (or Mis-Labeling) Using a label to describe a behavior or error Examples: 1) He s a bad person (instead of  He made a mistake when he lied. ) 2) I m stupid (instead of  I didn t study for my test, and I failed it. )   11. Personalization Taking blame for some negative event even though you were not responsible, you could not have known to do differently, there were extenuating circumstances, or other people were  involved. Examples: 1) It s my fault he hits me. 2) My mother is unhappy because of me.           Progress on Treatment Objective(s) / Homework:  New Objective established this session - PREPARATION (Decided to change - considering how); Intervened by negotiating a change plan and determining options / strategies for behavior change, identifying triggers, exploring social supports, and working towards setting a date to begin behavior change    Motivational Interviewing    MI Intervention: Expressed Empathy/Understanding     Change Talk Expressed by the Patient: Desire to change    Provider Response to Change Talk: E - Evoked more info from patient about behavior change      Situation        Automatic Thoughts  Cognitive Distortions      Feelings        Behavior        Questioning Thoughts            Also provided psychoeducation about behavioral health condition, symptoms, and treatment options    Care Plan review completed: Yes    Medication Review:  No changes to current psychiatric medication(s)    Medication Compliance:  Yes    Changes in Health Issues:   None reported    Chemical Use Review:   Substance Use: Chemical use reviewed, no active concerns identified      Tobacco Use: No current tobacco use.      Assessment: Current Emotional / Mental Status (status of significant symptoms):  Risk status (Self / Other harm or suicidal ideation)  Patient denies a history of suicidal ideation, suicide attempts, self-injurious behavior, homicidal ideation, homicidal behavior and and other safety concerns  Patient denies current fears or concerns for personal safety.  Patient denies current or recent suicidal ideation or behaviors.  Patient denies current or recent homicidal ideation or behaviors.  Patient denies current or recent self injurious behavior or ideation.  Patient denies other safety concerns.  A safety and risk management plan has not been developed at this time, however patient was encouraged to call [facility]  Crisis / 911 should there be a change in any of these risk factors.    Appearance:   Appropriate   Eye Contact:   Good   Psychomotor Behavior: Normal   Attitude:   Cooperative   Orientation:   All  Speech   Rate / Production: Normal    Volume:  Normal   Mood:    Anxious  Irritable   Affect:    Appropriate   Thought Content:  Clear   Thought Form:  Coherent  Logical   Insight:    Fair     Diagnoses:  1. Recurrent major depressive episodes (H)    2. STACIA (generalized anxiety disorder)        Collateral Reports Completed:  Routed note to PCP    Plan: (Homework, other):  Patient was given information about behavioral services and encouraged to schedule a follow up appointment with the clinic [facility] in 1 week.  She was also given information about mental health symptoms and treatment options  and Cognitive Behavioral Therapy skills to practice when experiencing anxiety and depression.  CD Recommendations: No indications of CD issues.  TANNER Paige, [facility]      ______________________________________________________________________    Integrated Primary Care Behavioral Health Treatment Plan    Patient's Name: Princess TRISTIN Machado  YOB: 1990    Date: 4/3/18    DSM-V Diagnoses: 296.32 (F33.1) Major Depressive Disorder, Recurrent Episode, Moderate _ and With mixed features or 300.02 (F41.1) Generalized Anxiety Disorder  Psychosocial / Contextual Factors: weight, relationships  WHODAS:24    Referral / Collaboration:  Referral to another professional/service is not indicated at this time..    Anticipated number of session or this episode of care: until long term care      MeasurableTreatment Goal(s) related to diagnosis / functional impairment(s)  Goal 1: Patient will increase coping skills    I will know I've met my goal when less angry.      Objective #A (Patient Action)    Patient will identify 2 initial signs or symptoms of anxiety.  Status: New - Date: 4/3/18     Intervention(s)  [facility] will make referrals to  psychiatry.    Objective #B  Patient will use thought-stopping strategy daily to reduce intrusive thoughts.  Status: New - Date: 4/3/18     Intervention(s)  [facility] will teach CBT skills.    Patient has reviewed and agreed to the above plan.      TANNER Mar  April 3, 2018

## 2018-04-04 ENCOUNTER — MYC REFILL (OUTPATIENT)
Dept: INTERNAL MEDICINE | Facility: CLINIC | Age: 28
End: 2018-04-04

## 2018-04-04 DIAGNOSIS — E55.9 VITAMIN D DEFICIENCY: ICD-10-CM

## 2018-04-05 NOTE — TELEPHONE ENCOUNTER
Message from [employer]:  Original authorizing provider: MD Brenda Jasonjoycelyn CROWAlexus Carter would like a refill of the following medications:  cholecalciferol (VITAMIN D3) 89926 UNITS capsule [Mayra Weems MD]    Preferred pharmacy: [address]    Comment:

## 2018-04-05 NOTE — TELEPHONE ENCOUNTER
Refill request for Vitamin D 50,000 u.    Last OV 4/3/18.     Last Vitamin D level = 13 on 2/10/18.     Last refill on 2/5/18.     Provider approval needed.

## 2018-04-06 ENCOUNTER — TELEPHONE (OUTPATIENT)
Dept: SURGERY | Facility: CLINIC | Age: 28
End: 2018-04-06

## 2018-04-06 NOTE — TELEPHONE ENCOUNTER
Psychologist from [facility] called and left 2 VM's regarding patient.  After checking there is an TAZ on file.      Called Janet - psychologist back and left a VM stating that patient in NOT currently a part of the bariatric program and that a letter was drawn up for.  If she would like to get that from pt that might answer any questions that she has.

## 2018-04-10 ENCOUNTER — MYC MEDICAL ADVICE (OUTPATIENT)
Dept: FAMILY MEDICINE | Facility: CLINIC | Age: 28
End: 2018-04-10

## 2018-04-10 ENCOUNTER — OFFICE VISIT (OUTPATIENT)
Dept: FAMILY MEDICINE | Facility: CLINIC | Age: 28
End: 2018-04-10
Payer: COMMERCIAL

## 2018-04-10 VITALS — DIASTOLIC BLOOD PRESSURE: 82 MMHG | SYSTOLIC BLOOD PRESSURE: 126 MMHG

## 2018-04-10 DIAGNOSIS — L70.0 ACNE VULGARIS: Primary | ICD-10-CM

## 2018-04-10 DIAGNOSIS — L73.2 HIDRADENITIS SUPPURATIVA: ICD-10-CM

## 2018-04-10 PROCEDURE — 99214 OFFICE O/P EST MOD 30 MIN: CPT | Performed by: FAMILY MEDICINE

## 2018-04-10 RX ORDER — SPIRONOLACTONE 50 MG/1
50 TABLET, FILM COATED ORAL DAILY
Qty: 30 TABLET | Refills: 3 | Status: SHIPPED | OUTPATIENT
Start: 2018-04-10 | End: 2019-02-12

## 2018-04-10 RX ORDER — CLINDAMYCIN PHOSPHATE 10 UG/ML
LOTION TOPICAL 2 TIMES DAILY
Qty: 60 ML | Refills: 11 | Status: SHIPPED | OUTPATIENT
Start: 2018-04-10 | End: 2018-08-21

## 2018-04-10 NOTE — MR AVS SNAPSHOT
"              After Visit Summary   4/10/2018    Princess TRISTIN Machado    MRN: [id number]           Patient Information     Date Of Birth          1990        Visit Information        Provider Department      4/10/2018 7:20 AM Shelly Whipple MD [facility]        Today's Diagnoses     Acne vulgaris    -  1    Hidradenitis suppurativa          Care Instructions    FUTURE APPOINTMENTS  Please get labs done in 3 months.  Follow up in 3 month(s) if acne control is not satisfactory.  Continue following up with weight loss clinic.    Check with your insurance company for coverage of BOTOX injections for treatment of chronic hidradenitis suppurativa in the axillae and groin recalcitrant to previous therapies including Humira, oral doxycycline, cephalexin, amoxicillin, bactrim, topical clindamycin, Bactroban, Hibiclens. You may call back to our clinic if the insurance company requires documentation of evidence for clinical improvement of hidradenitis suppurativa with BOTOX injection treatments. BOTOX has been recommended at 50 units injection in each axilla.    Avoid picking at acne lesions.  You may not want to do any more Dermafile treatments.    ORAL MEDICATION  Take by mouth 1 capsule(s)/tablet(s) of spironolactone 50 mg once daily.    ACNE TREATMENT PLAN  Morning or Evening (whenever you shower) :    Current Cetaphil facial cleanser.    Do a \"20/10\" wash (20 seconds of skin contact with the cleanser followed by a 10 second rinse)    Apply to face.  (FYI Note - Benzoyl peroxide washes can bleach clothing, so try to use disposable or old towels and clothes.)    After cleansing, medication : apply topical Clindamycin 1% lotion to face.    Evening or Morning (other time of day) :    Current Cetaphil facial cleanser - Do a \"20 / 10 wash\" again.    Recommendations if skin becomes too dry in response to medication:    Use Cetaphil facial moisturizer.          Follow-ups after your visit      "   Your next 10 appointments already scheduled     Apr 17, 2018 11:00 AM CDT   MyChart [facility] Return with TANNER Mar   [facility]    [address]  [facility] [id number] [phone]            Apr 17, 2018  1:30 PM CDT   [facility] with Mayra Weems MD   [facility]    [address]  [facility] [id number] [phone]            May 22, 2018  3:15 PM CDT   (Arrive by 3:00 PM)   New Visit with Cesilia Cannon NP   [facility]    [address]  [address]  [facility] [id number] [phone]              Who to contact     If you have questions or need follow up information about today's clinic visit or your schedule please contact [facility] [place] directly at [phone].  Normal or non-critical lab and imaging results will be communicated to you by BoardEvalshart, letter or phone within 4 business days after the clinic has received the results. If you do not hear from us within 7 days, please contact the clinic through Jamalont or phone. If you have a critical or abnormal lab result, we will notify you by phone as soon as possible.  Submit refill requests through Zimory or call your pharmacy and they will forward the refill request to us. Please allow 3 business days for your refill to be completed.          Additional Information About Your Visit        BoardEvalsharFourier Education Information     Zimory gives you secure access to your electronic health record. If you see a primary care provider, you can also send messages to your care team and make appointments. If you have questions, please call your primary care clinic.  If you do not have a primary care provider, please call [phone] and they will assist you.        Care EveryWhere ID     This is your Care EveryWhere ID. This could be used by other  organizations to access your [place] medical records  [id number]         Blood Pressure from Last 3 Encounters:   04/10/18 126/82   04/03/18 120/80   03/20/18 130/67    Weight from Last 3 Encounters:   04/03/18 (!) 336 lb 4.8 oz (152.5 kg)   03/20/18 (!) 339 lb 8 oz (154 kg)   03/06/18 (!) 334 lb 12.8 oz (151.9 kg)              Today, you had the following     No orders found for display       Primary Care Provider Office Phone # Fax #    Mayra Weems [phone]       [address]  [facility] [address]        Equal Access to Services     BEBE DAMIAN : Hadii indy Garcia, wamargaritoda luglennadaha, qaybta kaalmada adeariel, amos ramos. So [facility] [phone].    ATENCIÓN: Si habla español, tiene a shirley disposición servicios gratuitos de asistencia lingüística. Llame al [phone].    We comply with applicable federal civil rights laws and [place] laws. We do not discriminate on the basis of race, color, national origin, age, disability, sex, sexual orientation, or gender identity.            Thank you!     Thank you for choosing [facility] [place]  for your care. Our goal is always to provide you with excellent care. Hearing back from our patients is one way we can continue to improve our services. Please take a few minutes to complete the written survey that you may receive in the mail after your visit with us. Thank you!             Your Updated Medication List - Protect others around you: Learn how to safely use, store and throw away your medicines at www.disposemymeds.org.          This list is accurate as of 4/10/18  7:35 AM.  Always use your most recent med list.                   Brand Name Dispense Instructions for use Diagnosis    amoxicillin 500 MG capsule    AMOXIL    21 capsule    Take 1 capsule (500 mg) by mouth 3 times daily for 7 days    Acute suppurative otitis media of left ear without spontaneous rupture of tympanic membrane,  recurrence not specified       BIOTIN PO      Take 10,000 mcg by mouth Reported on 5/9/2017        clindamycin 300 MG capsule    CLEOCIN    60 capsule    1 cap po bid    Hidradenitis suppurativa       ECONTRA EZ 1.5 MG Tabs tablet   Generic drug:  levonorgestrel      Take 1.5 mg by mouth daily as needed        escitalopram 10 MG tablet    LEXAPRO    90 tablet    Take 1 tablet (10 mg) by mouth daily    Recurrent major depressive disorder, in remission (H)       guaiFENesin-codeine 100-10 MG/5ML Soln solution    ROBITUSSIN AC    120 mL    Take 5-10 mLs by mouth nightly as needed    Acute suppurative otitis media of left ear without spontaneous rupture of tympanic membrane, recurrence not specified       hydroquinone 4 % Crea     28.35 g    APPLY TO AFFECTED AREA(S) EVERY NIGHT AT BEDTIME    Hyperpigmentation of skin       ibuprofen 600 MG tablet    ADVIL/MOTRIN    60 tablet    Take 1 tablet (600 mg) by mouth every 6 hours as needed for moderate pain    Chronic right-sided low back pain without sciatica       metFORMIN 500 MG 24 hr tablet    GLUCOPHAGE-XR    90 tablet    Take 1 tablet (500 mg) by mouth daily (with dinner)    Prediabetes       methocarbamol 750 MG tablet    ROBAXIN    40 tablet    Take 1 tablet (750 mg) by mouth 2 times daily as needed for muscle spasms    DDD (degenerative disc disease), thoracic       mupirocin 2 % ointment    BACTROBAN          norethindrone 0.35 MG per tablet    MICRONOR     Take 1 tablet by mouth daily        oxybutynin 5 MG 24 hr tablet    DITROPAN-XL     Take 5 mg by mouth daily as needed for bladder spasms        propranolol 10 MG tablet    INDERAL    90 tablet    Take 1 tablet (10 mg) by mouth 3 times daily as needed    Anxiety       PROTONIX PO      Take 20 mg by mouth daily as needed for heartburn        REBIF TITRATION PACK 6X8.8 & 6X22 MCG Sosy   Generic drug:  Interferon Beta-1a      Inject 1 Units Subcutaneous three times a week        rifampin 300 MG capsule    RIFADIN     60 capsule    Take 2 capsules (600 mg) by mouth daily    Hidradenitis suppurativa       * VITAMIN D3 PO      Take 1,000 Int'l Units by mouth daily Reported on 5/9/2017        * cholecalciferol 31968 UNITS capsule    VITAMIN D3    8 capsule    Take 1 capsule (50,000 Units) by mouth once a week    Vitamin D deficiency       * Notice:  This list has 2 medication(s) that are the same as other medications prescribed for you. Read the directions carefully, and ask your doctor or other care provider to review them with you.

## 2018-04-10 NOTE — TELEPHONE ENCOUNTER
Called preferred one:  They need prior auth information faxed to:     department  [phone]  Letter of medical necessity  Chart note and journal articles- any thing to support the need for botox for hyper hydrosis    Jyoti Castrejon RN BSN  [facility]  [phone]   .

## 2018-04-10 NOTE — LETTER
To Whom It May Concern,                     Princess Machado , 1990  is a patient with moderate to severe hidradenitis supprativa.  Previous treatments have included oral antibiotics ( clindamycin, doxycycline, cephalexin ), topical antibiotics, Hibiclens . None of the treatments have been effective in  controlling the inflammatory process involved with hidradenitis supprativa therefore this patient has continued to develop scarring, increase track formation.                  There have been documented cases of Botox treatment for this difficult condition. The results are very promising , 50 Unit Botox injection resulted in excellent control ( reduced episodes, healing of previous draining sites ) of the condition . The duration of effectiveness varies from 3 months to 13 months .                   I have included copies of some of the cases for your review. I have had the opportunity to use 50 Units Botox in a young man with moderate - severe hidradenitis supprativa in the axilla. He had chronic draining ( 7 months duration )  , tracking between the site , within one week from the time of the intradermal injection of BOTOX there was healing and minimal inflammation .                  I would like to request approval for use of 50 UNITS of Botox per axilla in this patient with moderate- severe hidradenitis supprativa.      Please contact me if you have any further questions,    Shelly Whipple M.D.  [facility]  [address] [place] [id number] [phone]

## 2018-04-10 NOTE — PROGRESS NOTES
[facility] - PRIMARY CARE SKIN    CC : acne, hidradenitis suppurativa  SUBJECTIVE:                                                    Princess TRISTIN Machado is a 27 year old female who presents to clinic today because of acne on the cheeks. Acne breakouts have increased on the cheeks after a recent Dermafile treatment. She has also normally used a gentle Cetaphil facial cleanser.    Issue Two :  Because of the recurrent flares with hidradenitis suppurativa in the axilla area she has been referred to general surgery , who referred her to plastics because of the large amount of tissue involvement. Plastic surgery  Referred her to the weight loss clinic , because she reports a history of eating disorder. Odor underneath arms continues to persist. She has had recurrent drainage from various sites in the axillae, but drainage has persisted from lesions in the buttocks/groin for weeks.    Previous therapies include : Humira, oral doxycycline, cephalexin, amoxicillin, bactrim, topical clindamycin, Bactroban, Hibiclens.    Personal Medical History  Skin Cancer : NO  Eczema Psoriasis Rosacea Autoimmune   NO NO NO Yes - multiple sclerosis   History of hidradenitis suppurativa    Family Medical History  Skin Cancer : NO  Eczema Psoriasis Rosacea Autoimmune   NO NO NO NO     Occupation : works for [employer] (indoor).    Refer to electronic medical record (EMR) for past medical history and medications.    I : POSITIVE for acne  ROS : 14 point review of systems was negative except the symptoms listed above in the HPI.    This document serves as a record of the services and decisions personally performed and made by Tiffanie Whipple MD. It was created on her behalf by Angel Mixon, a trained medical scribe.  The creation of this document is based on the scribe's personal observations and the provider's statements to the medical scribe.  Angel Mixon, April 10, 2018 7:23 AM      OBJECTIVE:                                                     GENERAL: healthy, alert and no distress  SKIN: Araya Skin Type - II.  Face were examined. The dermatoscope was used to help evaluate pigmented lesions.  Skin Pertinent Findings:  Face : Multiple scattered inflammatory papules and nodules along jawline and on sides of face.    Axillae : newly healing pockets, residual tracking and scarring.    MDM :  Discussion regarding recurring flares of hidradenitis suppurativa. Reviewed the fact that a weight loss program to facilitate the patient's weight loss would be helpful in managing and hopefully decreasing the frequency of flare-ups.  Discussed BOTOX injections for control of hidradenitis suppurativa in the axillae. There are some reports in the literature about 50 Unit Botox injections to the areas of inflammation with improved control for 3-12 months. Because of the minimal risk and invasiveness of Botox injections and excellent control of the inflammatory process involved with hidradenitis suppurativa this is recommended.      ASSESSMENT:                                                      Encounter Diagnoses   Name Primary?     Acne vulgaris Yes     Hidradenitis suppurativa          PLAN:                                                    Patient Instructions   FUTURE APPOINTMENTS  Please get labs done in 3 months.  Follow up in 3 month(s) if acne control is not satisfactory.  Continue following up with weight loss clinic.    Check with your insurance company for coverage of BOTOX injections for treatment of chronic hidradenitis suppurativa in the axillae and groin recalcitrant to previous therapies including Humira, oral doxycycline, cephalexin, amoxicillin, bactrim, topical clindamycin, Bactroban, Hibiclens. You may call back to our clinic if the insurance company requires documentation of evidence for clinical improvement of hidradenitis suppurativa with BOTOX injection treatments. BOTOX has been recommended at 50 units injection in each axilla.    Avoid  "picking at acne lesions.  You may not want to do any more Dermafile treatments on the face.    ORAL MEDICATION  Take by mouth 1 capsule(s)/tablet(s) of spironolactone 50 mg once daily.    ACNE TREATMENT PLAN  Morning or Evening (whenever you shower) :    Current Cetaphil facial cleanser.    Do a \"20/10\" wash (20 seconds of skin contact with the cleanser followed by a 10 second rinse)    Apply to face.  (FYI Note - Benzoyl peroxide washes can bleach clothing, so try to use disposable or old towels and clothes.)    After cleansing, medication : apply topical Clindamycin 1% lotion to face.    Evening or Morning (other time of day) :    Current Cetaphil facial cleanser - Do a \"20 / 10 wash\" again.    Recommendations if skin becomes too dry in response to medication:    Use Cetaphil facial moisturizer.        PROCEDURES:                                                    None.    TT : 25 minutes.  CT : 15 minutes.      The information in this document, created by the medical scribe for me, accurately reflects the services I personally performed and the decisions made by me. I have reviewed and approved this document for accuracy prior to leaving the patient care area.  Tiffanie Whipple MD April 10, 2018 7:22 AM  [facility] - PRIMARY CARE SKIN  "

## 2018-04-10 NOTE — PATIENT INSTRUCTIONS
"FUTURE APPOINTMENTS  Please get labs done in 3 months.  Follow up in 3 month(s) if acne control is not satisfactory.  Continue following up with weight loss clinic.    Check with your insurance company for coverage of BOTOX injections for treatment of chronic hidradenitis suppurativa in the axillae and groin recalcitrant to previous therapies including Humira, oral doxycycline, cephalexin, amoxicillin, bactrim, topical clindamycin, Bactroban, Hibiclens. You may call back to our clinic if the insurance company requires documentation of evidence for clinical improvement of hidradenitis suppurativa with BOTOX injection treatments. BOTOX has been recommended at 50 units injection in each axilla.    Avoid picking at acne lesions.  You may not want to do any more Dermafile treatments.    ORAL MEDICATION  Take by mouth 1 capsule(s)/tablet(s) of spironolactone 50 mg once daily.    ACNE TREATMENT PLAN  Morning or Evening (whenever you shower) :    Current Cetaphil facial cleanser.    Do a \"20/10\" wash (20 seconds of skin contact with the cleanser followed by a 10 second rinse)    Apply to face.  (FYI Note - Benzoyl peroxide washes can bleach clothing, so try to use disposable or old towels and clothes.)    After cleansing, medication : apply topical Clindamycin 1% lotion to face.    Evening or Morning (other time of day) :    Current Cetaphil facial cleanser - Do a \"20 / 10 wash\" again.    Recommendations if skin becomes too dry in response to medication:    Use Cetaphil facial moisturizer.  "

## 2018-04-10 NOTE — LETTER
4/10/2018         RE: Princess TRISTIN Machado  [address]   [facility] [id number]        Dear Colleague,    Thank you for [facility] your patient, [facility]IN [facility]ado[facility]. Please see a copy of my visit note below.    [facility] - PRIMARY CARE SKIN    CC : acne, hidradenitis suppurativa  SUBJECTIVE:                                                    Princess TRISTIN Machado is a 27 year old female who presents to clinic today because of acne on the cheeks. Acne breakouts have increased on the cheeks after a recent Dermafile treatment. She has also normally used a gentle Cetaphil facial cleanser.    Issue Two :  Because of the recurrent flares with hidradenitis suppurativa in the axilla area she has been referred to general surgery , who referred her to plastics because of the large amount of tissue involvement. Plastic surgery  Referred her to the weight loss clinic , because she reports a history of eating disorder. Odor underneath arms continues to persist. She has had recurrent drainage from various sites in the axillae, but drainage has persisted from lesions in the buttocks/groin for weeks.    Previous therapies include : Humira, oral doxycycline, cephalexin, amoxicillin, bactrim, topical clindamycin, Bactroban, Hibiclens.    Personal Medical History  Skin Cancer : NO  Eczema Psoriasis Rosacea Autoimmune   NO NO NO Yes - multiple sclerosis   History of hidradenitis suppurativa    Family Medical History  Skin Cancer : NO  Eczema Psoriasis Rosacea Autoimmune   NO NO NO NO     Occupation : works for [employer] (indoor).    Refer to electronic medical record (EMR) for past medical history and medications.    I : POSITIVE for acne  ROS : 14 point review of systems was negative except the symptoms listed above in the HPI.    This document serves as a record of the services and decisions personally performed and made by Tiffanie Whipple MD. It was created on her behalf by  Angel Mixon, a trained medical scribe.  The creation of this document is based on the scribe's personal observations and the provider's statements to the medical scribe.  Angel Mixon, April 10, 2018 7:23 AM      OBJECTIVE:                                                    GENERAL: healthy, alert and no distress  SKIN: Araya Skin Type - II.  Face were examined. The dermatoscope was used to help evaluate pigmented lesions.  Skin Pertinent Findings:  Face : Multiple scattered inflammatory papules and nodules along jawline and on sides of face.    Axillae : newly healing pockets, residual tracking and scarring.    MDM :  Discussion regarding recurring flares of hidradenitis suppurativa. Reviewed the fact that a weight loss program to facilitate the patient's weight loss would be helpful in managing and hopefully decreasing the frequency of flare-ups.  Discussed BOTOX injections for control of hidradenitis suppurativa in the axillae. There are some reports in the literature about 50 Unit Botox injections to the areas of inflammation with improved control for 3-12 months. Because of the minimal risk and invasiveness of Botox injections and excellent control of the inflammatory process involved with hidradenitis suppurativa this is recommended.      ASSESSMENT:                                                      Encounter Diagnoses   Name Primary?     Acne vulgaris Yes     Hidradenitis suppurativa          PLAN:                                                    Patient Instructions   FUTURE APPOINTMENTS  Please get labs done in 3 months.  Follow up in 3 month(s) if acne control is not satisfactory.  Continue following up with weight loss clinic.    Check with your insurance company for coverage of BOTOX injections for treatment of chronic hidradenitis suppurativa in the axillae and groin recalcitrant to previous therapies including Humira, oral doxycycline, cephalexin, amoxicillin, bactrim, topical clindamycin,  "Bactroban, Hibiclens. You may call back to our clinic if the insurance company requires documentation of evidence for clinical improvement of hidradenitis suppurativa with BOTOX injection treatments. BOTOX has been recommended at 50 units injection in each axilla.    Avoid picking at acne lesions.  You may not want to do any more Dermafile treatments on the face.    ORAL MEDICATION  Take by mouth 1 capsule(s)/tablet(s) of spironolactone 50 mg once daily.    ACNE TREATMENT PLAN  Morning or Evening (whenever you shower) :    Current Cetaphil facial cleanser.    Do a \"20/10\" wash (20 seconds of skin contact with the cleanser followed by a 10 second rinse)    Apply to face.  (FYI Note - Benzoyl peroxide washes can bleach clothing, so try to use disposable or old towels and clothes.)    After cleansing, medication : apply topical Clindamycin 1% lotion to face.    Evening or Morning (other time of day) :    Current Cetaphil facial cleanser - Do a \"20 / 10 wash\" again.    Recommendations if skin becomes too dry in response to medication:    Use Cetaphil facial moisturizer.        PROCEDURES:                                                    None.    TT : 25 minutes.  CT : 15 minutes.      The information in this document, created by the medical scribe for me, accurately reflects the services I personally performed and the decisions made by me. I have reviewed and approved this document for accuracy prior to leaving the patient care area.  Tiffanie Whipple MD April 10, 2018 7:22 AM  [facility] - PRIMARY CARE SKIN    Again, thank you for allowing me to participate in the care of your patient.        Sincerely,        Shelly Whipple MD    "

## 2018-04-11 ENCOUNTER — MYC MEDICAL ADVICE (OUTPATIENT)
Dept: FAMILY MEDICINE | Facility: CLINIC | Age: 28
End: 2018-04-11

## 2018-04-11 DIAGNOSIS — L81.9 HYPERPIGMENTATION OF SKIN: ICD-10-CM

## 2018-04-11 RX ORDER — HYDROQUINONE 40 MG/G
CREAM TOPICAL
Qty: 56.8 G | Refills: 3 | Status: SHIPPED | OUTPATIENT
Start: 2018-04-11 | End: 2018-08-21

## 2018-04-17 ENCOUNTER — OFFICE VISIT (OUTPATIENT)
Dept: INTERNAL MEDICINE | Facility: CLINIC | Age: 28
End: 2018-04-17
Payer: COMMERCIAL

## 2018-04-17 VITALS
WEIGHT: 293 LBS | TEMPERATURE: 98.4 F | DIASTOLIC BLOOD PRESSURE: 79 MMHG | HEART RATE: 91 BPM | OXYGEN SATURATION: 100 % | RESPIRATION RATE: 16 BRPM | SYSTOLIC BLOOD PRESSURE: 127 MMHG | BODY MASS INDEX: 45.99 KG/M2 | HEIGHT: 67 IN

## 2018-04-17 DIAGNOSIS — F33.40 RECURRENT MAJOR DEPRESSIVE DISORDER, IN REMISSION (H): ICD-10-CM

## 2018-04-17 DIAGNOSIS — E66.01 MORBID OBESITY DUE TO EXCESS CALORIES (H): Primary | ICD-10-CM

## 2018-04-17 DIAGNOSIS — M54.50 CHRONIC RIGHT-SIDED LOW BACK PAIN WITHOUT SCIATICA: ICD-10-CM

## 2018-04-17 DIAGNOSIS — G89.29 CHRONIC RIGHT-SIDED LOW BACK PAIN WITHOUT SCIATICA: ICD-10-CM

## 2018-04-17 PROCEDURE — 99214 OFFICE O/P EST MOD 30 MIN: CPT | Performed by: INTERNAL MEDICINE

## 2018-04-17 NOTE — NURSING NOTE
"Chief Complaint   Patient presents with     Follow Up For     medications     Musculoskeletal Problem     back pain       Initial /79 (BP Location: Right arm, Patient Position: Sitting, Cuff Size: Thigh)  Pulse 91  Temp 98.4  F (36.9  C) (Oral)  Resp 16  Ht 5' 6.5\" (1.689 m)  Wt (!) 342 lb (155.1 kg)  LMP 03/14/2018 (Exact Date)  SpO2 100%  BMI 54.37 kg/m2 Estimated body mass index is 54.37 kg/(m^2) as calculated from the following:    Height as of this encounter: 5' 6.5\" (1.689 m).    Weight as of this encounter: 342 lb (155.1 kg).  Medication Reconciliation: complete   Eduardo GARZA      "

## 2018-04-17 NOTE — PROGRESS NOTES
SUBJECTIVE:   Princess TRISTIN Machado is a 27 year old female who presents to clinic today for the following health issues:      Patient was seeing by weight loss clinic, was not deemed a candidate due to history of bulimia nervosa.  She was seen by the [facility] in the past.     In regards to her diagnosis of pre-diabetes, she tried the meformin 500mg q24 hour, but having diarrhea. Hard to take this consistently due to side effects    Anxiety/Depression  Lexapro 10mg daily.  Sometimes takes 2 takes daily. Overall, this is controlling mood symptoms.     Her vitamin B12 level has been borderline, and she has ordered a B complex vitamins      Problem list and histories reviewed & adjusted, as indicated.  Additional history: as documented    Patient Active Problem List   Diagnosis     Optic neuritis, left     Recurrent major depressive episodes (H)     Multiple sclerosis (H)     Bulimia nervosa     Acne vulgaris     Anxiety     Chronic back pain     Freckles     Headache     Other specified health status     Hidradenitis suppurativa     Hypertriglyceridemia     Hidradenitis     Migraine without status migrainosus, not intractable     Biomechanical lesion     Numbness of finger     Somatic dysfunction of cervical region     Spasm     Vitamin D deficiency     Common wart: L t lat hand -      Morbid obesity due to excess calories (H) BMI 50-60     Lumbago     Sacral pain     Somatic dysfunction of sacral region     Thoracic segment dysfunction     Muscle spasm     Recurrent major depression (H)     STACIA (generalized anxiety disorder)     Past Surgical History:   Procedure Laterality Date     ENT SURGERY      tubes in ears     MYRINGOTOMY, INSERT TUBE BILATERAL, COMBINED  1990s       Social History   Substance Use Topics     Smoking status: Never Smoker     Smokeless tobacco: Never Used     Alcohol use No     Family History   Problem Relation Age of Onset     CEREBROVASCULAR DISEASE Mother      heart attack      "CEREBROVASCULAR DISEASE Father      heart attack     MENTAL ILLNESS Sister      Post-Traumatic Stress Disorder (PTSD) Brother      MENTAL ILLNESS Brother      Glaucoma No family hx of      Macular Degeneration No family hx of      DIABETES No family hx of      Coronary Artery Disease No family hx of      Hypertension No family hx of      Hyperlipidemia No family hx of      Breast Cancer No family hx of      Colon Cancer No family hx of      Prostate Cancer No family hx of      Other Cancer No family hx of      Depression No family hx of      Anxiety Disorder No family hx of      Substance Abuse No family hx of      Anesthesia Reaction No family hx of      Asthma No family hx of      OSTEOPOROSIS No family hx of      Genetic Disorder No family hx of      Thyroid Disease No family hx of      Obesity No family hx of      Unknown/Adopted No family hx of            Reviewed and updated as needed this visit by clinical staff  Tobacco  Allergies  Meds  Med Hx  Surg Hx  Fam Hx  Soc Hx      Reviewed and updated as needed this visit by Provider         ROS:  Constitutional, HEENT, cardiovascular, pulmonary, gi and gu systems are negative, except as otherwise noted.    OBJECTIVE:     /79 (BP Location: Right arm, Patient Position: Sitting, Cuff Size: Thigh)  Pulse 91  Temp 98.4  F (36.9  C) (Oral)  Resp 16  Ht 5' 6.5\" (1.689 m)  Wt (!) 342 lb (155.1 kg)  LMP 03/14/2018 (Exact Date)  SpO2 100%  BMI 54.37 kg/m2  Body mass index is 54.37 kg/(m^2).  GENERAL: healthy, alert and no distress  NECK: no adenopathy, no asymmetry, masses, or scars and thyroid normal to palpation  RESP: lungs clear to auscultation - no rales, rhonchi or wheezes  CV: regular rate and rhythm, normal S1 S2, no S3 or S4, no murmur, click or rub, no peripheral edema and peripheral pulses strong  ABDOMEN: soft, nontender, no hepatosplenomegaly, no masses and bowel sounds normal  MS: no gross musculoskeletal defects noted, no " edema  Comprehensive back pain exam:  Tenderness of Paraspinal cervical muscles    Diagnostic Test Results:  none     ASSESSMENT/PLAN:       (E66.01) Morbid obesity due to excess calories (H) BMI 50-60  (primary encounter diagnosis)  Comment/Plan:     Patient attended all meetings at weight loss clinic and was not deemed a candidate given history of bulimia- untreated. She is very willing to seek treatment so will try to initiate any steps we can to get her into treatment so we can start this process.  She otherwise is an ideal candidate given concomitant pre-diabetes. Will discuss with weight loss clinic further steps        (F33.40) Recurrent major depressive disorder, in remission (H)  Comment: Overall well controlled, will continue her on lexapro 10mg daily  Plan: Lexapro 10mg daily    Chronic right-sided low back pain  PT referral placed        Mayra Weems MD  [facility]

## 2018-04-17 NOTE — MR AVS SNAPSHOT
After Visit Summary   4/17/2018    Princess TRISTIN Machado    MRN: [id number]           Patient Information     Date Of Birth          1990        Visit Information        Provider Department      4/17/2018 1:30 PM Mayra Weems MD [facility]        Today's Diagnoses     Morbid obesity due to excess calories (H) BMI 50-60    -  1    Recurrent major depressive disorder, in remission (H)           Follow-ups after your visit        Your next 10 appointments already scheduled     May 01, 2018  3:30 PM CDT   [facility] with TANNER Mar   [facility] ([facility]    [address]  [facility] [address] [phone]            May 22, 2018  3:15 PM CDT   (Arrive by 3:00 PM)   New Visit with Cesilia Cannon NP   [facility] ([facility])    [address]  [address]  [facility] [address] [phone]              Who to contact     If you have questions or need follow up information about today's clinic visit or your schedule please contact [facility] directly at [phone].  Normal or non-critical lab and imaging results will be communicated to you by PLAXDhart, letter or phone within 4 business days after the clinic has received the results. If you do not hear from us within 7 days, please contact the clinic through PLAXDhart or phone. If you have a critical or abnormal lab result, we will notify you by phone as soon as possible.  Submit refill requests through Storybyte or call your pharmacy and they will forward the refill request to us. Please allow 3 business days for your refill to be completed.          Additional Information About Your Visit        PLAXDharleemail Information     Storybyte gives you secure access to your electronic health record. If you see a primary care provider, you can also send messages to your care team and make  "appointments. If you have questions, please call your primary care clinic.  If you do not have a primary care provider, please call [phone] and they will assist you.        Care EveryWhere ID     This is your Care EveryWhere ID. This could be used by other organizations to access your [place] medical records  [id number]        Your Vitals Were     Pulse Temperature Respirations Height Last Period Pulse Oximetry    91 98.4  F (36.9  C) (Oral) 16 5' 6.5\" (1.689 m) 03/14/2018 (Exact Date) 100%    BMI (Body Mass Index)                   54.37 kg/m2            Blood Pressure from Last 3 Encounters:   04/17/18 127/79   04/10/18 126/82   04/03/18 120/80    Weight from Last 3 Encounters:   04/17/18 (!) 342 lb (155.1 kg)   04/03/18 (!) 336 lb 4.8 oz (152.5 kg)   03/20/18 (!) 339 lb 8 oz (154 kg)              Today, you had the following     No orders found for display       Primary Care Provider Office Phone # Fax #    Mayra Weems [phone]       [address]  [facility] [address]        Equal Access to Services     DECLAN DAMIAN : Hadii aad ku hadasho Soomaali, waaxda luqadaha, qaybta kaalmada adeegyada, amos albain hayyemin kiran ramos. So [facility] [phone].    ATENCIÓN: Si habla español, tiene a shirley disposición servicios gratuitos de asistencia lingüística. Llame al [phone].    We comply with applicable federal civil rights laws and [place] laws. We do not discriminate on the basis of race, color, national origin, age, disability, sex, sexual orientation, or gender identity.            Thank you!     Thank you for choosing [facility]  for your care. Our goal is always to provide you with excellent care. Hearing back from our patients is one way we can continue to improve our services. Please take a few minutes to complete the written survey that you may receive in the mail after your visit with us. Thank you!             Your Updated Medication List - " Protect others around you: Learn how to safely use, store and throw away your medicines at www.disposemymeds.org.          This list is accurate as of 4/17/18  4:44 PM.  Always use your most recent med list.                   Brand Name Dispense Instructions for use Diagnosis    BIOTIN PO      Take 10,000 mcg by mouth Reported on 5/9/2017        clindamycin 1 % lotion    CLEOCIN T    60 mL    Apply topically 2 times daily    Acne vulgaris       clindamycin 300 MG capsule    CLEOCIN    60 capsule    1 cap po bid    Hidradenitis suppurativa       ECONTRA EZ 1.5 MG Tabs tablet   Generic drug:  levonorgestrel      Take 1.5 mg by mouth daily as needed        escitalopram 10 MG tablet    LEXAPRO    90 tablet    Take 1 tablet (10 mg) by mouth daily    Recurrent major depressive disorder, in remission (H)       hydroquinone 4 % Crea     56.8 g    APPLY TO AFFECTED AREA(S) EVERY NIGHT AT BEDTIME    Hyperpigmentation of skin       ibuprofen 600 MG tablet    ADVIL/MOTRIN    60 tablet    Take 1 tablet (600 mg) by mouth every 6 hours as needed for moderate pain    Chronic right-sided low back pain without sciatica       metFORMIN 500 MG 24 hr tablet    GLUCOPHAGE-XR    90 tablet    Take 1 tablet (500 mg) by mouth daily (with dinner)    Prediabetes       mupirocin 2 % ointment    BACTROBAN          norethindrone 0.35 MG per tablet    MICRONOR     Take 1 tablet by mouth daily        oxybutynin 5 MG 24 hr tablet    DITROPAN-XL     Take 5 mg by mouth daily as needed for bladder spasms        PROTONIX PO      Take 20 mg by mouth daily as needed for heartburn        REBIF TITRATION PACK 6X8.8 & 6X22 MCG Sosy   Generic drug:  Interferon Beta-1a      Inject 1 Units Subcutaneous three times a week        rifampin 300 MG capsule    RIFADIN    60 capsule    Take 2 capsules (600 mg) by mouth daily    Hidradenitis suppurativa       spironolactone 50 MG tablet    ALDACTONE    30 tablet    Take 1 tablet (50 mg) by mouth daily    Acne vulgaris        * VITAMIN D3 PO      Take 1,000 Int'l Units by mouth daily Reported on 5/9/2017        * cholecalciferol 43927 units capsule    VITAMIN D3    8 capsule    Take 1 capsule (50,000 Units) by mouth once a week    Vitamin D deficiency       * Notice:  This list has 2 medication(s) that are the same as other medications prescribed for you. Read the directions carefully, and ask your doctor or other care provider to review them with you.

## 2018-04-18 ENCOUNTER — MYC MEDICAL ADVICE (OUTPATIENT)
Dept: INTERNAL MEDICINE | Facility: CLINIC | Age: 28
End: 2018-04-18

## 2018-04-19 NOTE — TELEPHONE ENCOUNTER
"Prior authorization has been denied for the following reason \"there is not indication that the drug is deemed appropriate fir its use by an authoritative compendia identified by the Medicare program.\" Denial letter is in providers inbasket to review.    Maty Way MA      "

## 2018-04-24 NOTE — TELEPHONE ENCOUNTER
Patient notified.    Jyoti Castrejon,RN BSN  [facility]  [phone]

## 2018-05-01 ENCOUNTER — THERAPY VISIT (OUTPATIENT)
Dept: PHYSICAL THERAPY | Facility: CLINIC | Age: 28
End: 2018-05-01
Payer: COMMERCIAL

## 2018-05-01 DIAGNOSIS — M54.50 MIDLINE LOW BACK PAIN WITHOUT SCIATICA, UNSPECIFIED CHRONICITY: Primary | ICD-10-CM

## 2018-05-01 PROCEDURE — 97530 THERAPEUTIC ACTIVITIES: CPT | Mod: GP | Performed by: PHYSICAL THERAPIST

## 2018-05-01 PROCEDURE — 97162 PT EVAL MOD COMPLEX 30 MIN: CPT | Mod: GP | Performed by: PHYSICAL THERAPIST

## 2018-05-01 PROCEDURE — 97110 THERAPEUTIC EXERCISES: CPT | Mod: GP | Performed by: PHYSICAL THERAPIST

## 2018-05-01 NOTE — MR AVS SNAPSHOT
After Visit Summary   5/1/2018    Princess TRISTIN Machado    MRN: [id number]           Patient Information     Date Of Birth          1990        Visit Information        Provider Department      5/1/2018 2:10 PM Toya Boyd, PT [facility]        Today's Diagnoses     Midline low back pain without sciatica, unspecified chronicity    -  1       Follow-ups after your visit        Your next 10 appointments already scheduled     May 08, 2018  2:50 PM CDT   [facility] with Toya Boyd PT   [facility]    [address]  [address]  [facility] [address]   [phone]            May 15, 2018  2:30 PM CDT   [facility] TANNER Mar   [facility]    [address] [facility] [address]  [facility] [id number] [phone]            May 15, 2018  3:30 PM CDT   [facility] with Toya Boyd PT   [facility]    [address]  [address]  [facility] [address]   [phone]            May 22, 2018  3:15 PM CDT   (Arrive by 3:00 PM)   New Visit with Cesilia Cannon NP   [facility]    [address]  [address]  [facility] [id number] [phone]              Who to contact     If you have questions or need follow up information about today's clinic visit or your schedule please contact [facility] directly at [phone].  Normal or non-critical lab and imaging results will be communicated to you by MyChart, letter or phone within 4 business days after the clinic has received the results. If you do not hear from us within 7 days, please contact the clinic through PrecisionPoint Softwarehart or phone. If you have a critical or abnormal lab result, we will notify you by phone as soon as possible.  Submit refill requests through HiWiFi or call your pharmacy and they will forward the refill request to us.  Please allow 3 business days for your refill to be completed.          Additional Information About Your Visit        MyChart Information     Compliance Assurancehart gives you secure access to your electronic health record. If you see a primary care provider, you can also send messages to your care team and make appointments. If you have questions, please call your primary care clinic.  If you do not have a primary care provider, please call [phone] and they will assist you.        Care EveryWhere ID     This is your Care EveryWhere ID. This could be used by other organizations to access your [place] medical records  [id number]         Blood Pressure from Last 3 Encounters:   04/17/18 127/79   04/10/18 126/82   04/03/18 120/80    Weight from Last 3 Encounters:   04/17/18 (!) 155.1 kg (342 lb)   04/03/18 (!) 152.5 kg (336 lb 4.8 oz)   03/20/18 (!) 154 kg (339 lb 8 oz)              We Performed the Following     HC PT EVAL, MODERATE COMPLEXITY     [facility] INITIAL EVAL REPORT     THERAPEUTIC ACTIVITIES     THERAPEUTIC EXERCISES        Primary Care Provider Office Phone # Fax #    Mayra Weems [phone]       [address]  [facility] [address]        Equal Access to Services     DECLAN DAMIAN : Hadii aad ku hadasho Soomaali, waaxda luqadaha, qaybta kaalmada adeegyada, amos guerreron kiran rehman . So [facility] [phone].    ATENCIÓN: Si habla español, tiene a shirley disposición servicios gratuitos de asistencia lingüística. Llame al [phone].    We comply with applicable federal civil rights laws and [place] laws. We do not discriminate on the basis of race, color, national origin, age, disability, sex, sexual orientation, or gender identity.            Thank you!     Thank you for choosing [facility]  for your care. Our goal is always to provide you with excellent care. Hearing back from our patients is one way we can continue to improve our services. Please take a few minutes to  complete the written survey that you may receive in the mail after your visit with us. Thank you!             Your Updated Medication List - Protect others around you: Learn how to safely use, store and throw away your medicines at www.disposemymeds.org.          This list is accurate as of 5/1/18  3:24 PM.  Always use your most recent med list.                   Brand Name Dispense Instructions for use Diagnosis    BIOTIN PO      Take 10,000 mcg by mouth Reported on 5/9/2017        clindamycin 1 % lotion    CLEOCIN T    60 mL    Apply topically 2 times daily    Acne vulgaris       clindamycin 300 MG capsule    CLEOCIN    60 capsule    1 cap po bid    Hidradenitis suppurativa       ECONTRA EZ 1.5 MG Tabs tablet   Generic drug:  levonorgestrel      Take 1.5 mg by mouth daily as needed        escitalopram 10 MG tablet    LEXAPRO    90 tablet    Take 1 tablet (10 mg) by mouth daily    Recurrent major depressive disorder, in remission (H)       hydroquinone 4 % Crea     56.8 g    APPLY TO AFFECTED AREA(S) EVERY NIGHT AT BEDTIME    Hyperpigmentation of skin       ibuprofen 600 MG tablet    ADVIL/MOTRIN    60 tablet    Take 1 tablet (600 mg) by mouth every 6 hours as needed for moderate pain    Chronic right-sided low back pain without sciatica       metFORMIN 500 MG 24 hr tablet    GLUCOPHAGE-XR    90 tablet    Take 1 tablet (500 mg) by mouth daily (with dinner)    Prediabetes       mupirocin 2 % ointment    BACTROBAN          norethindrone 0.35 MG per tablet    MICRONOR     Take 1 tablet by mouth daily        oxybutynin 5 MG 24 hr tablet    DITROPAN-XL     Take 5 mg by mouth daily as needed for bladder spasms        PROTONIX PO      Take 20 mg by mouth daily as needed for heartburn        REBIF TITRATION PACK 6X8.8 & 6X22 MCG Sosy   Generic drug:  Interferon Beta-1a      Inject 1 Units Subcutaneous three times a week        rifampin 300 MG capsule    RIFADIN    60 capsule    Take 2 capsules (600 mg) by mouth daily     Hidradenitis suppurativa       spironolactone 50 MG tablet    ALDACTONE    30 tablet    Take 1 tablet (50 mg) by mouth daily    Acne vulgaris       * VITAMIN D3 PO      Take 1,000 Int'l Units by mouth daily Reported on 5/9/2017        * cholecalciferol 97128 units capsule    VITAMIN D3    8 capsule    Take 1 capsule (50,000 Units) by mouth once a week    Vitamin D deficiency       * Notice:  This list has 2 medication(s) that are the same as other medications prescribed for you. Read the directions carefully, and ask your doctor or other care provider to review them with you.

## 2018-05-01 NOTE — PROGRESS NOTES
"[facility] Initial Evaluation -- Lumbar    Date: May 1, 2018  Princess TRISTIN Machado is a 27 year old female with a lumbar  condition.   Referral: Dr. Lucero mechanical stresses:  Medical assistant  Employment status:  Full time  Leisure mechanical stresses:   Functional disability score (GUY/STarT Back):  See flow sheet  VAS score (0-10): 5/10  Patient goals/expectations:  \" to get rid of the pain\"    HISTORY:    Present symptoms: R LB  Pain quality (sharp/shooting/stabbing/aching/burning/cramping):  aching   Paresthesia (yes/no):  no    Present since (onset date): April 2017.     Symptoms (improving/unchanging/worsening):  worsening.     Symptoms commenced as a result of: no apparent reason   Condition occurred in the following environment:   home     Symptoms at onset (back/thigh/leg): back  Constant symptoms (back/thigh/leg): back  Intermittent symptoms (back/thigh/leg): upper/mid back, radiates    Symptoms are made worse with the following: Always Standing, Always Walking, Time of day - No effect and Always On the move   Symptoms are made better with the following: Other - heat, muscle relaxants    Disturbed sleep (yes/no):  no Sleeping postures (prone/sup/side R/L): sides, stomach    Previous episodes (0/1-5/6-10/11+): 1 Year of first episode: 2017    Previous history: started a year ago  Previous treatments: 1 PT visit      Specific Questions:  Cough/Sneeze/Strain (pos/neg): deep breathing  Bowel/Bladder (normal/abnormal): normal  Gait (normal/abnormal): normal  Medications (nil/NSAIDS/analg/steroids/anticoag/other):  Muscle relaxants  Medical allergies:            none  General health (excellent/good/fair/poor):  fair  Pertinent medical history:  Overweight, Depression and Multiple sclerosis  Imaging (None/Xray/MRI/Other):  none  Recent or major surgery (yes/no):  no  Night pain (yes/no): no  Accidents (yes/no): no  Unexplained weight loss (yes/no): no  Barriers at home: no  Other red flags: " no    EXAMINATION    Posture:   Sitting (good/fair/poor): fair  Standing (good/fair/poor):fair  Lordosis (red/acc/normal): acc  Correction of posture (better/worse/no effect): better    Lateral Shift (right/left/nil): nil  Relevant (yes/no):  no  Other Observations: none    Neurological:    Motor deficit:  n/a  Reflexes:  n/a  Sensory deficit:  n/a  Dural signs:  n/a    Movement Loss:   Eron Mod Min Nil Pain   Flexion    x    Extension   x  incr R LBP   Side Gliding R   x x    Side Gliding L   x x      Test Movements:   During: produces, abolishes, increases, decreases, no effect, centralizing, peripheralizing   After: better, worse, no better, no worse, no effect, centralized, peripheralized    Pretest symptoms standing: R LB   Symptoms During Symptoms After ROM increased ROM decreased No Effect   FIS No Effect    No Effect         Rep FIS No Effect    No Effect      x   EIS Increases    No Worse         Rep EIS Increases    No Worse        x   Pretest symptoms lying: not tested    Symptoms During Symptoms After ROM increased ROM decreased No Effect   EDIS        Rep EDIS        EIL        Rep EIL        If required, pretest symptoms:    Symptoms During Symptoms After ROM increased ROM decreased No Effect   SGIS - R        Rep SGIS - R        SGIS - L        Rep SGIS - L          Static Tests:  Sitting slouched:    Sitting erect:    Standing slouched   Standing erect:    Lying prone in extension:   Long sitting:      Other Tests: incr hypertonicity Bilat QL    Provisional Classification:  Inconclusive/Other - SIJ    Principle of Management:  Education:     Equipment provided:  none  Mechanical therapy (Y/N):  Y   Extension principle:    Lateral Principle:    Flexion principle:  Rep FISS R x 10/2 hrs  Other:      ASSESSMENT/PLAN:    Patient is a 27 year old female with lumbar, sacral and pelvic complaints.    Patient has the following significant findings with corresponding treatment plan.                Diagnosis 1:   R SIJ dysfunction  Pain -  manual therapy, self management, education, directional preference exercise and home program  Decreased ROM/flexibility - manual therapy and therapeutic exercise  Decreased strength - therapeutic exercise and therapeutic activities  Decreased function - therapeutic activities    Therapy Evaluation Codes:   1) History comprised of:   Personal factors that impact the plan of care:      None.    Comorbidity factors that impact the plan of care are:      Depression, Multiple sclerosis and Overweight.     Medications impacting care: Anti-depressant and Muscle relaxant.  2) Examination of Body Systems comprised of:   Body structures and functions that impact the plan of care:      Lumbar spine and Sacral illiac joint.   Activity limitations that impact the plan of care are:      Standing and Walking.  3) Clinical presentation characteristics are:   Stable/Uncomplicated.  4) Decision-Making    Moderate complexity using standardized patient assessment instrument and/or measureable assessment of functional outcome.  Cumulative Therapy Evaluation is: Moderate complexity.    Previous and current functional limitations:  (See Goal Flow Sheet for this information)    Short term and Long term goals: (See Goal Flow Sheet for this information)     Communication ability:  Patient appears to be able to clearly communicate and understand verbal and written communication and follow directions correctly.  Treatment Explanation - The following has been discussed with the patient:   RX ordered/plan of care  Anticipated outcomes  Possible risks and side effects  This patient would benefit from PT intervention to resume normal activities.   Rehab potential is good.    Frequency:  1 X week, once daily  Duration:  for 6 weeks  Discharge Plan:  Achieve all LTG.  Independent in home treatment program.  Reach maximal therapeutic benefit.    Please refer to the daily flowsheet for treatment today, total treatment time  and time spent performing 1:1 timed codes.

## 2018-05-07 ENCOUNTER — MYC MEDICAL ADVICE (OUTPATIENT)
Dept: INTERNAL MEDICINE | Facility: CLINIC | Age: 28
End: 2018-05-07

## 2018-05-08 ENCOUNTER — THERAPY VISIT (OUTPATIENT)
Dept: PHYSICAL THERAPY | Facility: CLINIC | Age: 28
End: 2018-05-08
Payer: COMMERCIAL

## 2018-05-08 ENCOUNTER — OFFICE VISIT (OUTPATIENT)
Dept: INTERNAL MEDICINE | Facility: CLINIC | Age: 28
End: 2018-05-08
Payer: COMMERCIAL

## 2018-05-08 DIAGNOSIS — M54.50 MIDLINE LOW BACK PAIN WITHOUT SCIATICA, UNSPECIFIED CHRONICITY: Primary | ICD-10-CM

## 2018-05-08 DIAGNOSIS — F33.9 RECURRENT MAJOR DEPRESSIVE EPISODES (H): Primary | ICD-10-CM

## 2018-05-08 PROCEDURE — 99207 ZZC NO BILLABLE SERVICE THIS VISIT: CPT | Performed by: INTERNAL MEDICINE

## 2018-05-08 PROCEDURE — 97110 THERAPEUTIC EXERCISES: CPT | Mod: GP | Performed by: PHYSICAL THERAPIST

## 2018-05-08 PROCEDURE — 97530 THERAPEUTIC ACTIVITIES: CPT | Mod: GP | Performed by: PHYSICAL THERAPIST

## 2018-05-08 NOTE — MR AVS SNAPSHOT
After Visit Summary   5/8/2018    Princess TRISTIN Machado    MRN: [id number]           Patient Information     Date Of Birth          1990        Visit Information        Provider Department      5/8/2018 5:30 PM Mayra Weems MD [facility]        Today's Diagnoses     Recurrent major depressive episodes (H)    -  1       Follow-ups after your visit        Your next 10 appointments already scheduled     May 15, 2018  2:10 PM CDT   [facility] with Toya Boyd, PT   [facility] ([facility] [place]  )    [address]  [address]  [facility] [address]   [phone]            May 22, 2018 12:50 PM CDT   [facility] Sp[facility] with Toya Boyd, PT   [facility] ([facility] [place]  )    [address]  [address]  [facility] [address]   [phone]            May 22, 2018  3:15 PM CDT   (Arrive by 3:00 PM)   New Visit with Cesilia Cannon NP   [facility] ([facility])    [address]  [address]  [facility] [id number] [phone]            May 29, 2018  2:50 PM CDT   [facility] Spine with Toya Boyd, PT   [facility] [place] [facility] ([facility] [place]  )    [address]  [address]  [facility] [address]   [phone]            Jun 05, 2018  3:00 PM CDT   [facility] Return with TANNER Mar   [facility] ([facility])    [address]  [facility] [id number] [phone]              Who to contact     If you have questions or need follow up information about today's clinic visit or your schedule please contact [facility] directly at [phone].  Normal or non-critical lab and imaging results will be communicated to you by MyChart, letter or phone within 4 business days after the clinic has received the results. If you do not hear from us within 7 days, please contact the clinic through MyChart or phone. If you have a critical or  abnormal lab result, we will notify you by phone as soon as possible.  Submit refill requests through In2Games or call your pharmacy and they will forward the refill request to us. Please allow 3 business days for your refill to be completed.          Additional Information About Your Visit        Futubrahart Information     In[employer] gives you secure access to your electronic health record. If you see a primary care provider, you can also send messages to your care team and make appointments. If you have questions, please call your primary care clinic.  If you do not have a primary care provider, please call [phone] and they will assist you.        Care EveryWhere ID     This is your Care EveryWhere ID. This could be used by other organizations to access your [facility] medical records  [id number]         Blood Pressure from Last 3 Encounters:   04/17/18 127/79   04/10/18 126/82   04/03/18 120/80    Weight from Last 3 Encounters:   04/17/18 (!) 342 lb (155.1 kg)   04/03/18 (!) 336 lb 4.8 oz (152.5 kg)   03/20/18 (!) 339 lb 8 oz (154 kg)              Today, you had the following     No orders found for display       Primary Care Provider Office Phone # Fax #    Mayra Weems [phone]       [address]  [facility] [address]        Equal Access to Services     BEBE DAMIAN : Hadii aad ku hadasho Soomaali, waaxda luqadaha, qaybta kaalmada adeegyada, waxalicia ryan haytosin rehman . So [facility] [phone].    ATENCIÓN: Si habla español, tiene a shirley disposición servicios gratuitos de asistencia lingüística. Llame al [phone].    We comply with applicable federal civil rights laws and [place] laws. We do not discriminate on the basis of race, color, national origin, age, disability, sex, sexual orientation, or gender identity.            Thank you!     Thank you for choosing [facility]  for your care. Our goal is always to provide you with excellent care. Hearing back  from our patients is one way we can continue to improve our services. Please take a few minutes to complete the written survey that you may receive in the mail after your visit with us. Thank you!             Your Updated Medication List - Protect others around you: Learn how to safely use, store and throw away your medicines at www.disposemymeds.org.          This list is accurate as of 5/8/18 11:59 PM.  Always use your most recent med list.                   Brand Name Dispense Instructions for use Diagnosis    BIOTIN PO      Take 10,000 mcg by mouth Reported on 5/9/2017        clindamycin 1 % lotion    CLEOCIN T    60 mL    Apply topically 2 times daily    Acne vulgaris       clindamycin 300 MG capsule    CLEOCIN    60 capsule    1 cap po bid    Hidradenitis suppurativa       ECONTRA EZ 1.5 MG Tabs tablet   Generic drug:  levonorgestrel      Take 1.5 mg by mouth daily as needed        escitalopram 10 MG tablet    LEXAPRO    90 tablet    Take 1 tablet (10 mg) by mouth daily    Recurrent major depressive disorder, in remission (H)       hydroquinone 4 % Crea     56.8 g    APPLY TO AFFECTED AREA(S) EVERY NIGHT AT BEDTIME    Hyperpigmentation of skin       ibuprofen 600 MG tablet    ADVIL/MOTRIN    60 tablet    Take 1 tablet (600 mg) by mouth every 6 hours as needed for moderate pain    Chronic right-sided low back pain without sciatica       metFORMIN 500 MG 24 hr tablet    GLUCOPHAGE-XR    90 tablet    Take 1 tablet (500 mg) by mouth daily (with dinner)    Prediabetes       mupirocin 2 % ointment    BACTROBAN          norethindrone 0.35 MG per tablet    MICRONOR     Take 1 tablet by mouth daily        oxybutynin 5 MG 24 hr tablet    DITROPAN-XL     Take 5 mg by mouth daily as needed for bladder spasms        PROTONIX PO      Take 20 mg by mouth daily as needed for heartburn        REBIF TITRATION PACK 6X8.8 & 6X22 MCG Sosy   Generic drug:  Interferon Beta-1a      Inject 1 Units Subcutaneous three times a week         rifampin 300 MG capsule    RIFADIN    60 capsule    Take 2 capsules (600 mg) by mouth daily    Hidradenitis suppurativa       spironolactone 50 MG tablet    ALDACTONE    30 tablet    Take 1 tablet (50 mg) by mouth daily    Acne vulgaris       * VITAMIN D3 PO      Take 1,000 Int'l Units by mouth daily Reported on 5/9/2017        * cholecalciferol 25007 units capsule    VITAMIN D3    8 capsule    Take 1 capsule (50,000 Units) by mouth once a week    Vitamin D deficiency       * Notice:  This list has 2 medication(s) that are the same as other medications prescribed for you. Read the directions carefully, and ask your doctor or other care provider to review them with you.

## 2018-05-10 NOTE — PROGRESS NOTES
Patient reports feeling overwhelmed with many stressors recently    She is completing outpatient therapy for eating disorder in order to get back into weight loss clinic    This has been a stressful time for her emotionally as well as with family    She does have an appointment coming up with psychiatry and for now lexapro does help somewhat, so I won't change it until she establishes care with psychiatry.    In addition, recommended she continue care with Hoa in our office    Mayra Weems MD

## 2018-05-15 ENCOUNTER — OFFICE VISIT (OUTPATIENT)
Dept: INTERNAL MEDICINE | Facility: CLINIC | Age: 28
End: 2018-05-15
Payer: COMMERCIAL

## 2018-05-15 ENCOUNTER — MYC MEDICAL ADVICE (OUTPATIENT)
Dept: FAMILY MEDICINE | Facility: CLINIC | Age: 28
End: 2018-05-15

## 2018-05-15 VITALS
TEMPERATURE: 98.2 F | BODY MASS INDEX: 45.99 KG/M2 | SYSTOLIC BLOOD PRESSURE: 116 MMHG | RESPIRATION RATE: 16 BRPM | HEIGHT: 67 IN | WEIGHT: 293 LBS | HEART RATE: 80 BPM | DIASTOLIC BLOOD PRESSURE: 80 MMHG

## 2018-05-15 DIAGNOSIS — N30.00 ACUTE CYSTITIS WITHOUT HEMATURIA: Primary | ICD-10-CM

## 2018-05-15 DIAGNOSIS — L73.2 HIDRADENITIS SUPPURATIVA: ICD-10-CM

## 2018-05-15 DIAGNOSIS — R82.90 NONSPECIFIC FINDING ON EXAMINATION OF URINE: ICD-10-CM

## 2018-05-15 DIAGNOSIS — R30.0 DYSURIA: ICD-10-CM

## 2018-05-15 LAB
ALBUMIN UR-MCNC: 30 MG/DL
APPEARANCE UR: ABNORMAL
BACTERIA #/AREA URNS HPF: ABNORMAL /HPF
BILIRUB UR QL STRIP: NEGATIVE
COLOR UR AUTO: YELLOW
GLUCOSE UR STRIP-MCNC: NEGATIVE MG/DL
HGB UR QL STRIP: ABNORMAL
KETONES UR STRIP-MCNC: NEGATIVE MG/DL
LEUKOCYTE ESTERASE UR QL STRIP: ABNORMAL
NITRATE UR QL: NEGATIVE
NON-SQ EPI CELLS #/AREA URNS LPF: ABNORMAL /LPF
PH UR STRIP: 6.5 PH (ref 5–7)
RBC #/AREA URNS AUTO: ABNORMAL /HPF
SOURCE: ABNORMAL
SP GR UR STRIP: 1.02 (ref 1–1.03)
UROBILINOGEN UR STRIP-ACNC: 1 EU/DL (ref 0.2–1)
WBC #/AREA URNS AUTO: ABNORMAL /HPF

## 2018-05-15 PROCEDURE — 87086 URINE CULTURE/COLONY COUNT: CPT | Performed by: PHYSICIAN ASSISTANT

## 2018-05-15 PROCEDURE — 87186 SC STD MICRODIL/AGAR DIL: CPT | Performed by: PHYSICIAN ASSISTANT

## 2018-05-15 PROCEDURE — 87088 URINE BACTERIA CULTURE: CPT | Performed by: PHYSICIAN ASSISTANT

## 2018-05-15 PROCEDURE — 99213 OFFICE O/P EST LOW 20 MIN: CPT | Performed by: PHYSICIAN ASSISTANT

## 2018-05-15 PROCEDURE — 81001 URINALYSIS AUTO W/SCOPE: CPT | Performed by: PHYSICIAN ASSISTANT

## 2018-05-15 RX ORDER — SULFAMETHOXAZOLE/TRIMETHOPRIM 800-160 MG
1 TABLET ORAL 2 TIMES DAILY
Qty: 10 TABLET | Refills: 0 | Status: SHIPPED | OUTPATIENT
Start: 2018-05-15 | End: 2018-05-20

## 2018-05-15 RX ORDER — CLINDAMYCIN HCL 300 MG
CAPSULE ORAL
Qty: 20 CAPSULE | Refills: 1 | Status: SHIPPED | OUTPATIENT
Start: 2018-05-15 | End: 2018-08-07

## 2018-05-15 ASSESSMENT — PAIN SCALES - GENERAL: PAINLEVEL: NO PAIN (0)

## 2018-05-15 NOTE — TELEPHONE ENCOUNTER
Per Dr. Whipple- ok to refill clindamycin po bid x 10 days.  Refill sent to [facility]  mychart message sent to patient.    Jyoti Castrejon RN BSN  [facility]  [phone]

## 2018-05-15 NOTE — MR AVS SNAPSHOT
After Visit Summary   5/15/2018    Princess TRISTIN Machado    MRN: [id number]           Patient Information     Date Of Birth          1990        Visit Information        Provider Department      5/15/2018 5:40 PM Margy Villalobos PA-C [facility]        Today's Diagnoses     Acute cystitis without hematuria    -  1    Dysuria        Nonspecific finding on examination of urine           Follow-ups after your visit        Your next 10 appointments already scheduled     May 15, 2018  5:40 PM CDT   MyChart Short with Margy Villalobos PA-C   [facility] ([facility])    [address] [id number] [phone]            May 22, 2018 12:20 PM CDT   Office Visit with Shelly Whipple MD   [facility] ([facility])    [address] [facility] [address] [phone]           Bring a current list of meds and any records pertaining to this visit. For Physicals, please bring immunization records and any forms needing to be filled out. Please arrive 10 minutes early to complete paperwork.            May 22, 2018  3:15 PM CDT   (Arrive by 3:00 PM)   New Visit with Cesilia Cannon NP   [facility]    [address]  [address]  [facility] [phone]            May 29, 2018  2:50 PM CDT   [facility] with Toya Boyd, PT   [facility] [place] [facility] ([facility] [place]  )    [address]  [address]  [facility] [address]   [phone]            Jun 05, 2018  3:00 PM CDT   MyChart [facility] Return with TANNER Mar   [facility]    [address]  [facility] [id number] [phone]              Who to contact     If you have questions or need follow up information about today's clinic  "visit or your schedule please contact [facility] directly at [phone].  Normal or non-critical lab and imaging results will be communicated to you by MyChart, letter or phone within 4 business days after the clinic has received the results. If you do not hear from us within 7 days, please contact the clinic through NOWBOXhart or phone. If you have a critical or abnormal lab result, we will notify you by phone as soon as possible.  Submit refill requests through AirXP or call your pharmacy and they will forward the refill request to us. Please allow 3 business days for your refill to be completed.          Additional Information About Your Visit        NOWBOXhart Information     [employer]XP gives you secure access to your electronic health record. If you see a primary care provider, you can also send messages to your care team and make appointments. If you have questions, please call your primary care clinic.  If you do not have a primary care provider, please call [phone] and they will assist you.        Care EveryWhere ID     This is your Care EveryWhere ID. This could be used by other organizations to access your [facility] medical records  [id number]        Your Vitals Were     Pulse Temperature Respirations Height BMI (Body Mass Index)       80 98.2  F (36.8  C) (Oral) 16 5' 6.5\" (1.689 m) 54.04 kg/m2        Blood Pressure from Last 3 Encounters:   05/15/18 116/80   04/17/18 127/79   04/10/18 126/82    Weight from Last 3 Encounters:   05/15/18 (!) 339 lb 14.4 oz (154.2 kg)   04/17/18 (!) 342 lb (155.1 kg)   04/03/18 (!) 336 lb 4.8 oz (152.5 kg)              We Performed the Following     UA with Microscopic reflex to Culture     Urine Culture Aerobic Bacterial          Today's Medication Changes          These changes are accurate as of 5/15/18  5:00 PM.  If you have any questions, ask your nurse or doctor.               Start taking these medicines.        Dose/Directions    " sulfamethoxazole-trimethoprim 800-160 MG per tablet   Commonly known as:  BACTRIM DS/SEPTRA DS   Used for:  Acute cystitis without hematuria   Started by:  Margy Villalobos PA-C        Dose:  1 tablet   Take 1 tablet by mouth 2 times daily for 5 days   Quantity:  10 tablet   Refills:  0            Where to get your medicines      These medications were sent to [address] [address]     Phone:  [phone]     clindamycin 300 MG capsule    sulfamethoxazole-trimethoprim 800-160 MG per tablet                Primary Care Provider Office Phone # Fax #    Mayra Weems [phone]       [address]  [facility] [address]        Equal Access to Services     DECLAN DAMIAN AH: Hadii indy tiwari hadasho Soomaali, waaxda luqadaha, qaybta kaalmada adeegyada, waxalicia ryan haytosin rehman . So [facility] [phone].    ATENCIÓN: Si habla español, tiene a shirley disposición servicios gratuitos de asistencia lingüística. [facility] [phone].    We comply with applicable federal civil rights laws and [place] laws. We do not discriminate on the basis of race, color, national origin, age, disability, sex, sexual orientation, or gender identity.            Thank you!     Thank you for choosing [facility]  for your care. Our goal is always to provide you with excellent care. Hearing back from our patients is one way we can continue to improve our services. Please take a few minutes to complete the written survey that you may receive in the mail after your visit with us. Thank you!             Your Updated Medication List - Protect others around you: Learn how to safely use, store and throw away your medicines at www.disposemymeds.org.          This list is accurate as of 5/15/18  5:00 PM.  Always use your most recent med list.                   Brand Name Dispense Instructions for use Diagnosis     BIOTIN PO      Take 10,000 mcg by mouth Reported on 5/9/2017        clindamycin 1 % lotion    CLEOCIN T    60 mL    Apply topically 2 times daily    Acne vulgaris       clindamycin 300 MG capsule    CLEOCIN    20 capsule    1 cap po bid    Hidradenitis suppurativa       ECONTRA EZ 1.5 MG Tabs tablet   Generic drug:  levonorgestrel      Take 1.5 mg by mouth daily as needed        escitalopram 10 MG tablet    LEXAPRO    90 tablet    Take 1 tablet (10 mg) by mouth daily    Recurrent major depressive disorder, in remission (H)       hydroquinone 4 % Crea     56.8 g    APPLY TO AFFECTED AREA(S) EVERY NIGHT AT BEDTIME    Hyperpigmentation of skin       ibuprofen 600 MG tablet    ADVIL/MOTRIN    60 tablet    Take 1 tablet (600 mg) by mouth every 6 hours as needed for moderate pain    Chronic right-sided low back pain without sciatica       metFORMIN 500 MG 24 hr tablet    GLUCOPHAGE-XR    90 tablet    Take 1 tablet (500 mg) by mouth daily (with dinner)    Prediabetes       mupirocin 2 % ointment    BACTROBAN          norethindrone 0.35 MG per tablet    MICRONOR     Take 1 tablet by mouth daily        oxybutynin 5 MG 24 hr tablet    DITROPAN-XL     Take 5 mg by mouth daily as needed for bladder spasms        PROTONIX PO      Take 20 mg by mouth daily as needed for heartburn        REBIF TITRATION PACK 6X8.8 & 6X22 MCG Sosy   Generic drug:  Interferon Beta-1a      Inject 1 Units Subcutaneous three times a week        rifampin 300 MG capsule    RIFADIN    60 capsule    Take 2 capsules (600 mg) by mouth daily    Hidradenitis suppurativa       spironolactone 50 MG tablet    ALDACTONE    30 tablet    Take 1 tablet (50 mg) by mouth daily    Acne vulgaris       sulfamethoxazole-trimethoprim 800-160 MG per tablet    BACTRIM DS/SEPTRA DS    10 tablet    Take 1 tablet by mouth 2 times daily for 5 days    Acute cystitis without hematuria       * VITAMIN D3 PO      Take 1,000 Int'l Units by mouth daily Reported on 5/9/2017        *  cholecalciferol 68529 units capsule    VITAMIN D3    8 capsule    Take 1 capsule (50,000 Units) by mouth once a week    Vitamin D deficiency       * Notice:  This list has 2 medication(s) that are the same as other medications prescribed for you. Read the directions carefully, and ask your doctor or other care provider to review them with you.

## 2018-05-15 NOTE — PROGRESS NOTES
"  SUBJECTIVE:   Princess TRISTIN Machado is a 27 year old female who presents to clinic today for the following health issues:      URINARY TRACT SYMPTOMS  Onset: since today     Description:   Painful urination (Dysuria): YES  Blood in urine (Hematuria): no   Delay in urine (Hesitency): YES    Intensity: moderate    Progression of Symptoms:  worsening    Accompanying Signs & Symptoms:  Fever/chills: no   Flank pain no   Nausea and vomiting: no   Any vaginal symptoms: none  Abdominal/Pelvic Pain: YES- pelvic pain     History:   History of frequent UTI's: no   History of kidney stones: no   Sexually Active: YES  Possibility of pregnancy: No    Precipitating factors:   Na     Therapies Tried and outcome: nothing     -------------------------------------    Problem list and histories reviewed & adjusted, as indicated.  Additional history: as documented    Labs reviewed in EPIC    Reviewed and updated as needed this visit by clinical staff  Tobacco  Allergies  Meds       Reviewed and updated as needed this visit by Provider  Allergies  Meds         ROS:  Constitutional, HEENT, cardiovascular, pulmonary, gi and gu systems are negative, except as otherwise noted.    OBJECTIVE:     /80 (BP Location: Left arm, Patient Position: Chair, Cuff Size: Adult Large)  Pulse 80  Temp 98.2  F (36.8  C) (Oral)  Resp 16  Ht 5' 6.5\" (1.689 m)  Wt (!) 339 lb 14.4 oz (154.2 kg)  BMI 54.04 kg/m2  Body mass index is 54.04 kg/(m^2).  GENERAL: healthy, alert and no distress  RESP: lungs clear to auscultation - no rales, rhonchi or wheezes  CV: regular rates and rhythm and normal S1 S2, no S3 or S4  ABDOMEN: soft, nontender, no hepatosplenomegaly, no masses and bowel sounds normal  SKIN: no suspicious lesions or rashes    Diagnostic Test Results:  Results for orders placed or performed in visit on 05/15/18 (from the past 24 hour(s))   UA with Microscopic reflex to Culture   Result Value Ref Range    Color Urine Yellow     Appearance " Urine Cloudy     Glucose Urine Negative NEG^Negative mg/dL    Bilirubin Urine Negative NEG^Negative    Ketones Urine Negative NEG^Negative mg/dL    Specific Gravity Urine 1.025 1.003 - 1.035    pH Urine 6.5 5.0 - 7.0 pH    Protein Albumin Urine 30 (A) NEG^Negative mg/dL    Urobilinogen Urine 1.0 0.2 - 1.0 EU/dL    Nitrite Urine Negative NEG^Negative    Blood Urine Large (A) NEG^Negative    Leukocyte Esterase Urine Small (A) NEG^Negative    Source Midstream Urine     WBC Urine  (A) OTO5^0 - 5 /HPF    RBC Urine  (A) OTO2^O - 2 /HPF    Squamous Epithelial /LPF Urine Few FEW^Few /LPF    Bacteria Urine Few (A) NEG^Negative /HPF       ASSESSMENT/PLAN:             1. Acute cystitis without hematuria    - sulfamethoxazole-trimethoprim (BACTRIM DS/SEPTRA DS) 800-160 MG per tablet; Take 1 tablet by mouth 2 times daily for 5 days  Dispense: 10 tablet; Refill: 0    2. Dysuria    - UA with Microscopic reflex to Culture  - Urine Culture Aerobic Bacterial    3. Nonspecific finding on examination of urine    - Urine Culture Aerobic Bacterial    Fluids rest and monitor  Recheck prn   Culture pending and will notify if need to change abx via my chart      Margy Villalobos PA-C  [facility]

## 2018-05-17 LAB
BACTERIA SPEC CULT: ABNORMAL
SPECIMEN SOURCE: ABNORMAL

## 2018-05-22 ENCOUNTER — OFFICE VISIT (OUTPATIENT)
Dept: PSYCHIATRY | Facility: CLINIC | Age: 28
End: 2018-05-22
Payer: COMMERCIAL

## 2018-05-22 VITALS
HEIGHT: 66 IN | DIASTOLIC BLOOD PRESSURE: 70 MMHG | OXYGEN SATURATION: 98 % | HEART RATE: 92 BPM | SYSTOLIC BLOOD PRESSURE: 118 MMHG | TEMPERATURE: 98.6 F

## 2018-05-22 DIAGNOSIS — F41.1 GAD (GENERALIZED ANXIETY DISORDER): Primary | ICD-10-CM

## 2018-05-22 DIAGNOSIS — F33.40 RECURRENT MAJOR DEPRESSIVE DISORDER, IN REMISSION (H): ICD-10-CM

## 2018-05-22 PROCEDURE — 90792 PSYCH DIAG EVAL W/MED SRVCS: CPT | Performed by: NURSE PRACTITIONER

## 2018-05-22 RX ORDER — ESCITALOPRAM OXALATE 20 MG/1
20 TABLET ORAL DAILY
Qty: 90 TABLET | Refills: 0 | Status: SHIPPED | OUTPATIENT
Start: 2018-05-22 | End: 2018-07-26 | Stop reason: SINTOL

## 2018-05-22 RX ORDER — GABAPENTIN 300 MG/1
300 CAPSULE ORAL 3 TIMES DAILY
Qty: 90 CAPSULE | Refills: 1 | Status: SHIPPED | OUTPATIENT
Start: 2018-05-22 | End: 2018-08-07

## 2018-05-22 ASSESSMENT — ANXIETY QUESTIONNAIRES
3. WORRYING TOO MUCH ABOUT DIFFERENT THINGS: NEARLY EVERY DAY
6. BECOMING EASILY ANNOYED OR IRRITABLE: NEARLY EVERY DAY
7. FEELING AFRAID AS IF SOMETHING AWFUL MIGHT HAPPEN: SEVERAL DAYS
1. FEELING NERVOUS, ANXIOUS, OR ON EDGE: NEARLY EVERY DAY
5. BEING SO RESTLESS THAT IT IS HARD TO SIT STILL: NOT AT ALL
GAD7 TOTAL SCORE: 14
2. NOT BEING ABLE TO STOP OR CONTROL WORRYING: MORE THAN HALF THE DAYS
IF YOU CHECKED OFF ANY PROBLEMS ON THIS QUESTIONNAIRE, HOW DIFFICULT HAVE THESE PROBLEMS MADE IT FOR YOU TO DO YOUR WORK, TAKE CARE OF THINGS AT HOME, OR GET ALONG WITH OTHER PEOPLE: VERY DIFFICULT

## 2018-05-22 ASSESSMENT — PATIENT HEALTH QUESTIONNAIRE - PHQ9: 5. POOR APPETITE OR OVEREATING: MORE THAN HALF THE DAYS

## 2018-05-22 NOTE — PATIENT INSTRUCTIONS
Treatment Plan:    Increase Lexapro (escitalopram) to 20 mg by mouth daily for anxiety.     Start Neurontin (gabapentin) 300 mg by mouth daily at bedtime for sleep and anxiety. Can consider dose increase to 2- 3 times per day for anxiety.     Continue all other medications as reviewed per electronic medical record today.     Safety plan reviewed. To the Emergency Department as needed or call after hours crisis line at [phone] or [phone]. [place] Crisis Text Line. Text [place] to 954704 or Suicide LifeLine Chat: suicidepreventionlifeline.org/chat    To schedule individual or family therapy, call [facility] at [phone].   Thank you for our work together in the Psychiatry Collaborative Care Model at [facility]. This is our last visit and I am returning your care back to your Primary Care Provider Mayra Weems MD. If you are not doing well, please contact your Primary Care Provider office.     Follow up with primary care provider as planned or for acute medical concerns.

## 2018-05-22 NOTE — MR AVS SNAPSHOT
After Visit Summary   5/22/2018    Princess TRISTIN Machado    MRN: [id number]           Patient Information     Date Of Birth          1990        Visit Information        Provider Department      5/22/2018 3:15 PM Cesilia Cannon NP [facility]        Today's Diagnoses     STACIA (generalized anxiety disorder)    -  1    Recurrent major depressive disorder, in remission (H)          Care Instructions    Treatment Plan:    Increase Lexapro (escitalopram) to 20 mg by mouth daily for anxiety.     Start Neurontin (gabapentin) 300 mg by mouth daily at bedtime for sleep and anxiety. Can consider dose increase to 2- 3 times per day for anxiety.     Continue all other medications as reviewed per electronic medical record today.     Safety plan reviewed. To the Emergency Department as needed or call after hours crisis line at [phone] or [phone]. [place] Crisis Text Line. Text [place] to 257659 or Suicide LifeLine Chat: suicidepreventionEka Systemsline.org/chat    To schedule individual or family therapy, call [facility] at [phone].   Thank you for our work together in the Psychiatry Collaborative Care Model at [facility]. This is our last visit and I am returning your care back to your Primary Care Provider Mayra Weems MD. If you are not doing well, please contact your Primary Care Provider office.     Follow up with primary care provider as planned or for acute medical concerns.          Follow-ups after your visit        Your next 10 appointments already scheduled     May 29, 2018  2:50 PM CDT   [facility] with Toya Boyd, PT   [facility] ([facility]  )    [address]  [address]  [facility] [address]   [phone]            Jun 05, 2018  3:00 PM CDT   [facility] Return with TANNER Mar   [facility]    [address] [facility] [address]  [facility]  [id number] [phone]              Who to contact     If you have questions or need follow up information about today's clinic visit or your schedule please contact [facility] directly at [phone].  Normal or non-critical lab and imaging results will be communicated to you by Remicalmhart, letter or phone within 4 business days after the clinic has received the results. If you do not hear from us within 7 days, please contact the clinic through LiveBuzzt or phone. If you have a critical or abnormal lab result, we will notify you by phone as soon as possible.  Submit refill requests through [employer] or call your pharmacy and they will forward the refill request to us. Please allow 3 business days for your refill to be completed.          Additional Information About Your Visit        [facility] Information     [facility] gives you secure access to your electronic health record. If you see a primary care provider, you can also send messages to your care team and make appointments. If you have questions, please call your primary care clinic.  If you do not have a primary care provider, please call [phone] and they will assist you.        Care EveryWhere ID     This is your Care EveryWhere ID. This could be used by other organizations to access your [place] medical records  [id number]        Your Vitals Were     Pulse Temperature Height Last Period Pulse Oximetry       92 98.6  F (37  C) (Oral) 5' 6\" (1.676 m) 05/02/2018 (Approximate) 98%        Blood Pressure from Last 3 Encounters:   05/22/18 118/70   05/15/18 116/80   04/17/18 127/79    Weight from Last 3 Encounters:   05/15/18 (!) 339 lb 14.4 oz (154.2 kg)   04/17/18 (!) 342 lb (155.1 kg)   04/03/18 (!) 336 lb 4.8 oz (152.5 kg)              Today, you had the following     No orders found for display         Today's Medication Changes          These changes are accurate as of 5/22/18  4:37 PM.  If you have any questions, ask your nurse or doctor. "               Start taking these medicines.        Dose/Directions    gabapentin 300 MG capsule   Commonly known as:  NEURONTIN   Used for:  STACIA (generalized anxiety disorder)   Started by:  Cesilia Cannon NP        Dose:  300 mg   Take 1 capsule (300 mg) by mouth 3 times daily   Quantity:  90 capsule   Refills:  1         These medicines have changed or have updated prescriptions.        Dose/Directions    escitalopram 20 MG tablet   Commonly known as:  LEXAPRO   This may have changed:    - medication strength  - how much to take  - additional instructions   Used for:  Recurrent major depressive disorder, in remission (H)   Changed by:  Cesilia Cannon NP        Dose:  20 mg   Take 1 tablet (20 mg) by mouth daily Increased dose.   Quantity:  90 tablet   Refills:  0            Where to get your medicines      These medications were sent to [facility] [address] [address]     Phone:  [phone]     escitalopram 20 MG tablet    gabapentin 300 MG capsule                Primary Care Provider Office Phone # Fax #    Mayra Weems [phone]       [address]  [facility] [address]        Equal Access to Services     [facility]: Hadii indy ku hadasho Solino, waaxda luqadaha, qaybta kaalmada crissy, amos ramos. So [facility] [phone].    ATENCIÓN: Si habla español, tiene a shirley disposición servicios gratuitos de asistencia lingüística. [facility] [phone].    We comply with applicable federal civil rights laws and [place] laws. We do not discriminate on the basis of race, color, national origin, age, disability, sex, sexual orientation, or gender identity.            Thank you!     Thank you for choosing [facility]  for your care. Our goal is always to provide you with excellent care. Hearing back from our patients is one way we can continue to improve our services.  Please take a few minutes to complete the written survey that you may receive in the mail after your visit with us. Thank you!             Your Updated Medication List - Protect others around you: Learn how to safely use, store and throw away your medicines at www.disposemymeds.org.          This list is accurate as of 5/22/18  4:37 PM.  Always use your most recent med list.                   Brand Name Dispense Instructions for use Diagnosis    BIOTIN PO      Take 10,000 mcg by mouth Reported on 5/9/2017        cholecalciferol 21093 units capsule    VITAMIN D3    8 capsule    Take 1 capsule (50,000 Units) by mouth once a week    Vitamin D deficiency       clindamycin 1 % lotion    CLEOCIN T    60 mL    Apply topically 2 times daily    Acne vulgaris       clindamycin 300 MG capsule    CLEOCIN    20 capsule    1 cap po bid    Hidradenitis suppurativa       ECONTRA EZ 1.5 MG Tabs tablet   Generic drug:  levonorgestrel      Take 1.5 mg by mouth daily as needed        escitalopram 20 MG tablet    LEXAPRO    90 tablet    Take 1 tablet (20 mg) by mouth daily Increased dose.    Recurrent major depressive disorder, in remission (H)       gabapentin 300 MG capsule    NEURONTIN    90 capsule    Take 1 capsule (300 mg) by mouth 3 times daily    STACIA (generalized anxiety disorder)       hydroquinone 4 % Crea     56.8 g    APPLY TO AFFECTED AREA(S) EVERY NIGHT AT BEDTIME    Hyperpigmentation of skin       ibuprofen 600 MG tablet    ADVIL/MOTRIN    60 tablet    Take 1 tablet (600 mg) by mouth every 6 hours as needed for moderate pain    Chronic right-sided low back pain without sciatica       mupirocin 2 % ointment    BACTROBAN          norethindrone 0.35 MG per tablet    MICRONOR     Take 1 tablet by mouth daily        oxybutynin 5 MG 24 hr tablet    DITROPAN-XL     Take 5 mg by mouth daily as needed for bladder spasms        PROTONIX PO      Take 20 mg by mouth daily as needed for heartburn        REBIF TITRATION PACK 6X8.8 &  6X22 MCG Sosy   Generic drug:  Interferon Beta-1a      Inject 1 Units Subcutaneous three times a week        rifampin 300 MG capsule    RIFADIN    60 capsule    Take 2 capsules (600 mg) by mouth daily    Hidradenitis suppurativa       spironolactone 50 MG tablet    ALDACTONE    30 tablet    Take 1 tablet (50 mg) by mouth daily    Acne vulgaris

## 2018-05-22 NOTE — PROGRESS NOTES
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  Diagnosis:  296.32 (F33.1) Major Depressive Disorder, Recurrent Episode, Moderate With anxious distress  300.02 (F41.1) Generalized Anxiety Disorder      Obesity - Patient refused weight today    Medical Comorbidities Include:   Patient Active Problem List    Diagnosis Date Noted     STACIA (generalized anxiety disorder) 03/13/2018     Priority: Medium     Recurrent major depression (H)      Priority: Medium     Pt has tried Zoloft, Prozac, Wellbutrin in the past        Midline low back pain without sciatica 09/06/2017     Priority: Medium     Sacral pain 09/06/2017     Priority: Medium     Somatic dysfunction of sacral region 09/06/2017     Priority: Medium     Thoracic segment dysfunction 09/06/2017     Priority: Medium     Muscle spasm 09/06/2017     Priority: Medium     Common wart: L t lat hand -  06/16/2017     Priority: Medium     Morbid obesity due to excess calories (H) BMI 50-60 06/16/2017     Priority: Medium     Recurrent major depressive episodes (H) 05/09/2017     Priority: Medium     Multiple sclerosis (H) 05/09/2017     Priority: Medium     Bulimia nervosa 05/09/2017     Priority: Medium     Optic neuritis, left 08/23/2016     Priority: Medium     Other specified health status 08/12/2016     Priority: Medium     Overview:   Care Coordinator: Emelia Lind RN,  MSN  Care coordination focus: Assist with appts.  Support in compliance  Living situation: home with 2 children and boyfriend  Important notes: nothing to note at this time  See care plan under Chart Review > Misc Reports > [facility] CARE PLAN REPORT       Hidradenitis suppurativa 04/05/2016     Priority: Medium     Overview:   Dr Lopes, Dermatology, [place].  On Humira and amoxicillin BID daily.        Hypertriglyceridemia 11/10/2015     Priority: Medium     Freckles 10/23/2015     Priority: Medium     Headache 03/31/2015     Priority: Medium     Biomechanical lesion 03/31/2015     Priority: Medium     Somatic dysfunction of cervical region 03/31/2015     Priority: Medium     Spasm 03/31/2015     Priority: Medium     Chronic back pain 07/08/2014     Priority: Medium     Acne vulgaris 06/03/2014     Priority: Medium     Vitamin D deficiency 03/27/2013     Priority: Medium     Numbness of finger 03/25/2013     Priority: Medium     Hidradenitis 01/11/2010     Priority: Medium     Overview:   RX Tretinoin 0.05% topical cream and Erythromycin 500mg BID by outside clinic       Migraine without status migrainosus, not intractable 09/14/2009     Priority: Medium       Psychosocial & Contextual Factors:  Financial Difficulties, Relationship Difficulties, Phase of Life Difficulties and Medical Comorbidites    Strengths and Opportunities:   Princess TRISTIN Machado identified the following strengths or resources that will help she succeed in counseling: commitment to health and well being, friends / good social support and positive work environment. Things that may interfere with the patient's success include:  financial hardship.    There are no language or communication issues or need for modification in treatment.   There are no ethnic, cultural or Scientologist factors that may be relevant for therapy.  Client identified their preferred language to be English.   Client does not need the assistance of an  or other  support involved in therapy.    A 12-item WHODAS 2.0 assessment was completed by the patient today and recorded in EPIC.    WHODAS 2.0 TOTAL SCORES 3/13/2018 5/22/2018   Total Score 24 32       The Patient Activation Measure (CONSTANZA) score was completed and recorded in EPIC. This assesses patient knowledge, skill, and confidence for self-management.   CONSTANZA Score (Last Two) 3/13/2018   CONSTANZA Raw Score 30   Activation Score 56   CONSTANZA Level 3        Impression:  Princess TRISTIN Machado reports struggling with depression and anxiety. Patient reports she is taking Lexapro (escitalopram) 20 mg daily. No side effects. Can augment with Buspar (buspirone) for anxiety.  Neurontin (gabapentin) is another option that we can try. Could consider increased dose of Inderal (propranolol) 20 mg up to 4 times per day for panic. Wellbutrin (buproprion) could also be an option for energy/motivaiton and depression. I do not recommend benzodiazepine medications at this time. Medication side effects and alternatives reviewed.Health promotion activities recommended and reviewed today. All questions addressed. Education and counseling completed regarding risks and benefits of medications and psychotherapy options. Collaborative Care Psychiatry Service model reviewed today. Recommend therapy for additional support. Patient has not followed through with that. She hopes to see TANNER Paige, [facility] again next month. Patient has been referred to community psychiatry multiple times and should continue with them. Reviewed I will be out on medical leave starting next month and reviewed the Collaborative Care Psychiatry Service model. Discussed return to Primary Care Provider or referral to long term community psychiatry. Returning back to Primary Care Provider. Patient may need long term psychiatry and therapy care.     Treatment Plan:    Increase Lexapro (escitalopram) to 20 mg by mouth daily for anxiety.     Start Neurontin (gabapentin) 300  mg by mouth daily at bedtime for sleep and anxiety. Can consider dose increase to 2- 3 times per day for anxiety.     Continue all other medications as reviewed per electronic medical record today.     Safety plan reviewed. To the Emergency Department as needed or call after hours crisis line at [phone] or [phone]. [place] Crisis Text Line. Text [place] to 756301 or Suicide LifeLine Chat: suicidepreventionKivraline.org/chat    To schedule individual or family therapy, call [facility] at [phone]. Follow with TANNER Paige, [facility] as planned in June.   Thank you for our work together in the Psychiatry Collaborative Care Model at [facility]. This is our last visit and I am returning your care back to your Primary Care Provider Mayra Weems MD . If you are not doing well, please contact your Primary Care Provider office.     Follow up with primary care provider as planned or for acute medical concerns. Recommend following up in June with Primary Care Provider.     This treatment plan was not printed with the AVS per patient request. It was sent in Ideabove Message to patient.     Community Resources:    National Suicide Prevention Lifeline: 101.595.7292 (TTY: 669.765.7703). Call anytime for help.  (www.suicidepreventionlifeline.org)  National [place] on Mental Illness (www.charissa.org): 820.745.5448 or 676-019-5059.   Mental Health Association (www.mentalhealth.org): 982.903.8470 or 329-971-7963.    Administrative Billing:   Time spent with patient was 60 minutes and greater than 50% of time or 40 minutes was spent in counseling and coordination of care regarding above diagnoses and treatment plan.    Patient Status:  The patient is being returned to the referring provider for ongoing care and medication prescribing.  The patient can be referred back to this service for further consultation as needed.    Signed:   Cesilia Cannon, PhD, APRN, CNP  Psychiatry

## 2018-05-22 NOTE — NURSING NOTE
"Chief Complaint   Patient presents with     Consult     referred by Dr Weems- anxiety medication not helping, Refused weight today     initial /70 (BP Location: Right arm, Patient Position: Chair, Cuff Size: Adult Large)  Pulse 92  Temp 98.6  F (37  C) (Oral)  Ht 5' 6\" (1.676 m)  LMP 05/02/2018 (Approximate)  SpO2 98% Estimated body mass index is 54.04 kg/(m^2) as calculated from the following:    Height as of 5/15/18: 5' 6.5\" (1.689 m).    Weight as of 5/15/18: 339 lb 14.4 oz (154.2 kg)..  bp completed using cuff size large    "

## 2018-05-23 ASSESSMENT — ANXIETY QUESTIONNAIRES: GAD7 TOTAL SCORE: 14

## 2018-06-05 ENCOUNTER — OFFICE VISIT (OUTPATIENT)
Dept: BEHAVIORAL HEALTH | Facility: CLINIC | Age: 28
End: 2018-06-05
Payer: COMMERCIAL

## 2018-06-05 DIAGNOSIS — F41.1 GAD (GENERALIZED ANXIETY DISORDER): ICD-10-CM

## 2018-06-05 DIAGNOSIS — F33.9 RECURRENT MAJOR DEPRESSIVE EPISODES (H): Primary | ICD-10-CM

## 2018-06-05 PROCEDURE — 90832 PSYTX W PT 30 MINUTES: CPT | Performed by: MARRIAGE & FAMILY THERAPIST

## 2018-06-05 ASSESSMENT — PATIENT HEALTH QUESTIONNAIRE - PHQ9
SUM OF ALL RESPONSES TO PHQ QUESTIONS 1-9: 13
10. IF YOU CHECKED OFF ANY PROBLEMS, HOW DIFFICULT HAVE THESE PROBLEMS MADE IT FOR YOU TO DO YOUR WORK, TAKE CARE OF THINGS AT HOME, OR GET ALONG WITH OTHER PEOPLE: SOMEWHAT DIFFICULT
SUM OF ALL RESPONSES TO PHQ QUESTIONS 1-9: 13

## 2018-06-05 ASSESSMENT — ANXIETY QUESTIONNAIRES
6. BECOMING EASILY ANNOYED OR IRRITABLE: MORE THAN HALF THE DAYS
GAD7 TOTAL SCORE: 11
7. FEELING AFRAID AS IF SOMETHING AWFUL MIGHT HAPPEN: SEVERAL DAYS
3. WORRYING TOO MUCH ABOUT DIFFERENT THINGS: NEARLY EVERY DAY
2. NOT BEING ABLE TO STOP OR CONTROL WORRYING: SEVERAL DAYS
7. FEELING AFRAID AS IF SOMETHING AWFUL MIGHT HAPPEN: SEVERAL DAYS
5. BEING SO RESTLESS THAT IT IS HARD TO SIT STILL: NOT AT ALL
GAD7 TOTAL SCORE: 11
4. TROUBLE RELAXING: SEVERAL DAYS
GAD7 TOTAL SCORE: 11
1. FEELING NERVOUS, ANXIOUS, OR ON EDGE: NEARLY EVERY DAY

## 2018-06-05 NOTE — PROGRESS NOTES
[facility]  June 5, 2018      Behavioral Health Clinician Progress Note    Patient Name: Princess TRISTIN Machado           Service Type:  Individual      Service Location:   Face to Face in Clinic     Session Start Time: 3;11  Session End Time: 3.35      Session Length: 38 - 52      Attendees: Client    Visit Activities (Refresh list every visit): [facility] Only    Diagnostic Assessment Date: 3/13/18  Treatment Plan Review Date: 7/3/18  See Flowsheets for today's PHQ-9 and STACIA-7 results  Previous PHQ-9:   PHQ-9 SCORE 11/7/2017 3/13/2018 6/5/2018   Total Score MyChart - - 13 (Moderate depression)   Total Score 5 9 13     Previous STACIA-7:   STACIA-7 SCORE 3/13/2018 5/22/2018 6/5/2018   Total Score - - 11 (moderate anxiety)   Total Score 12 14 11       CONSTANZA LEVEL:  CONSTANZA Score (Last Two) 3/13/2018   CONSTANZA Raw Score 30   Activation Score 56   CONSTANZA Level 3       DATA  Extended Session (60+ minutes): No  Interactive Complexity: No  Crisis: No  [facility] Patient: No    Treatment Objective(s) Addressed in This Session:  Target Behavior(s): diet/weight loss    Depressed Mood: Increase interest, engagement, and pleasure in doing things  Decrease frequency and intensity of feeling down, depressed, hopeless  Improve quantity and quality of night time sleep / decrease daytime naps  Feel less tired and more energy during the day   Improve diet, appetite, mindful eating, and / or meal planning  Identify negative self-talk and behaviors: challenge core beliefs, myths, and actions  Improve concentration, focus, and mindfulness in daily activities   Feel less fidgety, restless or slow in daily activities / interpersonal interactions  Discuss motivation / ambivalence about a referral to specialty mental health services (Therapy, Day Tx, PHP)  Anxiety: will experience a reduction in anxiety, will develop more effective coping skills to manage anxiety symptoms, will develop healthy cognitive patterns and beliefs and will increase  ability to function adaptively    Current Stressors / Issues:    Pt started at the [facility]. She has been seeing a therapist weekly. Processed different coping skills to deal with depression and anxiety. Pt will start long term therapy in August. Will come back if needed. Wants to stay with [facility] for now.     ere are a list of some distractions & self soothing ideas based on mood. Some of these activities could overlap into another mood, so don t feel like they re set in stone - use whatever you think would be helpful!    SAD  1. Cry it out.  2. Watch a ridiculous comedy.   3. Take a warm shower  4. Listen to inspiring, upbeat music.  5. Spend time with a pet.  6. Organise your room  7. Phone a friend or even visit them  8. Read a trashy magazine  9. Make a list of quotes and lyrics that inspire you  10. Make a list of places you d like to visit, or things you d like to do within your lifetime.  11. Write how you re feeling in a journal  12. Go for a long and peaceful walk  13. Bake or cook a favourite dish  14. Go to the movie theatre and watch the next movie that comes on (of course attempting to ensure that it will be trigger free)  15. Watch silly daytime television.  16. Play a video game or a board game.  17. Write letters to your best friends and send them if you wish to.  18. Start drawing, or a create a collage of how you re feeling - don t worry about how it looks, no one has to see it.  19. Cuddle a soft toy  ANXIOUS  20. Place a blanket in the dryer and wrap it around yourself  21. Make a cup of tea/coffee and attempt to focus mindfully on your actions whilst preparing and drinking the tea.  22. Look up and learn breathing techniques and mindfulness strategies, if you see a health professional try talking to them about these strategies and work out a plan of how to practice these techniques so that you ll eventually be able to successfully use them when your urges seem unmanageable.  23. Run  yourself a warm bath and fill it with aromatherapy oils.  24. Count by 9 s  25. Paint your nails  26. Colour in a mandala  27. If you are Congregational, pray or meditate.  28. Take photographs of something that catches your eye. Upload it somewhere like SnapMyAd.  29. Collect a list of silly websites! For example  30. Look up funny cats and dogs on Youtube. Trust me. It s endless and brilliant.  31. Fill in a CBT ABC worksheet or something similar. You can ask your therapist for something of the sort. Here is a good resource, it contains thought records as well. You can even draw up your own as these are arguably not the most aesthetically pleasing worksheets.  http://www.psychologytools.org/download-therapy-worksheets.html  32. Watch a candle burn.  33. Fingerpaint    ANGRY  34. Dance to ridiculous music  35. Scream out to music that expresses how you feel  36. Scream into a pillow  37. Punch a pile of pillows   38. Invest in a punching bag. Punch the absolute shit out of it.  39. Write a letter to someone you re mad - swear at them, scream at them, get it ALL out, and and tear it up  40. Eat a lemon, sour jerrod, a chili - anything that will focus on your senses without hurting you.  41. Hold ice cubes in your hand, rub them under your knees, on the heels of your feet.  42. Watch a film that makes you laugh.  43. Take your dog for a walk  44. Wash the dishes  45. Go for a run/sprint  46. Write your thoughts on your body in red pen.  47. Hit soft toys/pillow against the wall repetitively.  48. Have a cold shower. With your clothes on if need be. If you get out of the shower and your urges/anger comes back, get in the shower again.    ALL:  49. CALL A HELP LINE OR THERAPIST IF YOU ARE IN DANGER OR ARE FEELING UNSAFE    LASTLY:  50. Remember that not every distraction will work and it is still important to consider why you are self harming to begin with. According to DBT therapy distractions are not a CURE for self harm,  rather they are a technique to be used whilst the urges are too severe to work on. Aftere the urge has subsided it is important to SPEAK TO SOMEONE about how you are feeling. It is  recommended that you find a health professional you trust to talk through these things with.          Progress on Treatment Objective(s) / Homework:  New Objective established this session - PREPARATION (Decided to change - considering how); Intervened by negotiating a change plan and determining options / strategies for behavior change, identifying triggers, exploring social supports, and working towards setting a date to begin behavior change    Motivational Interviewing    MI Intervention: Expressed Empathy/Understanding     Change Talk Expressed by the Patient: Desire to change    Provider Response to Change Talk: E - Evoked more info from patient about behavior change      Situation        Automatic Thoughts  Cognitive Distortions      Feelings        Behavior        Questioning Thoughts            Also provided psychoeducation about behavioral health condition, symptoms, and treatment options    Care Plan review completed: Yes    Medication Review:  No changes to current psychiatric medication(s)    Medication Compliance:  Yes    Changes in Health Issues:   None reported    Chemical Use Review:   Substance Use: Chemical use reviewed, no active concerns identified      Tobacco Use: No current tobacco use.      Assessment: Current Emotional / Mental Status (status of significant symptoms):  Risk status (Self / Other harm or suicidal ideation)  Patient denies a history of suicidal ideation, suicide attempts, self-injurious behavior, homicidal ideation, homicidal behavior and and other safety concerns  Patient denies current fears or concerns for personal safety.  Patient denies current or recent suicidal ideation or behaviors.  Patient denies current or recent homicidal ideation or behaviors.  Patient denies current or recent self  injurious behavior or ideation.  Patient denies other safety concerns.  A safety and risk management plan has not been developed at this time, however patient was encouraged to call [place] [phone] should there be a change in any of these risk factors.    Appearance:   Appropriate   Eye Contact:   Good   Psychomotor Behavior: Normal   Attitude:   Cooperative   Orientation:   All  Speech   Rate / Production: Normal    Volume:  Normal   Mood:    Anxious  Irritable   Affect:    Appropriate   Thought Content:  Clear   Thought Form:  Coherent  Logical   Insight:    Fair     Diagnoses:  1. Recurrent major depressive episodes (H)    2. STACIA (generalized anxiety disorder)        Collateral Reports Completed:  Routed note to PCP    Plan: (Homework, other):  Patient was given information about behavioral services and encouraged to schedule a follow up appointment with the clinic [facility] in 1 week.  She was also given information about mental health symptoms and treatment options  and Cognitive Behavioral Therapy skills to practice when experiencing anxiety and depression.  CD Recommendations: No indications of CD issues.  TANNER Paige, [facility]      ______________________________________________________________________    Integrated Primary Care Behavioral Health Treatment Plan    Patient's Name: Princess TRISTIN Machado  YOB: 1990    Date: 4/3/18    DSM-V Diagnoses: 296.32 (F33.1) Major Depressive Disorder, Recurrent Episode, Moderate _ and With mixed features or 300.02 (F41.1) Generalized Anxiety Disorder  Psychosocial / Contextual Factors: weight, relationships  WHODAS:24    Referral / Collaboration:  Referral to another professional/service is not indicated at this time..    Anticipated number of session or this episode of care: until long term care      MeasurableTreatment Goal(s) related to diagnosis / functional impairment(s)  Goal 1: Patient will increase coping skills    I will know I've met my goal when  less angry.      Objective #A (Patient Action)    Patient will identify 2 initial signs or symptoms of anxiety.  Status: New - Date: 4/3/18     Intervention(s)  [facility] will make referrals to psychiatry.    Objective #B  Patient will use thought-stopping strategy daily to reduce intrusive thoughts.  Status: New - Date: 4/3/18     Intervention(s)  [facility] will teach CBT skills.    Patient has reviewed and agreed to the above plan.      TANNER Mar  April 3, 2018

## 2018-06-05 NOTE — MR AVS SNAPSHOT
After Visit Summary   6/5/2018    Princess TRISTIN Machado    MRN: [id number]           Patient Information     Date Of Birth          1990        Visit Information        Provider Department      6/5/2018 3:00 PM Olesya Varela, [facility] Instructions    ere are a list of some distractions & self soothing ideas based on mood. Some of these activities could overlap into another mood, so don t feel like they re set in stone - use whatever you think would be helpful!    SAD  1. Cry it out.  2. Watch a ridiculous comedy.   3. Take a warm shower  4. Listen to inspiring, upbeat music.  5. Spend time with a pet.  6. Organise your room  7. Phone a friend or even visit them  8. Read a trashy magazine  9. Make a list of quotes and lyrics that inspire you  10. Make a list of places you d like to visit, or things you d like to do within your lifetime.  11. Write how you re feeling in a journal  12. Go for a long and peaceful walk  13. Bake or cook a favourite dish  14. Go to the movie theatre and watch the next movie that comes on (of course attempting to ensure that it will be trigger free)  15. Watch silly daytime television.  16. Play a video game or a board game.  17. Write letters to your best friends and send them if you wish to.  18. Start drawing, or a create a collage of how you re feeling - don t worry about how it looks, no one has to see it.  19. Cuddle a soft toy  ANXIOUS  20. Place a blanket in the dryer and wrap it around yourself  21. Make a cup of tea/coffee and attempt to focus mindfully on your actions whilst preparing and drinking the tea.  22. Look up and learn breathing techniques and mindfulness strategies, if you see a health professional try talking to them about these strategies and work out a plan of how to practice these techniques so that you ll eventually be able to successfully use them when your urges seem unmanageable.  23. Run yourself a warm  bath and fill it with aromatherapy oils.  24. Count by 9 s  25. Paint your nails  26. Colour in a mandala  27. If you are Taoist, pray or meditate.  28. Take photographs of something that catches your eye. Upload it somewhere like mobintent.  29. Collect a list of silly websites! For example  30. Look up funny cats and dogs on Youtube. Trust me. It s endless and brilliant.  31. Fill in a CBT ABC worksheet or something similar. You can ask your therapist for something of the sort. Here is a good resource, it contains thought records as well. You can even draw up your own as these are arguably not the most aesthetically pleasing worksheets.  http://www.psychologytools.org/download-therapy-worksheets.html  32. Watch a candle burn.  33. Fingerpaint    ANGRY  34. Dance to ridiculous music  35. Scream out to music that expresses how you feel  36. Scream into a pillow  37. Punch a pile of pillows   38. Invest in a punching bag. Punch the absolute shit out of it.  39. Write a letter to someone you re mad - swear at them, scream at them, get it ALL out, and and tear it up  40. Eat a lemon, sour jerrod, a chili - anything that will focus on your senses without hurting you.  41. Hold ice cubes in your hand, rub them under your knees, on the heels of your feet.  42. Watch a film that makes you laugh.  43. Take your dog for a walk  44. Wash the dishes  45. Go for a run/sprint  46. Write your thoughts on your body in red pen.  47. Hit soft toys/pillow against the wall repetitively.  48. Have a cold shower. With your clothes on if need be. If you get out of the shower and your urges/anger comes back, get in the shower again.    ALL:  49. CALL A HELP LINE OR THERAPIST IF YOU ARE IN DANGER OR ARE FEELING UNSAFE    LASTLY:  50. Remember that not every distraction will work and it is still important to consider why you are self harming to begin with. According to DBT therapy distractions are not a CURE for self harm, rather they are  a technique to be used whilst the urges are too severe to work on. Aftere the urge has subsided it is important to SPEAK TO SOMEONE about how you are feeling. It is  recommended that you find a health professional you trust to talk through these things with.                Follow-ups after your visit        Your next 10 appointments already scheduled     Jul 03, 2018  2:30 PM CDT   [facility] Mayra Weems MD   [facility]    [address]  [facility] [address] [phone]              Who to contact     If you have questions or need follow up information about today's clinic visit or your schedule please contact [facility] directly at [phone].  Normal or non-critical lab and imaging results will be communicated to you by MyChart, letter or phone within 4 business days after the clinic has received the results. If you do not hear from us within 7 days, please contact the clinic through [email or web address] or phone. If you have a critical or abnormal lab result, we will notify you by phone as soon as possible.  Submit refill requests through [employer] or call your pharmacy and they will forward the refill request to us. Please allow 3 business days for your refill to be completed.          Additional Information About Your Visit        [email or web address] Information     [employer] gives you secure access to your electronic health record. If you see a primary care provider, you can also send messages to your care team and make appointments. If you have questions, please call your primary care clinic.  If you do not have a primary care provider, please call [phone] and they will assist you.        Care EveryWhere ID     This is your Care EveryWhere ID. This could be used by other organizations to access your [place] medical records  [id number]         Blood Pressure from Last 3 Encounters:   05/22/18 118/70   05/15/18 116/80   04/17/18  127/79    Weight from Last 3 Encounters:   05/15/18 (!) 154.2 kg (339 lb 14.4 oz)   04/17/18 (!) 155.1 kg (342 lb)   04/03/18 (!) 152.5 kg (336 lb 4.8 oz)              Today, you had the following     No orders found for display       Primary Care Provider Office Phone # Fax #    Mayra Weems [phone]       [address]  [facility] [address]        Equal Access to Services     DECLAN DAMIAN : Hadii aad ku hadasho Soomaali, waaxda luqadaha, qaybta kaalmada adeegyada, waxay idiin hayaan kiran rehman . So [facility] [phone].    ATENCIÓN: Si habla español, tiene a shirley disposición servicios gratuitos de asistencia lingüística. Llame al [phone].    We comply with applicable federal civil rights laws and [place] laws. We do not discriminate on the basis of race, color, national origin, age, disability, sex, sexual orientation, or gender identity.            Thank you!     Thank you for choosing [facility]  for your care. Our goal is always to provide you with excellent care. Hearing back from our patients is one way we can continue to improve our services. Please take a few minutes to complete the written survey that you may receive in the mail after your visit with us. Thank you!             Your Updated Medication List - Protect others around you: Learn how to safely use, store and throw away your medicines at www.disposemymeds.org.          This list is accurate as of 6/5/18  3:31 PM.  Always use your most recent med list.                   Brand Name Dispense Instructions for use Diagnosis    BIOTIN PO      Take 10,000 mcg by mouth Reported on 5/9/2017        cholecalciferol 84377 units capsule    VITAMIN D3    8 capsule    Take 1 capsule (50,000 Units) by mouth once a week    Vitamin D deficiency       clindamycin 1 % lotion    CLEOCIN T    60 mL    Apply topically 2 times daily    Acne vulgaris       clindamycin 300 MG capsule    CLEOCIN    20 capsule    1 cap  po bid    Hidradenitis suppurativa       ECONTRA EZ 1.5 MG Tabs tablet   Generic drug:  levonorgestrel      Take 1.5 mg by mouth daily as needed        escitalopram 20 MG tablet    LEXAPRO    90 tablet    Take 1 tablet (20 mg) by mouth daily Increased dose.    Recurrent major depressive disorder, in remission (H)       gabapentin 300 MG capsule    NEURONTIN    90 capsule    Take 1 capsule (300 mg) by mouth 3 times daily    STACIA (generalized anxiety disorder)       hydroquinone 4 % Crea     56.8 g    APPLY TO AFFECTED AREA(S) EVERY NIGHT AT BEDTIME    Hyperpigmentation of skin       ibuprofen 600 MG tablet    ADVIL/MOTRIN    60 tablet    Take 1 tablet (600 mg) by mouth every 6 hours as needed for moderate pain    Chronic right-sided low back pain without sciatica       mupirocin 2 % ointment    BACTROBAN          norethindrone 0.35 MG per tablet    MICRONOR     Take 1 tablet by mouth daily        oxybutynin 5 MG 24 hr tablet    DITROPAN-XL     Take 5 mg by mouth daily as needed for bladder spasms        PROTONIX PO      Take 20 mg by mouth daily as needed for heartburn        REBIF TITRATION PACK 6X8.8 & 6X22 MCG Sosy   Generic drug:  Interferon Beta-1a      Inject 1 Units Subcutaneous three times a week        rifampin 300 MG capsule    RIFADIN    60 capsule    Take 2 capsules (600 mg) by mouth daily    Hidradenitis suppurativa       spironolactone 50 MG tablet    ALDACTONE    30 tablet    Take 1 tablet (50 mg) by mouth daily    Acne vulgaris

## 2018-06-05 NOTE — PATIENT INSTRUCTIONS
ere are a list of some distractions & self soothing ideas based on mood. Some of these activities could overlap into another mood, so don t feel like they re set in stone - use whatever you think would be helpful!    SAD  1. Cry it out.  2. Watch a ridiculous comedy.   3. Take a warm shower  4. Listen to inspiring, upbeat music.  5. Spend time with a pet.  6. Organise your room  7. Phone a friend or even visit them  8. Read a trashy magazine  9. Make a list of quotes and lyrics that inspire you  10. Make a list of places you d like to visit, or things you d like to do within your lifetime.  11. Write how you re feeling in a journal  12. Go for a long and peaceful walk  13. Bake or cook a favourite dish  14. Go to the movie theatre and watch the next movie that comes on (of course attempting to ensure that it will be trigger free)  15. Watch silly daytime television.  16. Play a video game or a board game.  17. Write letters to your best friends and send them if you wish to.  18. Start drawing, or a create a collage of how you re feeling - don t worry about how it looks, no one has to see it.  19. Cuddle a soft toy  ANXIOUS  20. Place a blanket in the dryer and wrap it around yourself  21. Make a cup of tea/coffee and attempt to focus mindfully on your actions whilst preparing and drinking the tea.  22. Look up and learn breathing techniques and mindfulness strategies, if you see a health professional try talking to them about these strategies and work out a plan of how to practice these techniques so that you ll eventually be able to successfully use them when your urges seem unmanageable.  23. Run yourself a warm bath and fill it with aromatherapy oils.  24. Count by 9 s  25. Paint your nails  26. Colour in a mandala  27. If you are Muslim, pray or meditate.  28. Take photographs of something that catches your eye. Upload it somewhere like "[employer].  29. Collect a list of silly websites! For example  30. Look  up funny cats and dogs on Youtube. Trust me. It s endless and brilliant.  31. Fill in a CBT ABC worksheet or something similar. You can ask your therapist for something of the sort. Here is a good resource, it contains thought records as well. You can even draw up your own as these are arguably not the most aesthetically pleasing worksheets.  http://www.psychologytools.org/download-therapy-worksheets.html  32. Watch a candle burn.  33. Fingerpaint    ANGRY  34. Dance to ridiculous music  35. Scream out to music that expresses how you feel  36. Scream into a pillow  37. Punch a pile of pillows   38. Invest in a punching bag. Punch the absolute shit out of it.  39. Write a letter to someone you re mad - swear at them, scream at them, get it ALL out, and and tear it up  40. Eat a lemon, sour jerrod, a chili - anything that will focus on your senses without hurting you.  41. Hold ice cubes in your hand, rub them under your knees, on the heels of your feet.  42. Watch a film that makes you laugh.  43. Take your dog for a walk  44. Wash the dishes  45. Go for a run/sprint  46. Write your thoughts on your body in red pen.  47. Hit soft toys/pillow against the wall repetitively.  48. Have a cold shower. With your clothes on if need be. If you get out of the shower and your urges/anger comes back, get in the shower again.    ALL:  49. CALL A HELP LINE OR THERAPIST IF YOU ARE IN DANGER OR ARE FEELING UNSAFE    LASTLY:  50. Remember that not every distraction will work and it is still important to consider why you are self harming to begin with. According to DBT therapy distractions are not a CURE for self harm, rather they are a technique to be used whilst the urges are too severe to work on. Aftere the urge has subsided it is important to SPEAK TO SOMEONE about how you are feeling. It is  recommended that you find a health professional you trust to talk through these things with.

## 2018-06-06 ENCOUNTER — TRANSFERRED RECORDS (OUTPATIENT)
Dept: HEALTH INFORMATION MANAGEMENT | Facility: CLINIC | Age: 28
End: 2018-06-06

## 2018-06-06 ASSESSMENT — PATIENT HEALTH QUESTIONNAIRE - PHQ9: SUM OF ALL RESPONSES TO PHQ QUESTIONS 1-9: 13

## 2018-06-06 ASSESSMENT — ANXIETY QUESTIONNAIRES: GAD7 TOTAL SCORE: 11

## 2018-06-07 ENCOUNTER — TRANSFERRED RECORDS (OUTPATIENT)
Dept: HEALTH INFORMATION MANAGEMENT | Facility: CLINIC | Age: 28
End: 2018-06-07

## 2018-06-07 ENCOUNTER — TELEPHONE (OUTPATIENT)
Dept: SURGERY | Facility: CLINIC | Age: 28
End: 2018-06-07

## 2018-06-07 NOTE — TELEPHONE ENCOUNTER
Pt called and left message on voicemail.  States she has a brief consult with David Emery a couple of months ago and was not able to start the program due to eating disorder.  Was wondering if we received new notes from [facility] that cleared her to return to continue in the baraitric program?    Called pt back and left voicemail.  Let her know I have not seen any new records come in.  I reviewed her chart and no new records had been scanned into [facility].  David is out of the office but would be back on Monday.  I will forward this note to her, if she has seen any records, I will make sure she get back to the patient.

## 2018-07-03 ENCOUNTER — TELEPHONE (OUTPATIENT)
Dept: SURGERY | Facility: CLINIC | Age: 28
End: 2018-07-03

## 2018-07-03 NOTE — LETTER
July 3, 2018    Princess TRISTIN Machado  [address]   [facility] [address]        Dear ,    Based upon your chronic history of bulemia nervosa dating back to your teenage years and an intensive outpatient treatment recommendation by [facility] that was never completed, it is the decision of this clinic to hold off on your process to surgery.     Normally in the past it has been required that a patient wait 3 years AFTER treatment for an eating disorder before being considered a candidate for surgery.  In your case we are requiring the followin.  At a minimum, see a registered dietitian monthly at the [facility] AND actively participate with normalizing your relationship with food.  Goals will be established with the dietitian.    2.  Follow up with Ana Maria Asher - outpatient therapist at the [facility] every 2-3 months for a minimum of a year. You must actively participate in your visits with her as well.    3.  If after a year you are still considering bariatric surgery, you must provide strong recommendations from the therapist AND dietitian at the [facility].  They should state how you have done and that they are in full support of you moving forward with bariatric surgery.  They will need to state you have been actively involved with your treatment during the course of the year.          Sincerely,        [facility]

## 2018-07-03 NOTE — TELEPHONE ENCOUNTER
Called and left VM for patient to contact clinic.    Will discuss with her that several conversations took place regarding this patient.  One with medical director and the other with therapist Ana Maria Asher from [facility]. ([phone]  [phone].    Based upon your chronic history of bulemia nervosa dating back to your teenage years and an intensive outpatient treatment recommendation by [facility] that was never completed, it is the decision of this clinic to hold off on your process to surgery.     Normally in the past it has been required that a patient wait 3 years AFTER treatment for an eating disorder before being considered a candidate for surgery.  In your case we are requiring the followin.  At a minimum, see a registered dietitian monthly at the [facility] AND actively participate with normalizing your relationship with food.  Goals will be established with the dietitian.    2.  Follow up with Ana Maria Asher - outpatient therapist at the [facility] every 2-3 months for a minimum of a year. You must actively participate in your visits with her as well.    3.  If after a year you are still considering bariatric surgery, you must provide strong recommendations from the therapist AND dietitian at the [facility].  They should state how you have done and that they are in full support of you moving forward with bariatric surgery.  They will need to state you have been actively involved with your treatment during the course of the year.

## 2018-07-16 DIAGNOSIS — E55.9 VITAMIN D DEFICIENCY: ICD-10-CM

## 2018-07-17 ENCOUNTER — TRANSFERRED RECORDS (OUTPATIENT)
Dept: HEALTH INFORMATION MANAGEMENT | Facility: CLINIC | Age: 28
End: 2018-07-17

## 2018-07-17 ENCOUNTER — MYC MEDICAL ADVICE (OUTPATIENT)
Dept: INTERNAL MEDICINE | Facility: CLINIC | Age: 28
End: 2018-07-17

## 2018-07-17 DIAGNOSIS — R53.83 FATIGUE: ICD-10-CM

## 2018-07-17 DIAGNOSIS — G35 MULTIPLE SCLEROSIS (H): ICD-10-CM

## 2018-07-17 DIAGNOSIS — F01.53 VASCULAR DEMENTIA WITH DEPRESSED MOOD (H): ICD-10-CM

## 2018-07-17 DIAGNOSIS — E55.9 AVITAMINOSIS D: Primary | ICD-10-CM

## 2018-07-17 DIAGNOSIS — F33.40 RECURRENT MAJOR DEPRESSIVE DISORDER, IN REMISSION (H): Primary | ICD-10-CM

## 2018-07-17 LAB
ALBUMIN SERPL-MCNC: 3.3 G/DL (ref 3.4–5)
ALP SERPL-CCNC: 81 U/L (ref 40–150)
ALT SERPL W P-5'-P-CCNC: 25 U/L (ref 0–50)
ANION GAP SERPL CALCULATED.3IONS-SCNC: 9 MMOL/L (ref 3–14)
AST SERPL W P-5'-P-CCNC: 15 U/L (ref 0–45)
BASOPHILS # BLD AUTO: 0 10E9/L (ref 0–0.2)
BASOPHILS NFR BLD AUTO: 0.5 %
BILIRUB SERPL-MCNC: 0.4 MG/DL (ref 0.2–1.3)
BUN SERPL-MCNC: 13 MG/DL (ref 7–30)
CALCIUM SERPL-MCNC: 8.4 MG/DL (ref 8.5–10.1)
CHLORIDE SERPL-SCNC: 106 MMOL/L (ref 94–109)
CO2 SERPL-SCNC: 24 MMOL/L (ref 20–32)
CREAT SERPL-MCNC: 1.01 MG/DL (ref 0.52–1.04)
DIFFERENTIAL METHOD BLD: NORMAL
EOSINOPHIL # BLD AUTO: 0.1 10E9/L (ref 0–0.7)
EOSINOPHIL NFR BLD AUTO: 1.3 %
ERYTHROCYTE [DISTWIDTH] IN BLOOD BY AUTOMATED COUNT: 12.9 % (ref 10–15)
GFR SERPL CREATININE-BSD FRML MDRD: 64 ML/MIN/1.7M2
GLUCOSE SERPL-MCNC: 146 MG/DL (ref 70–99)
HCT VFR BLD AUTO: 37.9 % (ref 35–47)
HGB BLD-MCNC: 12.8 G/DL (ref 11.7–15.7)
LYMPHOCYTES # BLD AUTO: 2.1 10E9/L (ref 0.8–5.3)
LYMPHOCYTES NFR BLD AUTO: 24.7 %
MCH RBC QN AUTO: 31.8 PG (ref 26.5–33)
MCHC RBC AUTO-ENTMCNC: 33.8 G/DL (ref 31.5–36.5)
MCV RBC AUTO: 94 FL (ref 78–100)
MONOCYTES # BLD AUTO: 0.4 10E9/L (ref 0–1.3)
MONOCYTES NFR BLD AUTO: 4.6 %
NEUTROPHILS # BLD AUTO: 5.9 10E9/L (ref 1.6–8.3)
NEUTROPHILS NFR BLD AUTO: 68.9 %
PLATELET # BLD AUTO: 243 10E9/L (ref 150–450)
POTASSIUM SERPL-SCNC: 4.2 MMOL/L (ref 3.4–5.3)
PROT SERPL-MCNC: 7.8 G/DL (ref 6.8–8.8)
RBC # BLD AUTO: 4.02 10E12/L (ref 3.8–5.2)
SODIUM SERPL-SCNC: 139 MMOL/L (ref 133–144)
TSH SERPL DL<=0.005 MIU/L-ACNC: 2.41 MU/L (ref 0.4–4)
VIT B12 SERPL-MCNC: 323 PG/ML (ref 193–986)
WBC # BLD AUTO: 8.6 10E9/L (ref 4–11)

## 2018-07-17 PROCEDURE — 80053 COMPREHEN METABOLIC PANEL: CPT | Performed by: PSYCHIATRY & NEUROLOGY

## 2018-07-17 PROCEDURE — 82607 VITAMIN B-12: CPT | Performed by: PSYCHIATRY & NEUROLOGY

## 2018-07-17 PROCEDURE — 82306 VITAMIN D 25 HYDROXY: CPT | Performed by: PSYCHIATRY & NEUROLOGY

## 2018-07-17 PROCEDURE — 36415 COLL VENOUS BLD VENIPUNCTURE: CPT | Performed by: PSYCHIATRY & NEUROLOGY

## 2018-07-17 PROCEDURE — 84443 ASSAY THYROID STIM HORMONE: CPT | Performed by: PSYCHIATRY & NEUROLOGY

## 2018-07-17 PROCEDURE — 85025 COMPLETE CBC W/AUTO DIFF WBC: CPT | Performed by: PSYCHIATRY & NEUROLOGY

## 2018-07-18 LAB — DEPRECATED CALCIDIOL+CALCIFEROL SERPL-MC: 62 UG/L (ref 20–75)

## 2018-07-26 ENCOUNTER — OFFICE VISIT (OUTPATIENT)
Dept: FAMILY MEDICINE | Facility: CLINIC | Age: 28
End: 2018-07-26
Payer: COMMERCIAL

## 2018-07-26 VITALS
TEMPERATURE: 98 F | BODY MASS INDEX: 47.09 KG/M2 | OXYGEN SATURATION: 98 % | DIASTOLIC BLOOD PRESSURE: 74 MMHG | SYSTOLIC BLOOD PRESSURE: 132 MMHG | RESPIRATION RATE: 14 BRPM | WEIGHT: 293 LBS | HEIGHT: 66 IN | HEART RATE: 56 BPM

## 2018-07-26 DIAGNOSIS — L03.319 CELLULITIS AND ABSCESS OF TRUNK: Primary | ICD-10-CM

## 2018-07-26 DIAGNOSIS — L02.219 CELLULITIS AND ABSCESS OF TRUNK: Primary | ICD-10-CM

## 2018-07-26 PROCEDURE — 99207 ZZC NO CHARGE LOS: CPT | Mod: 25 | Performed by: PHYSICIAN ASSISTANT

## 2018-07-26 PROCEDURE — 10061 I&D ABSCESS COMP/MULTIPLE: CPT | Performed by: PHYSICIAN ASSISTANT

## 2018-07-26 RX ORDER — BUPROPION HYDROCHLORIDE 150 MG/1
150 TABLET ORAL EVERY MORNING
Qty: 90 TABLET | Refills: 1 | Status: SHIPPED | OUTPATIENT
Start: 2018-07-26 | End: 2018-08-21

## 2018-07-26 ASSESSMENT — ENCOUNTER SYMPTOMS
PSYCHIATRIC NEGATIVE: 1
ROS SKIN COMMENTS: AS IN HPI
RESPIRATORY NEGATIVE: 1
NEUROLOGICAL NEGATIVE: 1
CONSTITUTIONAL NEGATIVE: 1
EYES NEGATIVE: 1
CARDIOVASCULAR NEGATIVE: 1
MUSCULOSKELETAL NEGATIVE: 1
GASTROINTESTINAL NEGATIVE: 1

## 2018-07-26 NOTE — PROGRESS NOTES
"  SUBJECTIVE:   Princess TRISTIN Machado is a 27 year old female who presents to clinic today for the following health issues:        Cyst      Duration: Ongoing    Description (location/character/radiation): Cyst on chest    Intensity:  moderate    Accompanying signs and symptoms: Pain    History (similar episodes/previous evaluation): None    Precipitating or alleviating factors: None    Therapies tried and outcome: None     Patient has had similar cysts in the past, however they have never needed to be drained  This one is pain, located on the right side of her chest beneath the right breast  Last night it drained some bloody fluid  She is already taking Bactrim BID    Problem list and histories reviewed & adjusted, as indicated.  Additional history: as documented      Reviewed and updated as needed this visit by clinical staff  Tobacco  Allergies  Meds  Med Hx  Surg Hx  Fam Hx  Soc Hx      Reviewed and updated as needed this visit by Provider         Review of Systems   Constitutional: Negative.    HENT: Negative.    Eyes: Negative.    Respiratory: Negative.    Cardiovascular: Negative.    Gastrointestinal: Negative.    Genitourinary: Negative.    Musculoskeletal: Negative.    Skin:        As in HPI   Neurological: Negative.    Psychiatric/Behavioral: Negative.        OBJECTIVE:     /74 (BP Location: Right arm, Patient Position: Sitting, Cuff Size: Adult Large)  Pulse 56  Temp 98  F (36.7  C) (Tympanic)  Resp 14  Ht 5' 6\" (1.676 m)  Wt (!) 339 lb (153.8 kg)  LMP 07/26/2018 (Exact Date)  SpO2 98%  BMI 54.72 kg/m2  Body mass index is 54.72 kg/(m^2).    Physical Exam   Skin:   Palpable abscess on the right anterior chest beneath the right breast - no drainage, it is tender, no erythema.     PROCEDURE NOTE:  Effected area cleaned with betadine x 3 then wiped away with alcohol.    2 cc of 2 percent lidocaine with epi was used to infiltrate the area with good anesthesia.   Number 11 scalpel was used to " make a stab incision.    Purulent drainage was removed.  Loculations broken up with curved hemostat.   Appropriate wound care dressing applied.    Patient tolerated the procedure well.    No results found for this or any previous visit (from the past 24 hour(s)).    ASSESSMENT/PLAN:       ICD-10-CM    1. Cellulitis and abscess of trunk L03.319 DRAIN SKIN ABSCESS SIMPLE/SINGLE    L02.219       Bandage changes several times today and as needed until drainage stops.  Bactrim will cover MRSA    There are no Patient Instructions on file for this visit.    Rommel Hernandez PA-C  [facility]

## 2018-07-26 NOTE — MR AVS SNAPSHOT
After Visit Summary   7/26/2018    Princess TRISTIN Machado    MRN: [id number]           Patient Information     Date Of Birth          1990        Visit Information        Provider Department      7/26/2018 8:50 AM Anne Hernandez PA-C [facility]        Today's Diagnoses     Cellulitis and abscess of trunk    -  1       Follow-ups after your visit        Your next 10 appointments already scheduled     Jul 31, 2018  3:30 PM CDT   [facility] with [facility]   [facility]    [address]  [facility] [place] [id number] [phone]              Who to contact     If you have questions or need follow up information about today's clinic visit or your schedule please contact [facility] directly at [phone].  Normal or non-critical lab and imaging results will be communicated to you by MyChart, letter or phone within 4 business days after the clinic has received the results. If you do not hear from us within 7 days, please contact the clinic through MyChart or phone. If you have a critical or abnormal lab result, we will notify you by phone as soon as possible.  Submit refill requests through [employer] or call your pharmacy and they will forward the refill request to us. Please allow 3 business days for your refill to be completed.          Additional Information About Your Visit        MyChart Information     [employer] gives you secure access to your electronic health record. If you see a primary care provider, you can also send messages to your care team and make appointments. If you have questions, please call your primary care clinic.  If you do not have a primary care provider, please call [phone] and they will assist you.        Care EveryWhere ID     This is your Care EveryWhere ID. This could be used by other organizations to access your [facility]  "records  [id number]        Your Vitals Were     Pulse Temperature Respirations Height Last Period Pulse Oximetry    56 98  F (36.7  C) (Tympanic) 14 5' 6\" (1.676 m) 07/26/2018 (Exact Date) 98%    BMI (Body Mass Index)                   54.72 kg/m2            Blood Pressure from Last 3 Encounters:   07/26/18 132/74   05/22/18 118/70   05/15/18 116/80    Weight from Last 3 Encounters:   07/26/18 (!) 339 lb (153.8 kg)   05/15/18 (!) 339 lb 14.4 oz (154.2 kg)   04/17/18 (!) 342 lb (155.1 kg)              We Performed the Following     DRAIN SKIN ABSCESS SIMPLE/SINGLE        Primary Care Provider Office Phone # Fax #    Mayra Weems [phone]       [address]  [facility] [address]        Equal Access to Services     [facility] : Hadii aad ku hadamandao Solino, waaxda luqadaha, qaybta kaalmada adeegyada, amos rehman . So [facility] [phone].    ATENCIÓN: Si habla español, tiene a shirley disposición servicios gratuitos de asistencia lingüística. Llame al [phone].    We comply with applicable federal civil rights laws and [place] laws. We do not discriminate on the basis of race, color, national origin, age, disability, sex, sexual orientation, or gender identity.            Thank you!     Thank you for choosing [facility]  for your care. Our goal is always to provide you with excellent care. Hearing back from our patients is one way we can continue to improve our services. Please take a few minutes to complete the written survey that you may receive in the mail after your visit with us. Thank you!             Your Updated Medication List - Protect others around you: Learn how to safely use, store and throw away your medicines at www.disposemymeds.org.          This list is accurate as of 7/26/18  9:08 AM.  Always use your most recent med list.                   Brand Name Dispense Instructions for use Diagnosis    BIOTIN PO      Take " 10,000 mcg by mouth Reported on 5/9/2017        cholecalciferol 93436 units capsule    VITAMIN D3    8 capsule    Take 1 capsule (50,000 Units) by mouth once a week    Vitamin D deficiency       clindamycin 1 % lotion    CLEOCIN T    60 mL    Apply topically 2 times daily    Acne vulgaris       clindamycin 300 MG capsule    CLEOCIN    20 capsule    1 cap po bid    Hidradenitis suppurativa       ECONTRA EZ 1.5 MG Tabs tablet   Generic drug:  levonorgestrel      Take 1.5 mg by mouth daily as needed        escitalopram 20 MG tablet    LEXAPRO    90 tablet    Take 1 tablet (20 mg) by mouth daily Increased dose.    Recurrent major depressive disorder, in remission (H)       gabapentin 300 MG capsule    NEURONTIN    90 capsule    Take 1 capsule (300 mg) by mouth 3 times daily    STACIA (generalized anxiety disorder)       hydroquinone 4 % Crea     56.8 g    APPLY TO AFFECTED AREA(S) EVERY NIGHT AT BEDTIME    Hyperpigmentation of skin       ibuprofen 600 MG tablet    ADVIL/MOTRIN    60 tablet    Take 1 tablet (600 mg) by mouth every 6 hours as needed for moderate pain    Chronic right-sided low back pain without sciatica       mupirocin 2 % ointment    BACTROBAN          norethindrone 0.35 MG per tablet    MICRONOR     Take 1 tablet by mouth daily        oxybutynin 5 MG 24 hr tablet    DITROPAN-XL     Take 5 mg by mouth daily as needed for bladder spasms        PROTONIX PO      Take 20 mg by mouth daily as needed for heartburn        REBIF TITRATION PACK 6X8.8 & 6X22 MCG Sosy   Generic drug:  Interferon Beta-1a      Inject 1 Units Subcutaneous three times a week        rifampin 300 MG capsule    RIFADIN    60 capsule    Take 2 capsules (600 mg) by mouth daily    Hidradenitis suppurativa       spironolactone 50 MG tablet    ALDACTONE    30 tablet    Take 1 tablet (50 mg) by mouth daily    Acne vulgaris

## 2018-08-03 DIAGNOSIS — F43.20 ADJUSTMENT DISORDER, UNSPECIFIED TYPE: ICD-10-CM

## 2018-08-07 ENCOUNTER — OFFICE VISIT (OUTPATIENT)
Dept: INTERNAL MEDICINE | Facility: CLINIC | Age: 28
End: 2018-08-07
Payer: COMMERCIAL

## 2018-08-07 VITALS
HEIGHT: 66 IN | TEMPERATURE: 98.3 F | BODY MASS INDEX: 47.09 KG/M2 | WEIGHT: 293 LBS | DIASTOLIC BLOOD PRESSURE: 72 MMHG | OXYGEN SATURATION: 100 % | SYSTOLIC BLOOD PRESSURE: 122 MMHG | RESPIRATION RATE: 14 BRPM | HEART RATE: 81 BPM

## 2018-08-07 DIAGNOSIS — F33.1 MODERATE EPISODE OF RECURRENT MAJOR DEPRESSIVE DISORDER (H): Primary | ICD-10-CM

## 2018-08-07 DIAGNOSIS — F41.1 GAD (GENERALIZED ANXIETY DISORDER): ICD-10-CM

## 2018-08-07 PROCEDURE — 99213 OFFICE O/P EST LOW 20 MIN: CPT | Performed by: NURSE PRACTITIONER

## 2018-08-07 RX ORDER — HYDROXYZINE HYDROCHLORIDE 25 MG/1
TABLET, FILM COATED ORAL
Qty: 30 TABLET | Refills: 1 | Status: SHIPPED | OUTPATIENT
Start: 2018-08-07 | End: 2018-08-21

## 2018-08-07 RX ORDER — LORAZEPAM 0.5 MG/1
TABLET ORAL
Qty: 20 TABLET | Refills: 0 | OUTPATIENT
Start: 2018-08-07

## 2018-08-07 ASSESSMENT — ANXIETY QUESTIONNAIRES
7. FEELING AFRAID AS IF SOMETHING AWFUL MIGHT HAPPEN: SEVERAL DAYS
3. WORRYING TOO MUCH ABOUT DIFFERENT THINGS: NEARLY EVERY DAY
6. BECOMING EASILY ANNOYED OR IRRITABLE: NEARLY EVERY DAY
2. NOT BEING ABLE TO STOP OR CONTROL WORRYING: MORE THAN HALF THE DAYS
1. FEELING NERVOUS, ANXIOUS, OR ON EDGE: NEARLY EVERY DAY
IF YOU CHECKED OFF ANY PROBLEMS ON THIS QUESTIONNAIRE, HOW DIFFICULT HAVE THESE PROBLEMS MADE IT FOR YOU TO DO YOUR WORK, TAKE CARE OF THINGS AT HOME, OR GET ALONG WITH OTHER PEOPLE: EXTREMELY DIFFICULT
GAD7 TOTAL SCORE: 16
5. BEING SO RESTLESS THAT IT IS HARD TO SIT STILL: SEVERAL DAYS

## 2018-08-07 ASSESSMENT — PATIENT HEALTH QUESTIONNAIRE - PHQ9: 5. POOR APPETITE OR OVEREATING: NEARLY EVERY DAY

## 2018-08-07 NOTE — TELEPHONE ENCOUNTER
I dont see that Dr Weems has ever prescribed this medication for patient before. Sent a my chart message to patient  That she would need to be seen       Last Written Prescription Date:  7/28/18 by Dr Franklin  Last Fill Quantity: 20,  # refills: 0   Last office visit: No previous visit found with prescribing provider:     No CSA on file   Next 5 appointments (look out 90 days)     Aug 21, 2018  3:30 PM CDT   PHYSICAL with Mayra Weems MD   [facility]    [address]  [facility] [id number] [phone]

## 2018-08-07 NOTE — PROGRESS NOTES
SUBJECTIVE:   Princess TRISTIN Machado is a 27 year old female who presents to clinic today for the following health issues:      Depression and Anxiety Follow-Up    Status since last visit: No change    Other associated symptoms:persistent anxiety, no improvement in depression    Complicating factors: switch from lexapro to wellbutrin about 10 days ago.   Not using ativan.      Significant life event: has started with new therapist     Current substance abuse: None    PHQ-9 3/13/2018 5/22/2018 6/5/2018   Total Score 9 - 13   Q9: Suicide Ideation Not at all Not at all Not at all     STACIA-7 SCORE 3/13/2018 5/22/2018 6/5/2018   Total Score - - 11 (moderate anxiety)   Total Score 12 14 11       PHQ-9  English  PHQ-9   Any Language  STACIA-7  Suicide Assessment Five-step Evaluation and Treatment (SAFE-T)    Amount of exercise or physical activity: None    Problems taking medications regularly: No    Medication side effects: none    Diet: regular (no restrictions)            Problem list and histories reviewed & adjusted, as indicated.  Additional history: as documented    Patient Active Problem List   Diagnosis     Optic neuritis, left     Recurrent major depressive episodes (H)     Multiple sclerosis (H)     Bulimia nervosa     Acne vulgaris     Chronic back pain     Freckles     Headache     Other specified health status     Hidradenitis suppurativa     Hypertriglyceridemia     Hidradenitis     Migraine without status migrainosus, not intractable     Biomechanical lesion     Numbness of finger     Somatic dysfunction of cervical region     Spasm     Vitamin D deficiency     Common wart: L t lat hand -      Morbid obesity due to excess calories (H) BMI 50-60     Midline low back pain without sciatica     Sacral pain     Somatic dysfunction of sacral region     Thoracic segment dysfunction     Muscle spasm     Recurrent major depression (H)     STACIA (generalized anxiety disorder)     Past Surgical History:   Procedure Laterality  Date     ENT SURGERY      tubes in ears     MYRINGOTOMY, INSERT TUBE BILATERAL, COMBINED  1990s       Social History   Substance Use Topics     Smoking status: Never Smoker     Smokeless tobacco: Never Used     Alcohol use No     Family History   Problem Relation Age of Onset     Cerebrovascular Disease Mother      heart attack     Cerebrovascular Disease Father      heart attack     Mental Illness Sister      Post-Traumatic Stress Disorder (PTSD) Brother      Mental Illness Brother      Glaucoma No family hx of      Macular Degeneration No family hx of      Diabetes No family hx of      Coronary Artery Disease No family hx of      Hypertension No family hx of      Hyperlipidemia No family hx of      Breast Cancer No family hx of      Colon Cancer No family hx of      Prostate Cancer No family hx of      Other Cancer No family hx of      Depression No family hx of      Anxiety Disorder No family hx of      Substance Abuse No family hx of      Anesthesia Reaction No family hx of      Asthma No family hx of      Osteoperosis No family hx of      Genetic Disorder No family hx of      Thyroid Disease No family hx of      Obesity No family hx of      Unknown/Adopted No family hx of          Current Outpatient Prescriptions   Medication Sig Dispense Refill     BIOTIN PO Take 10,000 mcg by mouth Reported on 5/9/2017       buPROPion (WELLBUTRIN XL) 150 MG 24 hr tablet Take 1 tablet (150 mg) by mouth every morning 90 tablet 1     cholecalciferol (VITAMIN D3) 49548 UNITS capsule Take 1 capsule (50,000 Units) by mouth once a week 8 capsule 0     clindamycin (CLEOCIN T) 1 % lotion Apply topically 2 times daily 60 mL 11     hydroquinone 4 % CREA APPLY TO AFFECTED AREA(S) EVERY NIGHT AT BEDTIME 56.8 g 3     hydrOXYzine (ATARAX) 25 MG tablet Take 1 tab at HS prn 30 tablet 1     ibuprofen (ADVIL/MOTRIN) 600 MG tablet Take 1 tablet (600 mg) by mouth every 6 hours as needed for moderate pain 60 tablet 5     Interferon Beta-1a (REBIF  TITRATION PACK) 6X8.8 & 6X22 MCG SOSY Inject 1 Units Subcutaneous three times a week       levonorgestrel (ECONTRA EZ) 1.5 MG TABS tablet Take 1.5 mg by mouth daily as needed       LORazepam (ATIVAN) 0.5 MG tablet Take 1 tablet (0.5 mg) by mouth every 8 hours as needed for anxiety 20 tablet 0     mupirocin (BACTROBAN) 2 % ointment        norethindrone (MICRONOR) 0.35 MG per tablet Take 1 tablet by mouth daily       oxybutynin (DITROPAN-XL) 5 MG 24 hr tablet Take 5 mg by mouth daily as needed for bladder spasms Patient takes 5 mg in the morning and 10 mg at night.       oxymetazoline HCl (RHOFADE) 1 % cream Apply topically daily Apply a pea-sized amount once daily to the entire face or affected area avoiding the eyes and lips. Wash hands immediately after applying.       Pantoprazole Sodium (PROTONIX PO) Take 20 mg by mouth daily as needed for heartburn       spironolactone (ALDACTONE) 50 MG tablet Take 1 tablet (50 mg) by mouth daily (Patient taking differently: Take 50 mg by mouth daily Patien takes 200 mg daily.) 30 tablet 3     Sulfamethoxazole-Trimethoprim (BACTRIM PO) Take 1 tablet by mouth 2 times daily       LORazepam (ATIVAN) 0.5 MG tablet 1 tablet tid prn (Patient not taking: Reported on 8/7/2018) 20 tablet 0     BP Readings from Last 3 Encounters:   08/07/18 122/72   07/26/18 132/74   05/22/18 118/70    Wt Readings from Last 3 Encounters:   08/07/18 (!) 340 lb (154.2 kg)   07/26/18 (!) 339 lb (153.8 kg)   05/15/18 (!) 339 lb 14.4 oz (154.2 kg)                    Reviewed and updated as needed this visit by clinical staff  Tobacco  Allergies  Meds  Med Hx  Surg Hx  Fam Hx  Soc Hx      Reviewed and updated as needed this visit by Provider         ROS:  Constitutional, HEENT, cardiovascular, pulmonary, gi and gu systems are negative, except as otherwise noted.    OBJECTIVE:     /72 (BP Location: Right arm, Patient Position: Sitting, Cuff Size: Adult Large)  Pulse 81  Temp 98.3  F (36.8  C)  "(Oral)  Resp 14  Ht 5' 6\" (1.676 m)  Wt (!) 340 lb (154.2 kg)  LMP 07/26/2018  SpO2 100%  Breastfeeding? No  BMI 54.88 kg/m2  Body mass index is 54.88 kg/(m^2).  GENERAL: morbidly obese  PSYCH: mentation appears normal, affect normal/bright        ASSESSMENT/PLAN:               ICD-10-CM    1. Moderate episode of recurrent major depressive disorder (H) F33.1 hydrOXYzine (ATARAX) 25 MG tablet   2. STACIA (generalized anxiety disorder) F41.1 hydrOXYzine (ATARAX) 25 MG tablet       Continue wellbutrin  Continue counseling and f/u PCP    Luisana Lopez NP  [facility]    "

## 2018-08-07 NOTE — MR AVS SNAPSHOT
After Visit Summary   8/7/2018    Princess TRISTIN Machado    MRN: [id number]           Patient Information     Date Of Birth          1990        Visit Information        Provider Department      8/7/2018 2:20 PM Luisana Lopez NP [facility]        Today's Diagnoses     Moderate episode of recurrent major depressive disorder (H)    -  1    STACIA (generalized anxiety disorder)           Follow-ups after your visit        Your next 10 appointments already scheduled     Aug 21, 2018  3:30 PM CDT   PHYSICAL with Mayra Weems MD   [facility] ([facility]    [address]  [facility] [id number] [phone]              Who to contact     If you have questions or need follow up information about today's clinic visit or your schedule please contact [facility] directly at [phone].  Normal or non-critical lab and imaging results will be communicated to you by [employer], letter or phone within 4 business days after the clinic has received the results. If you do not hear from us within 7 days, please contact the clinic through [employer] or phone. If you have a critical or abnormal lab result, we will notify you by phone as soon as possible.  Submit refill requests through [employer] or call your pharmacy and they will forward the refill request to us. Please allow 3 business days for your refill to be completed.          Additional Information About Your Visit        MyChart Information     [employer] gives you secure access to your electronic health record. If you see a primary care provider, you can also send messages to your care team and make appointments. If you have questions, please call your primary care clinic.  If you do not have a primary care provider, please call [phone] and they will assist you.        Care EveryWhere ID     This is your Care EveryWhere ID. This could be used by other organizations to access your  "[facility] medical records  [id number]        Your Vitals Were     Pulse Temperature Respirations Height Last Period Pulse Oximetry    81 98.3  F (36.8  C) (Oral) 14 5' 6\" (1.676 m) 07/26/2018 100%    Breastfeeding? BMI (Body Mass Index)                No 54.88 kg/m2           Blood Pressure from Last 3 Encounters:   08/07/18 122/72   07/26/18 132/74   05/22/18 118/70    Weight from Last 3 Encounters:   08/07/18 (!) 340 lb (154.2 kg)   07/26/18 (!) 339 lb (153.8 kg)   05/15/18 (!) 339 lb 14.4 oz (154.2 kg)              Today, you had the following     No orders found for display         Today's Medication Changes          These changes are accurate as of 8/7/18  2:58 PM.  If you have any questions, ask your nurse or doctor.               Start taking these medicines.        Dose/Directions    hydrOXYzine 25 MG tablet   Commonly known as:  ATARAX   Used for:  Moderate episode of recurrent major depressive disorder (H), STACIA (generalized anxiety disorder)   Started by:  Luisana Lopez, NP        Take 1 tab at HS prn   Quantity:  30 tablet   Refills:  1         These medicines have changed or have updated prescriptions.        Dose/Directions    spironolactone 50 MG tablet   Commonly known as:  ALDACTONE   This may have changed:  additional instructions   Used for:  Acne vulgaris        Dose:  50 mg   Take 1 tablet (50 mg) by mouth daily   Quantity:  30 tablet   Refills:  3            Where to get your medicines      These medications were sent to [facility] [address] [address]     Phone:  [phone]     hydrOXYzine 25 MG tablet                Primary Care Provider Office Phone # Fax #    Mayra Weems [phone]       [address]  [facility] [address]        Equal Access to Services     DECLAN DAMIAN AH: Hadii indy popeo Soomaali, waaxda luqadaha, qaybta kaalmada adeariel, amos rehman " ah. So [facility] [phone].    ATENCIÓN: Si obinna cormier, tiene a shirley disposición servicios gratuitos de asistencia lingüística. Micaela gould [phone].    We comply with applicable federal civil rights laws and [place] laws. We do not discriminate on the basis of race, color, national origin, age, disability, sex, sexual orientation, or gender identity.            Thank you!     Thank you for choosing [facility]  for your care. Our goal is always to provide you with excellent care. Hearing back from our patients is one way we can continue to improve our services. Please take a few minutes to complete the written survey that you may receive in the mail after your visit with us. Thank you!             Your Updated Medication List - Protect others around you: Learn how to safely use, store and throw away your medicines at www.disposemymeds.org.          This list is accurate as of 8/7/18  2:58 PM.  Always use your most recent med list.                   Brand Name Dispense Instructions for use Diagnosis    BACTRIM PO      Take 1 tablet by mouth 2 times daily        BIOTIN PO      Take 10,000 mcg by mouth Reported on 5/9/2017        buPROPion 150 MG 24 hr tablet    WELLBUTRIN XL    90 tablet    Take 1 tablet (150 mg) by mouth every morning    Recurrent major depressive disorder, in remission (H)       cholecalciferol 83079 units capsule    VITAMIN D3    8 capsule    Take 1 capsule (50,000 Units) by mouth once a week    Vitamin D deficiency       clindamycin 1 % lotion    CLEOCIN T    60 mL    Apply topically 2 times daily    Acne vulgaris       ECONTRA EZ 1.5 MG Tabs tablet   Generic drug:  levonorgestrel      Take 1.5 mg by mouth daily as needed        hydroquinone 4 % Crea     56.8 g    APPLY TO AFFECTED AREA(S) EVERY NIGHT AT BEDTIME    Hyperpigmentation of skin       hydrOXYzine 25 MG tablet    ATARAX    30 tablet    Take 1 tab at HS prn    Moderate episode of recurrent major depressive disorder (H),  STACIA (generalized anxiety disorder)       ibuprofen 600 MG tablet    ADVIL/MOTRIN    60 tablet    Take 1 tablet (600 mg) by mouth every 6 hours as needed for moderate pain    Chronic right-sided low back pain without sciatica       * LORazepam 0.5 MG tablet    ATIVAN    20 tablet    Take 1 tablet (0.5 mg) by mouth every 8 hours as needed for anxiety    Adjustment disorder, unspecified type       * LORazepam 0.5 MG tablet    ATIVAN    20 tablet    1 tablet tid prn    Adjustment disorder, unspecified type       mupirocin 2 % ointment    BACTROBAN          norethindrone 0.35 MG per tablet    MICRONOR     Take 1 tablet by mouth daily        oxybutynin 5 MG 24 hr tablet    DITROPAN-XL     Take 5 mg by mouth daily as needed for bladder spasms Patient takes 5 mg in the morning and 10 mg at night.        PROTONIX PO      Take 20 mg by mouth daily as needed for heartburn        REBIF TITRATION PACK 6X8.8 & 6X22 MCG Sosy   Generic drug:  Interferon Beta-1a      Inject 1 Units Subcutaneous three times a week        RHOFADE 1 % cream   Generic drug:  oxymetazoline HCl      Apply topically daily Apply a pea-sized amount once daily to the entire face or affected area avoiding the eyes and lips. Wash hands immediately after applying.        spironolactone 50 MG tablet    ALDACTONE    30 tablet    Take 1 tablet (50 mg) by mouth daily    Acne vulgaris       * Notice:  This list has 2 medication(s) that are the same as other medications prescribed for you. Read the directions carefully, and ask your doctor or other care provider to review them with you.

## 2018-08-08 ASSESSMENT — PATIENT HEALTH QUESTIONNAIRE - PHQ9: SUM OF ALL RESPONSES TO PHQ QUESTIONS 1-9: 20

## 2018-08-08 ASSESSMENT — ANXIETY QUESTIONNAIRES: GAD7 TOTAL SCORE: 16

## 2018-08-16 DIAGNOSIS — F43.20 ADJUSTMENT DISORDER, UNSPECIFIED TYPE: ICD-10-CM

## 2018-08-16 RX ORDER — LORAZEPAM 0.5 MG/1
0.5 TABLET ORAL EVERY 8 HOURS PRN
Qty: 20 TABLET | Refills: 0 | OUTPATIENT
Start: 2018-08-16

## 2018-08-16 NOTE — TELEPHONE ENCOUNTER
Routing refill request to provider for review/approval because:  Drug not on the [facility] refill protocol       Routing to PCP.

## 2018-08-16 NOTE — TELEPHONE ENCOUNTER
Requested Prescriptions   Pending Prescriptions Disp Refills     LORazepam (ATIVAN) 0.5 MG tablet 20 tablet 0     Sig: Take 1 tablet (0.5 mg) by mouth every 8 hours as needed for anxiety    There is no refill protocol information for this order        Last Written Prescription Date:  7/28/2018  Last Fill Quantity: 20,  # refills: 0   Last office visit: 8/7/2018 with prescribing provider:     Future Office Visit:   Next 5 appointments (look out 90 days)     Aug 21, 2018  3:30 PM CDT   [facility]    [address] [place]  [facility] [address]   [phone]                 STACIA-7 SCORE 5/22/2018 6/5/2018 8/7/2018   Total Score - 11 (moderate anxiety) -   Total Score 14 11 16

## 2018-08-21 ENCOUNTER — TELEPHONE (OUTPATIENT)
Dept: INTERNAL MEDICINE | Facility: CLINIC | Age: 28
End: 2018-08-21

## 2018-08-21 ENCOUNTER — OFFICE VISIT (OUTPATIENT)
Dept: INTERNAL MEDICINE | Facility: CLINIC | Age: 28
End: 2018-08-21
Payer: COMMERCIAL

## 2018-08-21 VITALS
SYSTOLIC BLOOD PRESSURE: 128 MMHG | RESPIRATION RATE: 20 BRPM | HEIGHT: 65 IN | HEART RATE: 83 BPM | DIASTOLIC BLOOD PRESSURE: 72 MMHG | WEIGHT: 293 LBS | BODY MASS INDEX: 48.82 KG/M2 | TEMPERATURE: 98.1 F | OXYGEN SATURATION: 100 %

## 2018-08-21 DIAGNOSIS — F33.9 RECURRENT MAJOR DEPRESSIVE EPISODES (H): Primary | ICD-10-CM

## 2018-08-21 DIAGNOSIS — E66.01 MORBID OBESITY DUE TO EXCESS CALORIES (H): ICD-10-CM

## 2018-08-21 DIAGNOSIS — F19.981 DRUG-INDUCED SEXUAL DYSFUNCTION (H): ICD-10-CM

## 2018-08-21 DIAGNOSIS — R73.03 PREDIABETES: ICD-10-CM

## 2018-08-21 PROCEDURE — 99395 PREV VISIT EST AGE 18-39: CPT | Performed by: INTERNAL MEDICINE

## 2018-08-21 PROCEDURE — 99213 OFFICE O/P EST LOW 20 MIN: CPT | Mod: 25 | Performed by: INTERNAL MEDICINE

## 2018-08-21 RX ORDER — GINSENG 100 MG
2 CAPSULE ORAL DAILY
COMMUNITY
End: 2019-05-28

## 2018-08-21 RX ORDER — SILDENAFIL 25 MG/1
25 TABLET, FILM COATED ORAL DAILY PRN
Qty: 12 TABLET | Refills: 1 | Status: SHIPPED | OUTPATIENT
Start: 2018-08-21 | End: 2018-10-01

## 2018-08-21 RX ORDER — ASCORBIC ACID 125 MG
1 TABLET,CHEWABLE ORAL DAILY
COMMUNITY

## 2018-08-21 RX ORDER — AMANTADINE HYDROCHLORIDE 100 MG/1
100 CAPSULE, GELATIN COATED ORAL 2 TIMES DAILY
COMMUNITY
End: 2018-12-04

## 2018-08-21 RX ORDER — ESCITALOPRAM OXALATE 20 MG/1
20 TABLET ORAL DAILY
Qty: 60 TABLET | Refills: 1 | Status: SHIPPED | OUTPATIENT
Start: 2018-08-21 | End: 2018-10-01

## 2018-08-21 ASSESSMENT — PATIENT HEALTH QUESTIONNAIRE - PHQ9
SUM OF ALL RESPONSES TO PHQ QUESTIONS 1-9: 16
10. IF YOU CHECKED OFF ANY PROBLEMS, HOW DIFFICULT HAVE THESE PROBLEMS MADE IT FOR YOU TO DO YOUR WORK, TAKE CARE OF THINGS AT HOME, OR GET ALONG WITH OTHER PEOPLE: EXTREMELY DIFFICULT
SUM OF ALL RESPONSES TO PHQ QUESTIONS 1-9: 16

## 2018-08-21 NOTE — NURSING NOTE
"Vital signs:  Temp: 98.1  F (36.7  C) Temp src: Oral BP: 128/72 Pulse: 83   Resp: 20 SpO2: 100 %     Height: 5' 5.25\" (165.7 cm) Weight: (!) 344 lb 12.8 oz (156.4 kg)  Estimated body mass index is 56.94 kg/(m^2) as calculated from the following:    Height as of this encounter: 5' 5.25\" (1.657 m).    Weight as of this encounter: 344 lb 12.8 oz (156.4 kg).        "

## 2018-08-21 NOTE — TELEPHONE ENCOUNTER
Dolly, pharmacist with [facility], calls wanting to confirm that Dr. Weems wants Viagra ordered for patient as there aren't any FDA regulated clinical trials indicated for female use.    Provider please review and advise. Thanks.

## 2018-08-21 NOTE — TELEPHONE ENCOUNTER
See OV from today.  This was only meant as last resort given she failed all other alternatives.  Some research studies have shown efficacy in women on SSRIs who have these side effects.  Informed patient of all this but she requested as prn

## 2018-08-21 NOTE — MR AVS SNAPSHOT
After Visit Summary   8/21/2018    Princess TRISTIN Machado    MRN: [id number]           Patient Information     Date Of Birth          1990        Visit Information        Provider Department      8/21/2018 3:30 PM Mayra Weems MD [facility]        Today's Diagnoses     Recurrent major depressive episodes (H)    -  1    Drug-induced sexual dysfunction (H)        Morbid obesity due to excess calories (H) BMI 50-60        Prediabetes          Care Instructions      Preventive Health Recommendations  Female Ages 26 - 39  Yearly exam:   See your health care provider every year in order to    Review health changes.     Discuss preventive care.      Review your medicines if you your doctor has prescribed any.    Until age 30: Get a Pap test every three years (more often if you have had an abnormal result).    After age 30: Talk to your doctor about whether you should have a Pap test every 3 years or have a Pap test with HPV screening every 5 years.   You do not need a Pap test if your uterus was removed (hysterectomy) and you have not had cancer.  You should be tested each year for STDs (sexually transmitted diseases), if you're at risk.   Talk to your provider about how often to have your cholesterol checked.  If you are at risk for diabetes, you should have a diabetes test (fasting glucose).  Shots: Get a flu shot each year. Get a tetanus shot every 10 years.   Nutrition:     Eat at least 5 servings of fruits and vegetables each day.    Eat whole-grain bread, whole-wheat pasta and brown rice instead of white grains and rice.    Get adequate Calcium and Vitamin D.     Lifestyle    Exercise at least 150 minutes a week (30 minutes a day, 5 days of the week). This will help you control your weight and prevent disease.    Limit alcohol to one drink per day.    No smoking.     Wear sunscreen to prevent skin cancer.    See your dentist every six months for an exam and cleaning.             "Follow-ups after your visit        Who to contact     If you have questions or need follow up information about today's clinic visit or your schedule please contact [facility] directly at [phone].  Normal or non-critical lab and imaging results will be communicated to you by MyChart, letter or phone within 4 business days after the clinic has received the results. If you do not hear from us within 7 days, please contact the clinic through [employer] or phone. If you have a critical or abnormal lab result, we will notify you by phone as soon as possible.  Submit refill requests through [employer] or call your pharmacy and they will forward the refill request to us. Please allow 3 business days for your refill to be completed.          Additional Information About Your Visit        [employer] Information     [employer] gives you secure access to your electronic health record. If you see a primary care provider, you can also send messages to your care team and make appointments. If you have questions, please call your primary care clinic.  If you do not have a primary care provider, please call [phone] and they will assist you.        Care EveryWhere ID     This is your Care EveryWhere ID. This could be used by other organizations to access your [place] medical records  [id number]        Your Vitals Were     Pulse Temperature Respirations Height Last Period Pulse Oximetry    83 98.1  F (36.7  C) (Oral) 20 5' 5.25\" (1.657 m) 07/26/2018 100%    BMI (Body Mass Index)                   56.94 kg/m2            Blood Pressure from Last 3 Encounters:   08/21/18 128/72   08/07/18 122/72   07/26/18 132/74    Weight from Last 3 Encounters:   08/21/18 (!) 344 lb 12.8 oz (156.4 kg)   08/07/18 (!) 340 lb (154.2 kg)   07/26/18 (!) 339 lb (153.8 kg)              We Performed the Following     Hemoglobin A1c          Today's Medication Changes          These changes are accurate as of 8/21/18  5:26 PM.  If you have " any questions, ask your nurse or doctor.               Start taking these medicines.        Dose/Directions    escitalopram 20 MG tablet   Commonly known as:  LEXAPRO   Used for:  Recurrent major depressive episodes (H)   Started by:  Mayra Weems MD        Dose:  20 mg   Take 1 tablet (20 mg) by mouth daily   Quantity:  60 tablet   Refills:  1       sildenafil 25 MG tablet   Commonly known as:  VIAGRA   Used for:  Drug-induced sexual dysfunction (H)   Started by:  Mayra Weems MD        Dose:  25 mg   Take 1 tablet (25 mg) by mouth daily as needed 30 min to 4 hrs before sex. Do not use with nitroglycerin, terazosin or doxazosin.   Quantity:  12 tablet   Refills:  1         These medicines have changed or have updated prescriptions.        Dose/Directions    spironolactone 50 MG tablet   Commonly known as:  ALDACTONE   This may have changed:  additional instructions   Used for:  Acne vulgaris        Dose:  50 mg   Take 1 tablet (50 mg) by mouth daily   Quantity:  30 tablet   Refills:  3            Where to get your medicines      These medications were sent to [address] [address]     Phone:  [phone]     escitalopram 20 MG tablet    sildenafil 25 MG tablet                Primary Care Provider Office Phone # Fax #    Myara Weems [phone]       [address]  [facility] [address]        Equal Access to Services     [facility]: Hadcrissy tiwari hadasho Solino, waaxda luqadaha, qaybta kaalmada adeariel, amos ramos. So [facility] [phone].    ATENCIÓN: Si habla español, tiene a shirley disposición servicios gratuitos de asistencia lingüística. Micaela al [phone].    We comply with applicable federal civil rights laws and [place] laws. We do not discriminate on the basis of race, color, national origin, age, disability, sex, sexual orientation, or gender identity.             Thank you!     Thank you for choosing [facility]  for your care. Our goal is always to provide you with excellent care. Hearing back from our patients is one way we can continue to improve our services. Please take a few minutes to complete the written survey that you may receive in the mail after your visit with us. Thank you!             Your Updated Medication List - Protect others around you: Learn how to safely use, store and throw away your medicines at www.disposemymeds.org.          This list is accurate as of 8/21/18  5:26 PM.  Always use your most recent med list.                   Brand Name Dispense Instructions for use Diagnosis    amantadine 100 MG capsule    SYMMETREL     Take 100 mg by mouth 2 times daily        B-12 5000 MCG Caps      Take 1 tablet by mouth daily        BACTRIM PO      Take 1 tablet by mouth 2 times daily        BIOTIN PO      Take 10,000 mcg by mouth Reported on 5/9/2017        cholecalciferol 08187 units capsule    VITAMIN D3    8 capsule    Take 1 capsule (50,000 Units) by mouth once a week    Vitamin D deficiency       ECONTRA EZ 1.5 MG Tabs tablet   Generic drug:  levonorgestrel      Take 1.5 mg by mouth daily as needed        escitalopram 20 MG tablet    LEXAPRO    60 tablet    Take 1 tablet (20 mg) by mouth daily    Recurrent major depressive episodes (H)       ibuprofen 600 MG tablet    ADVIL/MOTRIN    60 tablet    Take 1 tablet (600 mg) by mouth every 6 hours as needed for moderate pain    Chronic right-sided low back pain without sciatica       MAGNESIUM GLYCINATE PLUS PO      Take 1 tablet by mouth daily        mupirocin 2 % ointment    BACTROBAN          norethindrone 0.35 MG per tablet    MICRONOR     Take 1 tablet by mouth daily        oxybutynin 5 MG 24 hr tablet    DITROPAN-XL     Take 5 mg by mouth daily as needed for bladder spasms Patient takes 5 mg in the morning and 10 mg at night.        PROTONIX PO      Take 20 mg by mouth daily as needed  for heartburn        REBIF TITRATION PACK 6X8.8 & 6X22 MCG Sosy   Generic drug:  Interferon Beta-1a      Inject 1 Units Subcutaneous three times a week        sildenafil 25 MG tablet    VIAGRA    12 tablet    Take 1 tablet (25 mg) by mouth daily as needed 30 min to 4 hrs before sex. Do not use with nitroglycerin, terazosin or doxazosin.    Drug-induced sexual dysfunction (H)       spironolactone 50 MG tablet    ALDACTONE    30 tablet    Take 1 tablet (50 mg) by mouth daily    Acne vulgaris       SUPER B COMPLEX PO      Take 1 tablet by mouth daily        zinc 50 MG Tabs      Take 2 tablets by mouth daily
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
## 2018-08-22 ASSESSMENT — PATIENT HEALTH QUESTIONNAIRE - PHQ9: SUM OF ALL RESPONSES TO PHQ QUESTIONS 1-9: 16

## 2018-08-25 DIAGNOSIS — R73.03 PREDIABETES: ICD-10-CM

## 2018-08-25 LAB — HBA1C MFR BLD: 5.7 % (ref 0–5.6)

## 2018-08-25 PROCEDURE — 36415 COLL VENOUS BLD VENIPUNCTURE: CPT | Performed by: INTERNAL MEDICINE

## 2018-08-25 PROCEDURE — 83036 HEMOGLOBIN GLYCOSYLATED A1C: CPT | Performed by: INTERNAL MEDICINE

## 2018-10-01 ENCOUNTER — E-VISIT (OUTPATIENT)
Dept: INTERNAL MEDICINE | Facility: CLINIC | Age: 28
End: 2018-10-01
Payer: COMMERCIAL

## 2018-10-01 DIAGNOSIS — F33.9 RECURRENT MAJOR DEPRESSIVE EPISODES (H): Primary | ICD-10-CM

## 2018-10-01 DIAGNOSIS — F33.9 RECURRENT MAJOR DEPRESSIVE EPISODES (H): ICD-10-CM

## 2018-10-01 RX ORDER — ESCITALOPRAM OXALATE 20 MG/1
40 TABLET ORAL DAILY
Qty: 60 TABLET | Refills: 1
Start: 2018-10-01 | End: 2018-11-26

## 2018-10-01 ASSESSMENT — PATIENT HEALTH QUESTIONNAIRE - PHQ9
SUM OF ALL RESPONSES TO PHQ QUESTIONS 1-9: 10
SUM OF ALL RESPONSES TO PHQ QUESTIONS 1-9: 10
10. IF YOU CHECKED OFF ANY PROBLEMS, HOW DIFFICULT HAVE THESE PROBLEMS MADE IT FOR YOU TO DO YOUR WORK, TAKE CARE OF THINGS AT HOME, OR GET ALONG WITH OTHER PEOPLE: VERY DIFFICULT

## 2018-10-01 ASSESSMENT — ANXIETY QUESTIONNAIRES
7. FEELING AFRAID AS IF SOMETHING AWFUL MIGHT HAPPEN: MORE THAN HALF THE DAYS
GAD7 TOTAL SCORE: 14
2. NOT BEING ABLE TO STOP OR CONTROL WORRYING: SEVERAL DAYS
3. WORRYING TOO MUCH ABOUT DIFFERENT THINGS: NEARLY EVERY DAY
1. FEELING NERVOUS, ANXIOUS, OR ON EDGE: MORE THAN HALF THE DAYS
7. FEELING AFRAID AS IF SOMETHING AWFUL MIGHT HAPPEN: MORE THAN HALF THE DAYS
GAD7 TOTAL SCORE: 14
5. BEING SO RESTLESS THAT IT IS HARD TO SIT STILL: NOT AT ALL
GAD7 TOTAL SCORE: 14
6. BECOMING EASILY ANNOYED OR IRRITABLE: NEARLY EVERY DAY
4. TROUBLE RELAXING: NEARLY EVERY DAY

## 2018-10-01 NOTE — PROGRESS NOTES
Based on E-visit, have increased patient to 40mg of the lexapro given this is a more stressful time for her and she is finding herself more irritable.  In addition, she will get into therapy bi-weekly which will help her through this time as well.    Although most doses are 20mg daily there are other instances when dose of 40mg may be used.  I will eventually decrease her back to 20mg when this time passes.     Mayra Weems MD

## 2018-10-02 ASSESSMENT — PATIENT HEALTH QUESTIONNAIRE - PHQ9: SUM OF ALL RESPONSES TO PHQ QUESTIONS 1-9: 10

## 2018-10-02 ASSESSMENT — ANXIETY QUESTIONNAIRES: GAD7 TOTAL SCORE: 14

## 2018-10-24 DIAGNOSIS — E55.9 VITAMIN D DEFICIENCY: ICD-10-CM

## 2018-10-25 NOTE — TELEPHONE ENCOUNTER
Refill request for Vitamin D 50,000. This is high dose.     Last OV 8/21/18      Vitamin D Deficiency screening 62  20 - 75 ug/L Final 07/17/2018 10:36 AM 51

## 2018-11-06 ENCOUNTER — TRANSFERRED RECORDS (OUTPATIENT)
Dept: HEALTH INFORMATION MANAGEMENT | Facility: CLINIC | Age: 28
End: 2018-11-06

## 2018-11-08 DIAGNOSIS — M99.02 THORACIC SEGMENT DYSFUNCTION: Primary | ICD-10-CM

## 2018-11-08 DIAGNOSIS — M62.838 MUSCLE SPASM: ICD-10-CM

## 2018-11-08 DIAGNOSIS — G35 MULTIPLE SCLEROSIS (H): ICD-10-CM

## 2018-11-27 ENCOUNTER — OFFICE VISIT (OUTPATIENT)
Dept: INTERNAL MEDICINE | Facility: CLINIC | Age: 28
End: 2018-11-27
Payer: COMMERCIAL

## 2018-11-27 VITALS
OXYGEN SATURATION: 98 % | TEMPERATURE: 98.7 F | DIASTOLIC BLOOD PRESSURE: 70 MMHG | BODY MASS INDEX: 55.32 KG/M2 | HEART RATE: 68 BPM | RESPIRATION RATE: 14 BRPM | SYSTOLIC BLOOD PRESSURE: 122 MMHG | WEIGHT: 293 LBS

## 2018-11-27 DIAGNOSIS — L30.9 ECZEMA, UNSPECIFIED TYPE: ICD-10-CM

## 2018-11-27 DIAGNOSIS — N92.6 IRREGULAR MENSES: Primary | ICD-10-CM

## 2018-11-27 LAB — BETA HCG QUAL IFA URINE: NEGATIVE

## 2018-11-27 PROCEDURE — 84703 CHORIONIC GONADOTROPIN ASSAY: CPT | Performed by: PHYSICIAN ASSISTANT

## 2018-11-27 PROCEDURE — 99213 OFFICE O/P EST LOW 20 MIN: CPT | Performed by: PHYSICIAN ASSISTANT

## 2018-11-27 NOTE — PROGRESS NOTES
SUBJECTIVE:   Princess TRISTIN Machado is a 28 year old female who presents to clinic today for the following health issues:      Rash  Onset: 1 week    Description:   Location: Bilateral breast tops- chest.  No rash noted  Some hx of winter itch, but not having any issues on the legs  Does point out a lesion on the right wrist though   Character: No visible rash  Itching (Pruritis): YES    Progression of Symptoms:  same    Accompanying Signs & Symptoms:  Fever: no   Body aches or joint pain: no   Sore throat symptoms: no   Recent cold symptoms: no     History:   Previous similar rash: no     Precipitating factors:   Exposure to similar rash: no   New exposures: None   Recent travel: no   No new medications  Denies any all over itching.  No jaundice      Alleviating factors:  None    Therapies Tried and outcome: None    Notes started BCP volodymyr late this month and did have a menstrual cycle.   Usually without any bleeding on [facility] ( [facility])         -------------------------------------    Problem list and histories reviewed & adjusted, as indicated.  Additional history: as documented    Labs reviewed in EPIC    Reviewed and updated as needed this visit by clinical staff       Reviewed and updated as needed this visit by Provider  Allergies  Meds         ROS:  Constitutional, HEENT, cardiovascular, pulmonary, gi and gu systems are negative, except as otherwise noted.    OBJECTIVE:     /70 (BP Location: Left arm, Patient Position: Chair, Cuff Size: Adult Regular)  Pulse 68  Temp 98.7  F (37.1  C) (Oral)  Resp 14  Wt (!) 335 lb (152 kg)  LMP 11/06/2018 (Within Days)  SpO2 98%  BMI 55.32 kg/m2  Body mass index is 55.32 kg/(m^2).  GENERAL: healthy, alert and no distress  RESP: lungs clear to auscultation - no rales, rhonchi or wheezes  CV: regular rate and rhythm, normal S1 S2, no S3 or S4, no murmur, click or rub, no peripheral edema and peripheral pulses strong  SKIN: no suspicious lesions or  rashes of the chest,  Wrist on the right with classic rough raised eczema patch     Diagnostic Test Results:  Results for orders placed or performed in visit on 11/27/18 (from the past 24 hour(s))   Beta HCG qual IFA urine   Result Value Ref Range    Beta HCG Qual IFA Urine Negative NEG^Negative          ASSESSMENT/PLAN:             1. Irregular menses    - Beta HCG qual IFA urine    2. Eczema, unspecified type  Reviewed skin cares  Recommend otc hydrocortisone to start on area of the right wrist.    Regarding itching feeling anterior chest- monitor if all over itching of the body consider labs.  If any rash developes on the chest then recheck with PCP.              Margy Villalobos PA-C  [facility]

## 2018-11-27 NOTE — MR AVS SNAPSHOT
After Visit Summary   11/27/2018    Princess TRISTIN Machado    MRN: [id number]           Patient Information     Date Of Birth          1990        Visit Information        Provider Department      11/27/2018 3:40 PM Margy Villalobos PA-C [facility]        Today's Diagnoses     Irregular menses    -  1    Eczema, unspecified type           Follow-ups after your visit        Your next 10 appointments already scheduled     Dec 04, 2018 10:00 AM CST   MyChart Long with [facility] Weems [facility]   [facility]    [address]  [facility] [id number] [phone]              Who to contact     If you have questions or need follow up information about today's clinic visit or your schedule please contact [facility] directly at [phone].  Normal or non-critical lab and imaging results will be communicated to you by MyChart, letter or phone within 4 business days after the clinic has received the results. If you do not hear from us within 7 days, please contact the clinic through MyChart or phone. If you have a critical or abnormal lab result, we will notify you by phone as soon as possible.  Submit refill requests through [email or web address] or call your pharmacy and they will forward the refill request to us. Please allow 3 business days for your refill to be completed.          Additional Information About Your Visit        MyChart Information     [facility] gives you secure access to your electronic health record. If you see a primary care provider, you can also send messages to your care team and make appointments. If you have questions, please call your primary care clinic.  If you do not have a primary care provider, please call [phone] and they will assist you.        Care EveryWhere ID     This is your Care EveryWhere ID. This could be used by other organizations to access your [facility]  records  [id number]        Your Vitals Were     Pulse Temperature Respirations Last Period Pulse Oximetry BMI (Body Mass Index)    68 98.7  F (37.1  C) (Oral) 14 11/06/2018 (Within Days) 98% 55.32 kg/m2       Blood Pressure from Last 3 Encounters:   11/27/18 122/70   08/21/18 128/72   08/07/18 122/72    Weight from Last 3 Encounters:   11/27/18 (!) 335 lb (152 kg)   08/21/18 (!) 344 lb 12.8 oz (156.4 kg)   08/07/18 (!) 340 lb (154.2 kg)              We Performed the Following     Beta HCG qual IFA urine          Today's Medication Changes          These changes are accurate as of 11/27/18  4:48 PM.  If you have any questions, ask your nurse or doctor.               These medicines have changed or have updated prescriptions.        Dose/Directions    spironolactone 50 MG tablet   Commonly known as:  ALDACTONE   This may have changed:  additional instructions   Used for:  Acne vulgaris        Dose:  50 mg   Take 1 tablet (50 mg) by mouth daily   Quantity:  30 tablet   Refills:  3                Primary Care Provider Office Phone # Fax #    Mayra Weems [phone]       [address]  [facility] [address]        Equal Access to Services     DECLAN DAMIAN AH: Hadii indy popeo Sodaoali, waaxda luqadaha, qaybta kaalmada adeegyada, amos ramos. So [facility] [phone].    ATENCIÓN: Si habla español, tiene a shirley disposición servicios gratuitos de asistencia lingüística. Llame al [phone].    We comply with applicable federal civil rights laws and [place] laws. We do not discriminate on the basis of race, color, national origin, age, disability, sex, sexual orientation, or gender identity.            Thank you!     Thank you for choosing [facility]  for your care. Our goal is always to provide you with excellent care. Hearing back from our patients is one way we can continue to improve our services. Please take a few minutes to complete the  written survey that you may receive in the mail after your visit with us. Thank you!             Your Updated Medication List - Protect others around you: Learn how to safely use, store and throw away your medicines at www.disposemymeds.org.          This list is accurate as of 11/27/18  4:48 PM.  Always use your most recent med list.                   Brand Name Dispense Instructions for use Diagnosis    amantadine 100 MG capsule    SYMMETREL     Take 100 mg by mouth 2 times daily        B-12 5000 MCG Caps      Take 1 tablet by mouth daily        BACTRIM PO      Take 1 tablet by mouth 2 times daily        BIOTIN PO      Take 10,000 mcg by mouth Reported on 5/9/2017        ECONTRA EZ 1.5 MG Tabs tablet   Generic drug:  levonorgestrel      Take 1.5 mg by mouth daily as needed        escitalopram 20 MG tablet    LEXAPRO    60 tablet    Take 2 tablets (40 mg) by mouth daily    Recurrent major depressive episodes (H)       ibuprofen 600 MG tablet    ADVIL/MOTRIN    60 tablet    Take 1 tablet (600 mg) by mouth every 6 hours as needed for moderate pain    Chronic right-sided low back pain without sciatica       MAGNESIUM GLYCINATE PLUS PO      Take 1 tablet by mouth daily        mupirocin 2 % external ointment    BACTROBAN          norethindrone 0.35 MG per tablet    MICRONOR     Take 1 tablet by mouth daily        oxybutynin ER 5 MG 24 hr tablet    DITROPAN-XL     Take 5 mg by mouth daily as needed for bladder spasms Patient takes 5 mg in the morning and 10 mg at night.        PROTONIX PO      Take 20 mg by mouth daily as needed for heartburn        REBIF TITRATION PACK 6X8.8 & 6X22 MCG Sosy   Generic drug:  Interferon Beta-1a      Inject 1 Units Subcutaneous three times a week        spironolactone 50 MG tablet    ALDACTONE    30 tablet    Take 1 tablet (50 mg) by mouth daily    Acne vulgaris       SUPER B COMPLEX PO      Take 1 tablet by mouth daily        vitamin D3 82063 units capsule    CHOLECALCIFEROL    8 capsule     Take 1 capsule (50,000 Units) by mouth once a week    Vitamin D deficiency       zinc 50 MG Tabs      Take 2 tablets by mouth daily

## 2018-12-04 ENCOUNTER — OFFICE VISIT (OUTPATIENT)
Dept: INTERNAL MEDICINE | Facility: CLINIC | Age: 28
End: 2018-12-04
Payer: COMMERCIAL

## 2018-12-04 VITALS
OXYGEN SATURATION: 99 % | HEIGHT: 65 IN | HEART RATE: 91 BPM | TEMPERATURE: 98.4 F | WEIGHT: 293 LBS | DIASTOLIC BLOOD PRESSURE: 78 MMHG | BODY MASS INDEX: 48.82 KG/M2 | RESPIRATION RATE: 18 BRPM | SYSTOLIC BLOOD PRESSURE: 124 MMHG

## 2018-12-04 DIAGNOSIS — R73.03 PREDIABETES: ICD-10-CM

## 2018-12-04 DIAGNOSIS — F33.9 RECURRENT MAJOR DEPRESSIVE EPISODES (H): Primary | ICD-10-CM

## 2018-12-04 DIAGNOSIS — R41.840 DIFFICULTY CONCENTRATING: ICD-10-CM

## 2018-12-04 DIAGNOSIS — E55.9 VITAMIN D DEFICIENCY: ICD-10-CM

## 2018-12-04 LAB — HBA1C MFR BLD: 5.8 % (ref 0–5.6)

## 2018-12-04 PROCEDURE — 36415 COLL VENOUS BLD VENIPUNCTURE: CPT | Performed by: INTERNAL MEDICINE

## 2018-12-04 PROCEDURE — 99214 OFFICE O/P EST MOD 30 MIN: CPT | Performed by: INTERNAL MEDICINE

## 2018-12-04 PROCEDURE — 83036 HEMOGLOBIN GLYCOSYLATED A1C: CPT | Performed by: INTERNAL MEDICINE

## 2018-12-04 PROCEDURE — 82306 VITAMIN D 25 HYDROXY: CPT | Performed by: INTERNAL MEDICINE

## 2018-12-04 ASSESSMENT — ANXIETY QUESTIONNAIRES
3. WORRYING TOO MUCH ABOUT DIFFERENT THINGS: NEARLY EVERY DAY
7. FEELING AFRAID AS IF SOMETHING AWFUL MIGHT HAPPEN: MORE THAN HALF THE DAYS
GAD7 TOTAL SCORE: 14
IF YOU CHECKED OFF ANY PROBLEMS ON THIS QUESTIONNAIRE, HOW DIFFICULT HAVE THESE PROBLEMS MADE IT FOR YOU TO DO YOUR WORK, TAKE CARE OF THINGS AT HOME, OR GET ALONG WITH OTHER PEOPLE: SOMEWHAT DIFFICULT
1. FEELING NERVOUS, ANXIOUS, OR ON EDGE: NEARLY EVERY DAY
6. BECOMING EASILY ANNOYED OR IRRITABLE: MORE THAN HALF THE DAYS
2. NOT BEING ABLE TO STOP OR CONTROL WORRYING: SEVERAL DAYS
5. BEING SO RESTLESS THAT IT IS HARD TO SIT STILL: NOT AT ALL

## 2018-12-04 ASSESSMENT — PATIENT HEALTH QUESTIONNAIRE - PHQ9
SUM OF ALL RESPONSES TO PHQ QUESTIONS 1-9: 11
5. POOR APPETITE OR OVEREATING: NEARLY EVERY DAY

## 2018-12-04 ASSESSMENT — PAIN SCALES - GENERAL: PAINLEVEL: MILD PAIN (3)

## 2018-12-04 NOTE — MR AVS SNAPSHOT
After Visit Summary   12/4/2018    Princess TRISTIN Machado    MRN: [id number]           Patient Information     Date Of Birth          1990        Visit Information        Provider Department      12/4/2018 10:00 AM Mayra Weems MD [facility]        Today's Diagnoses     Recurrent major depressive episodes (H)    -  1    Difficulty concentrating        Prediabetes        Vitamin D deficiency           Follow-ups after your visit        Additional Services     MENTAL HEALTH REFERRAL  - Adult; Psychiatry and Medication Management; Psychiatry; [facility]: [facility] [phone].  Medication management & future refills will be returned to G PCP upon completion of evaluation; We brian...       All scheduling is subject to the client's specific insurance plan & benefits, provider/location availability, and provider clinical specialities.  Please arrive 15 minutes early for your first appointment and bring your completed paperwork.    Please be aware that coverage of these services is subject to the terms and limitations of your health insurance plan.  Call member services at your health plan with any benefit or coverage questions.                            Who to contact     If you have questions or need follow up information about today's clinic visit or your schedule please contact [facility] directly at [phone].  Normal or non-critical lab and imaging results will be communicated to you by MyChart, letter or phone within 4 business days after the clinic has received the results. If you do not hear from us within 7 days, please contact the clinic through MyChart or phone. If you have a critical or abnormal lab result, we will notify you by phone as soon as possible.  Submit refill requests through [employer] or call your pharmacy and they will forward the refill request to us. Please allow 3 business days for your refill to be completed.        "   Additional Information About Your Visit        MyChart Information     [employer] gives you secure access to your electronic health record. If you see a primary care provider, you can also send messages to your care team and make appointments. If you have questions, please call your primary care clinic.  If you do not have a primary care provider, please call [phone] and they will assist you.        Care EveryWhere ID     This is your Care EveryWhere ID. This could be used by other organizations to access your [place] medical records  [id number]        Your Vitals Were     Pulse Temperature Respirations Height Last Period Pulse Oximetry    91 98.4  F (36.9  C) (Oral) 18 5' 5.25\" (1.657 m) 11/06/2018 (Within Days) 99%    BMI (Body Mass Index)                   55.29 kg/m2            Blood Pressure from Last 3 Encounters:   12/04/18 124/78   11/27/18 122/70   08/21/18 128/72    Weight from Last 3 Encounters:   12/04/18 (!) 334 lb 12.8 oz (151.9 kg)   11/27/18 (!) 335 lb (152 kg)   08/21/18 (!) 344 lb 12.8 oz (156.4 kg)              We Performed the Following     Hemoglobin A1c     MENTAL HEALTH REFERRAL  - Adult; Psychiatry and Medication Management; Psychiatry; [facility] [phone].  Medication management & future refills will be returned to G PCP upon completion of evaluation; We brian...     Vitamin D Deficiency          Today's Medication Changes          These changes are accurate as of 12/4/18  5:53 PM.  If you have any questions, ask your nurse or doctor.               These medicines have changed or have updated prescriptions.        Dose/Directions    spironolactone 50 MG tablet   Commonly known as:  ALDACTONE   This may have changed:  additional instructions   Used for:  Acne vulgaris        Dose:  50 mg   Take 1 tablet (50 mg) by mouth daily   Quantity:  30 tablet   Refills:  3                Primary Care Provider Office Phone # Fax #    Mayra Weems MD " [phone]       [address]  [facility] [address]        Equal Access to Services     DECLAN DAMIAN : Hadii indy ku ayla Garcia, waaxda luqadaha, qaybta kaalmada isaiasda, amos santostosin ramos. So [facility] [phone].    ATENCIÓN: Si habla español, tiene a shirley disposición servicios gratuitos de asistencia lingüística. Llame al [phone].    We comply with applicable federal civil rights laws and [place] laws. We do not discriminate on the basis of race, color, national origin, age, disability, sex, sexual orientation, or gender identity.            Thank you!     Thank you for choosing [facility]  for your care. Our goal is always to provide you with excellent care. Hearing back from our patients is one way we can continue to improve our services. Please take a few minutes to complete the written survey that you may receive in the mail after your visit with us. Thank you!             Your Updated Medication List - Protect others around you: Learn how to safely use, store and throw away your medicines at www.disposemymeds.org.          This list is accurate as of 12/4/18  5:53 PM.  Always use your most recent med list.                   Brand Name Dispense Instructions for use Diagnosis    B-12 5000 MCG Caps      Take 1 tablet by mouth daily        BACTRIM PO      Take 1 tablet by mouth 2 times daily        BIOTIN PO      Take 10,000 mcg by mouth Reported on 5/9/2017        ECONTRA EZ 1.5 MG tablet   Generic drug:  levonorgestrel      Take 1.5 mg by mouth daily as needed        escitalopram 20 MG tablet    LEXAPRO    60 tablet    Take 1 tablet (20 mg) by mouth daily    Recurrent major depressive episodes (H)       ibuprofen 600 MG tablet    ADVIL/MOTRIN    60 tablet    Take 1 tablet (600 mg) by mouth every 6 hours as needed for moderate pain    Chronic right-sided low back pain without sciatica       MAGNESIUM GLYCINATE PLUS PO      Take 1 tablet by  mouth daily        MODAFINIL PO      Take 200 mg by mouth daily        mupirocin 2 % external ointment    BACTROBAN          norethindrone 0.35 MG tablet    MICRONOR     Take 1 tablet by mouth daily        oxybutynin ER 5 MG 24 hr tablet    DITROPAN-XL     Take 5 mg by mouth daily as needed for bladder spasms Patient takes 5 mg in the morning and 10 mg at night.        PROTONIX PO      Take 20 mg by mouth daily as needed for heartburn        REBIF TITRATION PACK 6X8.8 & 6X22 MCG Sosy   Generic drug:  Interferon Beta-1a      Inject 1 Units Subcutaneous three times a week        spironolactone 50 MG tablet    ALDACTONE    30 tablet    Take 1 tablet (50 mg) by mouth daily    Acne vulgaris       SUPER B COMPLEX PO      Take 1 tablet by mouth daily        vitamin D3 96379 units capsule    CHOLECALCIFEROL    8 capsule    Take 1 capsule (50,000 Units) by mouth once a week    Vitamin D deficiency       zinc 50 MG Tabs      Take 2 tablets by mouth daily

## 2018-12-04 NOTE — PROGRESS NOTES
ASSESSMENT/PLAN:       1. Recurrent major depressive episodes (H)  PHQ9 of 11 currently, and patient on 40mg lexapro. She reports her mood is good and no SI. She feels overall improved on increase lexapro and did discuss with her today the 40mg dose is off label but for the time being helping her mood.    Have also referred to [facility] collaborative care to see psychiatry and adjust medications as necessary    2. Difficulty concentrating  She is reporting increased difficulty concentrating. She was put on modafanil to help with MS related fatigue. Now on 200mg modafanil and this is helping somewhat but she continues to have post-afternoon fatigue. Suggested she try taking 100mg AM and 100mg noon    She also inquired about possible stimulant usage. She has no history of ADHD but given MS has some brain fatigue and difficulty concentrating. It may be reasonable to try a stimulant on her if we stop modafanil, will refer to psychiatry for assessment     4. Prediabetes  Her A1c is staying steady at 5.8%. She has lost ~10lbs since last visit. Discussed metformin with her but she would like to wait at this time and since A1C is steady, it is reasonable  - Hemoglobin A1c    5. Vitamin D deficiency  Screen, previous low now on supplementation, will recheck  - Vitamin D Deficiency      Mayra Weems MD  [facility]    SUBJECTIVE:   Princess TRISTIN Machado is a 28 year old female who presents to clinic today for the following health issues:    Depression and Anxiety Follow-Up    Status since last visit: Improved     Other associated symptoms:Fatigue    Complicating factors:     Significant life event: No     Current substance abuse: None    PHQ 8/7/2018 8/21/2018 10/1/2018   PHQ-9 Total Score 20 16 10   Q9: Suicide Ideation Several days Several days Not at all     STACIA-7 SCORE 6/5/2018 8/7/2018 10/1/2018   Total Score 11 (moderate anxiety) - 14 (moderate anxiety)   Total Score 11 16 14       She continues on the 40mg  lexapro since October 1/2018,   She also seeing a therapist at [facility], and overall states mood symptoms are better. No SI.    She notices she is more flushed especially at the end of the day, and her face becomes red. This also happens when she is stressed or angry, her face becomes flushed.    Her neurologist prescribed her modafinil 200mg for energy, but she continues to have mid-afternoon tiredness.  She is inquiring about using stimulants.      In the past two weeks have you had thoughts of suicide or self-harm?  No.    Do you have concerns about your personal safety or the safety of others?   No  PHQ-9  English  PHQ-9   Any Language  STACIA-7  Suicide Assessment Five-step Evaluation and Treatment (SAFE-T)  She also has ongoing upper back pain. She gets flushed when she gets anxious.      Amount of exercise or physical activity: None    Problems taking medications regularly: No    Medication side effects: none    Diet: On keto diet.    Problem list and histories reviewed & adjusted, as indicated.  Additional history: as documented    Patient Active Problem List   Diagnosis     Optic neuritis, left     Recurrent major depressive episodes (H)     Multiple sclerosis (H)     Bulimia nervosa     Acne vulgaris     Chronic back pain     Freckles     Headache     Other specified health status     Hidradenitis suppurativa     Hypertriglyceridemia     Hidradenitis     Migraine without status migrainosus, not intractable     Biomechanical lesion     Numbness of finger     Somatic dysfunction of cervical region     Spasm     Vitamin D deficiency     Common wart: L t lat hand -      Morbid obesity due to excess calories (H) BMI 50-60     Midline low back pain without sciatica     Sacral pain     Somatic dysfunction of sacral region     Thoracic segment dysfunction     Muscle spasm     Recurrent major depression (H)     STACIA (generalized anxiety disorder)     Past Surgical History:   Procedure Laterality Date     ENT  "SURGERY      tubes in ears     MYRINGOTOMY, INSERT TUBE BILATERAL, COMBINED  1990s       Social History   Substance Use Topics     Smoking status: Never Smoker     Smokeless tobacco: Never Used     Alcohol use No     Family History   Problem Relation Age of Onset     Cerebrovascular Disease Mother      heart attack     Cerebrovascular Disease Father      heart attack     Mental Illness Sister      Post-Traumatic Stress Disorder (PTSD) Brother      Mental Illness Brother      Glaucoma No family hx of      Macular Degeneration No family hx of      Diabetes No family hx of      Coronary Artery Disease No family hx of      Hypertension No family hx of      Hyperlipidemia No family hx of      Breast Cancer No family hx of      Colon Cancer No family hx of      Prostate Cancer No family hx of      Other Cancer No family hx of      Depression No family hx of      Anxiety Disorder No family hx of      Substance Abuse No family hx of      Anesthesia Reaction No family hx of      Asthma No family hx of      Osteoporosis No family hx of      Genetic Disorder No family hx of      Thyroid Disease No family hx of      Obesity No family hx of      Unknown/Adopted No family hx of            Reviewed and updated as needed this visit by clinical staff  Tobacco  Allergies  Meds  Med Hx  Surg Hx  Fam Hx  Soc Hx      Reviewed and updated as needed this visit by Provider         ROS:  Constitutional, HEENT, cardiovascular, pulmonary, gi and gu systems are negative, except as otherwise noted.    OBJECTIVE:     /78 (BP Location: Right arm, Patient Position: Sitting, Cuff Size: Adult Large)  Pulse 91  Temp 98.4  F (36.9  C) (Oral)  Resp 18  Ht 5' 5.25\" (1.657 m)  Wt (!) 334 lb 12.8 oz (151.9 kg)  LMP 11/06/2018 (Within Days)  SpO2 99%  BMI 55.29 kg/m2  Body mass index is 55.29 kg/(m^2).  GENERAL: healthy, alert and no distress  NECK: no adenopathy, no asymmetry, masses, or scars and thyroid normal to palpation  RESP: " lungs clear to auscultation - no rales, rhonchi or wheezes  CV: regular rate and rhythm, normal S1 S2, no S3 or S4, no murmur, click or rub, no peripheral edema and peripheral pulses strong  ABDOMEN: soft, nontender, no hepatosplenomegaly, no masses and bowel sounds normal  MS: no gross musculoskeletal defects noted, no edema  PSYCH: mentation appears normal, affect normal/bright    Diagnostic Test Results:  none

## 2018-12-04 NOTE — NURSING NOTE
"Vital signs:  Temp: 98.4  F (36.9  C) Temp src: Oral BP: 124/78 Pulse: 91   Resp: 18 SpO2: 99 %     Height: 5' 5.25\" (165.7 cm) Weight: (!) 334 lb 12.8 oz (151.9 kg)  Estimated body mass index is 55.29 kg/(m^2) as calculated from the following:    Height as of this encounter: 5' 5.25\" (1.657 m).    Weight as of this encounter: 334 lb 12.8 oz (151.9 kg).      "

## 2018-12-05 LAB — DEPRECATED CALCIDIOL+CALCIFEROL SERPL-MC: 47 UG/L (ref 20–75)

## 2018-12-05 ASSESSMENT — ANXIETY QUESTIONNAIRES: GAD7 TOTAL SCORE: 14

## 2018-12-27 ENCOUNTER — MYC MEDICAL ADVICE (OUTPATIENT)
Dept: INTERNAL MEDICINE | Facility: CLINIC | Age: 28
End: 2018-12-27

## 2018-12-27 DIAGNOSIS — F33.9 RECURRENT MAJOR DEPRESSIVE EPISODES (H): ICD-10-CM

## 2018-12-27 DIAGNOSIS — G35 MULTIPLE SCLEROSIS (H): ICD-10-CM

## 2018-12-27 DIAGNOSIS — M62.838 MUSCLE SPASM: ICD-10-CM

## 2018-12-27 DIAGNOSIS — M99.02 THORACIC SEGMENT DYSFUNCTION: ICD-10-CM

## 2018-12-27 LAB
ERYTHROCYTE [DISTWIDTH] IN BLOOD BY AUTOMATED COUNT: 12.7 % (ref 10–15)
HCT VFR BLD AUTO: 36.7 % (ref 35–47)
HGB BLD-MCNC: 12.1 G/DL (ref 11.7–15.7)
MCH RBC QN AUTO: 30.9 PG (ref 26.5–33)
MCHC RBC AUTO-ENTMCNC: 33 G/DL (ref 31.5–36.5)
MCV RBC AUTO: 94 FL (ref 78–100)
PLATELET # BLD AUTO: 250 10E9/L (ref 150–450)
RBC # BLD AUTO: 3.91 10E12/L (ref 3.8–5.2)
VIT B12 SERPL-MCNC: 865 PG/ML (ref 193–986)
WBC # BLD AUTO: 9.3 10E9/L (ref 4–11)

## 2018-12-27 PROCEDURE — 82607 VITAMIN B-12: CPT | Performed by: PSYCHIATRY & NEUROLOGY

## 2018-12-27 PROCEDURE — 80076 HEPATIC FUNCTION PANEL: CPT | Performed by: PSYCHIATRY & NEUROLOGY

## 2018-12-27 PROCEDURE — 85027 COMPLETE CBC AUTOMATED: CPT | Performed by: PSYCHIATRY & NEUROLOGY

## 2018-12-27 PROCEDURE — 36415 COLL VENOUS BLD VENIPUNCTURE: CPT | Performed by: PSYCHIATRY & NEUROLOGY

## 2018-12-28 LAB
ALBUMIN SERPL-MCNC: 3.2 G/DL (ref 3.4–5)
ALP SERPL-CCNC: 79 U/L (ref 40–150)
ALT SERPL W P-5'-P-CCNC: 20 U/L (ref 0–50)
AST SERPL W P-5'-P-CCNC: 15 U/L (ref 0–45)
BILIRUB DIRECT SERPL-MCNC: <0.1 MG/DL (ref 0–0.2)
BILIRUB SERPL-MCNC: 0.3 MG/DL (ref 0.2–1.3)
PROT SERPL-MCNC: 7.5 G/DL (ref 6.8–8.8)

## 2019-01-02 NOTE — TELEPHONE ENCOUNTER
See her [employer]Ex message.     Per OV note on 12/4/18, she is taking 40 mg on Lexapro, but the med list still has 20 mg.    Should probably fax the 40 mg to pharmacy. Unless she is to decrease?        1. Recurrent major depressive episodes (H)  PHQ9 of 11 currently, and patient on 40mg lexapro. She reports her mood is good and no SI. She feels overall improved on increase lexapro and did discuss with her today the 40mg dose is off label but for the time being helping her mood.

## 2019-01-03 RX ORDER — ESCITALOPRAM OXALATE 20 MG/1
40 TABLET ORAL DAILY
Qty: 60 TABLET | Refills: 1 | OUTPATIENT
Start: 2019-01-03

## 2019-01-14 ENCOUNTER — MYC MEDICAL ADVICE (OUTPATIENT)
Dept: INTERNAL MEDICINE | Facility: CLINIC | Age: 29
End: 2019-01-14

## 2019-01-17 ENCOUNTER — TELEPHONE (OUTPATIENT)
Dept: INTERNAL MEDICINE | Facility: CLINIC | Age: 29
End: 2019-01-17

## 2019-01-17 DIAGNOSIS — F33.9 RECURRENT MAJOR DEPRESSIVE EPISODES (H): ICD-10-CM

## 2019-01-17 DIAGNOSIS — F41.9 ANXIETY: Primary | ICD-10-CM

## 2019-01-17 RX ORDER — PROPRANOLOL HYDROCHLORIDE 10 MG/1
TABLET ORAL
Qty: 90 TABLET | Refills: 1 | Status: SHIPPED | OUTPATIENT
Start: 2019-01-17 | End: 2019-01-22

## 2019-01-17 RX ORDER — ESCITALOPRAM OXALATE 20 MG/1
40 TABLET ORAL DAILY
Qty: 60 TABLET | Refills: 1
Start: 2019-01-17 | End: 2019-01-29

## 2019-01-17 NOTE — TELEPHONE ENCOUNTER
Called patient to discuss recent anxiety symptoms    She reported feeling more overwhelmed given work stressors and also planning an important trip to [facility] coming up.     She does feel the lexapro helps her depression, and her mood overall. She does not want to change this right now, (currently taking 40mg lexapro)    Recently, her neurologist also increased the modafinil to 200mg BID and this has helped with her energy    I suggested we try taking the propranolol 10mg-20mg up to three times daily for anxiety.  She was on this before however she may benefit from taking 20mg at a time and she is agreeable to trying this    She will also make follow up with Alicia CORRAL in our office    Mayra Weems MD

## 2019-01-22 ENCOUNTER — OFFICE VISIT (OUTPATIENT)
Dept: PSYCHIATRY | Facility: CLINIC | Age: 29
End: 2019-01-22
Payer: COMMERCIAL

## 2019-01-22 VITALS
HEART RATE: 61 BPM | DIASTOLIC BLOOD PRESSURE: 84 MMHG | BODY MASS INDEX: 47.09 KG/M2 | WEIGHT: 293 LBS | RESPIRATION RATE: 18 BRPM | SYSTOLIC BLOOD PRESSURE: 147 MMHG | OXYGEN SATURATION: 100 % | TEMPERATURE: 98.4 F | HEIGHT: 66 IN

## 2019-01-22 DIAGNOSIS — F41.1 GAD (GENERALIZED ANXIETY DISORDER): Primary | ICD-10-CM

## 2019-01-22 DIAGNOSIS — F41.9 ANXIETY: ICD-10-CM

## 2019-01-22 DIAGNOSIS — F33.9 RECURRENT MAJOR DEPRESSIVE EPISODES (H): ICD-10-CM

## 2019-01-22 PROCEDURE — 90792 PSYCH DIAG EVAL W/MED SRVCS: CPT | Performed by: NURSE PRACTITIONER

## 2019-01-22 RX ORDER — PROPRANOLOL HYDROCHLORIDE 40 MG/1
40 TABLET ORAL 2 TIMES DAILY
Qty: 60 TABLET | Refills: 1 | Status: SHIPPED | OUTPATIENT
Start: 2019-01-22 | End: 2019-04-13

## 2019-01-22 ASSESSMENT — ANXIETY QUESTIONNAIRES
7. FEELING AFRAID AS IF SOMETHING AWFUL MIGHT HAPPEN: SEVERAL DAYS
GAD7 TOTAL SCORE: 12
4. TROUBLE RELAXING: MORE THAN HALF THE DAYS
GAD7 TOTAL SCORE: 12
5. BEING SO RESTLESS THAT IT IS HARD TO SIT STILL: NOT AT ALL
7. FEELING AFRAID AS IF SOMETHING AWFUL MIGHT HAPPEN: SEVERAL DAYS
6. BECOMING EASILY ANNOYED OR IRRITABLE: MORE THAN HALF THE DAYS
GAD7 TOTAL SCORE: 12
3. WORRYING TOO MUCH ABOUT DIFFERENT THINGS: NEARLY EVERY DAY
2. NOT BEING ABLE TO STOP OR CONTROL WORRYING: SEVERAL DAYS
1. FEELING NERVOUS, ANXIOUS, OR ON EDGE: NEARLY EVERY DAY

## 2019-01-22 ASSESSMENT — PATIENT HEALTH QUESTIONNAIRE - PHQ9
SUM OF ALL RESPONSES TO PHQ QUESTIONS 1-9: 13
SUM OF ALL RESPONSES TO PHQ QUESTIONS 1-9: 13
10. IF YOU CHECKED OFF ANY PROBLEMS, HOW DIFFICULT HAVE THESE PROBLEMS MADE IT FOR YOU TO DO YOUR WORK, TAKE CARE OF THINGS AT HOME, OR GET ALONG WITH OTHER PEOPLE: VERY DIFFICULT

## 2019-01-22 ASSESSMENT — PAIN SCALES - GENERAL: PAINLEVEL: SEVERE PAIN (6)

## 2019-01-22 ASSESSMENT — MIFFLIN-ST. JEOR: SCORE: 2269.91

## 2019-01-22 NOTE — PROGRESS NOTES
"                                                         Outpatient Psychiatric Evaluation- Standard  Adult    Name:  Princess TRISTIN Machado  : 1990    Source of Referral:  Primary Care Provider: Mayra Weems MD - last visit 2018  Current Psychotherapist: Grazyna Smith at [facility] - last visit December, pt has appointment today  Neurologist    Identifying Data:  Patient is a 28 year old,   Two or more races American female  who presents for initial visit with me.  Patient is currently employed full time. Patient attended the session alone. Consent to communicate signed for Aldair Toscano  patient's Boyfriend. Consent for treatment signed and included in electronic medical record. Discussed limits of confidentiality today. My Practice Policy was reviewed and signed.     Chief Complaint:  Consultation.  Patient reports: \"I don't know why I'm here, I guess Dr. Weems wanted me to see you again for a check up\"  Patient prefers to be called: \"Leyla\"    Answers for HPI/ROS submitted by the patient on 2019   If you checked off any problems, how difficult have these problems made it for you to do your work, take care of things at home, or get along with other people?: Very difficult  PHQ9 TOTAL SCORE: 13  STACIA 7 TOTAL SCORE: 12    HPI:  I saw Patient for psychiatry consult on 2018.  During that visit, HPI:  Patient has worked with Dr. Weems for the last few months. History of psychiatry a few months ago and Patient reports \"this was not a good consult... They just recommended more vitamins.\" Patient has been started on Lexapro (escitalopram) 10 mg from Primary Care Provider. Patient is trying to go through assessment of gastric bypass surgery, but needs to complete treatment for eating disorder first due to history of bulimia nervosa. Currently working with [facility]. Patient has also been to treatment at [facility]. Patient was previously working with another Primary Care Provider who also " "tried other medications to treat anxiety and depression. Patient self increased Lexapro (escitalopram) to 20 mg daily a few weeks ago. Patient reports that she often is \"in and out of therapy, just try to find the right therapist.\"     Treatment Plan:    Increase Lexapro (escitalopram) to 20 mg by mouth daily for anxiety.     Start Neurontin (gabapentin) 300 mg by mouth daily at bedtime for sleep and anxiety. Can consider dose increase to 2- 3 times per day for anxiety.     Today, Patient is being re-referred to this service because Primary Care Provider wants to know if she should be on a stimulant? She is already taking Provigil (modafinil) 200 mg BID. Patient now notes that \"this is not working consistently... I am working with my neurologist on the energy part... If this pham snot work, can we do a stimulant and then just follow up with her... She recommend I come to see you now.\"    Patient needs to see a sleep specialist for her fatigue at the [facility].      Patient also reports that her anxiety has been worse lately.     Patient was just increased again to Lexapro (escitalopram) 40 mg. Apparently she was on 40 mg over the summer, but dose was reduced back to 20 mg per day about 2 months ago and was increased again to 40 mg a few weeks ago. She feels this \"may or may not be helping... I'm not as concerned about my depression now.. I feel overwhelmed and flushed and once per week my chest feels heavy and need to take a break.\" has missed work due to her symptoms of anxiety. She also notes that she is late to work because of her energy but \"not because of my depression\". Patient feels that if her energy was better that her depression would be gone. She is already taking a lot of supplements and vitamins for her energy and is not sure if these are working. Patient just started the Inderal (propranolol) last week for anxiety. Patient is only taking 10 mg of Inderal (propranolol) once since " last week. She reports she took this in evening. Patient does not remember taking Neurontin (gabapentin) from our last visit.     Provigil (modafinil) from Gail Harman MD at [facility].   Patient has both emotional and physical fatigue.     Past diagnoses include: Bulimia Nervosa, Anxiety  Current medications include: Inderal (propranolol), Lexapro (escitalopram) 40 mg, Provigil (modafinil) 200 mg BID    Medication side effects: Denies - taking Lexapro (escitalopram) in the morning- sometimes takes at night if she forgets in the morning.   Current stressors include: Medical Comorbidities, Financial Difficulties, Phase of Life Difficulties, Self Esteem, Occupational Stressors  Coping mechanisms and supports include: Journaling, Coworkers, Physical Therapy, Chiropractor, History of exercise- likes Herm[employer]- has not been able to do in one year    Psychiatric Review of Symptoms:  Depression: Interest: Decrease    Depressed Mood    Sleep: Increase and Decrease     Energy: Decrease    Appetite: Increase and Decrease     Guilt: Increase    Concentration: Decrease    Psychomotor slowing     Suicide: Yes    Irritability: Increase     Ruminations: Increase    PHQ-9 scores:   PHQ-9 SCORE 10/1/2018 12/4/2018 1/22/2019   PHQ-9 Total Score MyChart 10 (Moderate depression) 11 13 (Moderate depression)     Promise:             Distractibility: Increase                          Racing Thoughts: Increase                          Irritability              MDQ Score: Borderline Postive Screen  Anxiety: Feeling nervous, anxious, or on edge    Uncontrolled worrying    Worrying too much about different things    Trouble relaxing    Easily annoyed or irritable    Thoughts of impending doom    STACIA-7 scores:    STACIA-7 SCORE 10/1/2018 12/4/2018 1/22/2019   Total Score 14 (moderate anxiety) 14 12 (moderate anxiety)     Panic:              No symptoms   Agoraphobia:   No fear of leaving the house, but does not like being in  "crowds  PTSD:              No symptoms   OCD:               No symptoms but does like to keep things clean and organized  Psychosis:       No symptoms   ADD / ADHD:  No symptoms  Gambling or shoplifting: No   Eating Disorder:          Hx of Bulimia Nervosa and Treatment at [facility] about 3 years ago- no active symptoms  Sleep:              Hard to fall asleep- ruminations and mind racing at night                           Reports \"always tired\" from MS- diagnosed in October of 2016                          No recent nightmares                          History of sleep consult and denies diagnosis of Obstructive Sleep Apnea - sleep visit upcoming again                          No naps during the day    Psychiatric History:   Hospitalizations: None and no Emergency Department visits due to panic  History of Commitment? No   Past Treatment: counseling, day treatment, physician / PCP, medication(s) from physician / PCP and psychiatry  Suicide Attempts: No   Current Suicide Risk:  Suicide Assessment Completed Today.  Self-injurious Behavior: Cutting or Carving of Skin and went to the Emergency Department - last cut self a few years ago  Electroconvulsive Therapy (ECT) or Transcranial Magnetic Stimulation (TMS): No   GeneSight Genetic Testing: No      Past medication trials include but are not limited to:   Lexapro (escitalopram) 40 mg   Celexa (citalopram) 40 mg   Cymbalta (duloxetine) 60 mg   Prozac (fluoxetine) 20 mg   Inderal (propranolol) 10 mg TID prn   Wellbutrin (buproprion)   Zoloft (sertraline)   Neurontin (gabapentin)   Benadryl (diphenhydramine)   Provigil (modafinil)   Ativan (lorazepam)      Substance Use History:  Current use of drugs or alcohol: Denies - rare alcohol use- once about every two years because does not like the taste. No other drugs.   Patient reports no problems as a result of their drinking / drug use.   CAGE AID Screening score from today was 0  Based on the clinical interview, there  are " "not indications of drug or alcohol abuse.  Tobacco use: No  Caffeine:  Yes  72 ounces of soda/day - gets headache or withdrawal without - also feels more \"crabby\" without caffeine  Patient has not received chemical dependency treatment in the past  Recovery Programming Involvement: None    Past Medical History:  Past Medical History:   Diagnosis Date     Anxiety      Hidradenitis suppurativa 2010     MS (multiple sclerosis) (H)     diagnosis 10/2016     Recurrent major depression (H)     Pt has tried Zoloft, Prozac, Wellbutrin in the past       Surgery:   Past Surgical History:   Procedure Laterality Date     ENT SURGERY      tubes in ears     MYRINGOTOMY, INSERT TUBE BILATERAL, COMBINED  1990s     Food and Medicine Allergies:     Allergies   Allergen Reactions     Accutane Rash     Primary Care Provider: Mayra Weems MD  Seizures or Head Injury: No  Diet: No Restrictions  Exercise: No regular exercise program  Supplements: Reviewed per Electronic Medical Record Today    Current Medications:    Current Outpatient Medications:      BIOTIN PO, Take 10,000 mcg by mouth Reported on 5/9/2017, Disp: , Rfl:      cholecalciferol (VITAMIN D3) 01193 units capsule, Take 1 capsule (50,000 Units) by mouth once a week, Disp: 8 capsule, Rfl: 0     Cyanocobalamin (B-12) 5000 MCG CAPS, Take 1 tablet by mouth daily , Disp: , Rfl:      escitalopram (LEXAPRO) 20 MG tablet, Take 2 tablets (40 mg) by mouth daily, Disp: 60 tablet, Rfl: 1     ibuprofen (ADVIL/MOTRIN) 600 MG tablet, Take 1 tablet (600 mg) by mouth every 6 hours as needed for moderate pain, Disp: 60 tablet, Rfl: 5     Interferon Beta-1a (REBIF TITRATION PACK) 6X8.8 & 6X22 MCG SOSY, Inject 1 Units Subcutaneous three times a week, Disp: , Rfl:      levonorgestrel (ECONTRA EZ) 1.5 MG TABS tablet, Take 1.5 mg by mouth daily as needed, Disp: , Rfl:      MODAFINIL PO, Take 200 mg by mouth 2 times daily, Disp: , Rfl:      mupirocin (BACTROBAN) 2 % ointment, , Disp: , Rfl:      " norethindrone (MICRONOR) 0.35 MG per tablet, Take 1 tablet by mouth daily, Disp: , Rfl:      oxybutynin (DITROPAN-XL) 5 MG 24 hr tablet, Take 5 mg by mouth daily as needed for bladder spasms Patient takes 5 mg in the morning and 10 mg at night., Disp: , Rfl:      Pantoprazole Sodium (PROTONIX PO), Take 20 mg by mouth daily as needed for heartburn, Disp: , Rfl:      propranolol (INDERAL) 10 MG tablet, Take 10mg-20mg  three times daily as needed for anxiety, Disp: 90 tablet, Rfl: 1     spironolactone (ALDACTONE) 50 MG tablet, Take 1 tablet (50 mg) by mouth daily (Patient taking differently: Take 50 mg by mouth daily Patien takes 200 mg daily.), Disp: 30 tablet, Rfl: 3     B Complex-C (SUPER B COMPLEX PO), Take 1 tablet by mouth daily, Disp: , Rfl:      MAGNESIUM GLYCINATE PLUS PO, Take 1 tablet by mouth daily, Disp: , Rfl:      Sulfamethoxazole-Trimethoprim (BACTRIM PO), Take 1 tablet by mouth 2 times daily, Disp: , Rfl:      zinc 50 MG TABS, Take 2 tablets by mouth daily, Disp: , Rfl:     The [place] Prescription Monitoring Program has been reviewed and there are no concerns about diversionary activity for controlled substances at this time.      PRESCRIPTIONS  Total Prescriptions: 7  Total Private Pay: 0  Fill Date ID Written Drug Qty Days Prescriber Rx # Pharmacy Refill Daily Dose * Pymt Type   12/28/2018 1  11/06/2018 Modafinil 100 MG Tablet 60 30 Je Hernan [id number] Jung ([id number]) 1  [facility] [place]  12/27/2018 1  11/06/2018 Modafinil 100 MG Tablet 60 30 Je Hernan [id number] Jung ([id number]) 0  [facility] [place]  11/11/2018 1  10/24/2018 Modafinil 100 MG Tablet 30 30 Je Hernan [id number] Jung ([id number]) 0  [facility] [place]  10/20/2018 1  09/14/2018 Modafinil 100 MG Tablet 30 30 Je Hernan [id number] Jung ([id number]) 0  [facility] [place]  07/28/2018 1  07/28/2018 Lorazepam 0.5 MG Tablet 20 7  [facility] [id number] Jung ([id number]) 0 1.43 [facility] [place]  07/19/2018 1  07/19/2018 Lorazepam 0.5 MG Tablet 3 1 [facility] [id number] Jung ([id number]) 0 1.50 [place] [facility] [place]  05/22/2018 1  " 05/22/2018 Gabapentin 300 MG Capsule 90 30 [facility] [id number] Jung ([id number]) 0  [facility] [place]*Per CDC guidance, the MME conversion factors prescribed or provided as part of the medication-assisted treatment for opioid use disorder should not be used to benchmark against dosage thresholds meant for opioids prescribed for pain. Buprenorphine products have no agreed upon morphine equivalency, and as partial opioid agonists, are not expected to be associated with overdose risk in the same dose-dependent manner as doses for full agonist opioids. MME = morphine milligram equivalents. LME = Lorazepam milligram equivalents. MG = dose in milligrams.  PROVIDERS  Total Providers: 4  Name Address [facility] Zipcode Phone  Chinedu Franklin [address] [facility] [address]   Gail Harman [address] [facility] [address]   Cesilia Orruh [address] [facility] [address]   Gail Harman MD [address] [facility] [id number]     Vital Signs:  Vitals: /84 (BP Location: Left arm, Patient Position: Chair, Cuff Size: Adult Regular)   Pulse 61   Temp 98.4  F (36.9  C) (Oral)   Resp 18   Ht 1.676 m (5' 6\")   Wt (!) 153.3 kg (338 lb)   SpO2 100%   BMI 54.55 kg/m      Labs:  Most recent laboratory results reviewed and the pertinent results include:   Orders Only on 12/27/2018   Component Date Value Ref Range Status     Vitamin B12 12/27/2018 865  193 - 986 pg/mL Final     Bilirubin Direct 12/27/2018 <0.1  0.0 - 0.2 mg/dL Final     Bilirubin Total 12/27/2018 0.3  0.2 - 1.3 mg/dL Final     Albumin 12/27/2018 3.2* 3.4 - 5.0 g/dL Final     Protein Total 12/27/2018 7.5  6.8 - 8.8 g/dL Final     Alkaline Phosphatase 12/27/2018 79  40 - 150 U/L Final     ALT 12/27/2018 20  0 - 50 U/L Final     AST 12/27/2018 15  0 - 45 U/L Final     WBC 12/27/2018 9.3  4.0 - 11.0 10e9/L Final     RBC Count 12/27/2018 3.91  3.8 - 5.2 10e12/L Final     Hemoglobin 12/27/2018 12.1  11.7 - 15.7 g/dL Final     Hematocrit " 12/27/2018 36.7  35.0 - 47.0 % Final     MCV 12/27/2018 94  78 - 100 fl Final     MCH 12/27/2018 30.9  26.5 - 33.0 pg Final     MCHC 12/27/2018 33.0  31.5 - 36.5 g/dL Final     RDW 12/27/2018 12.7  10.0 - 15.0 % Final     Platelet Count 12/27/2018 250  150 - 450 10e9/L Final   Office Visit on 12/04/2018   Component Date Value Ref Range Status     Vitamin D Deficiency screening 12/04/2018 47  20 - 75 ug/L Final    Comment: Season, race, dietary intake, and treatment affect the concentration of   25-hydroxy-Vitamin D. Values may decrease during winter months and increase   during summer months. Values 20-29 ug/L may indicate Vitamin D insufficiency   and values <20 ug/L may indicate Vitamin D deficiency.  Vitamin D determination is routinely performed by an immunoassay specific for   25 hydroxyvitamin D3.  If an individual is on vitamin D2 (ergocalciferol)   supplementation, please specify 25 OH vitamin D2 and D3 level determination by   LCMSMS test [id number].       Hemoglobin A1C 12/04/2018 5.8* 0 - 5.6 % Final    Comment: Normal <5.7% Prediabetes 5.7-6.4%  Diabetes 6.5% or higher - adopted from ADA   consensus guidelines.     Office Visit on 11/27/2018   Component Date Value Ref Range Status     Beta HCG Qual IFA Urine 11/27/2018 Negative  NEG^Negative    Final   Orders Only on 08/25/2018   Component Date Value Ref Range Status     Hemoglobin A1C 08/25/2018 5.7* 0 - 5.6 % Final    Comment: Normal <5.7% Prediabetes 5.7-6.4%  Diabetes 6.5% or higher - adopted from ADA   consensus guidelines.     Orders Only on 07/17/2018   Component Date Value Ref Range Status     WBC 07/17/2018 8.6  4.0 - 11.0 10e9/L Final     RBC Count 07/17/2018 4.02  3.8 - 5.2 10e12/L Final     Hemoglobin 07/17/2018 12.8  11.7 - 15.7 g/dL Final     Hematocrit 07/17/2018 37.9  35.0 - 47.0 % Final     MCV 07/17/2018 94  78 - 100 fl Final     MCH 07/17/2018 31.8  26.5 - 33.0 pg Final     MCHC 07/17/2018 33.8  31.5 - 36.5 g/dL Final     RDW 07/17/2018  12.9  10.0 - 15.0 % Final     Platelet Count 07/17/2018 243  150 - 450 10e9/L Final     Diff Method 07/17/2018 Automated Method   Final     % Neutrophils 07/17/2018 68.9  % Final     % Lymphocytes 07/17/2018 24.7  % Final     % Monocytes 07/17/2018 4.6  % Final     % Eosinophils 07/17/2018 1.3  % Final     % Basophils 07/17/2018 0.5  % Final     Absolute Neutrophil 07/17/2018 5.9  1.6 - 8.3 10e9/L Final     Absolute Lymphocytes 07/17/2018 2.1  0.8 - 5.3 10e9/L Final     Absolute Monocytes 07/17/2018 0.4  0.0 - 1.3 10e9/L Final     Absolute Eosinophils 07/17/2018 0.1  0.0 - 0.7 10e9/L Final     Absolute Basophils 07/17/2018 0.0  0.0 - 0.2 10e9/L Final     Sodium 07/17/2018 139  133 - 144 mmol/L Final     Potassium 07/17/2018 4.2  3.4 - 5.3 mmol/L Final     Chloride 07/17/2018 106  94 - 109 mmol/L Final     Carbon Dioxide 07/17/2018 24  20 - 32 mmol/L Final     Anion Gap 07/17/2018 9  3 - 14 mmol/L Final     Glucose 07/17/2018 146* 70 - 99 mg/dL Final     Urea Nitrogen 07/17/2018 13  7 - 30 mg/dL Final     Creatinine 07/17/2018 1.01  0.52 - 1.04 mg/dL Final     GFR Estimate 07/17/2018 64  >60 mL/min/1.7m2 Final    Non  GFR Calc     GFR Estimate If Black 07/17/2018 78  >60 mL/min/1.7m2 Final    African American GFR Calc     Calcium 07/17/2018 8.4* 8.5 - 10.1 mg/dL Final     Bilirubin Total 07/17/2018 0.4  0.2 - 1.3 mg/dL Final     Albumin 07/17/2018 3.3* 3.4 - 5.0 g/dL Final     Protein Total 07/17/2018 7.8  6.8 - 8.8 g/dL Final     Alkaline Phosphatase 07/17/2018 81  40 - 150 U/L Final     ALT 07/17/2018 25  0 - 50 U/L Final     AST 07/17/2018 15  0 - 45 U/L Final     Vitamin D Deficiency screening 07/17/2018 62  20 - 75 ug/L Final    Comment: Season, race, dietary intake, and treatment affect the concentration of   25-hydroxy-Vitamin D. Values may decrease during winter months and increase   during summer months. Values 20-29 ug/L may indicate Vitamin D insufficiency   and values <20 ug/L may indicate  Vitamin D deficiency.  Vitamin D determination is routinely performed by an immunoassay specific for   25 hydroxyvitamin D3.  If an individual is on vitamin D2 (ergocalciferol)   supplementation, please specify 25 OH vitamin D2 and D3 level determination by   LCMSMS test [id number].       Vitamin B12 07/17/2018 323  193 - 986 pg/mL Final     TSH 07/17/2018 2.41  0.40 - 4.00 mU/L Final   Office Visit on 05/15/2018   Component Date Value Ref Range Status     Color Urine 05/15/2018 Yellow   Final     Appearance Urine 05/15/2018 Cloudy   Final     Glucose Urine 05/15/2018 Negative  NEG^Negative mg/dL Final     Bilirubin Urine 05/15/2018 Negative  NEG^Negative Final     Ketones Urine 05/15/2018 Negative  NEG^Negative mg/dL Final     Specific Gravity Urine 05/15/2018 1.025  1.003 - 1.035 Final     pH Urine 05/15/2018 6.5  5.0 - 7.0 pH Final     Protein Albumin Urine 05/15/2018 30* NEG^Negative mg/dL Final     Urobilinogen Urine 05/15/2018 1.0  0.2 - 1.0 EU/dL Final     Nitrite Urine 05/15/2018 Negative  NEG^Negative Final     Blood Urine 05/15/2018 Large* NEG^Negative Final     Leukocyte Esterase Urine 05/15/2018 Small* NEG^Negative Final     Source 05/15/2018 Midstream Urine   Final     WBC Urine 05/15/2018 * OTO5^0 - 5 /HPF Final     RBC Urine 05/15/2018 * OTO2^O - 2 /HPF Final     Squamous Epithelial /LPF Urine 05/15/2018 Few  FEW^Few /LPF Final     Bacteria Urine 05/15/2018 Few* NEG^Negative /HPF Final     Specimen Description 05/15/2018 Midstream Urine   Final     Culture Micro 05/15/2018 *  Preliminary                    Value:10,000 to 50,000 colonies/mL  Escherichia coli       Culture Micro 05/15/2018    Preliminary                    Value:<10,000 colonies/mL  mixed urogenital angelina  Susceptibility testing not routinely done       Culture Micro 05/15/2018 Report finalized too soon.  Additonal final report to follow.   Preliminary   Transferred Records on 03/27/2018   Component Date Value Ref Range Status      PHQ9 SCORE 03/27/2018 8   Final   Orders Only on 02/10/2018   Component Date Value Ref Range Status     Bilirubin Direct 02/10/2018 0.1  0.0 - 0.2 mg/dL Final     Bilirubin Total 02/10/2018 0.4  0.2 - 1.3 mg/dL Final     Albumin 02/10/2018 3.1* 3.4 - 5.0 g/dL Final     Protein Total 02/10/2018 7.5  6.8 - 8.8 g/dL Final     Alkaline Phosphatase 02/10/2018 96  40 - 150 U/L Final     ALT 02/10/2018 20  0 - 50 U/L Final     AST 02/10/2018 18  0 - 45 U/L Final     WBC 02/10/2018 8.8  4.0 - 11.0 10e9/L Final     RBC Count 02/10/2018 4.17  3.8 - 5.2 10e12/L Final     Hemoglobin 02/10/2018 13.0  11.7 - 15.7 g/dL Final     Hematocrit 02/10/2018 38.4  35.0 - 47.0 % Final     MCV 02/10/2018 92  78 - 100 fl Final     MCH 02/10/2018 31.2  26.5 - 33.0 pg Final     MCHC 02/10/2018 33.9  31.5 - 36.5 g/dL Final     RDW 02/10/2018 13.1  10.0 - 15.0 % Final     Platelet Count 02/10/2018 234  150 - 450 10e9/L Final     Diff Method 02/10/2018 Automated Method   Final     % Neutrophils 02/10/2018 65.3  % Final     % Lymphocytes 02/10/2018 24.0  % Final     % Monocytes 02/10/2018 7.1  % Final     % Eosinophils 02/10/2018 3.1  % Final     % Basophils 02/10/2018 0.5  % Final     Absolute Neutrophil 02/10/2018 5.7  1.6 - 8.3 10e9/L Final     Absolute Lymphocytes 02/10/2018 2.1  0.8 - 5.3 10e9/L Final     Absolute Monocytes 02/10/2018 0.6  0.0 - 1.3 10e9/L Final     Absolute Eosinophils 02/10/2018 0.3  0.0 - 0.7 10e9/L Final     Absolute Basophils 02/10/2018 0.0  0.0 - 0.2 10e9/L Final     Vitamin B12 02/10/2018 264  193 - 986 pg/mL Final     Vitamin D Deficiency screening 02/10/2018 13* 20 - 75 ug/L Final    Comment: Season, race, dietary intake, and treatment affect the concentration of   25-hydroxy-Vitamin D. Values may decrease during winter months and increase   during summer months. Values 20-29 ug/L may indicate Vitamin D insufficiency   and values <20 ug/L may indicate Vitamin D deficiency.  Vitamin D determination is routinely  performed by an immunoassay specific for   25 hydroxyvitamin D3.  If an individual is on vitamin D2 (ergocalciferol)   supplementation, please specify 25 OH vitamin D2 and D3 level determination by   LCMSMS test [id number].       TSH 02/10/2018 3.04  0.40 - 4.00 mU/L Final     Hemoglobin A1C 02/10/2018 5.8  4.3 - 6.0 % Final     Cholesterol 02/10/2018 123  <200 mg/dL Final     Triglycerides 02/10/2018 79  <150 mg/dL Final    Fasting specimen     HDL Cholesterol 02/10/2018 40* >49 mg/dL Final     LDL Cholesterol Calculated 02/10/2018 67  <100 mg/dL Final    Desirable:       <100 mg/dl     Non HDL Cholesterol 02/10/2018 83  <130 mg/dL Final     No EKG on file.     Review of Systems:  10 systems (general, cardiovascular, respiratory, eyes, ENT, endocrine, GI, , M/S, neurological) were reviewed. Most pertinent finding(s) is/are: chronic back pain, wears contacts, chronic fatigue, dry mouth, neck tension from anxiety. The remaining systems are all unremarkable.    Family History:   Patient reported family history includes:   Family History   Problem Relation Age of Onset     Cerebrovascular Disease Mother         heart attack     Cerebrovascular Disease Father         heart attack     Mental Illness Sister      Post-Traumatic Stress Disorder (PTSD) Brother      Mental Illness Brother      Glaucoma No family hx of      Macular Degeneration No family hx of      Diabetes No family hx of      Coronary Artery Disease No family hx of      Hypertension No family hx of      Hyperlipidemia No family hx of      Breast Cancer No family hx of      Colon Cancer No family hx of      Prostate Cancer No family hx of      Other Cancer No family hx of      Depression No family hx of      Anxiety Disorder No family hx of      Substance Abuse No family hx of      Anesthesia Reaction No family hx of      Asthma No family hx of      Osteoporosis No family hx of      Genetic Disorder No family hx of      Thyroid Disease No family hx of       Obesity No family hx of      Unknown/Adopted No family hx of      Mental Illness History: Brother experienced Anxiety, Depression and PTSD, Sister experienced Anxiety, Depression, and PTSD. Sister with Bipolar Disorder and multiple suicide attempts and abused prescriptions.   Substance Abuse History: Father reportedly uses alcohol, Sister reportedly uses prescription drugs .   Suicide History: Denies  Medications: Unknown      Social History:   Birth place: [place], [place]  Childhood: Yes intact home- does not talk to father or step mother.   Siblings:  one Brother(s),  four Sister(s) - Patient is second to youngest  Highest education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

## 2019-01-22 NOTE — PATIENT INSTRUCTIONS
Treatment Plan:    Change Inderal (propranolol) 40 by mouth daily in AM and PM for anxiety.     Continue Lexapro (escitalopram) 40 mg by mouth daily for depression and anxiety. Consider taking at night.     Consider change of Provigil (modafinil) to Nuvigil (armodafinil) for chronic fatigue from neurologist.    Follow with sleep specialist as planned next month.     Continue all other medications as reviewed per electronic medical record today.     Safety plan reviewed. To the Emergency Department as needed or call after hours crisis line at [phone] or [phone]. [place] Crisis Text Line: Text [place] to 480063  or  Suicide LifeLine Chat: suicidepreventionlifeline.org/chat    Continue individual therapy as planned with Grazyna.  Thank you for our work together in the Psychiatry Collaborative Care Model at [facility]. This is our last visit and I am returning your care back to your Primary Care Provider Mayra Weems MD . If you are not doing well, please contact your Primary Care Provider office.     Follow up with primary care provider as planned or for acute medical concerns.    Call the psychiatric nurse line with medication questions or concerns at [phone].    [employer] may be used to communicate with your provider, but this is not intended to be used for emergencies.

## 2019-01-22 NOTE — Clinical Note
José Manuel WeemsPsychiatry Update/Consult. I am sending care back to you for ongoing care and medication prescribing.  Future medication refills will come from you. I recommended that the patient follow up with you in about 4-12 weeks. More details about treatment plan are in my note. If you have any questions or concerns, please let me know. The patient can be referred back to this service for further consultation as needed.Cesilia

## 2019-01-22 NOTE — NURSING NOTE
"Chief Complaint   Patient presents with     Consult     re:admit- last seen by Cesilia 5/22/2018, chronic fatigue, MS, Dr Weems wanted her seen again for lack of energy related to medical condition.      initial /84 (BP Location: Left arm, Patient Position: Chair, Cuff Size: Adult Regular)   Pulse 61   Temp 98.4  F (36.9  C) (Oral)   Resp 18   Ht 1.676 m (5' 6\")   Wt (!) 153.3 kg (338 lb)   SpO2 100%   BMI 54.55 kg/m   Estimated body mass index is 54.55 kg/m  as calculated from the following:    Height as of this encounter: 1.676 m (5' 6\").    Weight as of this encounter: 153.3 kg (338 lb)..  bp completed using cuff size wrist cuff    "

## 2019-01-23 ASSESSMENT — ANXIETY QUESTIONNAIRES: GAD7 TOTAL SCORE: 12

## 2019-01-23 ASSESSMENT — PATIENT HEALTH QUESTIONNAIRE - PHQ9: SUM OF ALL RESPONSES TO PHQ QUESTIONS 1-9: 13

## 2019-01-26 ENCOUNTER — MYC MEDICAL ADVICE (OUTPATIENT)
Dept: PSYCHIATRY | Facility: CLINIC | Age: 29
End: 2019-01-26

## 2019-01-26 DIAGNOSIS — F33.9 RECURRENT MAJOR DEPRESSIVE EPISODES (H): ICD-10-CM

## 2019-01-26 RX ORDER — ESCITALOPRAM OXALATE 20 MG/1
40 TABLET ORAL DAILY
Qty: 60 TABLET | Refills: 1 | Status: CANCELLED | OUTPATIENT
Start: 2019-01-26

## 2019-01-29 ENCOUNTER — MYC MEDICAL ADVICE (OUTPATIENT)
Dept: INTERNAL MEDICINE | Facility: CLINIC | Age: 29
End: 2019-01-29

## 2019-01-29 RX ORDER — ESCITALOPRAM OXALATE 20 MG/1
40 TABLET ORAL DAILY
Qty: 120 TABLET | Refills: 1 | Status: SHIPPED | OUTPATIENT
Start: 2019-01-29 | End: 2019-05-28

## 2019-01-29 NOTE — TELEPHONE ENCOUNTER
"Routing refill request to provider for review/approval because:  40mg dose above recommended daily max of 20mg   Azeb RICHARDS RN    Requested Prescriptions   Pending Prescriptions Disp Refills     escitalopram (LEXAPRO) 20 MG tablet 60 tablet 1     Sig: Take 2 tablets (40 mg) by mouth daily    SSRIs Protocol Failed - 1/26/2019  4:52 PM       Failed - PHQ-9 score less than 5 in past 6 months    Please review last PHQ-9 score.          Passed - Medication is active on med list       Passed - Patient is age 18 or older       Passed - No active pregnancy on record       Passed - No positive pregnancy test in last 12 months       Passed - Recent (6 mo) or future (30 days) visit within the authorizing provider's specialty    Patient had office visit in the last 6 months or has a visit in the next 30 days with authorizing provider or within the authorizing provider's specialty.  See \"Patient Info\" tab in inbasket, or \"Choose Columns\" in Meds & Orders section of the refill encounter.                "

## 2019-02-05 DIAGNOSIS — E55.9 VITAMIN D DEFICIENCY: ICD-10-CM

## 2019-02-06 NOTE — TELEPHONE ENCOUNTER
Component      Latest Ref Rng & Units 12/4/2018   Vitamin D Deficiency screening      20 - 75 ug/L 47     Per 12/4/18 Vitamin D result note:   Dear ,     Your vitamin D remains at a good level, let's continue with the supplementation as we are doing     Thank you,       Mayra Weems MD       Routing refill request to provider for review/approval because:  Drug not on the [facility] refill protocol

## 2019-02-12 ENCOUNTER — OFFICE VISIT (OUTPATIENT)
Dept: PODIATRY | Facility: CLINIC | Age: 29
End: 2019-02-12
Payer: COMMERCIAL

## 2019-02-12 VITALS
DIASTOLIC BLOOD PRESSURE: 71 MMHG | HEART RATE: 46 BPM | HEIGHT: 66 IN | BODY MASS INDEX: 54.55 KG/M2 | SYSTOLIC BLOOD PRESSURE: 148 MMHG

## 2019-02-12 DIAGNOSIS — M20.41 HAMMER TOES OF BOTH FEET: Primary | ICD-10-CM

## 2019-02-12 DIAGNOSIS — M20.42 HAMMER TOES OF BOTH FEET: Primary | ICD-10-CM

## 2019-02-12 PROCEDURE — 99203 OFFICE O/P NEW LOW 30 MIN: CPT | Performed by: PODIATRIST

## 2019-02-12 NOTE — PATIENT INSTRUCTIONS
Thank you for choosing [facility]!    Follow up as needed    DR. NIX'S CLINIC LOCATIONS     MONDAY  [place] TUESDAY & FRIDAY AM  [place]   [address] [facility]   [address]   [place] [address] [place] [address]   [phone]  [phone]  [phone]       WEDNESDAY  [place] SCHEDULE SURGERY: [phone]   [address] [facility] APPOINTMENTS: [phone]   [place] [address] BILLING QUESTIONS: [phone]   [phone]         Hammertoe           What Is Hammertoe?  Hammertoe is a contracture (bending) of one or both joints of the second, third, fourth, or fifth (little) toes. This abnormal bending can put pressure on the toe when wearing shoes, causing problems to develop.  Hammertoes usually start out as mild deformities and get progressively worse over time. In the earlier stages, hammertoes are flexible and the symptoms can often be managed with noninvasive measures. But if left untreated, hammertoes can become more rigid and will not respond to non-surgical treatment.  Because of the progressive nature of hammertoes, they should receive early attention. Hammertoes never get better without some kind of intervention.  Causes  The most common cause of hammertoe is a muscle/tendon imbalance. This imbalance, which leads to a bending of the toe, results from mechanical (structural) changes in the foot that occur over time in some people.  Hammertoes may be aggravated by shoes that don t fit properly. A hammertoe may result if a toe is too long and is forced into a cramped position when a tight shoe is worn.  Occasionally, hammertoe is the result of an earlier trauma to the toe. In some people, hammertoes are inherited.  Symptoms  Common symptoms of hammertoes include:  Pain or irritation of the affected toe when wearing shoes.   Corns and calluses (a buildup of skin) on the toe, between two toes, or on the ball  of the foot. Corns are caused by constant friction against the shoe. They may be soft or hard, depending upon their location.   Inflammation, redness, or a burning sensation   Contracture of the toe   In more severe cases of hammertoe, open sores may form.   Diagnosis  Although hammertoes are readily apparent, to arrive at a diagnosis the foot and ankle surgeon will obtain a thorough history of your symptoms and examine your foot. During the physical examination, the doctor may attempt to reproduce your symptoms by manipulating your foot and will study the contractures of the toes. In addition, the foot and ankle surgeon may take x-rays to determine the degree of the deformities and assess any changes that may have occurred.   Hammertoes are progressive - they don t go away by themselves and usually they will get worse over time. However, not all cases are alike - some hammertoes progress more rapidly than others. Once your foot and ankle surgeon has evaluated your hammertoes, a treatment plan can be developed that is suited to your needs.  Non-surgical Treatment  There is a variety of treatment options for hammertoe. The treatment your foot and ankle surgeon selects will depend upon the severity of your hammertoe and other factors.  A number of non-surgical measures can be undertaken:  Padding corns and calluses. Your foot and ankle surgeon can provide or prescribe pads designed to shield corns from irritation. If you want to try over-the-counter pads, avoid the medicated types. Medicated pads are generally not recommended because they may contain a small amount of acid that can be harmful. Consult your surgeon about this option.   Changes in shoewear. Avoid shoes with pointed toes, shoes that are too short, or shoes with high heels - conditions that can force your toe against the front of the shoe. Instead, choose comfortable shoes with a deep, roomy toe box and heels no higher than two inches.   Orthotic devices.  A custom orthotic device placed in your shoe may help control the muscle/tendon imbalance.   Injection therapy. Corticosteroid injections are sometimes used to ease pain and inflammation caused by hammertoe.   Medications. Oral nonsteroidal anti-inflammatory drugs (NSAIDs), such as ibuprofen, may be recommended to reduce pain and inflammation.   Splinting/strapping. Splints or small straps may be applied by the surgeon to realign the bent toe.       HAMMERTOE SURGERY   Hammertoe surgery is complex. The surgical procedure is an attempt to help the toe lay in a better position. Nearly every structure in the toe will be cut including the tendons, ligaments, skin and bone. Hammertoes are a complex deformity and final toe position is difficult to predict. The only sure way to position a toe is to fuse all three digital joints. That will not happen as some degree of toe motion is needed for walking. The toe may not be completely reduced as the surrounding skin and other structures may not allow the toe to return to a normal position. The tendons on adjacent toes may need to be cut at the time of the original or subsequent surgeries, as interconnections exist between the toes. The toe may drift after surgery. Stiffness may develop leading to new areas of pressure.   Future shoe choices will be critical in allowing the surgery to provide comfort. The toes will still hurt if shoes rub. The original pain may also persist as other foot problems may be contributing to the current pain. The toe may or may not be pinned in place. External pins would require complete avoidance of water on the foot for six weeks. The pin would be removed about six weeks after the surgery. Strict attention to protection is critical. The pin could get bumped or loosen resulting in early removal. Removal might be necessary before the bone heals which would negatively affect the final surgical outcome and toe alignment.         Leyla to follow up  with Primary Care provider regarding elevated blood pressure.      Body Mass Index (BMI)  Many things can cause foot and ankle problems. Foot structure, activity level, foot mechanics and injuries are common causes of pain.  One very important issue that often goes unmentioned, is body weight. Extra weight can cause increased stress on muscles, ligaments, bones and tendons.  Sometimes just a few extra pounds is all it takes to put one over her/his threshold. Without reducing that stress, it can be difficult to alleviate pain. Some people are uncomfortable addressing this issue, but we feel it is important for you to think about it. As Foot &  Ankle specialists, our job is addressing the lower extremity problem and possible causes. Regarding extra body weight, we encourage patients to discuss diet and weight management plans with their primary care doctors. It is this team approach that gives you the best opportunity for pain relief and getting you back on your feet.

## 2019-02-12 NOTE — LETTER
2/12/2019         RE: Princess TRISTIN Machado  [address]  [facility] [address]        Dear Colleague,    Thank you for referring your patient, Princess TRISTIN Machado, to the [facility]. Please see a copy of my visit note below.    PATIENT HISTORY:  Princess TRISTIN Machado is a 28 year old female who presents to clinic for b/l 5th toe pain.  She wonders if she has hammertoes.  She gets calluses.  Trimming them, moisturizing helps.  2-8/10 pain.  Worse with activity.  Pt is an MA.    Review of Systems:  Patient denies fever, chills, rash, wound, stiffness, limping, numbness, weakness, heart burn, blood in stool, chest pain with activity, calf pain when walking, shortness of breath with activity, chronic cough, easy bleeding/bruising, swelling of ankles, excessive thirst, fatigue, depression, anxiety.      PAST MEDICAL HISTORY:   Past Medical History:   Diagnosis Date     Anxiety      Hidradenitis suppurativa 2010     MS (multiple sclerosis) (H)     diagnosis 10/2016     Recurrent major depression (H)     Pt has tried Zoloft, Prozac, Wellbutrin in the past         PAST SURGICAL HISTORY:   Past Surgical History:   Procedure Laterality Date     ENT SURGERY      tubes in ears     MYRINGOTOMY, INSERT TUBE BILATERAL, COMBINED  1990s        MEDICATIONS:   Current Outpatient Medications:      B Complex-C (SUPER B COMPLEX PO), Take 1 tablet by mouth daily, Disp: , Rfl:      BIOTIN PO, Take 10,000 mcg by mouth Reported on 5/9/2017, Disp: , Rfl:      Cyanocobalamin (B-12) 5000 MCG CAPS, Take 1 tablet by mouth daily , Disp: , Rfl:      escitalopram (LEXAPRO) 20 MG tablet, Take 2 tablets (40 mg) by mouth daily, Disp: 120 tablet, Rfl: 1     ibuprofen (ADVIL/MOTRIN) 600 MG tablet, Take 1 tablet (600 mg) by mouth every 6 hours as needed for moderate pain, Disp: 60 tablet, Rfl: 5     Interferon Beta-1a (REBIF TITRATION PACK) 6X8.8 & 6X22 MCG SOSY, Inject 1 Units Subcutaneous three times a week, Disp: , Rfl:       levonorgestrel (ECONTRA EZ) 1.5 MG TABS tablet, Take 1.5 mg by mouth daily as needed, Disp: , Rfl:      MAGNESIUM GLYCINATE PLUS PO, Take 1 tablet by mouth daily, Disp: , Rfl:      MODAFINIL PO, Take 200 mg by mouth 2 times daily, Disp: , Rfl:      mupirocin (BACTROBAN) 2 % ointment, , Disp: , Rfl:      norethindrone (MICRONOR) 0.35 MG per tablet, Take 1 tablet by mouth daily, Disp: , Rfl:      oxybutynin (DITROPAN-XL) 5 MG 24 hr tablet, Take 5 mg by mouth daily as needed for bladder spasms Patient takes 5 mg in the morning and 10 mg at night., Disp: , Rfl:      Pantoprazole Sodium (PROTONIX PO), Take 20 mg by mouth daily as needed for heartburn, Disp: , Rfl:      propranolol (INDERAL) 40 MG tablet, Take 1 tablet (40 mg) by mouth 2 times daily, Disp: 60 tablet, Rfl: 1     Sulfamethoxazole-Trimethoprim (BACTRIM PO), Take 1 tablet by mouth 2 times daily, Disp: , Rfl:      vitamin D3 (CHOLECALCIFEROL) 53225 units capsule, Take 1 capsule (50,000 Units) by mouth once a week, Disp: 8 capsule, Rfl: 1     zinc 50 MG TABS, Take 2 tablets by mouth daily, Disp: , Rfl:      ALLERGIES:    Allergies   Allergen Reactions     Accutane Rash        SOCIAL HISTORY:   Social History     Socioeconomic History     Marital status:      Spouse name: Not on file     Number of children: Not on file     Years of education: Not on file     Highest education level: Not on file   Social Needs     Financial resource strain: Not on file     Food insecurity - worry: Not on file     Food insecurity - inability: Not on file     Transportation needs - medical: Not on file     Transportation needs - non-medical: Not on file   Occupational History     Not on file   Tobacco Use     Smoking status: Never Smoker     Smokeless tobacco: Never Used   Substance and Sexual Activity     Alcohol use: No     Drug use: No     Sexual activity: Yes     Partners: Male     Birth control/protection: Pill   Other Topics Concern     Parent/sibling w/ CABG, MI  "or angioplasty before 65F 55M? No   Social History Narrative     Not on file        FAMILY HISTORY:   Family History   Problem Relation Age of Onset     Cerebrovascular Disease Mother         heart attack     Cerebrovascular Disease Father         heart attack     Mental Illness Sister      Post-Traumatic Stress Disorder (PTSD) Brother      Mental Illness Brother      Glaucoma No family hx of      Macular Degeneration No family hx of      Diabetes No family hx of      Coronary Artery Disease No family hx of      Hypertension No family hx of      Hyperlipidemia No family hx of      Breast Cancer No family hx of      Colon Cancer No family hx of      Prostate Cancer No family hx of      Other Cancer No family hx of      Depression No family hx of      Anxiety Disorder No family hx of      Substance Abuse No family hx of      Anesthesia Reaction No family hx of      Asthma No family hx of      Osteoporosis No family hx of      Genetic Disorder No family hx of      Thyroid Disease No family hx of      Obesity No family hx of      Unknown/Adopted No family hx of         EXAM:Vitals: /71   Pulse (!) 46   Ht 1.676 m (5' 6\")   BMI 54.55 kg/m     BMI= Body mass index is 54.55 kg/m .    General appearance: Patient is alert and fully cooperative with history & exam.  No sign of distress is noted during the visit.     Psychiatric: Affect is pleasant & appropriate.  Patient appears motivated to improve health.     Respiratory: Breathing is regular & unlabored while sitting.     HEENT: Hearing is intact to spoken word.  Speech is clear.  No gross evidence of visual impairment that would impact ambulation.     Dermatologic: b/l 5th toes with mild diffuse callusing of PIPJs.  Skin is intact to both lower extremities without significant lesions, rash or abrasion.  No paronychia or evidence of soft tissue infection is noted.     Vascular: DP & PT pulses are intact & regular bilaterally.  No significant edema or varicosities " noted.  CFT and skin temperature are normal to both lower extremities.     Neurologic: Lower extremity sensation is intact to light touch.  No evidence of weakness or contracture in the lower extremities.  No evidence of neuropathy.     Musculoskeletal: b/l adductovarus 5th hammertoes.  Patient is ambulatory without assistive device or brace.  No gross ankle deformity noted.  No foot or ankle joint effusion is noted.     ASSESSMENT: b/l 5th hammertoes     PLAN:  Reviewed patient's chart in epic.  Discussed condition and treatment options including pros and cons.    Hammertoe treatment options were discussed.  This included both surgical and non-surgical treatment alternatives.  Non-surgical options include wide fitting shoes, deep toe box, crest pad or foam pads, foot orthotics and debridement of any callous as necessary.  Surgical treatments were explained including soft tissue release, arthroplasty, skin plasty.    Patient is aware that surgery is elective, can be avoided if desired.  The recovery process was discussed including impact to work, walking, shoes and daily activities.  I would anticipate up to 12 months for maximum recovery after surgery. Likely surgical procedures based on exam and xray findings include 5th toe arthroplasty with derotational skin plasty b/l.    Handout given.  F/u prn.    Abilio Landers DPM, FACFAS    Weight management plan: Patient was referred to their PCP to discuss a diet and exercise plan.  Leyla to follow up with Primary Care provider regarding elevated blood pressure.      Again, thank you for allowing me to participate in the care of your patient.        Sincerely,        Abilio Landers DPM

## 2019-02-12 NOTE — PROGRESS NOTES
PATIENT HISTORY:  Princess TRISTIN Machado is a 28 year old female who presents to clinic for b/l 5th toe pain.  She wonders if she has hammertoes.  She gets calluses.  Trimming them, moisturizing helps.  2-8/10 pain.  Worse with activity.  Pt is an MA.    Review of Systems:  Patient denies fever, chills, rash, wound, stiffness, limping, numbness, weakness, heart burn, blood in stool, chest pain with activity, calf pain when walking, shortness of breath with activity, chronic cough, easy bleeding/bruising, swelling of ankles, excessive thirst, fatigue, depression, anxiety.      PAST MEDICAL HISTORY:   Past Medical History:   Diagnosis Date     Anxiety      Hidradenitis suppurativa 2010     MS (multiple sclerosis) (H)     diagnosis 10/2016     Recurrent major depression (H)     Pt has tried Zoloft, Prozac, Wellbutrin in the past         PAST SURGICAL HISTORY:   Past Surgical History:   Procedure Laterality Date     ENT SURGERY      tubes in ears     MYRINGOTOMY, INSERT TUBE BILATERAL, COMBINED  1990s        MEDICATIONS:   Current Outpatient Medications:      B Complex-C (SUPER B COMPLEX PO), Take 1 tablet by mouth daily, Disp: , Rfl:      BIOTIN PO, Take 10,000 mcg by mouth Reported on 5/9/2017, Disp: , Rfl:      Cyanocobalamin (B-12) 5000 MCG CAPS, Take 1 tablet by mouth daily , Disp: , Rfl:      escitalopram (LEXAPRO) 20 MG tablet, Take 2 tablets (40 mg) by mouth daily, Disp: 120 tablet, Rfl: 1     ibuprofen (ADVIL/MOTRIN) 600 MG tablet, Take 1 tablet (600 mg) by mouth every 6 hours as needed for moderate pain, Disp: 60 tablet, Rfl: 5     Interferon Beta-1a (REBIF TITRATION PACK) 6X8.8 & 6X22 MCG SOSY, Inject 1 Units Subcutaneous three times a week, Disp: , Rfl:      levonorgestrel (ECONTRA EZ) 1.5 MG TABS tablet, Take 1.5 mg by mouth daily as needed, Disp: , Rfl:      MAGNESIUM GLYCINATE PLUS PO, Take 1 tablet by mouth daily, Disp: , Rfl:      MODAFINIL PO, Take 200 mg by mouth 2 times daily, Disp: , Rfl:      mupirocin  (BACTROBAN) 2 % ointment, , Disp: , Rfl:      norethindrone (MICRONOR) 0.35 MG per tablet, Take 1 tablet by mouth daily, Disp: , Rfl:      oxybutynin (DITROPAN-XL) 5 MG 24 hr tablet, Take 5 mg by mouth daily as needed for bladder spasms Patient takes 5 mg in the morning and 10 mg at night., Disp: , Rfl:      Pantoprazole Sodium (PROTONIX PO), Take 20 mg by mouth daily as needed for heartburn, Disp: , Rfl:      propranolol (INDERAL) 40 MG tablet, Take 1 tablet (40 mg) by mouth 2 times daily, Disp: 60 tablet, Rfl: 1     Sulfamethoxazole-Trimethoprim (BACTRIM PO), Take 1 tablet by mouth 2 times daily, Disp: , Rfl:      vitamin D3 (CHOLECALCIFEROL) 26510 units capsule, Take 1 capsule (50,000 Units) by mouth once a week, Disp: 8 capsule, Rfl: 1     zinc 50 MG TABS, Take 2 tablets by mouth daily, Disp: , Rfl:      ALLERGIES:    Allergies   Allergen Reactions     Accutane Rash        SOCIAL HISTORY:   Social History     Socioeconomic History     Marital status:      Spouse name: Not on file     Number of children: Not on file     Years of education: Not on file     Highest education level: Not on file   Social Needs     Financial resource strain: Not on file     Food insecurity - worry: Not on file     Food insecurity - inability: Not on file     Transportation needs - medical: Not on file     Transportation needs - non-medical: Not on file   Occupational History     Not on file   Tobacco Use     Smoking status: Never Smoker     Smokeless tobacco: Never Used   Substance and Sexual Activity     Alcohol use: No     Drug use: No     Sexual activity: Yes     Partners: Male     Birth control/protection: Pill   Other Topics Concern     Parent/sibling w/ CABG, MI or angioplasty before 65F 55M? No   Social History Narrative     Not on file        FAMILY HISTORY:   Family History   Problem Relation Age of Onset     Cerebrovascular Disease Mother         heart attack     Cerebrovascular Disease Father         heart attack  "    Mental Illness Sister      Post-Traumatic Stress Disorder (PTSD) Brother      Mental Illness Brother      Glaucoma No family hx of      Macular Degeneration No family hx of      Diabetes No family hx of      Coronary Artery Disease No family hx of      Hypertension No family hx of      Hyperlipidemia No family hx of      Breast Cancer No family hx of      Colon Cancer No family hx of      Prostate Cancer No family hx of      Other Cancer No family hx of      Depression No family hx of      Anxiety Disorder No family hx of      Substance Abuse No family hx of      Anesthesia Reaction No family hx of      Asthma No family hx of      Osteoporosis No family hx of      Genetic Disorder No family hx of      Thyroid Disease No family hx of      Obesity No family hx of      Unknown/Adopted No family hx of         EXAM:Vitals: /71   Pulse (!) 46   Ht 1.676 m (5' 6\")   BMI 54.55 kg/m    BMI= Body mass index is 54.55 kg/m .    General appearance: Patient is alert and fully cooperative with history & exam.  No sign of distress is noted during the visit.     Psychiatric: Affect is pleasant & appropriate.  Patient appears motivated to improve health.     Respiratory: Breathing is regular & unlabored while sitting.     HEENT: Hearing is intact to spoken word.  Speech is clear.  No gross evidence of visual impairment that would impact ambulation.     Dermatologic: b/l 5th toes with mild diffuse callusing of PIPJs.  Skin is intact to both lower extremities without significant lesions, rash or abrasion.  No paronychia or evidence of soft tissue infection is noted.     Vascular: DP & PT pulses are intact & regular bilaterally.  No significant edema or varicosities noted.  CFT and skin temperature are normal to both lower extremities.     Neurologic: Lower extremity sensation is intact to light touch.  No evidence of weakness or contracture in the lower extremities.  No evidence of neuropathy.     Musculoskeletal: b/l " adductovarus 5th hammertoes.  Patient is ambulatory without assistive device or brace.  No gross ankle deformity noted.  No foot or ankle joint effusion is noted.     ASSESSMENT: b/l 5th hammertoes     PLAN:  Reviewed patient's chart in epic.  Discussed condition and treatment options including pros and cons.    Hammertoe treatment options were discussed.  This included both surgical and non-surgical treatment alternatives.  Non-surgical options include wide fitting shoes, deep toe box, crest pad or foam pads, foot orthotics and debridement of any callous as necessary.  Surgical treatments were explained including soft tissue release, arthroplasty, skin plasty.    Patient is aware that surgery is elective, can be avoided if desired.  The recovery process was discussed including impact to work, walking, shoes and daily activities.  I would anticipate up to 12 months for maximum recovery after surgery. Likely surgical procedures based on exam and xray findings include 5th toe arthroplasty with derotational skin plasty b/l.    Handout given.  F/u prn.    Abilio Landers DPM, FACFAS    Weight management plan: Patient was referred to their PCP to discuss a diet and exercise plan.   to follow up with Primary Care provider regarding elevated blood pressure.

## 2019-02-26 ENCOUNTER — OFFICE VISIT (OUTPATIENT)
Dept: OPTOMETRY | Facility: CLINIC | Age: 29
End: 2019-02-26
Payer: COMMERCIAL

## 2019-02-26 DIAGNOSIS — H52.13 HIGH MYOPIA, BILATERAL: Primary | ICD-10-CM

## 2019-02-26 DIAGNOSIS — H04.129 DRY EYE: ICD-10-CM

## 2019-02-26 PROCEDURE — 92014 COMPRE OPH EXAM EST PT 1/>: CPT | Performed by: OPTOMETRIST

## 2019-02-26 PROCEDURE — 92015 DETERMINE REFRACTIVE STATE: CPT | Performed by: OPTOMETRIST

## 2019-02-26 ASSESSMENT — SLIT LAMP EXAM - LIDS
COMMENTS: NORMAL
COMMENTS: NORMAL

## 2019-02-26 ASSESSMENT — EXTERNAL EXAM - LEFT EYE: OS_EXAM: NORMAL

## 2019-02-26 ASSESSMENT — CONF VISUAL FIELD
OD_NORMAL: 1
OS_NORMAL: 1
METHOD: COUNTING FINGERS

## 2019-02-26 ASSESSMENT — VISUAL ACUITY
CORRECTION_TYPE: CONTACTS
OD_SC: HM
OS_SC: HM
OS_CC+: -1
OD_CC: 20/20
OS_CC: 20/25
OS_CC: 20/30-1
OD_CC: 20/30-1
METHOD: SNELLEN - LINEAR

## 2019-02-26 ASSESSMENT — REFRACTION_MANIFEST
OD_SPHERE: -10.25
OS_SPHERE: -9.75
OD_SPHERE: -10.00
OS_CYLINDER: SPHERE
OD_CYLINDER: +0.75
OS_CYLINDER: SPHERE
OS_SPHERE: -10.00
OD_AXIS: 140
OD_CYLINDER: +0.25
METHOD_AUTOREFRACTION: 1
OD_AXIS: 029

## 2019-02-26 ASSESSMENT — TONOMETRY
OS_IOP_MMHG: 20
OD_IOP_MMHG: 19
IOP_METHOD: APPLANATION

## 2019-02-26 ASSESSMENT — CUP TO DISC RATIO
OD_RATIO: 0.15
OS_RATIO: 0.15

## 2019-02-26 ASSESSMENT — EXTERNAL EXAM - RIGHT EYE: OD_EXAM: NORMAL

## 2019-02-26 NOTE — PROGRESS NOTES
Chief Complaint   Patient presents with     Annual Eye Exam       Patient reports today she is not compliant with changing lenses out every two weeks, only changes pairs every other month    Doesn't want to do CL exam this year, but will next year    History of Optic Neuritis OS      Previous contact lens wearer? Yes: Joo Flores  Comfort of contact lenses :Good  Satisfied with current lenses: Yes        Last Eye Exam: January 2018  Dilated Previously: Yes    What are you currently using to see?  contacts    Distance Vision Acuity: Satisfied with vision    Near Vision Acuity: Satisfied with vision while reading  With contacts    Eye Comfort: good  Do you use eye drops? 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       OBJECTIVE: See Ophthalmology exam    ASSESSMENT:    ICD-10-CM    1. High myopia, bilateral H52.13    2. Dry eye H04.129       PLAN:   Update prescription   Recommend peroxide solution over multipurpose solution  Artificial tears / reduce wear time/ rewetting drops    Dionna William OD

## 2019-02-26 NOTE — LETTER
2/26/2019         RE: Princess TRISTIN Machado  [address]  [facility] [address]        Dear Colleague,    Thank you [facility] your patient, Princess TRISTIN Machado[facility]. Please see a copy of my visit note below.    Chief Complaint   Patient presents with     Annual Eye Exam       Patient reports today she is not compliant with changing lenses out every two weeks, only changes pairs every other month    Doesn't want to do CL exam this year, but will next year    History of Optic Neuritis OS      Previous contact lens wearer? Yes: Acuvluci Flores  Comfort of contact lenses :Good  Satisfied with current lenses: Yes        Last Eye Exam: January 2018  Dilated Previously: Yes    What are you currently using to see?  contacts    Distance Vision Acuity: Satisfied with vision    Near Vision Acuity: Satisfied with vision while reading  With contacts    Eye Comfort: good  Do you use eye drops? 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       OBJECTIVE: See Ophthalmology exam    ASSESSMENT:    ICD-10-CM    1. High myopia, bilateral H52.13    2. Dry eye H04.129       PLAN:   Update prescription   Recommend peroxide solution over multipurpose solution  Artificial tears / reduce wear time/ rewetting drops    Dionna William OD                     Again, thank you for allowing me to participate in the care of your patient.        Sincerely,        Dionna William, OD

## 2019-02-26 NOTE — PATIENT INSTRUCTIONS
Once your contact lens prescription is finalized and you are not having any problems with the trials or current lenses you can order your supply of lenses.   You can order your contact lenses online at [email or web address].  Click on services at the top of the page, then eye care and you will see the link to order contact lenses.  You can also order contact lenses at [phone]. There is no shipping fee if you order an annual supply otherwise  be sure to let them know which office you would like to  the lenses, [place] [phone].    Recommend peroxide solution over multipurpose solution (read package instructions as this can not go directly in your eyes)Artificial tears / reduce wear time to 12 hours/ rewetting drops when wearing lenses

## 2019-04-01 ENCOUNTER — OFFICE VISIT (OUTPATIENT)
Dept: FAMILY MEDICINE | Facility: CLINIC | Age: 29
End: 2019-04-01
Payer: COMMERCIAL

## 2019-04-01 VITALS
HEART RATE: 60 BPM | RESPIRATION RATE: 16 BRPM | HEIGHT: 66 IN | SYSTOLIC BLOOD PRESSURE: 120 MMHG | TEMPERATURE: 98.1 F | BODY MASS INDEX: 54.55 KG/M2 | DIASTOLIC BLOOD PRESSURE: 76 MMHG

## 2019-04-01 DIAGNOSIS — S29.012A RHOMBOID MUSCLE STRAIN, INITIAL ENCOUNTER: Primary | ICD-10-CM

## 2019-04-01 DIAGNOSIS — G35 MULTIPLE SCLEROSIS (H): ICD-10-CM

## 2019-04-01 PROCEDURE — 99214 OFFICE O/P EST MOD 30 MIN: CPT | Performed by: PHYSICIAN ASSISTANT

## 2019-04-01 RX ORDER — DEXTROAMPHETAMINE SACCHARATE, AMPHETAMINE ASPARTATE MONOHYDRATE, DEXTROAMPHETAMINE SULFATE AND AMPHETAMINE SULFATE 5; 5; 5; 5 MG/1; MG/1; MG/1; MG/1
1 CAPSULE, EXTENDED RELEASE ORAL 3 TIMES DAILY
COMMUNITY
Start: 2019-03-20 | End: 2021-07-21

## 2019-04-01 NOTE — PROGRESS NOTES
SUBJECTIVE:   Princess TRISTIN Machado is a 28 year old female who presents to clinic today for the following health issues:      Back Pain       Duration: ongoing with dx with MS about 2016- pain worsening and constant now        Specific cause: none    Description:   Location of pain: middle of back right  Character of pain: pressure, tense, achy, and constant  Pain radiation: radiates to sacrum when standing too long or pushing on it  New numbness or weakness in legs, not attributed to pain:  no     Intensity: mild    History:   Pain interferes with job: YES  History of back problems: recurrent self limited episodes of low back pain in the past  Any previous MRI or X-rays: None  Sees a specialist for back pain:  No  Therapies tried without relief: none    Alleviating factors:   Improved by: chiropractor, cold, heat, massage and NSAIDs      Precipitating factors:  Worsened by: prolonged Standing and Walking    Pain on the right side  Gnawing and tight  Pressure and achy  Improves with postural changes (pulling shoulder blades back) and stretching in the morning   Deep tissue massage helps some  Burning tailbone sensation with walking    Being on her feet makes it worse   No problems with numbness, coordination, weakness    Red flag symptoms:  Denies saddle anesthesia, bowel function.   Denies fever and chills.    Denies recent IV drug use.    Denies recent weight loss or history of cancer.   Denies history of osteoporosis or prolonged steroid use.    Bladder - she has ongoing problems with urinary frequency and incontinence attributed to MS.  She takes oxybutynin for this.  It may be slightly worse recently.       Problem list and histories reviewed & adjusted, as indicated.  Additional history: as documented    Patient Active Problem List   Diagnosis     Optic neuritis, left     Recurrent major depressive episodes (H)     Multiple sclerosis (H)     Bulimia nervosa     Acne vulgaris     Chronic back pain     Irina      Headache     Other specified health status     Hidradenitis suppurativa     Hypertriglyceridemia     Hidradenitis     Migraine without status migrainosus, not intractable     Biomechanical lesion     Numbness of finger     Somatic dysfunction of cervical region     Spasm     Vitamin D deficiency     Common wart: L t lat hand -      Morbid obesity due to excess calories (H) BMI 50-60     Midline low back pain without sciatica     Sacral pain     Somatic dysfunction of sacral region     Thoracic segment dysfunction     Muscle spasm     STACIA (generalized anxiety disorder)     Past Surgical History:   Procedure Laterality Date     ENT SURGERY      tubes in ears     MYRINGOTOMY, INSERT TUBE BILATERAL, COMBINED  1990s       Social History     Tobacco Use     Smoking status: Never Smoker     Smokeless tobacco: Never Used   Substance Use Topics     Alcohol use: No     Family History   Problem Relation Age of Onset     Cerebrovascular Disease Mother         heart attack     Cerebrovascular Disease Father         heart attack     Hypertension Father      Mental Illness Sister      Post-Traumatic Stress Disorder (PTSD) Brother      Mental Illness Brother      Glaucoma No family hx of      Macular Degeneration No family hx of      Coronary Artery Disease No family hx of      Hyperlipidemia No family hx of      Breast Cancer No family hx of      Colon Cancer No family hx of      Prostate Cancer No family hx of      Other Cancer No family hx of      Depression No family hx of      Anxiety Disorder No family hx of      Substance Abuse No family hx of      Anesthesia Reaction No family hx of      Asthma No family hx of      Osteoporosis No family hx of      Genetic Disorder No family hx of      Thyroid Disease No family hx of      Obesity No family hx of      Unknown/Adopted No family hx of          Current Outpatient Medications   Medication Sig Dispense Refill     amphetamine-dextroamphetamine (ADDERALL XR) 20 MG 24 hr capsule  Take 1 capsule by mouth 2 times daily       B Complex-C (SUPER B COMPLEX PO) Take 1 tablet by mouth daily       BIOTIN PO Take 10,000 mcg by mouth Reported on 5/9/2017       Cyanocobalamin (B-12) 5000 MCG CAPS Take 1 tablet by mouth daily        ibuprofen (ADVIL/MOTRIN) 600 MG tablet Take 1 tablet (600 mg) by mouth every 6 hours as needed for moderate pain 60 tablet 5     Interferon Beta-1a (REBIF TITRATION PACK) 6X8.8 & 6X22 MCG SOSY Inject 1 Units Subcutaneous three times a week       levonorgestrel (ECONTRA EZ) 1.5 MG TABS tablet Take 1.5 mg by mouth daily as needed       MAGNESIUM GLYCINATE PLUS PO Take 1 tablet by mouth daily       MODAFINIL PO Take 200 mg by mouth 2 times daily       mupirocin (BACTROBAN) 2 % ointment        norethindrone (MICRONOR) 0.35 MG per tablet Take 1 tablet by mouth daily       oxybutynin (DITROPAN-XL) 5 MG 24 hr tablet Take 5 mg by mouth daily as needed for bladder spasms Patient takes 5 mg in the morning and 10 mg at night.       Pantoprazole Sodium (PROTONIX PO) Take 20 mg by mouth daily as needed for heartburn       propranolol (INDERAL) 40 MG tablet Take 1 tablet (40 mg) by mouth 2 times daily 60 tablet 1     Sulfamethoxazole-Trimethoprim (BACTRIM PO) Take 1 tablet by mouth 2 times daily       vitamin D3 (CHOLECALCIFEROL) 91627 units capsule Take 1 capsule (50,000 Units) by mouth once a week 8 capsule 1     zinc 50 MG TABS Take 2 tablets by mouth daily       escitalopram (LEXAPRO) 20 MG tablet Take 2 tablets (40 mg) by mouth daily 120 tablet 1     Allergies   Allergen Reactions     Isotretinoin Rash and Hives     Accutane Rash       Reviewed and updated as needed this visit by clinical staff  Tobacco  Allergies  Meds       Reviewed and updated as needed this visit by Provider         Review of Systems   Constitutional: Negative.    HENT: Negative.    Eyes: Negative.    Respiratory: Negative.    Cardiovascular: Negative.    Gastrointestinal: Negative.    Genitourinary: Negative.  "   Musculoskeletal:        As in HPI   Skin: Negative.    Neurological: Negative.    Psychiatric/Behavioral: Negative.        OBJECTIVE:     /76 (Cuff Size: Thigh)   Pulse 60   Temp 98.1  F (36.7  C) (Tympanic)   Resp 16   Ht 1.676 m (5' 6\")   BMI 54.55 kg/m    Body mass index is 54.55 kg/m .    Physical Exam   Constitutional: She is oriented to person, place, and time. She appears well-developed and well-nourished.   HENT:   Head: Normocephalic and atraumatic.   Nose: Nose normal.   Eyes: Conjunctivae and EOM are normal.   Neck: Normal range of motion.   Pulmonary/Chest: Effort normal.   Musculoskeletal:        Cervical back: She exhibits tenderness, pain and spasm. She exhibits normal range of motion, no bony tenderness and no swelling.        Back:    Neurological: She is alert and oriented to person, place, and time.   Skin: Skin is warm and dry.   Psychiatric: She has a normal mood and affect. Judgment normal.       No results found for this or any previous visit (from the past 24 hour(s)).    ASSESSMENT/PLAN:       ICD-10-CM    1. Rhomboid muscle strain, initial encounter S29.012A    2. Multiple sclerosis (H) G35       1. We discussed exercises for rhomboid strain - which is mostly scapular stabilization exercise.  If home exercise program is not helpful, we can also order physical therapy.  2. MS discussed in regards to the urinary symptoms.  These are felt to be most consistent with her baseline and not a true red flag for back pain.  She has no other findings to suggest cauda equina.  She will also monitor the urinary symptoms.      Return in about 2 weeks (around 4/15/2019) for Musculoskeletal Recheck PRN.    Patient Instructions     Patient Education     Standing Scapular Pinch    1. Stand up straight. Raise each arm up to the side, bent at 90-degree angles with palms forward.  2. Squeeze your shoulders back, pushing your shoulder blades toward each other. Hold for 5 seconds.  3. Relax. Repeat " 5 times, or as instructed.  Date Last Reviewed: 3/10/2016    5544-0330 [employer] [address] [facility], [place] [address]. All rights reserved. This information is not intended as a substitute for professional medical care. Always follow your healthcare professional's instructions.               Rommel Hernandez PA-C  [facility]

## 2019-04-01 NOTE — PATIENT INSTRUCTIONS
Patient Education     Standing Scapular Pinch    1. Stand up straight. Raise each arm up to the side, bent at 90-degree angles with palms forward.  2. Squeeze your shoulders back, pushing your shoulder blades toward each other. Hold for 5 seconds.  3. Relax. Repeat 5 times, or as instructed.  Date Last Reviewed: 3/10/2016    4443-5177 [employer]. [address] [place] [address], [place] [address]. All rights reserved. This information is not intended as a substitute for professional medical care. Always follow your healthcare professional's instructions.

## 2019-04-02 ASSESSMENT — ENCOUNTER SYMPTOMS
EYES NEGATIVE: 1
RESPIRATORY NEGATIVE: 1
PSYCHIATRIC NEGATIVE: 1
GASTROINTESTINAL NEGATIVE: 1
CARDIOVASCULAR NEGATIVE: 1
CONSTITUTIONAL NEGATIVE: 1
NEUROLOGICAL NEGATIVE: 1

## 2019-04-09 ENCOUNTER — E-VISIT (OUTPATIENT)
Dept: FAMILY MEDICINE | Facility: CLINIC | Age: 29
End: 2019-04-09
Payer: COMMERCIAL

## 2019-04-09 DIAGNOSIS — K21.00 GASTRO-ESOPHAGEAL REFLUX DISEASE WITH ESOPHAGITIS: Primary | ICD-10-CM

## 2019-04-09 DIAGNOSIS — F33.9 RECURRENT MAJOR DEPRESSIVE EPISODES (H): ICD-10-CM

## 2019-04-09 DIAGNOSIS — F41.9 ANXIETY: ICD-10-CM

## 2019-04-09 PROCEDURE — 99444 ZZC PHYSICIAN ONLINE EVALUATION & MANAGEMENT SERVICE: CPT | Performed by: PHYSICIAN ASSISTANT

## 2019-04-09 RX ORDER — PANTOPRAZOLE SODIUM 40 MG/1
40 TABLET, DELAYED RELEASE ORAL
Qty: 30 TABLET | Refills: 2 | Status: SHIPPED | OUTPATIENT
Start: 2019-04-09 | End: 2019-05-28

## 2019-04-09 NOTE — TELEPHONE ENCOUNTER
"Requested Prescriptions   Pending Prescriptions Disp Refills     propranolol (INDERAL) 40 MG tablet 60 tablet 1     Sig: Take 1 tablet (40 mg) by mouth 2 times daily       Beta-Blockers Protocol Passed - 4/9/2019 10:21 AM        Passed - Blood pressure under 140/90 in past 12 months     BP Readings from Last 3 Encounters:   04/01/19 120/76   02/12/19 148/71   01/22/19 147/84                 Passed - Patient is age 6 or older        Passed - Recent (12 mo) or future (30 days) visit within the authorizing provider's specialty     Patient had office visit in the last 12 months or has a visit in the next 30 days with authorizing provider or within the authorizing provider's specialty.  See \"Patient Info\" tab in inbasket, or \"Choose Columns\" in Meds & Orders section of the refill encounter.              Passed - Medication is active on med list        Last Written Prescription Date:  1/22/2019  Last Fill Quantity: 60,  # refills: 1   Last office visit: 1/22/2019 with prescribing provider:  Cesilia Cannon   Future Office Visit:   Next 5 appointments (look out 90 days)    May 28, 2019  9:00 AM CDT  Office Visit with Sheron Samaniego MD  [facility] ([facility]) [address] [place] [id number] [phone]           "

## 2019-04-09 NOTE — TELEPHONE ENCOUNTER
"Refill for: inderal    Last Appointment: 1/22/19    Next Appointment: none    No Shows/Cancellations since last appointment:  none    Last Refill in Epic (date and amount/how many days):    Disp Refills Start End EFREN   propranolol (INDERAL) 40 MG tablet 60 tablet 1 1/22/2019  No   Sig - Route: Take 1 tablet (40 mg) by mouth 2 times daily - Oral       Per Cesilia Cannon's progress note on 1/22/19:  \"The patient is being returned to the referring provider for ongoing care and medication prescribing.  The patient can be referred back to this service for further consultation as needed.\"    Routing refill request to PCP.            "

## 2019-04-10 NOTE — PATIENT INSTRUCTIONS
Thank you for choosing us for your care. I have placed an order for a prescription so that you can start treatment. View your full visit summary for details by clicking on the link below. Your pharmacist will able to address any questions you may have about the medication.      If you're not feeling better within 2 weeks please schedule an appointment.  You can schedule an appointment right here in [employer], or call [phone]  If the visit is for the same symptoms as your e-visit, we'll refund the cost of your e-visit if seen within seven days.      GERD (Adult)    The esophagus is a tube that carries food from the mouth to the stomach. A valve (the LES, lower esophageal sphincter) at the lower end of the esophagus prevents stomach acid from flowing upward. When this valve doesn't work properly, stomach contents may repeatedly flow back up (reflux) into the esophagus. This is called gastroesophageal reflux disease (GERD). GERD can irritate the esophagus. It can cause problems with pain, swallowing or breathing. In severe cases, GERD can cause recurrent pneumonia (from aspiration or breathing in particles) or other serious problems.  Symptoms of reflux include burning, pressure or sharp pain in the upper abdomen or mid to lower chest. The pain can spread to the neck, back, or shoulder. There may be belching, an acid taste in the back of the throat, chronic cough, or sore throat, or hoarseness. GERD symptoms often occur during the day after a big meal. They can also occur at night when lying down.   Home care  Lifestyle changes can help reduce symptoms. If needed, your healthcare provider may prescribe medicines. Symptoms often improve with treatment, but if treatment is stopped, the symptoms often return after a few months. So most persons with GERD will need to continue treatment or get treatment on and off.  Lifestyle changes    Limit or avoid fatty, fried, and spicy foods, as well as coffee, chocolate, mint,  "and foods with high acid content such as tomatoes and citrus fruit and juices (orange, grapefruit, lemon).    Don t eat large meals, especially at night. Frequent, smaller meals are best. Don't lie down right after eating. And don t eat anything 3 hours before going to bed.    Don't drink alcohol or smoke. As much as possible, stay away from second hand smoke.    If you are overweight, losing weight will reduce symptoms.     Don't wear tight clothing around your stomach area.    If your symptoms occur during sleep, use a foam wedge to elevate your upper body (not just your head.) Or, place 4\" blocks under the head of your bed. Or use 2 bed risers under your bedframe.  Medicines  If needed, medicines can help relieve the symptoms of GERD and prevent damage to the esophagus. Discuss a medicine plan with your healthcare provider. This may include one or more of the following medicines:    Antacids to help neutralize the normal acids in your stomach.    Acid blockers (Histamine or H2 blockers) to decrease acid production.    Acid inhibitors (proton pump inhibitors PPIs) to decrease acid production in a different way than the blockers. They may work better, but can take a little longer to take effect.  Take an antacid 30 to 60 minutes after eating and at bedtime, but not at the same time as an acid blocker.  Try not to take medicines such as ibuprofen and aspirin. If you are taking aspirin for your heart or other medical reasons, talk to your healthcare provider about stopping it.  Follow-up care  Follow up with your healthcare provider or as advised by our staff.  When to seek medical advice  Call your healthcare provider if any of the following occur:    Stomach pain gets worse or moves to the lower right abdomen (appendix area)    Chest pain appears or gets worse, or spreads to the back, neck, shoulder, or arm    An over-the-counter trial of medicine doesn't relieve your symptoms    Weight loss that can't be " explained    Trouble or pain swallowing    Frequent vomiting (can t keep down liquids)    Blood in the stool or vomit (red or black in color)    Feeling weak or dizzy    Fever of 100.4 F (38 C) or higher, or as directed by your healthcare provider  Date Last Reviewed: 3/1/2018    1017-1177 [employer]. [address] [place] [address], [place] [address]. All rights reserved. This information is not intended as a substitute for professional medical care. Always follow your healthcare professional's instructions.          Lifestyle Changes for Controlling GERD  When you have GERD, stomach acid feels as if it s backing up toward your mouth. Whether or not you take medicine to control your GERD, your symptoms can often be improved with lifestyle changes. Talk to your healthcare provider about the following suggestions. These suggestions may help you get relief from your symptoms.      Raise your head  Reflux is more likely to strike when you re lying down flat, because stomach fluid can flow backward more easily. Raising the head of your bed 4 to 6 inches can help. To do this:    Slide blocks or books under the legs at the head of your bed. Or, place a wedge under the mattress. [employer] can make a suitable wedge for you. The wedge should run from your waist to the top of your head.    Don t just prop your head on several pillows. This increases pressure on your stomach. It can make GERD worse.  Watch your eating habits  Certain foods may increase the acid in your stomach or relax the lower esophageal sphincter. This makes GERD more likely. It s best to avoid the following if they cause you symptoms:    Coffee, tea, and carbonated drinks (with and without caffeine)    Fatty, fried, or spicy food    Mint, chocolate, onions, and tomatoes    Peppermint    Any other foods that seem to irritate your stomach or cause you pain  Relieve the pressure  Tips include the following:    Eat smaller meals, even if you have  to eat more often.    Don t lie down right after you eat. Wait a few hours for your stomach to empty.    Avoid tight belts and tight-fitting clothes.    Lose excess weight.  Tobacco and alcohol  Avoid smoking tobacco and drinking alcohol. They can make GERD symptoms worse.  Date Last Reviewed: 7/1/2016 2000-2018 [employer]. [address] [place] [address], [place] [address]. All rights reserved. This information is not intended as a substitute for professional medical care. Always follow your healthcare professional's instructions.         - I sent an Rx for Protonix.  I would also recommend taking an H2 blocker with it so I sent an Rx for ranitidine as well.  Let me know if you need anything else.     Pk

## 2019-04-13 RX ORDER — PROPRANOLOL HYDROCHLORIDE 40 MG/1
40 TABLET ORAL 2 TIMES DAILY
Qty: 60 TABLET | Refills: 1 | Status: SHIPPED | OUTPATIENT
Start: 2019-04-13 | End: 2019-05-28

## 2019-04-15 ENCOUNTER — OFFICE VISIT (OUTPATIENT)
Dept: FAMILY MEDICINE | Facility: CLINIC | Age: 29
End: 2019-04-15
Payer: COMMERCIAL

## 2019-04-15 VITALS
SYSTOLIC BLOOD PRESSURE: 117 MMHG | HEIGHT: 66 IN | TEMPERATURE: 98.1 F | RESPIRATION RATE: 16 BRPM | BODY MASS INDEX: 47.09 KG/M2 | WEIGHT: 293 LBS | OXYGEN SATURATION: 98 % | DIASTOLIC BLOOD PRESSURE: 73 MMHG | HEART RATE: 62 BPM

## 2019-04-15 DIAGNOSIS — Z23 NEED FOR IMMUNIZATION AGAINST RABIES: ICD-10-CM

## 2019-04-15 DIAGNOSIS — Z71.84 ENCOUNTER FOR COUNSELING FOR TRAVEL: Primary | ICD-10-CM

## 2019-04-15 DIAGNOSIS — Z23 NEED FOR IMMUNIZATION AGAINST TYPHOID: ICD-10-CM

## 2019-04-15 DIAGNOSIS — F40.243 FEAR OF FLYING: ICD-10-CM

## 2019-04-15 PROCEDURE — 99401 PREV MED CNSL INDIV APPRX 15: CPT | Mod: 25 | Performed by: PHYSICIAN ASSISTANT

## 2019-04-15 PROCEDURE — 90675 RABIES VACCINE IM: CPT | Performed by: PHYSICIAN ASSISTANT

## 2019-04-15 PROCEDURE — 90472 IMMUNIZATION ADMIN EACH ADD: CPT | Performed by: PHYSICIAN ASSISTANT

## 2019-04-15 PROCEDURE — 90471 IMMUNIZATION ADMIN: CPT | Performed by: PHYSICIAN ASSISTANT

## 2019-04-15 PROCEDURE — 90691 TYPHOID VACCINE IM: CPT | Performed by: PHYSICIAN ASSISTANT

## 2019-04-15 RX ORDER — AZITHROMYCIN 500 MG/1
500 TABLET, FILM COATED ORAL DAILY
Qty: 3 TABLET | Refills: 0 | Status: SHIPPED | OUTPATIENT
Start: 2019-04-15 | End: 2019-05-28

## 2019-04-15 RX ORDER — LORAZEPAM 0.5 MG/1
0.5 TABLET ORAL EVERY 6 HOURS PRN
Qty: 4 TABLET | Refills: 0 | Status: SHIPPED | OUTPATIENT
Start: 2019-04-15 | End: 2019-05-28

## 2019-04-15 ASSESSMENT — MIFFLIN-ST. JEOR: SCORE: 2269.91

## 2019-04-15 NOTE — PATIENT INSTRUCTIONS
See travel packet provided  Recommend ultrathon, pepto bismol and imodium  Also bring hand  and sun screen with you.  Safe Travels     Today April 15, 2019 you received the    Typhoid - injectable. This vaccine is valid for two years.   .    These appointments can be made as a NURSE ONLY visit.    **It is very important for the vaccinations to be given on the scheduled day(s), this helps ensure you receive the full effectiveness of the vaccine.**    Please call [facility] with any questions [phone]    Thank you for visiting [facility]

## 2019-04-15 NOTE — PROGRESS NOTES
SUBJECTIVE: Princess TRISTIN Machado , a 28 year old  female, presents for counseling and information regarding upcoming travel to [facility]. Special medical concerns include: none. She anticipates the following unusual exposures: none.    Itinerary:  [facility]     Departure Date: 4/30/19 Return date: 5/15/19    Reason for travel (i.e. Business, pleasure): pleasure     Visiting an urban or rural area?: both     Accommodations (i.e. hotel, hostel, friends, family, etc): family     Women - First day of your last period: 3/20/19    IMMUNIZATION HISTORY  Have you received any vaccinations in the past 4 weeks?  No  Have you ever fainted from having your blood drawn or from an injection?  No  Have you ever had a fever reaction to vaccination?  No  Have you ever had any bad reaction or side effect from any vaccination?  No  Have you ever had hepatitis A or B vaccine?  Yes   Do you live (or work closely) with anyone who has AIDS, an AIDS-like condition, any other immune disorder or who is on chemotherapy for cancer?  Yes   Have you received any injection of immune globulin or any blood products during the past 12 months?  No    GENERAL MEDICAL HISTORY  Do you have a medical condition that warrants maintenance medication or physician follow-up?  Yes   Do you have a medical condition that is stable now, but that may recur while traveling?  No  Has your spleen been removed?  No  Have you had an acute illness or a fever in the past 48 hours?  No  Are you pregnant, or might you become pregnant on this trip?  Any chance of pregnancy?  No  Are you breastfeeding?  No  Do you have HIV, AIDS, an AIDS-like condition, any other immune disorder, leukemia or cancer?  No  Do you have a severe combined immunodeficiency disease?  No  Have you had your thymus gland removed or history of problems with your thymus, such as myasthenia gravis, DiGeorge syndrome, or thymoma?  No    Do you have severe thrombocytopenia (low platelet count) or a  coagulation disorder?  No  Have you ever had a convulsion, seizure, epilepsy, neurologic condition or brain infection?  No  Do you have any stomach conditions?  No  Do you have a G6PD deficiency?  No  Do you have severe renal or kidney impairment?  No  Do you have a history of psychiatric problems?  No  Do you have a problem with strange dreams and/or nightmares?  No  Do you have insomnia?  No  Do you have problems with vaginitis?  No  Do you have psoriasis?  No  Are you prone to motion sickness?  No  Have you ever had headaches, nausea, vomiting, or breathing problems from altitude exposure?  No      Past Medical History:   Diagnosis Date     Anxiety      Hidradenitis suppurativa 2010     MS (multiple sclerosis) (H)     diagnosis 10/2016     Recurrent major depression (H)     Pt has tried Zoloft, Prozac, Wellbutrin in the past       Immunization History   Administered Date(s) Administered     HEPA 03/23/2001, 05/19/2005     HepB 11/28/2000, 01/16/2001     Hib (PRP-T) 04/01/1990     Historical DTP/aP 1988, 03/01/1989, 11/01/1989, 08/01/1993     MMR 02/01/1990, 11/28/2000     OPV, trivalent, live 1988, 11/01/1989, 02/01/1990, 06/01/1993     TD (ADULT, 7+) 11/28/2000     TDAP Vaccine (Boostrix) 05/15/2015     Typhoid IM 05/19/2005       Current Outpatient Medications   Medication Sig Dispense Refill     amphetamine-dextroamphetamine (ADDERALL XR) 20 MG 24 hr capsule Take 1 capsule by mouth 2 times daily       B Complex-C (SUPER B COMPLEX PO) Take 1 tablet by mouth daily       BIOTIN PO Take 10,000 mcg by mouth Reported on 5/9/2017       Cyanocobalamin (B-12) 5000 MCG CAPS Take 1 tablet by mouth daily        escitalopram (LEXAPRO) 20 MG tablet Take 2 tablets (40 mg) by mouth daily 120 tablet 1     ibuprofen (ADVIL/MOTRIN) 600 MG tablet Take 1 tablet (600 mg) by mouth every 6 hours as needed for moderate pain 60 tablet 5     Interferon Beta-1a (REBIF TITRATION PACK) 6X8.8 & 6X22 MCG SOSY Inject 1 Units  Subcutaneous three times a week       levonorgestrel (ECONTRA EZ) 1.5 MG TABS tablet Take 1.5 mg by mouth daily as needed       MAGNESIUM GLYCINATE PLUS PO Take 1 tablet by mouth daily       MODAFINIL PO Take 200 mg by mouth 2 times daily       mupirocin (BACTROBAN) 2 % ointment        norethindrone (MICRONOR) 0.35 MG per tablet Take 1 tablet by mouth daily       oxybutynin (DITROPAN-XL) 5 MG 24 hr tablet Take 5 mg by mouth daily as needed for bladder spasms Patient takes 5 mg in the morning and 10 mg at night.       pantoprazole (PROTONIX) 40 MG EC tablet Take 1 tablet (40 mg) by mouth every morning (before breakfast) 30 minutes before meal 30 tablet 2     Pantoprazole Sodium (PROTONIX PO) Take 20 mg by mouth daily as needed for heartburn       propranolol (INDERAL) 40 MG tablet Take 1 tablet (40 mg) by mouth 2 times daily 60 tablet 1     ranitidine (ZANTAC) 150 MG tablet Take 1 tablet (150 mg) by mouth 2 times daily 60 tablet 2     Sulfamethoxazole-Trimethoprim (BACTRIM PO) Take 1 tablet by mouth 2 times daily       tiZANidine (ZANAFLEX) 4 MG tablet Take 1 tablet (4 mg) by mouth 3 times daily as needed for muscle spasms 30 tablet 0     vitamin D3 (CHOLECALCIFEROL) 78092 units capsule Take 1 capsule (50,000 Units) by mouth once a week 8 capsule 1     zinc 50 MG TABS Take 2 tablets by mouth daily       Allergies   Allergen Reactions     Isotretinoin Rash and Hives     Accutane Rash        EXAM: deferred    Immunizations discussed include: Typhoid, rabies  Malaria prophylaxis recommended: not needed  Symptomatic treatment for traveler's diarrhea: bismuth subsalicylate, loperamide/diphenoxylate and azithromycin    ASSESSMENT/PLAN:    (Z71.89) Encounter for counseling for travel  (primary encounter diagnosis)    Comment: Typhoid, rabies vaccines today. Patient will return or follow-up with PCP as needed. Prophylaxis given for Traveler's diarrhea and is not needed for Malaria. All questions were answered.     Plan:  azithromycin (ZITHROMAX) 500 MG tablet            (F40.243) Fear of flying  Comment:   Plan: LORazepam (ATIVAN) 0.5 MG tablet            (Z23) Need for immunization against typhoid  Comment:   Plan: TYPHOID VACCINE, IM              I have reviewed general recommendations for safe travel   including: food/water precautions, insect avoidance, safe sex   practices given high prevalence of HIV and other STDs,   roadway safety. Educational materials and links to the CDC   Traveler's health website have been provided.    Total time 15 minutes, greater than 50 percent in counseling   and coordination of care.

## 2019-04-23 ENCOUNTER — ALLIED HEALTH/NURSE VISIT (OUTPATIENT)
Dept: NURSING | Facility: CLINIC | Age: 29
End: 2019-04-23
Payer: COMMERCIAL

## 2019-04-23 DIAGNOSIS — Z23 NEED FOR RABIES VACCINATION: Primary | ICD-10-CM

## 2019-04-23 PROCEDURE — 90675 RABIES VACCINE IM: CPT

## 2019-04-23 PROCEDURE — 99207 ZZC NO CHARGE NURSE ONLY: CPT

## 2019-04-23 PROCEDURE — 90471 IMMUNIZATION ADMIN: CPT

## 2019-04-28 ENCOUNTER — E-VISIT (OUTPATIENT)
Dept: FAMILY MEDICINE | Facility: CLINIC | Age: 29
End: 2019-04-28
Payer: COMMERCIAL

## 2019-04-28 DIAGNOSIS — F40.243 FEAR OF FLYING: Primary | ICD-10-CM

## 2019-04-28 PROCEDURE — 99444 ZZC PHYSICIAN ONLINE EVALUATION & MANAGEMENT SERVICE: CPT | Performed by: PHYSICIAN ASSISTANT

## 2019-04-28 ASSESSMENT — ANXIETY QUESTIONNAIRES
2. NOT BEING ABLE TO STOP OR CONTROL WORRYING: SEVERAL DAYS
6. BECOMING EASILY ANNOYED OR IRRITABLE: SEVERAL DAYS
3. WORRYING TOO MUCH ABOUT DIFFERENT THINGS: NEARLY EVERY DAY
4. TROUBLE RELAXING: SEVERAL DAYS
GAD7 TOTAL SCORE: 10
1. FEELING NERVOUS, ANXIOUS, OR ON EDGE: MORE THAN HALF THE DAYS
5. BEING SO RESTLESS THAT IT IS HARD TO SIT STILL: SEVERAL DAYS
GAD7 TOTAL SCORE: 10
7. FEELING AFRAID AS IF SOMETHING AWFUL MIGHT HAPPEN: SEVERAL DAYS
7. FEELING AFRAID AS IF SOMETHING AWFUL MIGHT HAPPEN: SEVERAL DAYS

## 2019-04-29 RX ORDER — CLONAZEPAM 1 MG/1
1-2 TABLET ORAL 2 TIMES DAILY PRN
Qty: 4 TABLET | Refills: 0 | Status: SHIPPED | OUTPATIENT
Start: 2019-04-29 | End: 2019-05-28

## 2019-04-29 ASSESSMENT — ANXIETY QUESTIONNAIRES: GAD7 TOTAL SCORE: 10

## 2019-04-29 NOTE — TELEPHONE ENCOUNTER
Clonazepam ordered - this is a reasonable request as lorazepam has not worked in the past.     Rommel Hernandez PA-C

## 2019-05-27 ENCOUNTER — DOCUMENTATION ONLY (OUTPATIENT)
Dept: PEDIATRICS | Facility: CLINIC | Age: 29
End: 2019-05-27

## 2019-05-27 PROBLEM — M99.02 THORACIC SEGMENT DYSFUNCTION: Status: RESOLVED | Noted: 2017-09-06 | Resolved: 2019-05-27

## 2019-05-27 PROBLEM — F50.20 BULIMIA NERVOSA: Status: ACTIVE | Noted: 2017-05-09

## 2019-05-27 PROBLEM — B07.8 COMMON WART: Status: RESOLVED | Noted: 2017-06-16 | Resolved: 2019-05-27

## 2019-05-27 PROBLEM — M99.04 SOMATIC DYSFUNCTION OF SACRAL REGION: Status: RESOLVED | Noted: 2017-09-06 | Resolved: 2019-05-27

## 2019-05-27 PROBLEM — M54.50 MIDLINE LOW BACK PAIN WITHOUT SCIATICA: Status: RESOLVED | Noted: 2017-09-06 | Resolved: 2019-05-27

## 2019-05-27 PROBLEM — M53.3 SACRAL PAIN: Status: RESOLVED | Noted: 2017-09-06 | Resolved: 2019-05-27

## 2019-05-27 PROBLEM — M62.838 MUSCLE SPASM: Status: RESOLVED | Noted: 2017-09-06 | Resolved: 2019-05-27

## 2019-05-28 ENCOUNTER — OFFICE VISIT (OUTPATIENT)
Dept: PEDIATRICS | Facility: CLINIC | Age: 29
End: 2019-05-28
Payer: COMMERCIAL

## 2019-05-28 VITALS
OXYGEN SATURATION: 99 % | BODY MASS INDEX: 53.51 KG/M2 | SYSTOLIC BLOOD PRESSURE: 116 MMHG | TEMPERATURE: 96.6 F | WEIGHT: 293 LBS | HEART RATE: 69 BPM | DIASTOLIC BLOOD PRESSURE: 70 MMHG

## 2019-05-28 DIAGNOSIS — R73.03 PREDIABETES: ICD-10-CM

## 2019-05-28 DIAGNOSIS — F33.9 RECURRENT MAJOR DEPRESSIVE EPISODES (H): Primary | ICD-10-CM

## 2019-05-28 DIAGNOSIS — G89.29 CHRONIC RIGHT-SIDED LOW BACK PAIN WITHOUT SCIATICA: ICD-10-CM

## 2019-05-28 DIAGNOSIS — M54.50 CHRONIC RIGHT-SIDED LOW BACK PAIN WITHOUT SCIATICA: ICD-10-CM

## 2019-05-28 DIAGNOSIS — Z82.49 FAMILY HISTORY OF EARLY CAD: ICD-10-CM

## 2019-05-28 DIAGNOSIS — K21.00 GASTRO-ESOPHAGEAL REFLUX DISEASE WITH ESOPHAGITIS: ICD-10-CM

## 2019-05-28 DIAGNOSIS — L73.2 HIDRADENITIS SUPPURATIVA: ICD-10-CM

## 2019-05-28 DIAGNOSIS — F41.1 GAD (GENERALIZED ANXIETY DISORDER): ICD-10-CM

## 2019-05-28 DIAGNOSIS — E66.01 MORBID OBESITY DUE TO EXCESS CALORIES (H): ICD-10-CM

## 2019-05-28 DIAGNOSIS — G35 MULTIPLE SCLEROSIS (H): ICD-10-CM

## 2019-05-28 DIAGNOSIS — F50.20 BULIMIA NERVOSA: ICD-10-CM

## 2019-05-28 DIAGNOSIS — G47.10 HYPERSOMNOLENCE: ICD-10-CM

## 2019-05-28 LAB — HBA1C MFR BLD: 5.6 % (ref 0–5.6)

## 2019-05-28 PROCEDURE — 99214 OFFICE O/P EST MOD 30 MIN: CPT | Performed by: INTERNAL MEDICINE

## 2019-05-28 PROCEDURE — 83036 HEMOGLOBIN GLYCOSYLATED A1C: CPT | Performed by: INTERNAL MEDICINE

## 2019-05-28 PROCEDURE — 80061 LIPID PANEL: CPT | Performed by: INTERNAL MEDICINE

## 2019-05-28 PROCEDURE — 36415 COLL VENOUS BLD VENIPUNCTURE: CPT | Performed by: INTERNAL MEDICINE

## 2019-05-28 PROCEDURE — 82306 VITAMIN D 25 HYDROXY: CPT | Performed by: INTERNAL MEDICINE

## 2019-05-28 PROCEDURE — 80053 COMPREHEN METABOLIC PANEL: CPT | Performed by: INTERNAL MEDICINE

## 2019-05-28 RX ORDER — HYDROQUINONE 40 MG/G
CREAM TOPICAL
COMMUNITY
Start: 2019-05-28

## 2019-05-28 RX ORDER — PROPRANOLOL HYDROCHLORIDE 40 MG/1
40 TABLET ORAL 2 TIMES DAILY
Qty: 180 TABLET | Refills: 1 | Status: SHIPPED | OUTPATIENT
Start: 2019-05-28 | End: 2021-03-22

## 2019-05-28 RX ORDER — PANTOPRAZOLE SODIUM 40 MG/1
40 TABLET, DELAYED RELEASE ORAL
Qty: 90 TABLET | Refills: 3 | Status: SHIPPED | OUTPATIENT
Start: 2019-05-28 | End: 2021-03-22

## 2019-05-28 RX ORDER — ESCITALOPRAM OXALATE 20 MG/1
20 TABLET ORAL DAILY
Qty: 90 TABLET | Refills: 1 | Status: SHIPPED | OUTPATIENT
Start: 2019-05-28

## 2019-05-28 ASSESSMENT — ANXIETY QUESTIONNAIRES
1. FEELING NERVOUS, ANXIOUS, OR ON EDGE: NEARLY EVERY DAY
IF YOU CHECKED OFF ANY PROBLEMS ON THIS QUESTIONNAIRE, HOW DIFFICULT HAVE THESE PROBLEMS MADE IT FOR YOU TO DO YOUR WORK, TAKE CARE OF THINGS AT HOME, OR GET ALONG WITH OTHER PEOPLE: SOMEWHAT DIFFICULT
7. FEELING AFRAID AS IF SOMETHING AWFUL MIGHT HAPPEN: SEVERAL DAYS
3. WORRYING TOO MUCH ABOUT DIFFERENT THINGS: SEVERAL DAYS
GAD7 TOTAL SCORE: 9
6. BECOMING EASILY ANNOYED OR IRRITABLE: MORE THAN HALF THE DAYS
2. NOT BEING ABLE TO STOP OR CONTROL WORRYING: SEVERAL DAYS
5. BEING SO RESTLESS THAT IT IS HARD TO SIT STILL: NOT AT ALL

## 2019-05-28 ASSESSMENT — PATIENT HEALTH QUESTIONNAIRE - PHQ9
SUM OF ALL RESPONSES TO PHQ QUESTIONS 1-9: 12
5. POOR APPETITE OR OVEREATING: SEVERAL DAYS

## 2019-05-28 NOTE — PATIENT INSTRUCTIONS
Stay on the 20 MG of Lexapro and do not increase to 40 MG.     Dietician at [facility]. Her name is clau arellano.     Can choose from any of the weight loss clinics.     Mediterranean diet.

## 2019-05-28 NOTE — PROGRESS NOTES
Subjective     Princess TRISTIN Machado is a 28 year old female who presents to clinic today for the following health issues:       Pt is here today to establish care with provider.    Patient would like her blood work checked for lipids and sugars. She would like to get back on track with weight loss. She would like to go back to weight loss surgery since she went last year and was told she needed to complete bulimia treatment and wait a year. She is in between doing it herself vs weight loss surgery.     States she is not actively Bulimic. Saw Yumiko nassar in past and had records sent. Is not eating well, no binging, but poor diet. She does feel she can change her diet but is hard for her. Her prior PCP retired which triggered some poor eating.      Currently she is seeing therapist at formerly [facility] in [place] but has been hard to get appointment with. Getting back with her. She takes Lexapro 20 MG (40 MG only prn about 1x per week) and feels it keeps her stable but she does get irritability and some anxiety. Overall she feels it is better controlled than a year ago. Thinks it could be better controlled. Denies suicidal thoughts or self harm. Denies prior hospitalization for mental health.     Takes adderall for hypersomia 20 MG via neurologist. It is much better. Has appointment with neurology this week. Is getting a lot of tardies at work since she cannot wake in the morning.     Mom  at 33 from heart attack and dad in 40's.     Seeing   Dr. Gail Roman for MS seeing Q6 months and sx reasonably controlled  Valdez Mcmillan for sleep neurology   Dr. Elam for dermatology   Grazyna Smith at [facility] in [place] for Therapy         Patient Active Problem List   Diagnosis     Optic neuritis, left     Recurrent major depressive episodes (H)     Multiple sclerosis (H)     Bulimia nervosa     Chronic back pain     Hidradenitis suppurativa     Hypertriglyceridemia     Migraine without status migrainosus, not  intractable     Vitamin D deficiency     Morbid obesity due to excess calories (H) BMI 50-60     STACIA (generalized anxiety disorder)     Prediabetes     Past Surgical History:   Procedure Laterality Date     ENT SURGERY      tubes in ears     MYRINGOTOMY, INSERT TUBE BILATERAL, COMBINED  1990s       Social History     Tobacco Use     Smoking status: Never Smoker     Smokeless tobacco: Never Used   Substance Use Topics     Alcohol use: No     Frequency: Never     Drinks per session: 5 or 6     Binge frequency: Less than monthly     Family History   Problem Relation Age of Onset     Cerebrovascular Disease Mother         heart attack     Cerebrovascular Disease Father         heart attack     Hypertension Father      Mental Illness Sister      Post-Traumatic Stress Disorder (PTSD) Brother      Mental Illness Brother      Glaucoma No family hx of      Macular Degeneration No family hx of      Coronary Artery Disease No family hx of      Hyperlipidemia No family hx of      Breast Cancer No family hx of      Colon Cancer No family hx of      Prostate Cancer No family hx of      Other Cancer No family hx of      Depression No family hx of      Anxiety Disorder No family hx of      Substance Abuse No family hx of      Anesthesia Reaction No family hx of      Asthma No family hx of      Osteoporosis No family hx of      Genetic Disorder No family hx of      Thyroid Disease No family hx of      Obesity No family hx of      Unknown/Adopted No family hx of          Current Outpatient Medications   Medication Sig Dispense Refill     amphetamine-dextroamphetamine (ADDERALL XR) 20 MG 24 hr capsule Take 1 capsule by mouth 2 times daily       BIOTIN PO Take 10,000 mcg by mouth Reported on 5/9/2017       Cyanocobalamin (B-12) 5000 MCG CAPS Take 1 tablet by mouth daily        escitalopram (LEXAPRO) 20 MG tablet Take 1 tablet (20 mg) by mouth daily 90 tablet 1     hydroquinone (MAUDE) 4 % external cream        Interferon Beta-1a  (REBIF TITRATION PACK) 6X8.8 & 6X22 MCG SOSY Inject 1 Units Subcutaneous three times a week       levonorgestrel (ECONTRA EZ) 1.5 MG TABS tablet Take 1.5 mg by mouth daily as needed       MAGNESIUM GLYCINATE PLUS PO Take 1 tablet by mouth daily       mupirocin (BACTROBAN) 2 % ointment        norethindrone (MICRONOR) 0.35 MG per tablet Take 1 tablet by mouth daily       oxybutynin (DITROPAN-XL) 5 MG 24 hr tablet Take 5 mg by mouth daily as needed for bladder spasms Patient takes 5 mg in the morning and 10 mg at night.       pantoprazole (PROTONIX) 40 MG EC tablet Take 1 tablet (40 mg) by mouth every morning (before breakfast) 30 minutes before meal 90 tablet 3     propranolol (INDERAL) 40 MG tablet Take 1 tablet (40 mg) by mouth 2 times daily 180 tablet 1     ranitidine (ZANTAC) 150 MG tablet Take 1 tablet (150 mg) by mouth 2 times daily 60 tablet 2     Sulfamethoxazole-Trimethoprim (BACTRIM PO) Take 1 tablet by mouth 2 times daily       tiZANidine (ZANAFLEX) 4 MG tablet Take 1 tablet (4 mg) by mouth 3 times daily as needed for muscle spasms 30 tablet 3     vitamin D3 (CHOLECALCIFEROL) 95272 units (250 mcg) capsule Take 1 capsule (10,000 Units) by mouth daily       vitamin D3 (CHOLECALCIFEROL) 12287 units capsule Take 1 capsule (50,000 Units) by mouth once a week 8 capsule 1     Allergies   Allergen Reactions     Accutane Rash     BP Readings from Last 3 Encounters:   05/28/19 116/70   04/15/19 117/73   04/01/19 120/76    Wt Readings from Last 3 Encounters:   05/28/19 (!) 150.4 kg (331 lb 8 oz)   04/15/19 (!) 153.3 kg (338 lb)   01/22/19 (!) 153.3 kg (338 lb)                      Reviewed and updated as needed this visit by Provider         Review of Systems   ROS COMP: Constitutional, HEENT, cardiovascular, pulmonary, GI, , musculoskeletal, neuro, skin, endocrine and psych systems are negative, except as otherwise noted.    This document serves as a record of the services and decisions personally performed and  made by Sheron Samaniego MD. It was created on her behalf by Rubi Roth, a trained medical scribe. The creation of this document is based the provider's statements to the medical scribe.    Rubi Roth May 28, 2019 9:28 AM          Objective    /70 (BP Location: Right arm, Patient Position: Sitting, Cuff Size: Adult Large)   Pulse 69   Temp 96.6  F (35.9  C) (Tympanic)   Wt (!) 150.4 kg (331 lb 8 oz)   SpO2 99%   BMI 53.51 kg/m    Body mass index is 53.51 kg/m .  Physical Exam   GENERAL:, alert and no distress  HEENT: NC/at. PERRL, EOMI.  TM's clear bilaterally.  Pharynx pink and moist.    RESP: lungs clear to auscultation - no rales, rhonchi or wheezes  CV: regular rate and rhythm, normal S1 S2, no S3 or S4, no murmur, click or rub, no peripheral edema   ABDOMEN: soft, nontender, and bowel sounds normal  MS: no gross musculoskeletal defects noted, no edema  PSYCH: mentation appears normal, affect normal/bright    Diagnostic Test Results:  Labs reviewed in Epic  No results found for this or any previous visit (from the past 24 hour(s)).        Assessment & Plan     (F33.9) Recurrent major depressive episodes (H)  (primary encounter diagnosis)  -- PHQ9:12; GAD7:9  -- patient reports her mood could be better controlled; does feel sx are increased due to stress and finding a new PCP; denies SI/SIB urges  -- currently on 20 MG lexapro and was taking 40 MG prn about 1x per week  -- will stay on Lexapro 20 MG for now and close follow up   -- advised patient to not take 40 MG lexapro prn as it will not be effective and increases risk for serious heart rhythm problems   -- advised to continue with current therapist; unclear how often she is seeing therapist at this time  -- discussed with  pal SB 4 about patient and decided to have [facility] reach out to patient via phone.  --at some point in the future would like to talk with patient about what meds have been tried previously  --could also be a candidate  for TMS?     Plan: escitalopram (LEXAPRO) 20 MG tablet,         propranolol (INDERAL) 40 MG tablet      (F41.1) STACIA (generalized anxiety disorder)  -- see above discussion     Hypersomnolence  -due to MS  -treated with adderall by sleep MD  -adderall may be worsening anxiety, will need to monitor ongoing anxiety symptoms; suspect that the benefits outweigh the risk in this case.      (G35) Multiple sclerosis (H)  -- followed by Neurologist, currently on interferon beta and stable.    -- repeat vit D today   Plan: Vitamin D Deficiency      (E66.01) Morbid obesity due to excess calories (H) BMI 50-60  -- discussed weight loss options with patient including dietician and weight loss clinic   -- will do both dietician and consult with another bariatric clinic  -- patient had been denied bariatric surgery in the past due to bulimia history; has been stable for ~2 years and seeing therapist   -- referral to dietician and weight loss clinic to discuss next steps   -- advised patient considering FHx of early CAD keto would not be appropriate; would consider mediterranean style diet  -- would also consider intermittent fasting, but would be concerned about triggering binging episodes and subsequent bulimic episodes   Plan: NUTRITION REFERRAL, BARIATRIC ADULT REFERRAL,         Lipid panel reflex to direct LDL Fasting,         Comprehensive metabolic panel    (F50.2) Bulimia nervosa  -- states she has been stable for about 2 years   -- encouraged to continue with therapist   --recommend any weight loss attempts be supervised by a dietician with ED experience     (R73.03) Prediabetes  -- repeat fasting labs today   Plan: Comprehensive metabolic panel, Hemoglobin A1c      (K21.0) Gastro-esophageal reflux disease with esophagitis  -- controlled with protonix; no changes   Plan: pantoprazole (PROTONIX) 40 MG EC tablet      (M54.5,  G89.29) Chronic right-sided low back pain without sciatica  -- stable currently on Zanaflex prn; no  "changes to medications   Plan: tiZANidine (ZANAFLEX) 4 MG tablet      (L73.2) Hidradenitis suppurativa  --followed by dermatology   -- stable; no changes     (Z82.49) Family history of early CAD  -- as patient is fasting and due will take fasting labs today   -- patient has significant FHx of early CAD; consider cardiac calcium score  Plan: Lipid panel reflex to direct LDL Fasting     BMI:   Estimated body mass index is 54.55 kg/m  as calculated from the following:    Height as of 4/15/19: 1.676 m (5' 6\").    Weight as of 4/15/19: 153.3 kg (338 lb).   Weight management plan: Patient referred to endocrine and/or weight management specialty Discussed healthy diet and exercise guidelines    Return for Physical Exam.    The information in this document, created by the medical scribe for me, accurately reflects the services I personally performed and the decisions made by me. I have reviewed and approved this document for accuracy prior to leaving the patient care area.  Sheron Samaniego MD  [facility]      "

## 2019-05-29 LAB
ALBUMIN SERPL-MCNC: 3.5 G/DL (ref 3.4–5)
ALP SERPL-CCNC: 81 U/L (ref 40–150)
ALT SERPL W P-5'-P-CCNC: 22 U/L (ref 0–50)
ANION GAP SERPL CALCULATED.3IONS-SCNC: 4 MMOL/L (ref 3–14)
AST SERPL W P-5'-P-CCNC: 14 U/L (ref 0–45)
BILIRUB SERPL-MCNC: 0.2 MG/DL (ref 0.2–1.3)
BUN SERPL-MCNC: 12 MG/DL (ref 7–30)
CALCIUM SERPL-MCNC: 8.6 MG/DL (ref 8.5–10.1)
CHLORIDE SERPL-SCNC: 107 MMOL/L (ref 94–109)
CHOLEST SERPL-MCNC: 111 MG/DL
CO2 SERPL-SCNC: 29 MMOL/L (ref 20–32)
CREAT SERPL-MCNC: 0.93 MG/DL (ref 0.52–1.04)
DEPRECATED CALCIDIOL+CALCIFEROL SERPL-MC: 102 UG/L (ref 20–75)
GFR SERPL CREATININE-BSD FRML MDRD: 82 ML/MIN/{1.73_M2}
GLUCOSE SERPL-MCNC: 87 MG/DL (ref 70–99)
HDLC SERPL-MCNC: 31 MG/DL
LDLC SERPL CALC-MCNC: 62 MG/DL
NONHDLC SERPL-MCNC: 80 MG/DL
POTASSIUM SERPL-SCNC: 4.4 MMOL/L (ref 3.4–5.3)
PROT SERPL-MCNC: 7.8 G/DL (ref 6.8–8.8)
SODIUM SERPL-SCNC: 140 MMOL/L (ref 133–144)
TRIGL SERPL-MCNC: 88 MG/DL

## 2019-05-29 ASSESSMENT — ANXIETY QUESTIONNAIRES: GAD7 TOTAL SCORE: 9

## 2019-05-30 DIAGNOSIS — L81.9 HYPERPIGMENTATION OF SKIN: ICD-10-CM

## 2019-05-30 DIAGNOSIS — E55.9 VITAMIN D DEFICIENCY: ICD-10-CM

## 2019-05-30 NOTE — TELEPHONE ENCOUNTER
"  patient is over due for refill  Last OV 4/10/2018    Left message on answering machine for patient to call back.    Requested Prescriptions   Pending Prescriptions Disp Refills     hydroquinone (MAUDE) 4 % external cream [Pharmacy Med Name: HYDROQUINONE 4% CREA] 28.35 g 3     Sig: APPLY TO AFFECTED AREA(S) EVERY NIGHT AT BEDTIME       Miscellaneous Dermatologic Agents Failed - 5/30/2019 12:20 PM        Failed - Recent (12 mo) or future (30 days) visit within the authorizing provider's specialty     Patient had office visit in the last 12 months or has a visit in the next 30 days with authorizing provider or within the authorizing provider's specialty.  See \"Patient Info\" tab in inbasket, or \"Choose Columns\" in Meds & Orders section of the refill encounter.              Passed - Refill request is not for Imiquimod, 5-Fluorouracil, or Finasteride      If Imiquimod, 5-Fluorouracil, or Finasteride, may refill if indicated in progress notes.           Passed - Medication is active on med list        Passed - Patient is 24 mos old or older          "

## 2019-05-31 ENCOUNTER — MYC MEDICAL ADVICE (OUTPATIENT)
Dept: PEDIATRICS | Facility: CLINIC | Age: 29
End: 2019-05-31

## 2019-05-31 DIAGNOSIS — E55.9 VITAMIN D DEFICIENCY: Primary | ICD-10-CM

## 2019-06-04 RX ORDER — HYDROQUINONE 40 MG/G
CREAM TOPICAL
Qty: 28.35 G | Refills: 3 | OUTPATIENT
Start: 2019-06-04

## 2019-06-04 NOTE — TELEPHONE ENCOUNTER
patient called back- she is seeing a different dermatologist for this an dit is not needed.  Can delete request  Pharmacy notified.  Jyoti CastrejonRN BSN  [facility]  [phone]

## 2019-06-04 NOTE — TELEPHONE ENCOUNTER
Left message on answering machine for patient to call back.  Jyoti Castrejon,RN BSN  [facility]  [phone]

## 2019-06-06 ENCOUNTER — OFFICE VISIT (OUTPATIENT)
Dept: FAMILY MEDICINE | Facility: CLINIC | Age: 29
End: 2019-06-06
Payer: COMMERCIAL

## 2019-06-06 VITALS
OXYGEN SATURATION: 98 % | DIASTOLIC BLOOD PRESSURE: 74 MMHG | SYSTOLIC BLOOD PRESSURE: 128 MMHG | HEART RATE: 83 BPM | TEMPERATURE: 97.3 F | RESPIRATION RATE: 16 BRPM

## 2019-06-06 DIAGNOSIS — G35 MULTIPLE SCLEROSIS (H): Primary | ICD-10-CM

## 2019-06-06 DIAGNOSIS — M54.50 CHRONIC RIGHT-SIDED LOW BACK PAIN WITHOUT SCIATICA: ICD-10-CM

## 2019-06-06 DIAGNOSIS — G89.29 CHRONIC RIGHT-SIDED LOW BACK PAIN WITHOUT SCIATICA: ICD-10-CM

## 2019-06-06 PROCEDURE — 99213 OFFICE O/P EST LOW 20 MIN: CPT | Performed by: FAMILY MEDICINE

## 2019-06-06 RX ORDER — METHYLPREDNISOLONE 4 MG
TABLET, DOSE PACK ORAL
Qty: 21 TABLET | Refills: 0 | Status: SHIPPED | OUTPATIENT
Start: 2019-06-06 | End: 2019-07-02

## 2019-06-06 NOTE — PROGRESS NOTES
Subjective     Princess TRISTIN Machado is a 28 year old female who presents to clinic today for the following health issues:    HPI   Back Pain       Duration: ongoing but worsened the last week        Specific cause: none    Description:   Location of pain: upper back bilateral, neck  Character of pain: tight, pressure, strong ache  Pain radiation:none  New numbness or weakness in legs, not attributed to pain:  no     Intensity: moderate- 7/10    History:   Pain interferes with job: No  History of back problems: yes- lower back pain in the past  Any previous MRI or X-rays: None  Sees a specialist for back pain:  No  Therapies tried without relief: muscle relaxants, ibuprofen    Alleviating factors:   Improved by: ice and rest      Precipitating factors:  Worsened by: twisting side to side          Accompanying Signs & Symptoms:  Risk of Fracture:  None  Risk of Cauda Equina:  None  Risk of Infection:  None  Risk of Cancer:  None  Risk of Ankylosing Spondylitis:  Onset at age <35, male, AND morning back stiffness. no          Pt has Tizanidine but only takes it at night because it makes her drowsy.  She has Rx for Baclofen at home but has not taken that, worried it too could make her drowsy            BP Readings from Last 3 Encounters:   06/06/19 128/74   05/28/19 116/70   04/15/19 117/73    Wt Readings from Last 3 Encounters:   05/28/19 (!) 150.4 kg (331 lb 8 oz)   04/15/19 (!) 153.3 kg (338 lb)   01/22/19 (!) 153.3 kg (338 lb)                      Reviewed and updated as needed this visit by Provider         Review of Systems   ROS COMP: CONSTITUTIONAL: NEGATIVE for fever, chills, change in weight  RESP: NEGATIVE for significant cough or SOB  CV: NEGATIVE for chest pain, palpitations or peripheral edema  MUSCULOSKELETAL: POSITIVE  for back pain mid back, myalgia and neck pain  NEURO: POSITIVE for MS symptoms      Objective    /74 (Cuff Size: Adult Large)   Pulse 83   Temp 97.3  F (36.3  C) (Tympanic)   Resp  "16   SpO2 98%   There is no height or weight on file to calculate BMI.  Physical Exam   GENERAL: healthy, alert and no distress  MS: spine exam shows no scoliosis and paraspinal muscles are tight from lower back, mid to upper back.  No spasm present    Diagnostic Test Results:  Labs reviewed in Epic  none         Assessment & Plan      was seen today for musculoskeletal problem.    Diagnoses and all orders for this visit:    Multiple sclerosis (H)    Chronic right-sided low back pain without sciatica  -     methylPREDNISolone (MEDROL DOSEPAK) 4 MG tablet therapy pack; Follow Package Directions         BMI:   Estimated body mass index is 53.51 kg/m  as calculated from the following:    Height as of 4/15/19: 1.676 m (5' 6\").    Weight as of 5/28/19: 150.4 kg (331 lb 8 oz).   Weight management plan: Discussed healthy diet and exercise guidelines        FUTURE APPOINTMENTS:       - Follow-up visit in 1 mo  Patient Instructions   Alternate ice & heat.  Use Ice massage on trigger point areas.  Do gentle Range of motion exercises    Take Baclofen at night and try 1/2 tab of the Tizanidine during daytime      Return in about 1 month (around 7/6/2019) for back pain, muscle tightness.    Landen Vanessa MD  [facility]      "

## 2019-06-06 NOTE — PATIENT INSTRUCTIONS
Alternate ice & heat.  Use Ice massage on trigger point areas.  Do gentle Range of motion exercises    Take Baclofen at night and try 1/2 tab of the Tizanidine during daytime

## 2019-06-11 ENCOUNTER — OFFICE VISIT (OUTPATIENT)
Dept: INTERNAL MEDICINE | Facility: CLINIC | Age: 29
End: 2019-06-11
Payer: COMMERCIAL

## 2019-06-11 VITALS
SYSTOLIC BLOOD PRESSURE: 126 MMHG | RESPIRATION RATE: 18 BRPM | OXYGEN SATURATION: 98 % | WEIGHT: 293 LBS | HEART RATE: 50 BPM | BODY MASS INDEX: 53.26 KG/M2 | DIASTOLIC BLOOD PRESSURE: 88 MMHG | TEMPERATURE: 98.2 F

## 2019-06-11 DIAGNOSIS — R30.0 DYSURIA: ICD-10-CM

## 2019-06-11 DIAGNOSIS — N30.00 ACUTE CYSTITIS WITHOUT HEMATURIA: Primary | ICD-10-CM

## 2019-06-11 DIAGNOSIS — R82.90 NONSPECIFIC FINDING ON EXAMINATION OF URINE: ICD-10-CM

## 2019-06-11 LAB
ALBUMIN UR-MCNC: NEGATIVE MG/DL
APPEARANCE UR: CLEAR
BACTERIA #/AREA URNS HPF: ABNORMAL /HPF
BILIRUB UR QL STRIP: NEGATIVE
COLOR UR AUTO: YELLOW
GLUCOSE UR STRIP-MCNC: NEGATIVE MG/DL
HGB UR QL STRIP: ABNORMAL
KETONES UR STRIP-MCNC: NEGATIVE MG/DL
LEUKOCYTE ESTERASE UR QL STRIP: ABNORMAL
NITRATE UR QL: POSITIVE
NON-SQ EPI CELLS #/AREA URNS LPF: ABNORMAL /LPF
PH UR STRIP: 6 PH (ref 5–7)
RBC #/AREA URNS AUTO: ABNORMAL /HPF
SOURCE: ABNORMAL
SP GR UR STRIP: >1.03 (ref 1–1.03)
UROBILINOGEN UR STRIP-ACNC: 0.2 EU/DL (ref 0.2–1)
WBC #/AREA URNS AUTO: ABNORMAL /HPF

## 2019-06-11 PROCEDURE — 99213 OFFICE O/P EST LOW 20 MIN: CPT | Performed by: PHYSICIAN ASSISTANT

## 2019-06-11 PROCEDURE — 81001 URINALYSIS AUTO W/SCOPE: CPT | Performed by: PHYSICIAN ASSISTANT

## 2019-06-11 PROCEDURE — 87088 URINE BACTERIA CULTURE: CPT | Performed by: PHYSICIAN ASSISTANT

## 2019-06-11 PROCEDURE — 87086 URINE CULTURE/COLONY COUNT: CPT | Performed by: PHYSICIAN ASSISTANT

## 2019-06-11 PROCEDURE — 87186 SC STD MICRODIL/AGAR DIL: CPT | Performed by: PHYSICIAN ASSISTANT

## 2019-06-11 RX ORDER — DEXTROAMPHETAMINE SACCHARATE, AMPHETAMINE ASPARTATE, DEXTROAMPHETAMINE SULFATE AND AMPHETAMINE SULFATE 2.5; 2.5; 2.5; 2.5 MG/1; MG/1; MG/1; MG/1
20 TABLET ORAL 2 TIMES DAILY
COMMUNITY
Start: 2019-06-01

## 2019-06-11 RX ORDER — CIPROFLOXACIN 500 MG/1
500 TABLET, FILM COATED ORAL 2 TIMES DAILY
Qty: 10 TABLET | Refills: 0 | Status: SHIPPED | OUTPATIENT
Start: 2019-06-11 | End: 2019-07-02

## 2019-06-11 NOTE — PROGRESS NOTES
Subjective     Princess TRISTIN Machado is a 28 year old female who presents to clinic today for the following health issues:    HPI   URINARY TRACT SYMPTOMS  Onset: 2 days    Description:   Painful urination (Dysuria): YES  Blood in urine (Hematuria): no   Delay in urine (Hesitency): no   Retention: Yes    Intensity: mild    Progression of Symptoms:  worsening    Accompanying Signs & Symptoms:  Fever/chills: no   Flank pain YES  Nausea and vomiting: YES- nausea  Any vaginal symptoms: none  Abdominal/Pelvic Pain: YES    History:   History of frequent UTI's: no   History of kidney stones: no   Sexually Active: YES  Possibility of pregnancy: No    Precipitating factors:   None    Therapies Tried and outcome: None  -------------------------------------        -------------------------------------  Reviewed and updated as needed this visit by Provider  Allergies  Meds         Review of Systems   ROS COMP: Constitutional, HEENT, cardiovascular, pulmonary, gi and gu systems are negative, except as otherwise noted.      Objective    /88 (BP Location: Left arm, Patient Position: Chair, Cuff Size: Adult Regular)   Pulse 50   Temp 98.2  F (36.8  C) (Oral)   Resp 18   Wt 149.7 kg (330 lb)   LMP  (LMP Unknown)   SpO2 98%   BMI 53.26 kg/m    Body mass index is 53.26 kg/m .  Physical Exam   GENERAL: healthy, alert and no distress  RESP: lungs clear to auscultation - no rales, rhonchi or wheezes  CV: regular rates and rhythm  MS: no gross musculoskeletal defects noted, no edema  SKIN: no suspicious lesions or rashes    Diagnostic Test Results:  Results for orders placed or performed in visit on 06/11/19 (from the past 24 hour(s))   UA with Microscopic reflex to Culture   Result Value Ref Range    Color Urine Yellow     Appearance Urine Clear     Glucose Urine Negative NEG^Negative mg/dL    Bilirubin Urine Negative NEG^Negative    Ketones Urine Negative NEG^Negative mg/dL    Specific Gravity Urine >1.030 1.003 - 1.035     "pH Urine 6.0 5.0 - 7.0 pH    Protein Albumin Urine Negative NEG^Negative mg/dL    Urobilinogen Urine 0.2 0.2 - 1.0 EU/dL    Nitrite Urine Positive (A) NEG^Negative    Blood Urine Moderate (A) NEG^Negative    Leukocyte Esterase Urine Small (A) NEG^Negative    Source Midstream Urine     WBC Urine 0 - 5 OTO5^0 - 5 /HPF    RBC Urine 25-50 (A) OTO2^O - 2 /HPF    Squamous Epithelial /LPF Urine Few FEW^Few /LPF    Bacteria Urine Moderate (A) NEG^Negative /HPF           Assessment & Plan       ICD-10-CM    1. Acute cystitis without hematuria N30.00 ciprofloxacin (CIPRO) 500 MG tablet   2. Dysuria R30.0 UA with Microscopic reflex to Culture   3. Nonspecific finding on examination of urine R82.90 Urine Culture Aerobic Bacterial        BMI:   Estimated body mass index is 53.26 kg/m  as calculated from the following:    Height as of 4/15/19: 1.676 m (5' 6\").    Weight as of this encounter: 149.7 kg (330 lb).   Weight management plan: Patient was referred to their PCP to discuss a diet and exercise plan.        Fluids   Reviewed no tizanidine while on cipro   Patient plans to stop Medrol dose volodymyr today too-its not helping symptoms     Return in about 1 week (around 6/18/2019) for recheck if not improving, regular primary provider.    Margy Villalobos PA-C  [facility]      "

## 2019-06-12 LAB
BACTERIA SPEC CULT: ABNORMAL
BACTERIA SPEC CULT: ABNORMAL
SPECIMEN SOURCE: ABNORMAL

## 2019-06-18 ENCOUNTER — TELEPHONE (OUTPATIENT)
Dept: SURGERY | Facility: CLINIC | Age: 29
End: 2019-06-18

## 2019-06-18 NOTE — TELEPHONE ENCOUNTER
Called patient and left her a voicemail.  Noticed that she has signed up for the bariatric informational session but has been seen here already in the past year.  Will need to go over some concerns noted from those visits.           David Emery       June 18, 2019  Patient called back later in the day.  Informed her that she did not need to attend an info session since she was seen in clinic less than a year ago.  Reviewed the last set of visit notes with her and read the necessary tasks - focused around her history of eating disorder - that were set up last year.  They included regular follow up with a dietitian and therapist at the [facility] consistently for a year.  Patient stated she did not do this and wanted to have someone else review her chart.  Let her know that these recommendation are from the team and not this provider.  The point is to address any behaviors associated with an eating disorder before seeking bariatric surgery.    Patient hung up phone        David Emery MS, PAVIRGIL

## 2019-07-02 ENCOUNTER — OFFICE VISIT (OUTPATIENT)
Dept: INTERNAL MEDICINE | Facility: CLINIC | Age: 29
End: 2019-07-02
Payer: COMMERCIAL

## 2019-07-02 VITALS
WEIGHT: 293 LBS | OXYGEN SATURATION: 98 % | BODY MASS INDEX: 52.94 KG/M2 | RESPIRATION RATE: 18 BRPM | TEMPERATURE: 98.2 F | DIASTOLIC BLOOD PRESSURE: 82 MMHG | SYSTOLIC BLOOD PRESSURE: 118 MMHG | HEART RATE: 64 BPM

## 2019-07-02 DIAGNOSIS — N92.6 IRREGULAR MENSES: Primary | ICD-10-CM

## 2019-07-02 LAB — HCG UR QL: NEGATIVE

## 2019-07-02 PROCEDURE — 81025 URINE PREGNANCY TEST: CPT | Performed by: PHYSICIAN ASSISTANT

## 2019-07-02 PROCEDURE — 99213 OFFICE O/P EST LOW 20 MIN: CPT | Performed by: PHYSICIAN ASSISTANT

## 2019-07-02 NOTE — PROGRESS NOTES
"Subjective     Princess TRISTIN Machado is a 28 year old female who presents to clinic today for the following health issues:    HPI     Had some spotting for 2 days. Was late on taking birthcontrol in may.       Patient had menses just this past few days, normal period but shorter than usual.   Some slight spotting today   Missed a pill in the past pill volodymyr     -------------------------------------    BP Readings from Last 3 Encounters:   07/02/19 118/82   06/11/19 126/88   06/06/19 128/74    Wt Readings from Last 3 Encounters:   07/02/19 148.8 kg (328 lb)   06/11/19 149.7 kg (330 lb)   05/28/19 (!) 150.4 kg (331 lb 8 oz)                    -------------------------------------  Reviewed and updated as needed this visit by Provider  Allergies  Meds         Review of Systems   ROS COMP: Constitutional, HEENT, cardiovascular, pulmonary, gi and gu systems are negative, except as otherwise noted.      Objective    /82 (BP Location: Left arm, Patient Position: Chair, Cuff Size: Adult Regular)   Pulse 64   Temp 98.2  F (36.8  C) (Oral)   Resp 18   Wt 148.8 kg (328 lb)   LMP  (LMP Unknown)   SpO2 98%   BMI 52.94 kg/m    Body mass index is 52.94 kg/m .  Physical Exam   GENERAL: healthy, alert and no distress  RESP: lungs clear to auscultation - no rales, rhonchi or wheezes  CV: regular rates and rhythm and normal S1 S2, no S3 or S4    Diagnostic Test Results:  Results for orders placed or performed in visit on 07/02/19 (from the past 24 hour(s))   HCG Qual, Urine ([id number])   Result Value Ref Range    HCG Qual Urine Negative NEG^Negative           Assessment & Plan     1. Irregular menses    - HCG Qual, Urine ([id number])     BMI:   Estimated body mass index is 52.94 kg/m  as calculated from the following:    Height as of 4/15/19: 1.676 m (5' 6\").    Weight as of this encounter: 148.8 kg (328 lb).   Weight management plan: Patient was referred to their PCP to discuss a diet and exercise plan.        Reviewed results "   Recheck as needed.  Likely irregular menses from missed pill in volodymyr.       Return in about 6 months (around 1/2/2020) for Routine Visit, regular primary provider.    Margy Villalobos PA-C  [facility]

## 2019-07-13 ENCOUNTER — OFFICE VISIT (OUTPATIENT)
Dept: URGENT CARE | Facility: URGENT CARE | Age: 29
End: 2019-07-13
Payer: COMMERCIAL

## 2019-07-13 VITALS
WEIGHT: 293 LBS | BODY MASS INDEX: 47.09 KG/M2 | HEIGHT: 66 IN | HEART RATE: 60 BPM | SYSTOLIC BLOOD PRESSURE: 126 MMHG | RESPIRATION RATE: 16 BRPM | DIASTOLIC BLOOD PRESSURE: 79 MMHG | OXYGEN SATURATION: 99 %

## 2019-07-13 DIAGNOSIS — T14.8XXA MUSCLE STRAIN: Primary | ICD-10-CM

## 2019-07-13 PROCEDURE — 99213 OFFICE O/P EST LOW 20 MIN: CPT | Performed by: FAMILY MEDICINE

## 2019-07-13 RX ORDER — IBUPROFEN 800 MG/1
800 TABLET, FILM COATED ORAL EVERY 8 HOURS PRN
Qty: 30 TABLET | Refills: 0 | Status: SHIPPED | OUTPATIENT
Start: 2019-07-13 | End: 2021-03-22

## 2019-07-13 ASSESSMENT — MIFFLIN-ST. JEOR: SCORE: 2224.55

## 2019-07-14 NOTE — PROGRESS NOTES
SUBJECTIVE:   Chief Complaint   Patient presents with     Musculoskeletal Problem     collar bone pain X 2 weeks         Joint/Muscle Pain      Onset: Two weeks ago    Injury?  No. She started a new Herm[employer] Exercise recently     Description:   Location(s):Right upper chest area (By the collar bone   Character: Dull ache    Intensity: mild    Progression of Symptoms: same    Accompanying Signs & Symptoms:  Other symptoms: none    History:   Previous similar pain: Yes.      Worsened by    Overuse?: Yes Details: She has been doing lots of stretches and Hermann class recently   Morning Stiffness?:no    Alleviating factors:  Improved by: nothing  Therapies Tried and outcome: Nothing      Review of Systems review of system negative except as mentioned in HPI.       Past Medical History:   Diagnosis Date     Anxiety      Hidradenitis suppurativa 2010     Hypersomnia 04/2019     MS (multiple sclerosis) (H)     diagnosis 10/2016     Recurrent major depression (H)     Pt has tried Zoloft, Prozac, Wellbutrin in the past      Family History   Problem Relation Age of Onset     Cerebrovascular Disease Mother         heart attack     Cerebrovascular Disease Father         heart attack     Hypertension Father      Mental Illness Sister      Post-Traumatic Stress Disorder (PTSD) Brother      Mental Illness Brother      Glaucoma No family hx of      Macular Degeneration No family hx of      Coronary Artery Disease No family hx of      Hyperlipidemia No family hx of      Breast Cancer No family hx of      Colon Cancer No family hx of      Prostate Cancer No family hx of      Other Cancer No family hx of      Depression No family hx of      Anxiety Disorder No family hx of      Substance Abuse No family hx of      Anesthesia Reaction No family hx of      Asthma No family hx of      Osteoporosis No family hx of      Genetic Disorder No family hx of      Thyroid Disease No family hx of      Obesity No family hx of      Unknown/Adopted No  family hx of      Current Outpatient Medications   Medication Sig Dispense Refill     amphetamine-dextroamphetamine (ADDERALL XR) 20 MG 24 hr capsule Take 1 capsule by mouth 3 times daily        amphetamine-dextroamphetamine (ADDERALL) 10 MG tablet Take 10 mg by mouth 2 times daily        BIOTIN PO Take 10,000 mcg by mouth Reported on 5/9/2017       Cyanocobalamin (B-12) 5000 MCG CAPS Take 1 tablet by mouth daily        escitalopram (LEXAPRO) 20 MG tablet Take 1 tablet (20 mg) by mouth daily 90 tablet 1     hydroquinone (MAUDE) 4 % external cream        ibuprofen (ADVIL/MOTRIN) 800 MG tablet Take 1 tablet (800 mg) by mouth every 8 hours as needed for moderate pain 30 tablet 0     Interferon Beta-1a (REBIF TITRATION PACK) 6X8.8 & 6X22 MCG SOSY Inject 1 Units Subcutaneous three times a week       levonorgestrel (ECONTRA EZ) 1.5 MG TABS tablet Take 1.5 mg by mouth daily as needed       MAGNESIUM GLYCINATE PLUS PO Take 1 tablet by mouth daily       mupirocin (BACTROBAN) 2 % ointment        norethindrone (MICRONOR) 0.35 MG per tablet Take 1 tablet by mouth daily       oxybutynin (DITROPAN-XL) 5 MG 24 hr tablet Take 5 mg by mouth daily as needed for bladder spasms Patient takes 5 mg in the morning and 10 mg at night.       pantoprazole (PROTONIX) 40 MG EC tablet Take 1 tablet (40 mg) by mouth every morning (before breakfast) 30 minutes before meal 90 tablet 3     propranolol (INDERAL) 40 MG tablet Take 1 tablet (40 mg) by mouth 2 times daily 180 tablet 1     ranitidine (ZANTAC) 150 MG tablet Take 1 tablet (150 mg) by mouth 2 times daily 60 tablet 2     Sulfamethoxazole-Trimethoprim (BACTRIM PO) Take 1 tablet by mouth 2 times daily       tiZANidine (ZANAFLEX) 4 MG tablet Take 1 tablet (4 mg) by mouth 3 times daily as needed for muscle spasms 30 tablet 3     vitamin D3 (CHOLECALCIFEROL) 33859 units (250 mcg) capsule Take 1 capsule (10,000 Units) by mouth daily       vitamin D3 (CHOLECALCIFEROL) 05663 units capsule Take 1  "capsule (50,000 Units) by mouth once a week 8 capsule 1     Social History     Tobacco Use     Smoking status: Never Smoker     Smokeless tobacco: Never Used   Substance Use Topics     Alcohol use: No     Frequency: Never     Drinks per session: 5 or 6     Binge frequency: Less than monthly       OBJECTIVE  /79 (BP Location: Right arm, Patient Position: Sitting, Cuff Size: Adult Regular)   Pulse 60   Resp 16   Ht 1.676 m (5' 6\")   Wt 148.8 kg (328 lb)   SpO2 99%   BMI 52.94 kg/m      Physical Exam   Constitutional: She appears well-developed and well-nourished. No distress.   HENT:   Head: Normocephalic and atraumatic.   Eyes: Conjunctivae are normal.   Musculoskeletal: She exhibits no edema.        Right shoulder: Normal.        Left shoulder: Normal.        Cervical back: Normal.        Arms:  Upper extremity strength 5 out of 5 bilaterally   Neurological: She is alert.   Skin: Skin is warm. She is not diaphoretic.       Labs:  No results found for this or any previous visit (from the past 24 hour(s)).    X-Ray was not done.    ASSESSMENT:     was seen today for musculoskeletal problem.    Diagnoses and all orders for this visit:    Muscle strain:  Patient is a pleasant 28-year-old female who presented to the clinic today for evaluation of right upper chest wall pain.  Her physical examination was remarkable for trapezius muscle tenderness.  Reassured patient that her symptoms are likely secondary to muscle strain considering new exercise activity.  Recommended icing/heat packs as needed and ibuprofen as needed for pain.  She will follow-up as needed.  -     ibuprofen (ADVIL/MOTRIN) 800 MG tablet; Take 1 tablet (800 mg) by mouth every 8 hours as needed for moderate pain          Followup:    If not improving or if condition worsens, follow up with your Primary Care Provider    Roselyn Hammond MD  "

## 2019-08-02 ENCOUNTER — TRANSFERRED RECORDS (OUTPATIENT)
Dept: HEALTH INFORMATION MANAGEMENT | Facility: CLINIC | Age: 29
End: 2019-08-02

## 2019-08-02 DIAGNOSIS — E55.9 VITAMIN D DEFICIENCY DISEASE: ICD-10-CM

## 2019-08-02 DIAGNOSIS — G35 MULTIPLE SCLEROSIS (H): Primary | ICD-10-CM

## 2019-08-02 DIAGNOSIS — R53.83 FATIGUE: ICD-10-CM

## 2019-08-02 DIAGNOSIS — H46.9 OPTIC NEURITIS: ICD-10-CM

## 2019-08-02 LAB
BASOPHILS # BLD AUTO: 0 10E9/L (ref 0–0.2)
BASOPHILS NFR BLD AUTO: 0.4 %
DIFFERENTIAL METHOD BLD: NORMAL
EOSINOPHIL # BLD AUTO: 0.1 10E9/L (ref 0–0.7)
EOSINOPHIL NFR BLD AUTO: 1.4 %
ERYTHROCYTE [DISTWIDTH] IN BLOOD BY AUTOMATED COUNT: 13 % (ref 10–15)
HCT VFR BLD AUTO: 35.3 % (ref 35–47)
HGB BLD-MCNC: 12.3 G/DL (ref 11.7–15.7)
LYMPHOCYTES # BLD AUTO: 2.5 10E9/L (ref 0.8–5.3)
LYMPHOCYTES NFR BLD AUTO: 29.6 %
MCH RBC QN AUTO: 32.3 PG (ref 26.5–33)
MCHC RBC AUTO-ENTMCNC: 34.8 G/DL (ref 31.5–36.5)
MCV RBC AUTO: 93 FL (ref 78–100)
MONOCYTES # BLD AUTO: 0.8 10E9/L (ref 0–1.3)
MONOCYTES NFR BLD AUTO: 9.9 %
NEUTROPHILS # BLD AUTO: 4.9 10E9/L (ref 1.6–8.3)
NEUTROPHILS NFR BLD AUTO: 58.7 %
PLATELET # BLD AUTO: 260 10E9/L (ref 150–450)
RBC # BLD AUTO: 3.81 10E12/L (ref 3.8–5.2)
WBC # BLD AUTO: 8.3 10E9/L (ref 4–11)

## 2019-08-02 PROCEDURE — 36415 COLL VENOUS BLD VENIPUNCTURE: CPT | Performed by: PHYSICIAN ASSISTANT

## 2019-08-02 PROCEDURE — 85025 COMPLETE CBC W/AUTO DIFF WBC: CPT | Performed by: PHYSICIAN ASSISTANT

## 2019-08-02 PROCEDURE — 82306 VITAMIN D 25 HYDROXY: CPT | Performed by: PHYSICIAN ASSISTANT

## 2019-08-02 PROCEDURE — 84443 ASSAY THYROID STIM HORMONE: CPT | Performed by: PHYSICIAN ASSISTANT

## 2019-08-03 LAB — TSH SERPL DL<=0.005 MIU/L-ACNC: 3.36 MU/L (ref 0.4–4)

## 2019-08-04 LAB — DEPRECATED CALCIDIOL+CALCIFEROL SERPL-MC: 79 UG/L (ref 20–75)

## 2019-08-05 ENCOUNTER — OFFICE VISIT (OUTPATIENT)
Dept: URGENT CARE | Facility: URGENT CARE | Age: 29
End: 2019-08-05
Payer: COMMERCIAL

## 2019-08-05 VITALS — HEART RATE: 49 BPM | OXYGEN SATURATION: 100 % | DIASTOLIC BLOOD PRESSURE: 89 MMHG | SYSTOLIC BLOOD PRESSURE: 136 MMHG

## 2019-08-05 DIAGNOSIS — M79.622 PAIN OF LEFT UPPER ARM: Primary | ICD-10-CM

## 2019-08-05 LAB — D DIMER PPP FEU-MCNC: 0.4 UG/ML FEU (ref 0–0.5)

## 2019-08-05 PROCEDURE — 85379 FIBRIN DEGRADATION QUANT: CPT | Performed by: FAMILY MEDICINE

## 2019-08-05 PROCEDURE — 99213 OFFICE O/P EST LOW 20 MIN: CPT | Performed by: FAMILY MEDICINE

## 2019-08-05 PROCEDURE — 36415 COLL VENOUS BLD VENIPUNCTURE: CPT | Performed by: FAMILY MEDICINE

## 2019-08-05 NOTE — PROGRESS NOTES
SUBJECTIVE: Princess TRISTIN Machado is a 28 year old female presenting with a chief complaint of left arm and rt leg pain.  Onset of symptoms was day(s) ago.  Course of illness is same.    Severity moderate  Current and Associated symptoms: upper arm pain  Treatment measures tried include.  Predisposing factors include None.    Past Medical History:   Diagnosis Date     Anxiety      Hidradenitis suppurativa 2010     Hypersomnia 04/2019     MS (multiple sclerosis) (H)     diagnosis 10/2016     Recurrent major depression (H)     Pt has tried Zoloft, Prozac, Wellbutrin in the past      Allergies   Allergen Reactions     Accutane Rash     Social History     Tobacco Use     Smoking status: Never Smoker     Smokeless tobacco: Never Used   Substance Use Topics     Alcohol use: No     Frequency: Never     Drinks per session: 5 or 6     Binge frequency: Less than monthly       ROS:  SKIN: no rash  GI: no vomiting    OBJECTIVE:  /89   Pulse (!) 49   SpO2 100% GENERAL APPEARANCE: healthy, alert and no distress  EYES: EOMI,  PERRL, conjunctiva clear  HENT: ear canals and TM's normal.  Nose and mouth without ulcers, erythema or lesions  NECK: supple, nontender, no lymphadenopathy  RESP: lungs clear to auscultation - no rales, rhonchi or wheezes  CV: regular rates and rhythm, normal S1 S2, no murmur noted  ABDOMEN:  soft, nontender, no HSM or masses and bowel sounds normal  NEURO: Normal strength and tone, sensory exam grossly normal,  normal speech and mentation  SKIN: no suspicious lesions or rashes      ICD-10-CM    1. Pain of left upper arm M79.622 D dimer quantitative     Fluids/Rest, f/u if worse/not any better

## 2019-08-07 ENCOUNTER — ALLIED HEALTH/NURSE VISIT (OUTPATIENT)
Dept: NURSING | Facility: CLINIC | Age: 29
End: 2019-08-07
Payer: COMMERCIAL

## 2019-08-07 DIAGNOSIS — Z79.899 ENCOUNTER FOR LONG-TERM (CURRENT) USE OF MEDICATIONS: ICD-10-CM

## 2019-08-07 DIAGNOSIS — H46.9 OPTIC NEURITIS: ICD-10-CM

## 2019-08-07 DIAGNOSIS — R53.83 FATIGUE: ICD-10-CM

## 2019-08-07 DIAGNOSIS — G35 MULTIPLE SCLEROSIS (H): Primary | ICD-10-CM

## 2019-08-07 DIAGNOSIS — E55.9 VITAMIN D DEFICIENCY DISEASE: ICD-10-CM

## 2019-08-07 PROCEDURE — 93000 ELECTROCARDIOGRAM COMPLETE: CPT

## 2019-08-07 NOTE — PROGRESS NOTES
EKG from outside source was performed on patient and faxed to neurologist  [phone]  Dr Waldrop

## 2019-08-19 ENCOUNTER — OFFICE VISIT (OUTPATIENT)
Dept: FAMILY MEDICINE | Facility: CLINIC | Age: 29
End: 2019-08-19
Payer: COMMERCIAL

## 2019-08-19 VITALS
SYSTOLIC BLOOD PRESSURE: 126 MMHG | TEMPERATURE: 97 F | DIASTOLIC BLOOD PRESSURE: 70 MMHG | HEART RATE: 68 BPM | RESPIRATION RATE: 16 BRPM

## 2019-08-19 DIAGNOSIS — R58 ECCHYMOSIS: Primary | ICD-10-CM

## 2019-08-19 LAB
APTT PPP: 31 SEC (ref 22–37)
INR PPP: 1.09 (ref 0.86–1.14)

## 2019-08-19 PROCEDURE — 99213 OFFICE O/P EST LOW 20 MIN: CPT | Performed by: PHYSICIAN ASSISTANT

## 2019-08-19 PROCEDURE — 85610 PROTHROMBIN TIME: CPT | Performed by: PHYSICIAN ASSISTANT

## 2019-08-19 PROCEDURE — 85730 THROMBOPLASTIN TIME PARTIAL: CPT | Performed by: PHYSICIAN ASSISTANT

## 2019-08-19 PROCEDURE — 36415 COLL VENOUS BLD VENIPUNCTURE: CPT | Performed by: PHYSICIAN ASSISTANT

## 2019-08-19 ASSESSMENT — ENCOUNTER SYMPTOMS
CONSTITUTIONAL NEGATIVE: 1
GASTROINTESTINAL NEGATIVE: 1
ROS SKIN COMMENTS: AS IN HPI
NEUROLOGICAL NEGATIVE: 1
PSYCHIATRIC NEGATIVE: 1
EYES NEGATIVE: 1
RESPIRATORY NEGATIVE: 1
CARDIOVASCULAR NEGATIVE: 1

## 2019-08-19 NOTE — RESULT ENCOUNTER NOTE
Leyla    Your lab tests are complete and I have reviewed the results.     - Your lab results look great; everything is normal!!!    If you have any questions or concerns, please feel free to call or send a [employer] message.    Sincerely,  Rommel Hernandez PA-C

## 2019-08-19 NOTE — PROGRESS NOTES
Subjective     Princess TRISTIN Machado is a 28 year old female who presents to clinic today for the following health issues:    HPI   Bruising      Duration: 1.5 weeks    Description (location/character/radiation): Lt arm    Intensity:  mild    Accompanying signs and symptoms: bruising unknown cause    History (similar episodes/previous evaluation): None    Precipitating or alleviating factors: None    Therapies tried and outcome: None     Bilateral clavicle soreness and palpable lump  Upper extremity veins are more prominent than baseline  Negative D-dimer at         Patient Active Problem List   Diagnosis     Optic neuritis, left     Recurrent major depressive episodes (H)     Multiple sclerosis (H)     Bulimia nervosa     Chronic back pain     Hidradenitis suppurativa     Hypertriglyceridemia     Migraine without status migrainosus, not intractable     Vitamin D deficiency     Morbid obesity due to excess calories (H) BMI 50-60     STACIA (generalized anxiety disorder)     Prediabetes     Past Surgical History:   Procedure Laterality Date     ENT SURGERY      tubes in ears     MYRINGOTOMY, INSERT TUBE BILATERAL, COMBINED  1990s       Social History     Tobacco Use     Smoking status: Never Smoker     Smokeless tobacco: Never Used   Substance Use Topics     Alcohol use: No     Frequency: Never     Drinks per session: 5 or 6     Binge frequency: Less than monthly     Family History   Problem Relation Age of Onset     Cerebrovascular Disease Mother         heart attack     Cerebrovascular Disease Father         heart attack     Hypertension Father      Mental Illness Sister      Post-Traumatic Stress Disorder (PTSD) Brother      Mental Illness Brother      Glaucoma No family hx of      Macular Degeneration No family hx of      Coronary Artery Disease No family hx of      Hyperlipidemia No family hx of      Breast Cancer No family hx of      Colon Cancer No family hx of      Prostate Cancer No family hx of      Other  Cancer No family hx of      Depression No family hx of      Anxiety Disorder No family hx of      Substance Abuse No family hx of      Anesthesia Reaction No family hx of      Asthma No family hx of      Osteoporosis No family hx of      Genetic Disorder No family hx of      Thyroid Disease No family hx of      Obesity No family hx of      Unknown/Adopted No family hx of          Current Outpatient Medications   Medication Sig Dispense Refill     amphetamine-dextroamphetamine (ADDERALL XR) 20 MG 24 hr capsule Take 1 capsule by mouth 3 times daily        amphetamine-dextroamphetamine (ADDERALL) 10 MG tablet Take 10 mg by mouth 2 times daily        BIOTIN PO Take 10,000 mcg by mouth Reported on 5/9/2017       Cyanocobalamin (B-12) 5000 MCG CAPS Take 1 tablet by mouth daily        escitalopram (LEXAPRO) 20 MG tablet Take 1 tablet (20 mg) by mouth daily 90 tablet 1     hydroquinone (MAUDE) 4 % external cream        ibuprofen (ADVIL/MOTRIN) 800 MG tablet Take 1 tablet (800 mg) by mouth every 8 hours as needed for moderate pain 30 tablet 0     Interferon Beta-1a (REBIF TITRATION PACK) 6X8.8 & 6X22 MCG SOSY Inject 1 Units Subcutaneous three times a week       levonorgestrel (ECONTRA EZ) 1.5 MG TABS tablet Take 1.5 mg by mouth daily as needed       MAGNESIUM GLYCINATE PLUS PO Take 1 tablet by mouth daily       mupirocin (BACTROBAN) 2 % ointment        norethindrone (MICRONOR) 0.35 MG per tablet Take 1 tablet by mouth daily       oxybutynin (DITROPAN-XL) 5 MG 24 hr tablet Take 5 mg by mouth daily as needed for bladder spasms Patient takes 5 mg in the morning and 10 mg at night.       pantoprazole (PROTONIX) 40 MG EC tablet Take 1 tablet (40 mg) by mouth every morning (before breakfast) 30 minutes before meal 90 tablet 3     propranolol (INDERAL) 40 MG tablet Take 1 tablet (40 mg) by mouth 2 times daily 180 tablet 1     ranitidine (ZANTAC) 150 MG tablet Take 1 tablet (150 mg) by mouth 2 times daily 60 tablet 2      Sulfamethoxazole-Trimethoprim (BACTRIM PO) Take 1 tablet by mouth 2 times daily       tiZANidine (ZANAFLEX) 4 MG tablet Take 1 tablet (4 mg) by mouth 3 times daily as needed for muscle spasms 30 tablet 3     vitamin D3 (CHOLECALCIFEROL) 85033 units (250 mcg) capsule Take 1 capsule (10,000 Units) by mouth daily       vitamin D3 (CHOLECALCIFEROL) 21621 units capsule Take 1 capsule (50,000 Units) by mouth once a week 8 capsule 1     Allergies   Allergen Reactions     Accutane Rash       Reviewed and updated as needed this visit by Provider         Review of Systems   Constitutional: Negative.    HENT: Negative.    Eyes: Negative.    Respiratory: Negative.    Cardiovascular: Negative.    Gastrointestinal: Negative.    Genitourinary: Negative.    Musculoskeletal:        As in HPI   Skin:        As in HPI   Neurological: Negative.    Psychiatric/Behavioral: Negative.          Objective    /70 (Cuff Size: Adult Large)   Pulse 68   Temp 97  F (36.1  C) (Tympanic)   Resp 16   Physical Exam   Constitutional: She is oriented to person, place, and time. She appears well-developed and well-nourished. No distress.   HENT:   Head: Normocephalic.   Right Ear: External ear normal.   Left Ear: External ear normal.   Nose: Nose normal.   Eyes: Conjunctivae and EOM are normal.   Neck: Normal range of motion.   Pulmonary/Chest: Effort normal.   Musculoskeletal:        Right shoulder: She exhibits no swelling and no deformity.        Left shoulder: She exhibits no swelling and no deformity.   Neurological: She is alert and oriented to person, place, and time.   Skin: Ecchymosis (left forearm - 4 cm by 3 cm, no overlying abrasion or laceration) noted. She is not diaphoretic.   Psychiatric: She has a normal mood and affect. Judgment normal.       Diagnostic Test Results:  No results found for this or any previous visit (from the past 24 hour(s)).        Assessment & Plan   Problem List Items Addressed This Visit     None       Visit Diagnoses     Ecchymosis    -  Primary    Relevant Orders    Partial thromboplastin time (Completed)    INR (Completed)         Labs ordered as above  CBC normal on 8/2/19  If bruising worsens or becomes more frequent, consider further workup    There are no Patient Instructions on file for this visit.    Return in about 4 weeks (around 9/16/2019) for a recheck if you are not improved.    Anne Hernandez PA-C  [facility]

## 2019-09-24 ENCOUNTER — OFFICE VISIT (OUTPATIENT)
Dept: INTERNAL MEDICINE | Facility: CLINIC | Age: 29
End: 2019-09-24
Payer: COMMERCIAL

## 2019-09-24 VITALS
BODY MASS INDEX: 51 KG/M2 | WEIGHT: 293 LBS | RESPIRATION RATE: 14 BRPM | HEART RATE: 67 BPM | SYSTOLIC BLOOD PRESSURE: 108 MMHG | DIASTOLIC BLOOD PRESSURE: 68 MMHG | TEMPERATURE: 97.3 F | OXYGEN SATURATION: 100 %

## 2019-09-24 DIAGNOSIS — M79.10 MYALGIA: Primary | ICD-10-CM

## 2019-09-24 PROCEDURE — 99213 OFFICE O/P EST LOW 20 MIN: CPT | Performed by: PHYSICIAN ASSISTANT

## 2019-09-24 NOTE — PROGRESS NOTES
Subjective     Princess TRISTIN Machado is a 29 year old female who presents to clinic today for the following health issues:    HPI   Musculoskeletal problem/pain      Duration: Notices in the last week     Description  Location: behind right leg (tenderness) began last week. Ongoing other issues with pain in upper left arm, and neck     Intensity:  moderate    Accompanying signs and symptoms: bumping sensation under skin of left arm     History  Previous similar problem: YES  Previous evaluation:  UC visits     Precipitating or alleviating factors:  Trauma or overuse: no   Aggravating factors include: pressure against leg     Therapies tried and outcome: nothing    Patient with several UC visits for myalgias and concerns about possible blood clot and bruising. Labs all normal   She is known MS patient and seeing neurology routinely. No changes in MS medications other than Adderall adding this summer to help with fatigue- neurology and Psych managing   She currently is in between PCP- her current PCP is leaving [facility].        -------------------------------------    BP Readings from Last 3 Encounters:   09/24/19 108/68   08/19/19 126/70   08/05/19 136/89    Wt Readings from Last 3 Encounters:   09/24/19 143.3 kg (316 lb)   07/13/19 148.8 kg (328 lb)   07/02/19 148.8 kg (328 lb)                    -------------------------------------  Reviewed and updated as needed this visit by Provider  Allergies  Meds         Review of Systems   ROS COMP: Constitutional, HEENT, cardiovascular, pulmonary, gi and gu systems are negative, except as otherwise noted.      Objective    /68   Pulse 67   Temp 97.3  F (36.3  C) (Tympanic)   Resp 14   Wt 143.3 kg (316 lb)   LMP 08/26/2019 (Approximate)   SpO2 100%   Breastfeeding? No   BMI 51.00 kg/m    Body mass index is 51 kg/m .  Physical Exam   GENERAL: healthy, alert and no distress  RESP: lungs clear to auscultation - no rales, rhonchi or wheezes  CV: regular rate  "and rhythm, normal S1 S2, no S3 or S4, no murmur, click or rub, no peripheral edema and peripheral pulses strong  MS: normal muscle tone and tender points at the clavicles and behind the right knee   Upper posterior neck   Lower back   SKIN: no suspicious lesions or rashes    Diagnostic Test Results:  Labs reviewed in Epic        Assessment & Plan       ICD-10-CM    1. Myalgia M79.10 RHEUMATOLOGY REFERRAL        BMI:   Estimated body mass index is 51 kg/m  as calculated from the following:    Height as of 7/13/19: 1.676 m (5' 6\").    Weight as of this encounter: 143.3 kg (316 lb).   Weight management plan: Patient was referred to their PCP to discuss a diet and exercise plan.        Reviewed labs done so far for patient.  Recommend consult with rheumatology       Return in about 1 week (around 10/1/2019) for scheduled visit .    Margy Villalobos PA-C  [facility]      "

## 2019-10-08 ENCOUNTER — TELEPHONE (OUTPATIENT)
Dept: RHEUMATOLOGY | Facility: CLINIC | Age: 29
End: 2019-10-08

## 2019-10-08 NOTE — TELEPHONE ENCOUNTER
Referral received from Margy Villalobos PA-C for myalgia, This is not a diagnosis that the Rheumatology clinic evaluates/manages/treats per protocol. Letter has been sent to the referring provider informing them of this.   Bridgette Flores CMA   10/8/2019 4:17 PM

## 2019-10-08 NOTE — LETTER
October 8, 2019    Princess TRISTIN Machado  [address]  [facility] [address]    Dear Referring Provider,  Thank you for the referral. We are currently short staffed in the department and are working on recruitment.   We encourage you to refer your patient, Princess TRISTIN Machado, to one of our community sites:    [facility]    Provider of your choice            Thank you for your support and understanding.    Sincerely,  The Division of Arthritis and Autoimmune Disorders

## 2019-10-10 DIAGNOSIS — G89.29 CHRONIC RIGHT-SIDED LOW BACK PAIN WITHOUT SCIATICA: ICD-10-CM

## 2019-10-10 DIAGNOSIS — M54.50 CHRONIC RIGHT-SIDED LOW BACK PAIN WITHOUT SCIATICA: ICD-10-CM

## 2019-10-11 NOTE — TELEPHONE ENCOUNTER
Requested Prescriptions   Pending Prescriptions Disp Refills     tiZANidine (ZANAFLEX) 4 MG tablet [Pharmacy Med Name: TIZANIDINE HCL 4MG TABS]        Last Written Prescription Date:  5/28/2019  Last Fill Quantity: 30,   # refills: 3  Last Office Visit: 9/24/2019  Future Office visit:    Next 5 appointments (look out 90 days)    Oct 22, 2019  2:00 PM CDT  [facility] Physical Adult with Vandana Rowell, [facility] [address] [address]  [phone]   Nov 05, 2019  8:30 AM CST  [facility] with Vandana Rowell DO  [facility] [address] [address]  [phone]           Routing refill request to provider for review/approval because:  Drug not on the G, P or [facility] refill protocol or controlled substance   30 tablet 3     Sig: TAKE ONE TABLET BY MOUTH THREE TIMES A DAY AS NEEDED FOR MUSCLE SPASMS       There is no refill protocol information for this order

## 2019-10-25 ENCOUNTER — OFFICE VISIT (OUTPATIENT)
Dept: FAMILY MEDICINE | Facility: CLINIC | Age: 29
End: 2019-10-25
Payer: COMMERCIAL

## 2019-10-25 VITALS
DIASTOLIC BLOOD PRESSURE: 62 MMHG | SYSTOLIC BLOOD PRESSURE: 116 MMHG | TEMPERATURE: 98 F | RESPIRATION RATE: 16 BRPM | BODY MASS INDEX: 50.75 KG/M2 | HEART RATE: 51 BPM | WEIGHT: 293 LBS | OXYGEN SATURATION: 98 %

## 2019-10-25 DIAGNOSIS — Z53.9 ERRONEOUS ENCOUNTER--DISREGARD: Primary | ICD-10-CM

## 2019-10-25 RX ORDER — SPIRONOLACTONE 50 MG/1
50 TABLET, FILM COATED ORAL DAILY
COMMUNITY
End: 2021-03-22

## 2019-10-25 NOTE — PROGRESS NOTES
"Subjective     Princess TRISTIN Machado is a 29 year old female who presents to clinic today for the following health issues:    HPI   Referral       Duration: Areas of edema    Description (location/character/radiation): Bruising and swelling in areas of body    Intensity:  mild    Accompanying signs and symptoms: None    History (similar episodes/previous evaluation): None    Precipitating or alleviating factors: None    Therapies tried and outcome: None     {ACUTE Problem  - SuperBrief histories:183254}  {additonal problems for provider to add (Optional):656735}    {HIST REVIEW/ LINKS 2 (Optional):193746}    {Additional problems for the provider to add (optional):085356}  Reviewed and updated as needed this visit by Provider         Review of Systems   {ROS COMP (Optional):721721}      Objective    /62 (Patient Position: Sitting, Cuff Size: Thigh)   Pulse 51   Temp 98  F (36.7  C) (Tympanic)   Resp 16   Wt 142.6 kg (314 lb 6.4 oz)   LMP 09/18/2019   SpO2 98%   BMI 50.75 kg/m    Body mass index is 50.75 kg/m .  Physical Exam   {Exam List (Optional):864865}    {Diagnostic Test Results (Optional):797568::\"Diagnostic Test Results:\",\"Labs reviewed in Epic\"}        {PROVIDER CHARTING PREFERENCE:758721}      This encounter was opened in error. Please disregard.  "

## 2019-10-31 ENCOUNTER — TRANSFERRED RECORDS (OUTPATIENT)
Dept: HEALTH INFORMATION MANAGEMENT | Facility: CLINIC | Age: 29
End: 2019-10-31

## 2019-11-06 ENCOUNTER — HEALTH MAINTENANCE LETTER (OUTPATIENT)
Age: 29
End: 2019-11-06

## 2019-12-14 ENCOUNTER — OFFICE VISIT (OUTPATIENT)
Dept: URGENT CARE | Facility: URGENT CARE | Age: 29
End: 2019-12-14
Payer: MEDICAID

## 2019-12-14 VITALS — HEART RATE: 56 BPM | TEMPERATURE: 97.5 F | RESPIRATION RATE: 20 BRPM

## 2019-12-14 DIAGNOSIS — L03.114 CELLULITIS OF LEFT UPPER EXTREMITY: Primary | ICD-10-CM

## 2019-12-14 PROCEDURE — 99213 OFFICE O/P EST LOW 20 MIN: CPT | Performed by: FAMILY MEDICINE

## 2019-12-14 RX ORDER — CEPHALEXIN 500 MG/1
500 CAPSULE ORAL 4 TIMES DAILY
Qty: 40 CAPSULE | Refills: 0 | Status: SHIPPED | OUTPATIENT
Start: 2019-12-14 | End: 2020-05-10

## 2019-12-15 NOTE — PATIENT INSTRUCTIONS
Patient Education     Cellulitis  Cellulitis is an infection of the deep layers of skin. A break in the skin, such as a cut or scratch, can let bacteria under the skin. If the bacteria get to deep layers of the skin, it can be serious. If not treated, cellulitis can get into the bloodstream and lymph nodes. The infection can then spread throughout the body. This causes serious illness.  Cellulitis causes the affected skin to become red, swollen, warm, and sore. The reddened areas have a visible border. An open sore may leak fluid (pus). You may have a fever, chills, and pain.  Cellulitis is treated with antibiotics taken for 7 to 10 days. An open sore may be cleaned and covered with cool wet gauze. Symptoms should get better 1 to 2 days after treatment is started. Make sure to take all the antibiotics for the full number of days until they are gone. Keep taking the medicine even if your symptoms go away.  Home care  Follow these tips:    Limit the use of the part of your body with cellulitis.     If the infection is on your leg, keep your leg raised while sitting. This will help to reduce swelling.    Take all of the antibiotic medicine exactly as directed until it is gone. Do not miss any doses, especially during the first 7 days. Don t stop taking the medicine when your symptoms get better.    Keep the affected area clean and dry.    Wash your hands with soap and warm water before and after touching your skin. Anyone else who touches your skin should also wash his or her hands. Don't share towels.  Follow-up care  Follow up with your healthcare provider, or as advised. If your infection does not go away on the first antibiotic, your healthcare provider will prescribe a different one.  When to seek medical advice  Call your healthcare provider right away if any of these occur:    Red areas that spread    Swelling or pain that gets worse    Fluid leaking from the skin (pus)    Fever higher of 100.4  F (38.0  C) or  higher after 2 days on antibiotics  Date Last Reviewed: 9/1/2016 2000-2018 [employer]. [address] [place] [address], [place] [address]. All rights reserved. This information is not intended as a substitute for professional medical care. Always follow your healthcare professional's instructions.

## 2020-02-16 ENCOUNTER — HEALTH MAINTENANCE LETTER (OUTPATIENT)
Age: 30
End: 2020-02-16

## 2020-04-16 ENCOUNTER — MYC MEDICAL ADVICE (OUTPATIENT)
Dept: OPTOMETRY | Facility: CLINIC | Age: 30
End: 2020-04-16

## 2020-05-10 ENCOUNTER — OFFICE VISIT (OUTPATIENT)
Dept: URGENT CARE | Facility: URGENT CARE | Age: 30
End: 2020-05-10
Payer: COMMERCIAL

## 2020-05-10 VITALS
SYSTOLIC BLOOD PRESSURE: 152 MMHG | DIASTOLIC BLOOD PRESSURE: 94 MMHG | TEMPERATURE: 98.1 F | BODY MASS INDEX: 50.56 KG/M2 | WEIGHT: 293 LBS | HEART RATE: 79 BPM

## 2020-05-10 DIAGNOSIS — I80.02 SUPERFICIAL PHLEBITIS OF LEG, LEFT: Primary | ICD-10-CM

## 2020-05-10 PROCEDURE — 99213 OFFICE O/P EST LOW 20 MIN: CPT | Performed by: FAMILY MEDICINE

## 2020-05-10 NOTE — PROGRESS NOTES
CHIEF COMPLAINT    Check purple area L leg.      HISTORY    The patient has an area L leg, posterolateral aspect upper L calf region, which initially was red 3 days ago. Now has purplish discolor. Mildly tender.     No calf pain or actual leg swelling. No pain with ambulation.      Patient Active Problem List   Diagnosis     Optic neuritis, left     Recurrent major depressive episodes (H)     Multiple sclerosis (H)     Bulimia nervosa     Chronic back pain     Hidradenitis suppurativa     Hypertriglyceridemia     Migraine without status migrainosus, not intractable     Vitamin D deficiency     Morbid obesity due to excess calories (H) BMI 50-60     STACIA (generalized anxiety disorder)     Prediabetes     Current Outpatient Medications   Medication Sig Dispense Refill     amphetamine-dextroamphetamine (ADDERALL XR) 20 MG 24 hr capsule Take 1 capsule by mouth 3 times daily        amphetamine-dextroamphetamine (ADDERALL) 10 MG tablet Take 10 mg by mouth 2 times daily        escitalopram (LEXAPRO) 20 MG tablet Take 1 tablet (20 mg) by mouth daily 90 tablet 1     hydroquinone (MAUDE) 4 % external cream        Interferon Beta-1a (REBIF TITRATION PACK) 6X8.8 & 6X22 MCG SOSY Inject 1 Units Subcutaneous three times a week       mupirocin (BACTROBAN) 2 % ointment        norethindrone (MICRONOR) 0.35 MG per tablet Take 1 tablet by mouth daily       oxybutynin (DITROPAN-XL) 5 MG 24 hr tablet Take 5 mg by mouth daily as needed for bladder spasms Patient takes 5 mg in the morning and 10 mg at night.       pantoprazole (PROTONIX) 40 MG EC tablet Take 1 tablet (40 mg) by mouth every morning (before breakfast) 30 minutes before meal 90 tablet 3     propranolol (INDERAL) 40 MG tablet Take 1 tablet (40 mg) by mouth 2 times daily 180 tablet 1     vitamin D3 (CHOLECALCIFEROL) 22350 units (250 mcg) capsule Take 1 capsule (10,000 Units) by mouth daily       vitamin D3 (CHOLECALCIFEROL) 67407 units capsule Take 1 capsule (50,000 Units) by  mouth once a week 8 capsule 1     Cyanocobalamin (B-12) 5000 MCG CAPS Take 1 tablet by mouth daily        ibuprofen (ADVIL/MOTRIN) 800 MG tablet Take 1 tablet (800 mg) by mouth every 8 hours as needed for moderate pain (Patient not taking: Reported on 5/10/2020) 30 tablet 0     spironolactone (ALDACTONE) 50 MG tablet Take 50 mg by mouth daily       tiZANidine (ZANAFLEX) 4 MG tablet TAKE ONE TABLET BY MOUTH THREE TIMES A DAY AS NEEDED FOR MUSCLE SPASMS (Patient not taking: Reported on 5/10/2020) 30 tablet 3       REVIEW OF SYSTEMS    No fever/chills  No cough or sob  No CP  No nausea or diarrhea      Past Medical History:   Diagnosis Date     Anxiety      Depressive disorder      Headache      Hidradenitis suppurativa 2010     Hypersomnia 04/2019     MS (multiple sclerosis) (H)     diagnosis 10/2016     Recurrent major depression (H)     Pt has tried Zoloft, Prozac, Wellbutrin in the past        EXAM  BP (!) 152/94   Pulse 79   Temp 98.1  F (36.7  C) (Tympanic)   Wt 142.1 kg (313 lb 4 oz)   BMI 50.56 kg/m        L leg:  Ecchymotic area about 3 X 5 cm upper L calf, lateral aspect.  Some underlying lumpiness.  Not especially tender.  No calf tenderness.  No edema.      (I80.02) Superficial phlebitis of leg, left  (primary encounter diagnosis)  Comment:   She may have had superficial vein inflammation, suggested by preceding redness with some mild extravasation in this area.  Bruising inadvertently would be another possibility.  Low suspicion for DVT.    Plan:   Expectant management advised.  OK to try Ibu 600 mg bid.  Support stocking.    Return for increasing swelling, pain, redness.

## 2020-07-22 ENCOUNTER — TRANSFERRED RECORDS (OUTPATIENT)
Dept: HEALTH INFORMATION MANAGEMENT | Facility: CLINIC | Age: 30
End: 2020-07-22

## 2020-07-31 ENCOUNTER — MEDICAL CORRESPONDENCE (OUTPATIENT)
Dept: HEALTH INFORMATION MANAGEMENT | Facility: CLINIC | Age: 30
End: 2020-07-31

## 2020-10-08 NOTE — TELEPHONE ENCOUNTER
FUTURE VISIT INFORMATION      FUTURE VISIT INFORMATION:    Date: 12.31.20    Time: 1:00    Location: [facility]  REFERRAL INFORMATION:    Referring provider:  Dr. Laury Elam    Referring providers clinic:  [facility]    Reason for visit/diagnosis  New HS    RECORDS REQUESTED FROM:       Clinic name Comments Records Status Imaging Status   [facility] 7.31.20, 7.22.20  Dr. Elam [facility] N/A   [facility] Delgadillo 1.17.20  Dr. Kim Carrasco Epic N/A

## 2020-11-29 ENCOUNTER — HEALTH MAINTENANCE LETTER (OUTPATIENT)
Age: 30
End: 2020-11-29

## 2020-12-31 ENCOUNTER — PRE VISIT (OUTPATIENT)
Dept: DERMATOLOGY | Facility: CLINIC | Age: 30
End: 2020-12-31

## 2020-12-31 ENCOUNTER — TELEPHONE (OUTPATIENT)
Dept: DERMATOLOGY | Facility: CLINIC | Age: 30
End: 2020-12-31

## 2020-12-31 NOTE — TELEPHONE ENCOUNTER
Will cancel appt.     lvm advising to call back to schedule, advised next available is not until April.     Radha LOCKETT CMA

## 2020-12-31 NOTE — TELEPHONE ENCOUNTER
[facility] Call Center    Phone Message    May a detailed message be left on voicemail: yes     Reason for Call: Other: Pt called and will not be able to come to her appt today due to work. Please call her back to r/s. Thanks     Action Taken: Message routed to:  [facility] ([facility] DERM    Travel Screening: Not Applicable

## 2021-01-05 NOTE — TELEPHONE ENCOUNTER
Pt called and said that she is okay scheduling out into April with Dr. Pelaez. Pt said that she has been playing phone tag and would just prefer the clinic to go ahead and schedule something for her and send a [employer] message to inform her of the date and time. Pt said she can do Tuesdays or Thursdays during lunch time (11am-1pm). Thanks

## 2021-03-22 ENCOUNTER — ANCILLARY PROCEDURE (OUTPATIENT)
Dept: GENERAL RADIOLOGY | Facility: CLINIC | Age: 31
End: 2021-03-22
Attending: PHYSICIAN ASSISTANT
Payer: COMMERCIAL

## 2021-03-22 ENCOUNTER — OFFICE VISIT (OUTPATIENT)
Dept: URGENT CARE | Facility: URGENT CARE | Age: 31
End: 2021-03-22
Payer: COMMERCIAL

## 2021-03-22 VITALS
RESPIRATION RATE: 18 BRPM | OXYGEN SATURATION: 100 % | BODY MASS INDEX: 48.1 KG/M2 | DIASTOLIC BLOOD PRESSURE: 76 MMHG | TEMPERATURE: 98.4 F | SYSTOLIC BLOOD PRESSURE: 127 MMHG | HEART RATE: 88 BPM | WEIGHT: 293 LBS

## 2021-03-22 DIAGNOSIS — N76.0 BV (BACTERIAL VAGINOSIS): ICD-10-CM

## 2021-03-22 DIAGNOSIS — M65.4 DE QUERVAIN'S DISEASE (TENOSYNOVITIS): ICD-10-CM

## 2021-03-22 DIAGNOSIS — B96.89 BV (BACTERIAL VAGINOSIS): ICD-10-CM

## 2021-03-22 DIAGNOSIS — Z32.00 POSSIBLE PREGNANCY: ICD-10-CM

## 2021-03-22 DIAGNOSIS — Z11.3 SCREEN FOR STD (SEXUALLY TRANSMITTED DISEASE): Primary | ICD-10-CM

## 2021-03-22 DIAGNOSIS — B37.31 CANDIDIASIS OF VAGINA: ICD-10-CM

## 2021-03-22 DIAGNOSIS — M25.531 RIGHT WRIST PAIN: ICD-10-CM

## 2021-03-22 LAB
ALBUMIN UR-MCNC: NEGATIVE MG/DL
APPEARANCE UR: CLEAR
BILIRUB UR QL STRIP: NEGATIVE
COLOR UR AUTO: YELLOW
GLUCOSE UR STRIP-MCNC: NEGATIVE MG/DL
HCG UR QL: NEGATIVE
HGB UR QL STRIP: NEGATIVE
KETONES UR STRIP-MCNC: NEGATIVE MG/DL
LEUKOCYTE ESTERASE UR QL STRIP: NEGATIVE
NITRATE UR QL: NEGATIVE
PH UR STRIP: 5.5 PH (ref 5–7)
SOURCE: NORMAL
SP GR UR STRIP: >1.03 (ref 1–1.03)
SPECIMEN SOURCE: ABNORMAL
UROBILINOGEN UR STRIP-ACNC: 0.2 EU/DL (ref 0.2–1)
WET PREP SPEC: ABNORMAL

## 2021-03-22 PROCEDURE — 99000 SPECIMEN HANDLING OFFICE-LAB: CPT | Performed by: PHYSICIAN ASSISTANT

## 2021-03-22 PROCEDURE — 86780 TREPONEMA PALLIDUM: CPT | Mod: 90 | Performed by: PHYSICIAN ASSISTANT

## 2021-03-22 PROCEDURE — 87591 N.GONORRHOEAE DNA AMP PROB: CPT | Performed by: PHYSICIAN ASSISTANT

## 2021-03-22 PROCEDURE — 81025 URINE PREGNANCY TEST: CPT | Performed by: PHYSICIAN ASSISTANT

## 2021-03-22 PROCEDURE — 86803 HEPATITIS C AB TEST: CPT | Performed by: PHYSICIAN ASSISTANT

## 2021-03-22 PROCEDURE — 99214 OFFICE O/P EST MOD 30 MIN: CPT | Performed by: PHYSICIAN ASSISTANT

## 2021-03-22 PROCEDURE — 87210 SMEAR WET MOUNT SALINE/INK: CPT | Performed by: PHYSICIAN ASSISTANT

## 2021-03-22 PROCEDURE — 87389 HIV-1 AG W/HIV-1&-2 AB AG IA: CPT | Performed by: PHYSICIAN ASSISTANT

## 2021-03-22 PROCEDURE — 81003 URINALYSIS AUTO W/O SCOPE: CPT | Performed by: PHYSICIAN ASSISTANT

## 2021-03-22 PROCEDURE — 36415 COLL VENOUS BLD VENIPUNCTURE: CPT | Performed by: PHYSICIAN ASSISTANT

## 2021-03-22 PROCEDURE — 73110 X-RAY EXAM OF WRIST: CPT | Mod: RT | Performed by: RADIOLOGY

## 2021-03-22 PROCEDURE — 87491 CHLMYD TRACH DNA AMP PROBE: CPT | Performed by: PHYSICIAN ASSISTANT

## 2021-03-22 RX ORDER — METRONIDAZOLE 7.5 MG/G
1 GEL VAGINAL DAILY
Qty: 25 G | Refills: 0 | Status: SHIPPED | OUTPATIENT
Start: 2021-03-22 | End: 2021-03-27

## 2021-03-22 RX ORDER — FLUCONAZOLE 150 MG/1
150 TABLET ORAL ONCE
Qty: 1 TABLET | Refills: 0 | Status: SHIPPED | OUTPATIENT
Start: 2021-03-22 | End: 2021-03-22

## 2021-03-22 RX ORDER — IBUPROFEN 800 MG/1
800 TABLET, FILM COATED ORAL EVERY 8 HOURS PRN
Qty: 30 TABLET | Refills: 0 | Status: SHIPPED | OUTPATIENT
Start: 2021-03-22 | End: 2021-07-21

## 2021-03-22 NOTE — LETTER
March 22, 2021      Princess TRISTIN Machado  [address]  [facility] [address]        To Whom It May Concern:    Princess TRISTIN Machado was seen in our clinic. She may return to work with limited movement of her Right wrist thru-out this week.      Sincerely,        Valdez Pascual PA-C

## 2021-03-23 LAB
HCV AB SERPL QL IA: NONREACTIVE
HIV 1+2 AB+HIV1 P24 AG SERPL QL IA: NONREACTIVE
T PALLIDUM AB SER QL: NONREACTIVE

## 2021-03-23 NOTE — PROGRESS NOTES
Assessment & Plan     Screen for STD (sexually transmitted disease)  STD pending  UA negative  Urine hcg negative for pregancy  - UA reflex to Microscopic and Culture  - Chlamydia trachomatis PCR [KRR514]  - Neisseria gonorrhoeae PCR [[id number]]  - Wet prep  - HIV Antigen Antibody Combo  - Treponema Abs w Reflex to RPR and Titer  - **Hepatitis C Screen Reflex to RNA FUTURE anytime    Possible pregnancy  Negative for pregnancy  - HCG Qual, Urine ([id number])    Right wrist pain  Xray wrist Negative for acute findings, read by Valdez Pascual PA-C [facility] at time of visit.  Motrin for pain  Immobilization for treatment, RICE treatment: Rest, Ice, compression, elevation   - XR Wrist Right G/E 3 Views  - ibuprofen (ADVIL/MOTRIN) 800 MG tablet; Take 1 tablet (800 mg) by mouth every 8 hours as needed  - Wrist/Arm/Hand Supplies Order for DME - ONLY FOR DME    De Quervain's disease (tenosynovitis)    - XR Wrist Right G/E 3 Views  - ibuprofen (ADVIL/MOTRIN) 800 MG tablet; Take 1 tablet (800 mg) by mouth every 8 hours as needed  - Wrist/Arm/Hand Supplies Order for DME - ONLY FOR DME    Candidiasis of vagina  Diflucan for yeast  - fluconazole (DIFLUCAN) 150 MG tablet; Take 1 tablet (150 mg) by mouth once for 1 dose    BV (bacterial vaginosis)  Metrogel for BV  - metroNIDAZOLE (METROGEL) 0.75 % vaginal gel; Place 1 applicator (5 g) vaginally daily for 5 days      No follow-ups on file.    Valdez Pascual PA-C  [facility] is a 30 year old who presents for the following health issues     HPI     STD testing  Vaginal discharge  Right wrist tenderness    Review of Systems   Constitutional, HEENT, cardiovascular, pulmonary, gi and gu systems are negative, except as otherwise noted.      Objective    /76   Pulse 88   Temp 98.4  F (36.9  C)   Resp 18   Wt 135.2 kg (298 lb)   SpO2 100%   BMI 48.10 kg/m    Body mass index is 48.1 kg/m .  Physical Exam   GENERAL: healthy, alert and  no distress  EYES: Eyes grossly normal to inspection, PERRL and conjunctivae and sclerae normal  HENT: ear canals and TM's normal, nose and mouth without ulcers or lesions  NECK: no adenopathy, no asymmetry, masses, or scars and thyroid normal to palpation  RESP: lungs clear to auscultation - no rales, rhonchi or wheezes  CV: regular rate and rhythm, normal S1 S2, no S3 or S4, no murmur, click or rub, no peripheral edema and peripheral pulses strong  ABDOMEN: soft, nontender, no hepatosplenomegaly, no masses and bowel sounds normal  MS: Positive for tenderness over the extensor side thumb  SKIN: no suspicious lesions or rashes  NEURO: Normal strength and tone, mentation intact and speech normal    Results for orders placed or performed in visit on 03/22/21   XR Wrist Right G/E 3 Views     Status: None    Narrative    XR WRIST RT G/E 3 VW  3/22/2021 7:49 PM     HISTORY: Right wrist pain; De Quervain's disease (tenosynovitis)    COMPARISON: None      Impression    IMPRESSION: No acute fracture is identified. There is normal joint  spacing and alignment. Negative ulnar variance.     BOBBY VASQUEZ MD   UA reflex to Microscopic and Culture     Status: None    Specimen: Midstream Urine   Result Value Ref Range    Color Urine Yellow     Appearance Urine Clear     Glucose Urine Negative NEG^Negative mg/dL    Bilirubin Urine Negative NEG^Negative    Ketones Urine Negative NEG^Negative mg/dL    Specific Gravity Urine >1.030 1.003 - 1.035    Blood Urine Negative NEG^Negative    pH Urine 5.5 5.0 - 7.0 pH    Protein Albumin Urine Negative NEG^Negative mg/dL    Urobilinogen Urine 0.2 0.2 - 1.0 EU/dL    Nitrite Urine Negative NEG^Negative    Leukocyte Esterase Urine Negative NEG^Negative    Source Midstream Urine    HCG Qual, Urine ([id number])     Status: None   Result Value Ref Range    HCG Qual Urine Negative NEG^Negative   Wet prep     Status: Abnormal    Specimen: Vagina   Result Value Ref Range    Specimen Description Vagina      Wet Prep No Trichomonas seen     Wet Prep Clue cells seen  Moderate   (A)     Wet Prep Yeast seen  Few   (A)     Wet Prep WBC'S seen  Few        Wrist xray Negative for acute findings, read by Valdez PURI at time of visit.

## 2021-03-23 NOTE — PATIENT INSTRUCTIONS
Patient Education     Understanding De Quervain Tenosynovitis    De Quervain tenosynovitis is a condition that can cause wrist and thumb pain. Tendons connect muscles in your wrist and forearm to the bones in your thumb. The tendons have a protective cover (sheath). The sheath s lining makes a fluid that lets the tendons slide easily when you straighten your wrist and thumb. If any of these tendons are irritated or injured, they can become swollen and inflamed. This is called de Quervain tenosynovitis.    How to say it  Rox ten-oh-sin-oh-VY-tis   What causes de Quervain tenosynovitis?  This condition is most often caused from overuse. For example, making the same wrist motions over and over can irritate the tendons. This includes doing things like unscrewing jar lids or grasping a tool. Activities such as typing, playing racquet sports, knitting, and texting can also lead to the condition.   Symptoms of de Quervain tenosynovitis  You may have pain, soreness, redness, and swelling along the side of your wrist and the base of your thumb. You may feel pain when you pinch or grasp things, turn or touch your wrist, or make a fist. Your thumb may catch or make a crackling sound when you move it.   Treatment for de Quervain tenosynovitis  Treatments may include:    Resting the wrist and thumb. This involves limiting movements that make your symptoms worse. You also may need to avoid certain hobbies, sports, and types of work for a time.    Cold packs. These help reduce pain and swelling.    Prescription or over-the-counter medicines.  These help relieve pain and swelling. NSAIDs (nonsteroidal anti-inflammatory drugs) are the most common medicines used. Medicines may be prescribed or bought over the counter. They may be given as pills. Or they may be put on the skin as a gel, cream, or patch.    Splint or brace. This helps keep the thumb and wrist from moving and gives time for your tendons to heal.    Exercises  or physical therapy. These help stretch, strengthen, and improve the range of motion in your wrist and thumb.    Shots of medicine into the area around the tendon.  These usually are anti-inflammatory medicines called corticosteroids . They may help ease pain and swelling .    Surgery. You may need surgery if other treatments don t relieve symptoms. During surgery, the surgeon releases the sheath that surrounds the tendons so the tendons can move more easily.  Possible complications of de Quervain tenosynovitis  Without proper care and treatment, healing may take longer than normal. Also, symptoms may continue or get worse. Over time, the problem may become long-term (chronic). This can make it hard to use your wrist and thumb for normal activities.   When to call your healthcare provider  Call your healthcare provider right away if you have any of these:    Fever of 100.4 F (38 C) or higher, or as directed by your provider    Chills    Symptoms that don t get better with treatment, or get worse    Pain, numbness, or coldness in the hand    New symptoms  Juan last reviewed this educational content on 12/1/2019 2000-2020 [employer]. All rights reserved. This information is not intended as a substitute for professional medical care. Always follow your healthcare professional's instructions.           Patient Education     Understanding De Quervain Tenosynovitis    De Quervain tenosynovitis is a condition that can cause wrist and thumb pain. Tendons connect muscles in your wrist and forearm to the bones in your thumb. The tendons have a protective cover (sheath). The sheath s lining makes a fluid that lets the tendons slide easily when you straighten your wrist and thumb. If any of these tendons are irritated or injured, they can become swollen and inflamed. This is called de Quervain tenosynovitis.    How to say it  [id number] ten-oh-sin-oh-VY-tis   What causes de Quervain tenosynovitis?  This  condition is most often caused from overuse. For example, making the same wrist motions over and over can irritate the tendons. This includes doing things like unscrewing jar lids or grasping a tool. Activities such as typing, playing racquet sports, knitting, and texting can also lead to the condition.   Symptoms of de Quervain tenosynovitis  You may have pain, soreness, redness, and swelling along the side of your wrist and the base of your thumb. You may feel pain when you pinch or grasp things, turn or touch your wrist, or make a fist. Your thumb may catch or make a crackling sound when you move it.   Treatment for de Quervain tenosynovitis  Treatments may include:    Resting the wrist and thumb. This involves limiting movements that make your symptoms worse. You also may need to avoid certain hobbies, sports, and types of work for a time.    Cold packs. These help reduce pain and swelling.    Prescription or over-the-counter medicines.  These help relieve pain and swelling. NSAIDs (nonsteroidal anti-inflammatory drugs) are the most common medicines used. Medicines may be prescribed or bought over the counter. They may be given as pills. Or they may be put on the skin as a gel, cream, or patch.    Splint or brace. This helps keep the thumb and wrist from moving and gives time for your tendons to heal.    Exercises or physical therapy. These help stretch, strengthen, and improve the range of motion in your wrist and thumb.    Shots of medicine into the area around the tendon.  These usually are anti-inflammatory medicines called corticosteroids . They may help ease pain and swelling .    Surgery. You may need surgery if other treatments don t relieve symptoms. During surgery, the surgeon releases the sheath that surrounds the tendons so the tendons can move more easily.  Possible complications of de Quervain tenosynovitis  Without proper care and treatment, healing may take longer than normal. Also, symptoms may  continue or get worse. Over time, the problem may become long-term (chronic). This can make it hard to use your wrist and thumb for normal activities.   When to call your healthcare provider  Call your healthcare provider right away if you have any of these:    Fever of 100.4 F (38 C) or higher, or as directed by your provider    Chills    Symptoms that don t get better with treatment, or get worse    Pain, numbness, or coldness in the hand    New symptoms  [employer] last reviewed this educational content on 12/1/2019 2000-2020 [employer]. All rights reserved. This information is not intended as a substitute for professional medical care. Always follow your healthcare professional's instructions.           Patient Education     Treating De Quervain Tenosynovitis     The surgery releases the protective sheath, creating more space for the tendons. This allows them to move more freely and relieves inflammation.     The goal of your treatment is to ease your pain and allow you to use your thumb again. Treatment will depend on how bad the pain is.  Nonsurgical Treatment  Just taking a break from the activities that caused your pain may be enough. Your healthcare provider may also have you take nonsteroidal, anti-inflammatory medicine (NSAIDs), such as ibuprofen. Or you may wear a splint for a few weeks to rest the thumb and wrist. To lesson swelling, your healthcare provider may inject an anti-inflammatory medicine, such as cortisone, around the tendons. You may have more pain at first. But in a few days your thumb should feel better.  Surgery  If other kinds of treatment don t ease your pain, or if the pain is bad, you may need surgery. The sheath that surrounds the tendons is released so the tendons can move more easily. This helps reduce the inflammation. It also allows you to straighten your thumb without pain. Surgery is done with local or regional anesthetic on an outpatient basis. So you can go  home the same day. You will likely have a splint or dressing on your wrist for a few days while the tissue heals. You may need physical therapy to help strengthen muscles. Your healthcare provider will talk with you about the risks and possible complications of surgery.  [employer] last reviewed this educational content on 1/1/2018 2000-2020 [employer]. All rights reserved. This information is not intended as a substitute for professional medical care. Always follow your healthcare professional's instructions.           Patient Education     De Quervain Tenosynovitis    De Quervain tenosynovitis is inflammation of tendons and synovium on the thumb side of the wrist. Tendons are fibers that attach muscle to bone. Synovium is a slick membrane that helps tendons move. Movements done over and over can irritate and inflame these tissues. This can cause pain when you touch or grasp objects, turn or twist your wrist, or make a fist. You may also have pain and swelling near the base of the thumb or numbness along the back of your thumb and index finger. You may also feel the thumb catch or snap when you move it.  Treatment will depend on how bad the symptoms are. It can often be treated with medicines, injections, splinting, and home care. If your case is severe, you may be referred to a specialist to talk about surgery.  Home care  Your healthcare provider may prescribe medicines to relieve pain and reduce inflammation. A steroid medicine may be injected near the tendons. This reduces swelling and pain. The healthcare provider may also suggest taking over-the-counter medicines such as ibuprofen or naproxen. These help reduce inflammation. Take all medicines only as directed.  The following are general care guidelines:    Avoid repetitive movements of your wrist and thumb.    Note any activity that causes pain or swelling. If possible, avoid or limit that activity.    Put a cold pack on your thumb. To make  an ice pack, put ice cubes in a plastic bag that seals at the top. Wrap the bag in a clean, thin towel or cloth. Hold this to your thumb for up to 20 minutes at a time. Don't put ice directly on the skin.    Your healthcare provider may put a splint on the thumb to hold it still. Use the splint as you have been instructed. In some cases, you may need to use a splint 24 hours a day for 4 to 6 weeks. This will allow the wrist and thumb to heal.  Follow-up care  Follow up with your healthcare provider, or as advised. You may need more treatment if your injury is severe or if your symptoms don't get better. This additional treatment may include local injections, physical therapy, and surgery.  When to seek medical advice  Call your healthcare provider right away if any of these occur:    Increase in pain or swelling    You have fever, chills, redness, warmth, or drainage    Symptoms get worse after taking medicine    Pain spreads farther down the thumb or into the forearm    Pain continues to get in the way of daily life  youwho last reviewed this educational content on 6/1/2018 2000-2020 [employer]. All rights reserved. This information is not intended as a substitute for professional medical care. Always follow your healthcare professional's instructions.

## 2021-03-24 LAB
C TRACH DNA SPEC QL NAA+PROBE: NEGATIVE
N GONORRHOEA DNA SPEC QL NAA+PROBE: NEGATIVE
SPECIMEN SOURCE: NORMAL
SPECIMEN SOURCE: NORMAL

## 2021-05-27 ENCOUNTER — OFFICE VISIT (OUTPATIENT)
Dept: DERMATOLOGY | Facility: CLINIC | Age: 31
End: 2021-05-27
Payer: COMMERCIAL

## 2021-05-27 VITALS — DIASTOLIC BLOOD PRESSURE: 84 MMHG | SYSTOLIC BLOOD PRESSURE: 133 MMHG

## 2021-05-27 DIAGNOSIS — L73.2 HIDRADENITIS SUPPURATIVA: Primary | ICD-10-CM

## 2021-05-27 PROCEDURE — 99203 OFFICE O/P NEW LOW 30 MIN: CPT | Performed by: DERMATOLOGY

## 2021-05-27 RX ORDER — DOXYCYCLINE 100 MG/1
100 CAPSULE ORAL 2 TIMES DAILY
Qty: 180 CAPSULE | Refills: 3 | Status: SHIPPED | OUTPATIENT
Start: 2021-05-27 | End: 2021-07-21

## 2021-05-27 RX ORDER — DEXTROAMPHETAMINE SACCHARATE, AMPHETAMINE ASPARTATE MONOHYDRATE, DEXTROAMPHETAMINE SULFATE AND AMPHETAMINE SULFATE 7.5; 7.5; 7.5; 7.5 MG/1; MG/1; MG/1; MG/1
30 CAPSULE, EXTENDED RELEASE ORAL DAILY
COMMUNITY

## 2021-05-27 RX ORDER — SPIRONOLACTONE 100 MG/1
100 TABLET, FILM COATED ORAL 2 TIMES DAILY
Qty: 180 TABLET | Refills: 3 | Status: SHIPPED | OUTPATIENT
Start: 2021-05-27

## 2021-05-27 RX ORDER — DEXTROAMPHETAMINE SACCHARATE, AMPHETAMINE ASPARTATE, DEXTROAMPHETAMINE SULFATE AND AMPHETAMINE SULFATE 5; 5; 5; 5 MG/1; MG/1; MG/1; MG/1
20 TABLET ORAL 2 TIMES DAILY
COMMUNITY
End: 2021-07-21

## 2021-05-27 NOTE — LETTER
5/27/2021       RE: Princess TRISTIN Machado  [address]  [facility] [address]     Dear Colleague,    Thank you for referring your patient, Princess TRISTIN Machado, to the [facility] [place] at [facility]. Please see a copy of my visit note below.    HS Aoi-kf-Qkcal Checklist      Follow up: 3 mnonth virtual    Labs today? NO       Imaging needed? NO    Referrals placed? YES --> Derm surgery    Infusion Start? No    Infusion change/dose increase NO    Biologics change? NO    Wound care supplies? (print orders for faxing) NO    Reach out to outside providers? NO    Photos and photo consent done? NO      [facility] Dermatology Note  Encounter Date: May 27, 2021  Office Visit     Dermatology Problem List:  1. HS  2. Multiple sclerosis  3. Prediabetes  4. Depression, anxiety,   SH: 2 kids, daughter 11, son 5 1/2  -   ____________________________________________    Assessment & Plan:   1. HS   - Discussed trying another non-TNF inhibitor biologic like cosentyx, but patient would like to hold off at this time. She is willing to increase her spironolactone to 200mg daily which she has tolerated previously. She does not remember if it helped. Will also add a new antibiotic which she does not remember trying before. She also wanted to get a surgical evaluation for possible de-margret of tracts.  She does have groin involvement as well but she did not want me to look today. She notes this does not bother her much and is very interested in surgery at this time.    Follow-up: 3 months virtual    Staff:     Abimael Pelaez MD, FAAD    Departments of Internal Medicine and Dermatology  [facility]  [phone]    ____________________________________________    CC: Derm Problem ( is coming in today to discuss HS, states that she has it mostly in the under arms)    HPI:  Ms. Princess TRISTIN Machado is a(n)  30 year old female who presents today for follow-up HS.  >10 years HS. Started in early 20s, possibly teenager. Does not flare with her cycle.   Currently on nexplanon. It did not seem to change her HS.  2010 diagnosed, when she was pregnant. Got worse after pregnancy, but not immediately after. SHe was started on humira 2010 or 2011. Humira helped for 1 year and then wore.  She developed MS in 2016.  She is on Interferon beta 1 alpha. It works veru well for her. No new lesions for 2 years.      Tried   - Amoxicillin (maybe helps)  - BPO wash  - Clindam topical  - Clinda oral (maybe helped)  - Bactrim (maybe helped)    Antibiotics did seem to help but wears off and then switches to something else.    Currently she is on nothing except hibilcens    She was recently restarted on spironolactone 50mg BID.  It does seem to help.    Patient is otherwise feeling well, without additional skin concerns.    HS Nurse Assessment    Nurse Assessment Data 5/27/2021   Over the past month, about how many recurrent or new boils have you had? 4   Over the past week, how many dressing changes do you do each day? 0   Over the past week, has your wound drainage been: Mild   Rate your HS overall from 0-10 (0 = no disease, 10 = worst) over the past week:  6   Rate your pain score from 0-10 (0 = no disease, 10 = worst) for the most painful/symptomatic lesion in the past week:  2   Over the past week, how much has HS influenced your quality of life? very much       Labs Reviewed:  None    Physical Exam:  Vitals: /84 (BP Location: Right arm, Patient Position: Sitting, Cuff Size: Adult Large)   SKIN: Limited exam of axilla and face done today. Pt declined full body exam today    HS Exam  No flowsheet data found.    Left Axilla 6/3/2021   # of inflamed nodules 3   # of abcesses 0   # of draining tunnels 3       Right Axilla 6/3/2021   # of inflamed nodules 1   # of abcesses 0   # of draining tunnels 1       No flowsheet data found.    No  flowsheet data found.     No flowsheet data found.    No flowsheet data found.    No flowsheet data found.    No flowsheet data found.    Buttocks 6/3/2021   Pioderma Gangrenosum? No   Cutaneous Crohn's? No       HS Data  HS Exam Data 6/3/2021   LC Type LC1   Clinical Subtypes Regular type   Acne? No   Dissecting Cellulitis? No   Total Inflammatory Nodules 4   Total Abcesses 0   Total Draining Tunnels 4   Total Abscess and Nodule Count 4   IHS4 Score  20     - No other lesions of concern on areas examined.     Medications:  Current Outpatient Medications   Medication     amphetamine-dextroamphetamine (ADDERALL XR) 30 MG 24 hr capsule     amphetamine-dextroamphetamine (ADDERALL) 20 MG tablet     Cyanocobalamin (B-12) 5000 MCG CAPS     escitalopram (LEXAPRO) 20 MG tablet     hydroquinone (MAUDE) 4 % external cream     ibuprofen (ADVIL/MOTRIN) 800 MG tablet     Interferon Beta-1a (REBIF TITRATION PACK) 6X8.8 & 6X22 MCG SOSY     mupirocin (BACTROBAN) 2 % ointment     norethindrone (MICRONOR) 0.35 MG per tablet     tiZANidine (ZANAFLEX) 4 MG tablet     vitamin D3 (CHOLECALCIFEROL) 51825 units (250 mcg) capsule     amphetamine-dextroamphetamine (ADDERALL XR) 20 MG 24 hr capsule     amphetamine-dextroamphetamine (ADDERALL) 10 MG tablet     No current facility-administered medications for this visit.       Past Medical History:   Patient Active Problem List   Diagnosis     Optic neuritis, left     Recurrent major depressive episodes (H)     Multiple sclerosis (H)     Bulimia nervosa     Chronic back pain     Hidradenitis suppurativa     Hypertriglyceridemia     Migraine without status migrainosus, not intractable     Vitamin D deficiency     Morbid obesity due to excess calories (H) BMI 50-60     STACIA (generalized anxiety disorder)     Prediabetes     Past Medical History:   Diagnosis Date     Anxiety      Depressive disorder      Headache      Hidradenitis suppurativa 2010     Hypersomnia 04/2019     MS (multiple  sclerosis) (H)     diagnosis 10/2016     Recurrent major depression (H)     Pt has tried Zoloft, Prozac, Wellbutrin in the past        CC No referring provider defined for this encounter. on close of this encounter.

## 2021-05-27 NOTE — PATIENT INSTRUCTIONS
Sunscreen website: https://www.dermatologytimes.com/view/carcinogen-found-in-multiple-sunscreens    If interested in pfizer trial send me a message: jose inhibitor, tyk2 inhibitor and iraq4 inhibitor (in the IL1 pathway) vs placebo        HIDRADENITIS SUPPURATIVA     What is hidradenitis suppurativa (HS)?  Hidradenitis suppurativa (HS) is a chronic skin disorder affecting the hair follicles. The disease starts with sore red lumps (or boils), typically involving the armpits, breasts, lower abdomen, groin, and buttocks. These lesions appear suddenly, increase in size and then burst or rupture, usually under the surface of the skin. This causes severe pain. Sometimes they rupture to the surface of the skin, draining pus. In some people, they can result in tunnels under the skin that may or may not continue to drain. When the lumps heal, they can leave scars.     Facts:     HS is a chronic skin disease, that comes and goes in flares, and is very painful    HS happens in many people - men and women, young and elderly, all races and ethnicities. But HS is more common in young adults, women  and skin of color    One out of three people with HS has a family member with HS     HS is NOT due to an infection or washing habits    HS is NOT contagious, so it cannot be spread from one person to another person     HS is NOT caused by how well you wash or what soaps you use    HS is NOT caused by smoking or obesity, but quitting smoking and losing weight have helped some people        Causes  The cause of HS remains unclear. It is thought that there is a problem in the hair follicle that makes it weaker and easier to break open. The current theory is that there are three steps that cause an HS lesion to form::   1. The hair follicle gets plugged   2. The hair follicle swells and then ruptures, causing pain and inflammation   3. In some people, the inflammation does not stop and results in tunneling through the skin       Associated  Conditions  HS is associated with several other medical conditions and activities including:    Tobacco use    High cholesterol, heart disease and stroke     Type 2 diabetes     Depression and anxiety     Arthritis, which causes stiffness and pain in joints     Inflammatory bowel disease (including Crohn's disease and ulcerative colitis)    Severe acne and pilonidal sinus/cyst    Polycystic ovarian syndrome    Skin cancer in areas of longstanding HS lesions, typically in the groin or buttocks (rare)    It is important to ask your physician to watch out for these conditions.      To help manage mental health issues related to HS and emotional support:    Help finding a mental health specialist: https://www.psychologytoday.com/us/therapists    Suicide prevention (24/7 free confidential support):   o Phone: (366) 992-9790  o Website: https://suicidepreventionlifeline.org/      Support groups:  https://hopeforhs.org/    Intimacy support: American Association of Sexuality Educators, Counselors and Therapists (AASECT) website: https://www.aasect.org    For help quitting smoking     Phone:  o English: 4-107-QUIT-NOW (1-184.545.3176)  o Egyptian: 4-880-JAGYAB-YA (1-586.319.7394)  o     Website:   o English: https://smokefree.gov/  o Egyptian: valentinoanol.smokefree.gov     Lifestyle Modifications   Quitting smoking or weight loss can help your overall health and may help improve HS symptoms in some people, but not everyone.   It is recommended to eat a well-balanced, healthy diet (https://health.gov/dietaryguidelines) and to maintain a healthy weight. Avoiding dietary triggers can help some people suffering with HS, but will not necessarily help others with HS.    Some people may find that friction, irritation, and rubbing may worsen HS symptoms. Some people do better with loose, breathable clothing and fabrics. Shaving close to the affected areas may also worsen HS in some people. But, not everyone has the same triggers for their  HS, so see what works for you!    Some medications may worsen HS such as lithium, testosterone, and some progesterone-only birth control.  Please discuss this with your provider before stopping medication.     Zinc supplementation has been shown in some studies to help HS. However, every treatment can have a side effects,  so talk to your provider before starting any treatment.     Treatment                    Medicines, procedures and surgeries are offered to treat HS. Treatment can help reduce breakouts and improve quality of life.  Medicines used:    Topical washes (e.g. chlorhexidine, benzoyl peroxide)      Clindamycin on the skin - seems effective for pustules and papules. Probably not helpful for larger chronic lesions.       Oral antibiotics (e.g. doxycycline, clindamycin + rifampin, dapsone) - antibiotics may help some, but not all patients      Hormonal therapies (e.g. spironolactone, oral contraceptives) - primarily used in females though to have a hormonal component to their HS (e.g perimenstrual flares, worsening in pregnancy, polycystic ovarian syndrome)      Immunosuppression (e.g. prednisone)       Injectables (e.g. adalimumab, infliximab) - Adalimumab is the only federal drug administration (FDA) approved medication for HS    Procedures:    Intralesional steroid injections - may help areas of acute active inflammation       Some hair removal lasers - If considering this option, we recommend doing this only with a medical professional that has experience treating HS.     Surgeries:    Incision and drainage - Provides fast, short-term relief, but symptoms likely to recur.          Deroofing - This surgery involves opening the lesion and cleaning out the base. This treatment is effective for recurring lesions.  Recurrence rates seem to be similar to excision. These are typically left open and allowed to heal on their own over 1-3 months        Excision - This involves cutting out chronic lesions.  This  may entail cutting out a large affected area referred to as wide local excision, or cutting out single lesions, referred to as localized excisions.  Excision may be done with a scalpel, electric heating device or laser (e.g. carbon dioxide [CO2] laser).  These are either allowed to heal on their own, closed with sutures, or closed with a graft or flap.  Grafts are typically done by taking skin from one site of the body and using it to close another part of the body.  Flaps are done by moving tissue around to close a wound.     Check out this website to help decide the best treatment options for you!     https://www.informed-decisions.org/hidradenitispda.php

## 2021-05-27 NOTE — NURSING NOTE
Dermatology Rooming Note    Princess TRISTIN Machado's goals for this visit include:   Chief Complaint   Patient presents with     Derm Problem     edwige is coming in today to discuss HS, states that she has it mostly in the under arms     Bethanie Trejo CMA on 5/27/2021 at 1:01 PM

## 2021-05-27 NOTE — PROGRESS NOTES
HS Xhe-ho-Lhagm Checklist      Follow up: 3 mnonth virtual    Labs today? NO       Imaging needed? NO    Referrals placed? YES --> Derm surgery    Infusion Start? No    Infusion change/dose increase NO    Biologics change? NO    Wound care supplies? (print orders for faxing) NO    Reach out to outside providers? NO    Photos and photo consent done? NO

## 2021-05-27 NOTE — PROGRESS NOTES
[facility] Dermatology Note  Encounter Date: May 27, 2021  Office Visit     Dermatology Problem List:  1. HS  2. Multiple sclerosis  3. Prediabetes  4. Depression, anxiety,   SH: 2 kids, daughter 11, son 5 1/2  -   ____________________________________________    Assessment & Plan:   1. HS   - Discussed trying another non-TNF inhibitor biologic like cosentyx, but patient would like to hold off at this time. She is willing to increase her spironolactone to 200mg daily which she has tolerated previously. She does not remember if it helped. Will also add a new antibiotic which she does not remember trying before. She also wanted to get a surgical evaluation for possible de-margret of tracts.  She does have groin involvement as well but she did not want me to look today. She notes this does not bother her much and is very interested in surgery at this time.    Follow-up: 3 months virtual    Staff:     Abimael Pelaez MD, FAAD    Departments of Internal Medicine and Dermatology  [facility]  [phone]    ____________________________________________    CC: Derm Problem ( is coming in today to discuss HS, states that she has it mostly in the under arms)    HPI:  Ms. Princess TRISTIN Machado is a(n) 30 year old female who presents today for follow-up HS.  >10 years HS. Started in early 20s, possibly teenager. Does not flare with her cycle.   Currently on nexplanon. It did not seem to change her HS.  2010 diagnosed, when she was pregnant. Got worse after pregnancy, but not immediately after. SHe was started on humira 2010 or 2011. Humira helped for 1 year and then wore.  She developed MS in 2016.  She is on Interferon beta 1 alpha. It works veru well for her. No new lesions for 2 years.      Tried   - Amoxicillin (maybe helps)  - BPO wash  - Clindam topical  - Clinda oral (maybe helped)  - Bactrim (maybe helped)    Antibiotics did seem to help but wears off and then  switches to something else.    Currently she is on nothing except hibilcens    She was recently restarted on spironolactone 50mg BID.  It does seem to help.    Patient is otherwise feeling well, without additional skin concerns.    HS Nurse Assessment    Nurse Assessment Data 5/27/2021   Over the past month, about how many recurrent or new boils have you had? 4   Over the past week, how many dressing changes do you do each day? 0   Over the past week, has your wound drainage been: Mild   Rate your HS overall from 0-10 (0 = no disease, 10 = worst) over the past week:  6   Rate your pain score from 0-10 (0 = no disease, 10 = worst) for the most painful/symptomatic lesion in the past week:  2   Over the past week, how much has HS influenced your quality of life? very much       Labs Reviewed:  None    Physical Exam:  Vitals: /84 (BP Location: Right arm, Patient Position: Sitting, Cuff Size: Adult Large)   SKIN: Limited exam of axilla and face done today. Pt declined full body exam today    HS Exam  No flowsheet data found.    Left Axilla 6/3/2021   # of inflamed nodules 3   # of abcesses 0   # of draining tunnels 3       Right Axilla 6/3/2021   # of inflamed nodules 1   # of abcesses 0   # of draining tunnels 1       No flowsheet data found.    No flowsheet data found.     No flowsheet data found.    No flowsheet data found.    No flowsheet data found.    No flowsheet data found.    Buttocks 6/3/2021   Pioderma Gangrenosum? No   Cutaneous Crohn's? No       HS Data  HS Exam Data 6/3/2021   LC Type LC1   Clinical Subtypes Regular type   Acne? No   Dissecting Cellulitis? No   Total Inflammatory Nodules 4   Total Abcesses 0   Total Draining Tunnels 4   Total Abscess and Nodule Count 4   IHS4 Score  20     - No other lesions of concern on areas examined.     Medications:  Current Outpatient Medications   Medication     amphetamine-dextroamphetamine (ADDERALL XR) 30 MG 24 hr capsule     amphetamine-dextroamphetamine  (ADDERALL) 20 MG tablet     Cyanocobalamin (B-12) 5000 MCG CAPS     escitalopram (LEXAPRO) 20 MG tablet     hydroquinone (MAUDE) 4 % external cream     ibuprofen (ADVIL/MOTRIN) 800 MG tablet     Interferon Beta-1a (REBIF TITRATION PACK) 6X8.8 & 6X22 MCG SOSY     mupirocin (BACTROBAN) 2 % ointment     norethindrone (MICRONOR) 0.35 MG per tablet     tiZANidine (ZANAFLEX) 4 MG tablet     vitamin D3 (CHOLECALCIFEROL) 91026 units (250 mcg) capsule     amphetamine-dextroamphetamine (ADDERALL XR) 20 MG 24 hr capsule     amphetamine-dextroamphetamine (ADDERALL) 10 MG tablet     No current facility-administered medications for this visit.       Past Medical History:   Patient Active Problem List   Diagnosis     Optic neuritis, left     Recurrent major depressive episodes (H)     Multiple sclerosis (H)     Bulimia nervosa     Chronic back pain     Hidradenitis suppurativa     Hypertriglyceridemia     Migraine without status migrainosus, not intractable     Vitamin D deficiency     Morbid obesity due to excess calories (H) BMI 50-60     STACIA (generalized anxiety disorder)     Prediabetes     Past Medical History:   Diagnosis Date     Anxiety      Depressive disorder      Headache      Hidradenitis suppurativa 2010     Hypersomnia 04/2019     MS (multiple sclerosis) (H)     diagnosis 10/2016     Recurrent major depression (H)     Pt has tried Zoloft, Prozac, Wellbutrin in the past        CC No referring provider defined for this encounter. on close of this encounter.

## 2021-07-21 ENCOUNTER — OFFICE VISIT (OUTPATIENT)
Dept: URGENT CARE | Facility: URGENT CARE | Age: 31
End: 2021-07-21
Payer: COMMERCIAL

## 2021-07-21 VITALS
BODY MASS INDEX: 45.52 KG/M2 | DIASTOLIC BLOOD PRESSURE: 78 MMHG | TEMPERATURE: 97.1 F | HEART RATE: 95 BPM | OXYGEN SATURATION: 97 % | SYSTOLIC BLOOD PRESSURE: 118 MMHG | WEIGHT: 282 LBS

## 2021-07-21 DIAGNOSIS — N76.0 BV (BACTERIAL VAGINOSIS): ICD-10-CM

## 2021-07-21 DIAGNOSIS — Z20.2 EXPOSURE TO CHLAMYDIA: Primary | ICD-10-CM

## 2021-07-21 DIAGNOSIS — B96.89 BV (BACTERIAL VAGINOSIS): ICD-10-CM

## 2021-07-21 DIAGNOSIS — B37.31 CANDIDIASIS OF VAGINA: ICD-10-CM

## 2021-07-21 LAB
ALBUMIN UR-MCNC: NEGATIVE MG/DL
APPEARANCE UR: CLEAR
BILIRUB UR QL STRIP: NEGATIVE
CLUE CELLS: PRESENT
COLOR UR AUTO: YELLOW
GLUCOSE UR STRIP-MCNC: NEGATIVE MG/DL
HGB UR QL STRIP: ABNORMAL
KETONES UR STRIP-MCNC: NEGATIVE MG/DL
LEUKOCYTE ESTERASE UR QL STRIP: NEGATIVE
NITRATE UR QL: NEGATIVE
PH UR STRIP: 5.5 [PH] (ref 5–7)
SP GR UR STRIP: 1.02 (ref 1–1.03)
TRICHOMONAS, WET PREP: ABNORMAL
UROBILINOGEN UR STRIP-ACNC: 0.2 E.U./DL
WBC'S/HIGH POWER FIELD, WET PREP: ABNORMAL
YEAST, WET PREP: PRESENT

## 2021-07-21 PROCEDURE — 87210 SMEAR WET MOUNT SALINE/INK: CPT | Performed by: NURSE PRACTITIONER

## 2021-07-21 PROCEDURE — 81001 URINALYSIS AUTO W/SCOPE: CPT | Performed by: NURSE PRACTITIONER

## 2021-07-21 PROCEDURE — 99213 OFFICE O/P EST LOW 20 MIN: CPT | Performed by: NURSE PRACTITIONER

## 2021-07-21 PROCEDURE — 87591 N.GONORRHOEAE DNA AMP PROB: CPT | Performed by: NURSE PRACTITIONER

## 2021-07-21 PROCEDURE — 87491 CHLMYD TRACH DNA AMP PROBE: CPT | Performed by: NURSE PRACTITIONER

## 2021-07-21 RX ORDER — FLUCONAZOLE 150 MG/1
150 TABLET ORAL ONCE
Qty: 1 TABLET | Refills: 0 | Status: SHIPPED | OUTPATIENT
Start: 2021-07-21 | End: 2021-07-21

## 2021-07-21 RX ORDER — AZITHROMYCIN 500 MG/1
1000 TABLET, FILM COATED ORAL DAILY
Qty: 2 TABLET | Refills: 0 | Status: SHIPPED | OUTPATIENT
Start: 2021-07-21 | End: 2021-07-22

## 2021-07-21 RX ORDER — METRONIDAZOLE 500 MG/1
500 TABLET ORAL 2 TIMES DAILY
Qty: 14 TABLET | Refills: 0 | Status: SHIPPED | OUTPATIENT
Start: 2021-07-21 | End: 2021-07-28

## 2021-07-22 LAB
RBC #/AREA URNS AUTO: NORMAL /HPF
WBC #/AREA URNS AUTO: NORMAL /HPF

## 2021-07-22 NOTE — PROGRESS NOTES
Assessment & Plan     Exposure to chlamydia    - azithromycin (ZITHROMAX) 500 MG tablet; Take 2 tablets (1,000 mg) by mouth daily for 1 day  - UA Macro with Reflex to Micro and Culture - lab collect; Future  - Neisseria gonorrhoeae PCR; Future  - Chlamydia trachomatis PCR; Future  - UA Macro with Reflex to Micro and Culture - lab collect  - Neisseria gonorrhoeae PCR  - Chlamydia trachomatis PCR  - Urine Microscopic    BV (bacterial vaginosis)    - metroNIDAZOLE (FLAGYL) 500 MG tablet; Take 1 tablet (500 mg) by mouth 2 times daily for 7 days  - Wet prep - lab collect; Future  - Wet prep - lab collect    Candidiasis of vagina    - fluconazole (DIFLUCAN) 150 MG tablet; Take 1 tablet (150 mg) by mouth once for 1 dose      15 minutes spent on the date of the encounter doing chart review, review of test results, patient visit and documentation        See Patient Instructions    No follow-ups on file.    Margy Christian CNP  [facility] MOUNIKA Mayer is a 30 year old who presents for the following health issues     HPI     Vaginal Symptoms  Onset/Duration: 2 weeks  Description:  Vaginal Discharge: none   Itching (Pruritis): YES  Burning sensation:  no  Odor: YES  Accompanying Signs & Symptoms:  Urinary symptoms: no  Abdominal pain: no  Fever: no  History:   Sexually active: YES  New Partner: YES  Possibility of Pregnancy:  no  Recent antibiotic use: no  Previous vaginitis issues: no  Precipitating or alleviating factors: None  Therapies tried and outcome: none  [facility] was [facility] for chlamydia on 7/12/2021. Test was done when her Nexplanon was removed per routine.         Review of Systems   Constitutional, HEENT, cardiovascular, pulmonary, gi and gu systems are negative, except as otherwise noted.      Objective    /78 (BP Location: Left arm, Patient Position: Sitting, Cuff Size: Adult Large)   Pulse 95   Temp 97.1  F (36.2  C) (Tympanic)    Wt 127.9 kg (282 lb)   SpO2 97%   Breastfeeding No   BMI 45.52 kg/m    Body mass index is 45.52 kg/m .  Physical Exam   GENERAL: healthy, alert and no distress

## 2021-07-23 LAB
C TRACH DNA SPEC QL NAA+PROBE: NEGATIVE
N GONORRHOEA DNA SPEC QL NAA+PROBE: NEGATIVE

## 2021-09-19 ENCOUNTER — HEALTH MAINTENANCE LETTER (OUTPATIENT)
Age: 31
End: 2021-09-19

## 2021-10-06 ENCOUNTER — OFFICE VISIT (OUTPATIENT)
Dept: INTERNAL MEDICINE | Facility: CLINIC | Age: 31
End: 2021-10-06
Payer: COMMERCIAL

## 2021-10-06 VITALS
HEART RATE: 71 BPM | RESPIRATION RATE: 18 BRPM | DIASTOLIC BLOOD PRESSURE: 76 MMHG | SYSTOLIC BLOOD PRESSURE: 112 MMHG | OXYGEN SATURATION: 99 % | BODY MASS INDEX: 46.45 KG/M2 | WEIGHT: 287.8 LBS

## 2021-10-06 DIAGNOSIS — M54.6 LEFT-SIDED THORACIC BACK PAIN, UNSPECIFIED CHRONICITY: Primary | ICD-10-CM

## 2021-10-06 DIAGNOSIS — M54.50 LEFT-SIDED LOW BACK PAIN WITHOUT SCIATICA, UNSPECIFIED CHRONICITY: ICD-10-CM

## 2021-10-06 DIAGNOSIS — M76.892 HIP FLEXOR TENDINITIS, LEFT: ICD-10-CM

## 2021-10-06 PROCEDURE — 99213 OFFICE O/P EST LOW 20 MIN: CPT | Performed by: PHYSICIAN ASSISTANT

## 2021-10-06 RX ORDER — BUPROPION HYDROCHLORIDE 200 MG/1
TABLET, EXTENDED RELEASE ORAL
COMMUNITY
Start: 2021-06-30

## 2021-10-06 RX ORDER — METHYLPREDNISOLONE 4 MG
TABLET, DOSE PACK ORAL
Qty: 21 TABLET | Refills: 0 | Status: SHIPPED | OUTPATIENT
Start: 2021-10-06 | End: 2022-03-16

## 2021-10-06 RX ORDER — FLUOXETINE 10 MG/1
CAPSULE ORAL
COMMUNITY
Start: 2021-10-06 | End: 2022-05-18

## 2021-10-06 RX ORDER — ULIPRISTAL ACETATE 30 MG/1
30 TABLET ORAL DAILY PRN
COMMUNITY
Start: 2021-08-26

## 2021-10-06 RX ORDER — TRETINOIN 0.5 MG/G
CREAM TOPICAL
COMMUNITY
Start: 2021-03-19

## 2021-10-06 RX ORDER — CYCLOBENZAPRINE HCL 10 MG
10 TABLET ORAL
COMMUNITY
Start: 2020-05-29

## 2021-10-06 NOTE — PROGRESS NOTES
Assessment & Plan     Left-sided thoracic back pain, unspecified chronicity    - [facility]; Future  - methylPREDNISolone (MEDROL DOSEPAK) 4 MG tablet therapy pack; Follow Package Directions    Left-sided low back pain without sciatica, unspecified chronicity    - [facility]; Future  - methylPREDNISolone (MEDROL DOSEPAK) 4 MG tablet therapy pack; Follow Package Directions    Hip flexor tendinitis, left    - [facility]erral; Future  - methylPREDNISolone (MEDROL DOSEPAK) 4 MG tablet therapy pack; Follow Package Directions             Ok to continue with current meds  Medrol dose volodymyr  PT    Return in about 4 weeks (around 11/3/2021) for recheck if not improving, regular primary provider.    Margy Villalobos PA-C  [facility] is a 31 year old who presents for the following health issues     HPI     Back Pain  Onset/Duration: more than 6 months  Description:   Location of pain: middle of back left  Character of pain: dull ache  Pain radiation: none  New numbness or weakness in legs, not attributed to pain: no   Intensity: mild, moderate  Progression of Symptoms: worsening  History:   Specific cause: none  Pain interferes with job: YES  History of back problems: no prior back problems  Any previous MRI or X-rays: Yes- at [facility].    Sees a specialist for back pain: No  Alleviating factors:   Improved by: acetaminophen (Tylenol), massage and NSAIDs, foam roller, ice heat  Precipitating factors:  Worsened by:   Therapies tried and outcome: acetaminophen (Tylenol), cold, heat, massage, muscle relaxants and NSAIDs    Accompanying Signs & Symptoms:  Risk of Fracture: None  Risk of Cauda Equina: None  Risk of Infection: None  Risk of Cancer: None  Risk of Ankylosing Spondylitis: Onset at age <35, male, AND morning back stiffness  no             Review of Systems   Constitutional, HEENT, cardiovascular, pulmonary, gi and gu  systems are negative, except as otherwise noted.      Objective    /76   Pulse 71   Resp 18   Wt 130.5 kg (287 lb 12.8 oz)   SpO2 99%   BMI 46.45 kg/m    Body mass index is 46.45 kg/m .  Physical Exam   GENERAL: healthy, alert and no distress  MS: tenderness left side lower thoracic area , laterally - between ribs  Some mild tenderness lumbar area  And pain at the left hip flexor area  SKIN: no suspicious lesions or rashes

## 2021-10-20 ENCOUNTER — THERAPY VISIT (OUTPATIENT)
Dept: PHYSICAL THERAPY | Facility: CLINIC | Age: 31
End: 2021-10-20
Attending: PHYSICIAN ASSISTANT
Payer: COMMERCIAL

## 2021-10-20 ENCOUNTER — OFFICE VISIT (OUTPATIENT)
Dept: URGENT CARE | Facility: URGENT CARE | Age: 31
End: 2021-10-20
Payer: COMMERCIAL

## 2021-10-20 VITALS
OXYGEN SATURATION: 100 % | SYSTOLIC BLOOD PRESSURE: 128 MMHG | HEART RATE: 87 BPM | WEIGHT: 279 LBS | BODY MASS INDEX: 45.03 KG/M2 | DIASTOLIC BLOOD PRESSURE: 81 MMHG | TEMPERATURE: 97.3 F

## 2021-10-20 DIAGNOSIS — N89.8 VAGINAL DISCHARGE: ICD-10-CM

## 2021-10-20 DIAGNOSIS — B96.89 BACTERIAL VAGINOSIS: Primary | ICD-10-CM

## 2021-10-20 DIAGNOSIS — M54.6 LEFT-SIDED THORACIC BACK PAIN, UNSPECIFIED CHRONICITY: ICD-10-CM

## 2021-10-20 DIAGNOSIS — G89.29 CHRONIC RIGHT-SIDED LOW BACK PAIN WITHOUT SCIATICA: Primary | ICD-10-CM

## 2021-10-20 DIAGNOSIS — M54.6 MIDLINE THORACIC BACK PAIN: ICD-10-CM

## 2021-10-20 DIAGNOSIS — N76.0 BACTERIAL VAGINOSIS: Primary | ICD-10-CM

## 2021-10-20 DIAGNOSIS — M54.50 LEFT-SIDED LOW BACK PAIN WITHOUT SCIATICA, UNSPECIFIED CHRONICITY: ICD-10-CM

## 2021-10-20 DIAGNOSIS — Z11.8 SPECIAL SCREENING EXAMINATION FOR CHLAMYDIAL DISEASE: ICD-10-CM

## 2021-10-20 DIAGNOSIS — M54.50 CHRONIC RIGHT-SIDED LOW BACK PAIN WITHOUT SCIATICA: Primary | ICD-10-CM

## 2021-10-20 DIAGNOSIS — M76.892 HIP FLEXOR TENDINITIS, LEFT: ICD-10-CM

## 2021-10-20 LAB
ALBUMIN UR-MCNC: NEGATIVE MG/DL
APPEARANCE UR: CLEAR
BACTERIA #/AREA URNS HPF: ABNORMAL /HPF
BILIRUB UR QL STRIP: NEGATIVE
CLUE CELLS: PRESENT
COLOR UR AUTO: YELLOW
GLUCOSE UR STRIP-MCNC: NEGATIVE MG/DL
HGB UR QL STRIP: NEGATIVE
KETONES UR STRIP-MCNC: NEGATIVE MG/DL
LEUKOCYTE ESTERASE UR QL STRIP: ABNORMAL
NITRATE UR QL: NEGATIVE
PH UR STRIP: 7 [PH] (ref 5–7)
RBC #/AREA URNS AUTO: ABNORMAL /HPF
SP GR UR STRIP: >=1.03 (ref 1–1.03)
SQUAMOUS #/AREA URNS AUTO: ABNORMAL /LPF
TRICHOMONAS, WET PREP: ABNORMAL
UROBILINOGEN UR STRIP-ACNC: 0.2 E.U./DL
WBC #/AREA URNS AUTO: ABNORMAL /HPF
WBC'S/HIGH POWER FIELD, WET PREP: ABNORMAL
YEAST, WET PREP: ABNORMAL

## 2021-10-20 PROCEDURE — 86696 HERPES SIMPLEX TYPE 2 TEST: CPT | Performed by: PHYSICIAN ASSISTANT

## 2021-10-20 PROCEDURE — 97162 PT EVAL MOD COMPLEX 30 MIN: CPT | Mod: GP | Performed by: PHYSICAL THERAPIST

## 2021-10-20 PROCEDURE — 86780 TREPONEMA PALLIDUM: CPT | Performed by: PHYSICIAN ASSISTANT

## 2021-10-20 PROCEDURE — 86803 HEPATITIS C AB TEST: CPT | Performed by: PHYSICIAN ASSISTANT

## 2021-10-20 PROCEDURE — 87591 N.GONORRHOEAE DNA AMP PROB: CPT | Performed by: PHYSICIAN ASSISTANT

## 2021-10-20 PROCEDURE — 87491 CHLMYD TRACH DNA AMP PROBE: CPT | Performed by: PHYSICIAN ASSISTANT

## 2021-10-20 PROCEDURE — 99213 OFFICE O/P EST LOW 20 MIN: CPT | Performed by: PHYSICIAN ASSISTANT

## 2021-10-20 PROCEDURE — 86695 HERPES SIMPLEX TYPE 1 TEST: CPT | Performed by: PHYSICIAN ASSISTANT

## 2021-10-20 PROCEDURE — 87522 HEPATITIS C REVRS TRNSCRPJ: CPT | Performed by: PHYSICIAN ASSISTANT

## 2021-10-20 PROCEDURE — 36415 COLL VENOUS BLD VENIPUNCTURE: CPT | Performed by: PHYSICIAN ASSISTANT

## 2021-10-20 PROCEDURE — 97110 THERAPEUTIC EXERCISES: CPT | Mod: GP | Performed by: PHYSICAL THERAPIST

## 2021-10-20 PROCEDURE — 87210 SMEAR WET MOUNT SALINE/INK: CPT | Performed by: PHYSICIAN ASSISTANT

## 2021-10-20 PROCEDURE — 87389 HIV-1 AG W/HIV-1&-2 AB AG IA: CPT | Performed by: PHYSICIAN ASSISTANT

## 2021-10-20 PROCEDURE — 81001 URINALYSIS AUTO W/SCOPE: CPT | Performed by: PHYSICIAN ASSISTANT

## 2021-10-20 RX ORDER — METRONIDAZOLE 500 MG/1
500 TABLET ORAL 2 TIMES DAILY
Qty: 14 TABLET | Refills: 0 | Status: SHIPPED | OUTPATIENT
Start: 2021-10-20 | End: 2021-10-27

## 2021-10-20 ASSESSMENT — ENCOUNTER SYMPTOMS
GASTROINTESTINAL NEGATIVE: 1
RESPIRATORY NEGATIVE: 1
CONSTITUTIONAL NEGATIVE: 1
EYES NEGATIVE: 1
CARDIOVASCULAR NEGATIVE: 1

## 2021-10-20 NOTE — PROGRESS NOTES
Physical Therapy Initial Evaluation  Subjective:  Patient is referred with a 2-3 year hx of bilateral thoracic and neck pain, R>L which has been worsening over the past year. She works as a medical assistant and wonders if the stresses she is under during her work day have contributed to her sxs. She tends to be best in the morning and worse as the day progresses. Prolonged sitting increases sxs ans she notes pain with cervical rotation, R>L. Has and is currently receiving chiropractic treatment. MD visit 10/6/21.    The history is provided by the patient.   Therapist Generated HPI Evaluation         Type of problem:  Thoracic spine and cervical spine.    This is a chronic condition.  Condition occurred with:  Insidious onset.  Where condition occurred: in the community.  Patient reports pain:  Mid thoracic, thoracic right side, thoracic left side, cervical right side, cervical left side and central cervical spine.  Pain is described as aching and is intermittent.  Pain radiates to:  None. Pain is worse during the day and worse in the P.M..  Since onset symptoms are unchanged.  Associated symptoms:  Loss of motion/stiffness. Symptoms are exacerbated by sitting and rotating head  and relieved by nothing.    Previous treatment includes chiropractic. There was moderate improvement following previous treatment.  Restrictions due to condition include:  Working in normal job without restrictions.  Barriers include:  None as reported by patient.    Patient Health History  Princess TRISTIN Machado being seen for back and neck pain.          Pain is reported as 3/10 on pain scale.  General health as reported by patient is fair.  Pertinent medical history includes: depression, overweight, migraines/headaches and multiple sclerosis.   Red flags:  None as reported by patient.     Surgeries: cyst removal from neck in early 90's.    Current medications:  Anti-depressants, anti-inflammatory and muscle relaxants.    Current occupation is  medical assistant.   Primary job tasks include:  Computer work, pushing/pulling and repetitive tasks.                                    Objective:  System    Physical Exam                           Cj Thoracic Evalution:  Posture:  Sitting: poor  Standing: fair  Protruded Head: no  Kyphosis: accentuated  Correction of Posture: better    Movement Loss:  Flexion (Flex):  Nil  Extension (EXT): min and pain  Rotation Lt (ROT L): nil  Rotation Rt (ROT R): nil  Cervical Differential:                Rep Flex:  During:  Increases  After: no worse  Mechanical Response:  No effect  Test Movements:  Flexion:  During:  Increases  After: no worse  Mechanical Response:  No effect  Rep Flexion:  During:  Increases  After: no worse  Mechanical Response: no effect  Extension:  During:  No effect  After: no effect  Mechanical Response: no effect  Rep Extension:  During:  No effect  After: no effect  Mechanical Response: IncROM    Pretest symptoms: mid thoracic                      Conclusion: posture  Principle of Treatment:  Posture Correction:  Discussed importance of proper posture    Extension:  Thoracic extension in sitting, retraction with OP x 10/ 4-6 x daily    Other:  UB strengthening, pectoral stretching                  ROS    Assessment/Plan:    Patient is a 31 year old female with cervical and thoracic complaints.    Patient has the following significant findings with corresponding treatment plan.                Diagnosis 1:  Neck and thoracic pain  Pain -  manual therapy, self management, education and home program  Decreased strength - therapeutic exercise, therapeutic activities and home program  Decreased proprioception - neuro re-education, therapeutic activities and home program  Impaired muscle performance - neuro re-education and home program  Decreased function - therapeutic activities and home program  Impaired posture - neuro re-education and home program    Therapy Evaluation Codes:   1) History  comprised of:   Personal factors that impact the plan of care:      Anxiety, Overall behavior pattern and Time since onset of symptoms.    Comorbidity factors that impact the plan of care are:      Depression, Multiple sclerosis and Overweight.     Medications impacting care: Anti-depressant, Anti-inflammatory and Muscle relaxant.  2) Examination of Body Systems comprised of:   Body structures and functions that impact the plan of care:      Cervical spine and Thoracic Spine.   Activity limitations that impact the plan of care are:      Lifting and Sitting.  3) Clinical presentation characteristics are:   Evolving/Changing.  4) Decision-Making    Moderate complexity using standardized patient assessment instrument and/or measureable assessment of functional outcome.  Cumulative Therapy Evaluation is: Moderate complexity.    Previous and current functional limitations:  (See Goal Flow Sheet for this information)    Short term and Long term goals: (See Goal Flow Sheet for this information)     Communication ability:  Patient appears to be able to clearly communicate and understand verbal and written communication and follow directions correctly.  Treatment Explanation - The following has been discussed with the patient:   RX ordered/plan of care  Anticipated outcomes  Possible risks and side effects  This patient would benefit from PT intervention to resume normal activities.   Rehab potential is good.    Frequency:  1 X week, once daily  Duration:  for 4 weeks  Discharge Plan:  Achieve all LTG.  Independent in home treatment program.  Reach maximal therapeutic benefit.    Please refer to the daily flowsheet for treatment today, total treatment time and time spent performing 1:1 timed codes.

## 2021-10-21 NOTE — PATIENT INSTRUCTIONS
Patient Education     Bacterial Vaginosis    You have a vaginal infection called bacterial vaginosis (BV). Both good and bad bacteria are present in a healthy vagina. BV occurs when these bacteria get out of balance. The number of bad bacteria increase. And the number of good bacteria decrease. BV is linked with sexual activity, but it's not a sexually transmitted infection (STI).   BV may or may not cause symptoms. If symptoms do occur, they can include:     Thin, gray, milky-white, or sometimes green discharge    Unpleasant odor or  fishy  smell    Itching, burning, or pain in or around the vagina  It is not known what causes BV, but certain factors can make the problem more likely. These can include:     Douching    Spermicides    Use of antibiotics    Change in hormone levels with pregnancy, breastfeeding, or menopause    Having sex with a new partner    Having sex with more than one partner  BV will sometimes go away on its own. But treatment is often advised. This is because untreated BV can raise the risk of more serious health problems such as:     Pelvic inflammatory disease (PID)     delivery (giving birth to a baby early if you re pregnant)    HIV and some other sexually transmitted infections (STIs)    Infection after surgery on the reproductive organs  Home care  General care    BV is most often treated with medicines called antibiotics. These may be given as pills or as a vaginal cream. If antibiotics are prescribed, be sure to use them exactly as directed. And complete all of the medicine, even if your symptoms go away.    Don't douche or having sex during treatment.    If you have sex with a female partner, ask your healthcare provider if she should also be treated.  Prevention    Don't douche.    Don't have sex. If you do have sex, then take steps to lower your risk:  ? Use condoms when having sex.  ? Limit the number of sex partners you have.    Follow-up care  Follow up with your  healthcare provider, or as advised.   When to get medical advice  Call your healthcare provider right away if:     You have a fever of 100.4 F (38 C) or higher, or as directed by your provider.    Your symptoms get worse, or they don t go away within a few days of starting treatment.    You have new pain in the lower belly or pelvic region.    You have side effects that bother you or a reaction to the pills or cream you re prescribed.    You or any of your sex partners have new symptoms, such as a rash, joint pain, or sores.  [employer] last reviewed this educational content on 6/1/2020 2000-2021 [employer]. All rights reserved. This information is not intended as a substitute for professional medical care. Always follow your healthcare professional's instructions.

## 2021-10-21 NOTE — PROGRESS NOTES
Special screening examination for chlamydial disease  - Chlamydia & Gonorrhea by PCR, [facility]/Range - Clinic Collect  - HIV Antigen Antibody Combo; Future  - Treponema Abs w Reflex to RPR and Titer; Future  - Herpes Simplex Virus 1 and 2 IgG; Future  - Hepatitis C antibody; Future  - Hepatitis C RNA, quantitative; Future  - HIV Antigen Antibody Combo  - Treponema Abs w Reflex to RPR and Titer  - Herpes Simplex Virus 1 and 2 IgG  - Hepatitis C antibody  - Hepatitis C RNA, quantitative    Vaginal discharge  - Wet prep - Clinic Collect  - UA macro with reflex to Microscopic and Culture - Clinc Collect    Bacterial vaginosis  - metroNIDAZOLE (FLAGYL) 500 MG tablet; Take 1 tablet (500 mg) by mouth 2 times daily for 7 days    20 minutes spent on the date of the encounter doing chart review, history and exam, documentation and further activities per the note     See Patient Instructions  Patient Instructions     Patient Education     Bacterial Vaginosis    You have a vaginal infection called bacterial vaginosis (BV). Both good and bad bacteria are present in a healthy vagina. BV occurs when these bacteria get out of balance. The number of bad bacteria increase. And the number of good bacteria decrease. BV is linked with sexual activity, but it's not a sexually transmitted infection (STI).   BV may or may not cause symptoms. If symptoms do occur, they can include:     Thin, gray, milky-white, or sometimes green discharge    Unpleasant odor or  fishy  smell    Itching, burning, or pain in or around the vagina  It is not known what causes BV, but certain factors can make the problem more likely. These can include:     Douching    Spermicides    Use of antibiotics    Change in hormone levels with pregnancy, breastfeeding, or menopause    Having sex with a new partner    Having sex with more than one partner  BV will sometimes go away on its own. But treatment is often advised. This is because untreated BV can raise the risk of  more serious health problems such as:     Pelvic inflammatory disease (PID)     delivery (giving birth to a baby early if you re pregnant)    HIV and some other sexually transmitted infections (STIs)    Infection after surgery on the reproductive organs  Home care  General care    BV is most often treated with medicines called antibiotics. These may be given as pills or as a vaginal cream. If antibiotics are prescribed, be sure to use them exactly as directed. And complete all of the medicine, even if your symptoms go away.    Don't douche or having sex during treatment.    If you have sex with a female partner, ask your healthcare provider if she should also be treated.  Prevention    Don't douche.    Don't have sex. If you do have sex, then take steps to lower your risk:  ? Use condoms when having sex.  ? Limit the number of sex partners you have.    Follow-up care  Follow up with your healthcare provider, or as advised.   When to get medical advice  Call your healthcare provider right away if:     You have a fever of 100.4 F (38 C) or higher, or as directed by your provider.    Your symptoms get worse, or they don t go away within a few days of starting treatment.    You have new pain in the lower belly or pelvic region.    You have side effects that bother you or a reaction to the pills or cream you re prescribed.    You or any of your sex partners have new symptoms, such as a rash, joint pain, or sores.  [employer] last reviewed this educational content on 2020-2021 [employer]. All rights reserved. This information is not intended as a substitute for professional medical care. Always follow your healthcare professional's instructions.               Valdez Galan PA-C  [facility]    Subjective   31 year old who presents to clinic today for the following health issues:    Urgent Care and STD       HPI     1 week of vaginal discharge. Patient wanted to be  tested for STIs as well as BV and yeast.     Review of Systems   Review of Systems   Constitutional: Negative.    HENT: Negative.    Eyes: Negative.    Respiratory: Negative.    Cardiovascular: Negative.    Gastrointestinal: Negative.         Objective    Temp: 97.3  F (36.3  C) Temp src: Oral BP: 128/81 Pulse: 87     SpO2: 100 %       Physical Exam   Physical Exam  Constitutional:       General: She is not in acute distress.     Appearance: Normal appearance. She is not ill-appearing, toxic-appearing or diaphoretic.   HENT:      Head: Normocephalic and atraumatic.   Cardiovascular:      Rate and Rhythm: Normal rate.      Pulses: Normal pulses.   Pulmonary:      Effort: Pulmonary effort is normal. No respiratory distress.   Neurological:      General: No focal deficit present.      Mental Status: She is alert and oriented to person, place, and time. Mental status is at baseline.      Gait: Gait normal.   Psychiatric:         Mood and Affect: Mood normal.         Behavior: Behavior normal.         Thought Content: Thought content normal.         Judgment: Judgment normal.          Results for orders placed or performed in visit on 10/20/21 (from the past 24 hour(s))   Wet prep - Clinic Collect    Specimen: Urethra; Swab   Result Value Ref Range    Trichomonas Absent Absent    Yeast Absent Absent    Clue Cells Present (A) Absent    WBCs/high power field 1+ (A) None

## 2021-10-22 LAB
C TRACH DNA SPEC QL PROBE+SIG AMP: NEGATIVE
HSV1 IGG SERPL QL IA: 0.51 INDEX
HSV1 IGG SERPL QL IA: ABNORMAL
HSV2 IGG SERPL QL IA: 14.9 INDEX
HSV2 IGG SERPL QL IA: ABNORMAL
N GONORRHOEA DNA SPEC QL NAA+PROBE: NEGATIVE

## 2021-10-24 ENCOUNTER — TELEPHONE (OUTPATIENT)
Dept: URGENT CARE | Facility: URGENT CARE | Age: 31
End: 2021-10-24

## 2021-10-24 RX ORDER — VALACYCLOVIR HYDROCHLORIDE 1 G/1
1000 TABLET, FILM COATED ORAL 2 TIMES DAILY
Qty: 20 TABLET | Refills: 0 | Status: SHIPPED | OUTPATIENT
Start: 2021-10-24 | End: 2022-03-16

## 2021-10-24 NOTE — TELEPHONE ENCOUNTER
[facility] Emergency Department/Urgent Care Lab result notification     Patient/parent Name      RN Assessment (Patient s current Symptoms), include time called.  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 This is her first diagnosis and outbreak, she would like to start treatment and will follow up with her pcp.  RN Recommendations/Instructions per [place] ED lab result protocol  Patient notified of lab result and treatment recommendations.  Rx for Valtrex 1000 mg bid x 10 days sent to [Pharmacy - [facility] ].  RN reviewed information about Referenced the following information for HSV type 2  from this source RN protocols reference guides     Please Contact your PCP clinic or return to the Emergency department if your:    Symptoms return.    Symptoms do not improve after 3 days on antibiotic.    Symptoms do not resolve after completing antibiotic.    Symptoms worsen or other concerning symptom's.    }    Jennyfer Roland RN  [facility] [place]  Emergency Dept Lab Result RN  Ph# [phone]

## 2021-10-25 LAB — HCV RNA SERPL NAA+PROBE-ACNC: NOT DETECTED IU/ML

## 2021-11-17 ENCOUNTER — THERAPY VISIT (OUTPATIENT)
Dept: PHYSICAL THERAPY | Facility: CLINIC | Age: 31
End: 2021-11-17
Payer: COMMERCIAL

## 2021-11-17 DIAGNOSIS — M54.6 CHRONIC MIDLINE THORACIC BACK PAIN: ICD-10-CM

## 2021-11-17 DIAGNOSIS — G89.29 CHRONIC MIDLINE THORACIC BACK PAIN: ICD-10-CM

## 2021-11-17 PROCEDURE — 97110 THERAPEUTIC EXERCISES: CPT | Mod: GP | Performed by: PHYSICAL THERAPY ASSISTANT

## 2021-11-17 PROCEDURE — 97112 NEUROMUSCULAR REEDUCATION: CPT | Mod: GP | Performed by: PHYSICAL THERAPY ASSISTANT

## 2021-11-22 NOTE — TELEPHONE ENCOUNTER
"Medical Week 3 Survey      Responses   [facility] patient discharged from? [facility]   Does the patient have one of the following disease processes/diagnoses(primary or secondary)? Other   Week 3 attempt successful? Yes   Call start time 1615   Call end time 1618   Discharge diagnosis Progressive CKD,-E. coli UTI   Person spoke with today (if not patient) and relationship Cliff-Significant other   Meds reviewed with patient/caregiver? Yes   Is the patient having any side effects they believe may be caused by any medication additions or changes? No   Does the patient have all medications ordered at discharge? Yes   Is the patient taking all medications as directed (includes completed medication regime)? Yes   Comments regarding appointments Sees \"kidney doctor\" on Wed 11/24/2021   Does the patient have a primary care provider?  Yes   Does the patient have an appointment with their PCP within 7 days of discharge? Yes   Has the patient kept scheduled appointments due by today? Yes   Has home health visited the patient within 72 hours of discharge? N/A   Psychosocial issues? No   Comments Cliff states she is moving around more.    Did the patient receive a copy of their discharge instructions? Yes   Nursing interventions Reviewed instructions with patient   What is the patient's perception of their health status since discharge? Improving   Is the patient/caregiver able to teach back signs and symptoms related to disease process for when to call PCP? Yes   Is the patient/caregiver able to teach back signs and symptoms related to disease process for when to call 911? Yes   Is the patient/caregiver able to teach back the hierarchy of who to call/visit for symptoms/problems? PCP, Specialist, Home health nurse, Urgent Care, ED, 911 Yes   Week 3 Call Completed? Yes          Harry Collier RN  " "Requested Prescriptions   Pending Prescriptions Disp Refills     vitamin D3 (CHOLECALCIFEROL) 51404 units capsule  Last Written Prescription Date:  10/25/18  Last Fill Quantity: 8,  # refills: 0   Last office visit: 12/4/2018 with prescribing provider:  Dank   Future Office Visit:     8 capsule 0     Sig: Take 1 capsule (50,000 Units) by mouth once a week    Vitamin Supplements (Adult) Protocol Failed - 2/5/2019 10:25 AM       Failed - High dose Vitamin D not ordered       Passed - Recent (12 mo) or future (30 days) visit within the authorizing provider's specialty    Patient had office visit in the last 12 months or has a visit in the next 30 days with authorizing provider or within the authorizing provider's specialty.  See \"Patient Info\" tab in inbasket, or \"Choose Columns\" in Meds & Orders section of the refill encounter.             Passed - Medication is active on med list          "

## 2021-12-02 NOTE — PROGRESS NOTES
[facility]    OUTPATIENT Physical Therapy ORTHOPEDIC EVALUATION  PLAN OF TREATMENT FOR OUTPATIENT REHABILITATION  (COMPLETE FOR INITIAL CLAIMS ONLY)  Patient's Last Name, First Name, M.I.  YOB: 1990  CarterLeyla  TRISTIN    Provider s Name:  [facility]   Medical Record No.  [id number]   Start of Care Date:  10/20/21   Onset Date:   10/06/21   Type:     _X__PT   ___OT Medical Diagnosis:    Encounter Diagnoses   Name Primary?     Left-sided thoracic back pain, unspecified chronicity      Left-sided low back pain without sciatica, unspecified chronicity      Hip flexor tendinitis, left      Chronic right-sided low back pain without sciatica Yes     Midline thoracic back pain         Treatment Diagnosis:  thoracic and neck pain        Goals:     10/20/21 0500   Body Part   Goals listed below are for thoracic   Goal #1   Goal #1 sitting   Previous Functional Level No restrictions   Current Functional Level Minutes patient can sit   Performance level 45   STG Target Performance Hours patient will be able to sit   Performance level 1   Rationale to allow rest from standing;for community transportation   Due date 11/12/21   LTG Target Performance Hours patient will be able to sit   Performance Level 2   Rationale for community transportation;for job requirements in their work place   Due date 11/22/21   Goal #2   Goal #2 posture/body mechanics   Previous Functional Level Patient reports fair posture and proper body mechanics   Current Functional Level Poor posture/body mechanics   STG Target Performance Patient able to demonstrate good posture and body mechanics when prompted   Rationale to prevent neck/back pain and avoid injury when lifting/carrying and performing tasks requiring bending   Due Date 10/29/21   LTG Target Performance Improve patient awareness to maintain  optimum posture the following percentage of time   Performance Level 90%   Rationale to prevent neck/back pain and avoid injury when lifting/carrying and performing tasks requiring bending   Due Date 11/15/21       Therapy Frequency:  1x/week  Predicted Duration of Therapy Intervention:  4 weeks    Mark Wen, PT                 I CERTIFY THE NEED FOR THESE SERVICES FURNISHED UNDER        THIS PLAN OF TREATMENT AND WHILE UNDER MY CARE     (Physician attestation of this document indicates review and certification of the therapy plan).                       Certification Date From:  10/20/21   Certification Date To:  01/20/22    Referring Provider:  Margy Villalobos    Initial Assessment        See Epic Evaluation SOC Date: 10/20/21

## 2021-12-23 NOTE — PROGRESS NOTES
Subjective:  HPI  Physical Exam                    Objective:  System    Physical Exam    General     ROS    Assessment/Plan:    DISCHARGE REPORT    Progress reporting period is from 10/20/21 to 11/17/21.      SUBJECTIVE  Subjective changes noted by patient:  Patient reports that she has been watching her posture.  States that she has been going to the Chiropractor 1 time a month.  Has tightness in the thoracic, cervical and lumbar area.   Current pain level is 6/10    Previous pain level was:   Initial Pain level: 3/10   Changes in function:  Yes (See Goal flowsheet attached for changes in current functional level) Changes in function: Yes, see goal flow sheet for change in function   Adverse reaction to treatment or activity: None     OBJECTIVE  Changes noted in objective findings:  Patient has failed to return to therapy so current objective findings are unknown.  Objective: Cervical ROM has improved.  Noted that patient is continuing to reset the left scapula with retraction and depression when supine and sitting.  Severe mm tightness in the left lower quadrant.  Advanced exercise with CORE stabilization today.  Patient needs cueing for activating the TA with Kegals.

## 2021-12-29 ENCOUNTER — OFFICE VISIT (OUTPATIENT)
Dept: URGENT CARE | Facility: URGENT CARE | Age: 31
End: 2021-12-29
Payer: COMMERCIAL

## 2021-12-29 VITALS
WEIGHT: 285 LBS | HEART RATE: 91 BPM | TEMPERATURE: 97.9 F | SYSTOLIC BLOOD PRESSURE: 130 MMHG | RESPIRATION RATE: 18 BRPM | HEIGHT: 66 IN | DIASTOLIC BLOOD PRESSURE: 80 MMHG | OXYGEN SATURATION: 100 % | BODY MASS INDEX: 45.8 KG/M2

## 2021-12-29 DIAGNOSIS — J02.9 ACUTE PHARYNGITIS, UNSPECIFIED ETIOLOGY: Primary | ICD-10-CM

## 2021-12-29 LAB
DEPRECATED S PYO AG THROAT QL EIA: NEGATIVE
FLUAV AG SPEC QL IA: NEGATIVE
FLUBV AG SPEC QL IA: NEGATIVE
GROUP A STREP BY PCR: NOT DETECTED

## 2021-12-29 PROCEDURE — 99213 OFFICE O/P EST LOW 20 MIN: CPT | Performed by: PHYSICIAN ASSISTANT

## 2021-12-29 PROCEDURE — U0005 INFEC AGEN DETEC AMPLI PROBE: HCPCS | Performed by: PHYSICIAN ASSISTANT

## 2021-12-29 PROCEDURE — 87651 STREP A DNA AMP PROBE: CPT | Performed by: PHYSICIAN ASSISTANT

## 2021-12-29 PROCEDURE — 87804 INFLUENZA ASSAY W/OPTIC: CPT | Performed by: PHYSICIAN ASSISTANT

## 2021-12-29 PROCEDURE — U0003 INFECTIOUS AGENT DETECTION BY NUCLEIC ACID (DNA OR RNA); SEVERE ACUTE RESPIRATORY SYNDROME CORONAVIRUS 2 (SARS-COV-2) (CORONAVIRUS DISEASE [COVID-19]), AMPLIFIED PROBE TECHNIQUE, MAKING USE OF HIGH THROUGHPUT TECHNOLOGIES AS DESCRIBED BY CMS-2020-01-R: HCPCS | Performed by: PHYSICIAN ASSISTANT

## 2021-12-29 ASSESSMENT — MIFFLIN-ST. JEOR: SCORE: 2010.53

## 2021-12-29 NOTE — PATIENT INSTRUCTIONS
Follow up immediately with severe headache, chest pain, or shortness of breath    Rest, isolate for 10 days, hydrate, follow up if worsening or new symptoms  Household members to isolate until test results, if positive isolate for 2 weeks and follow up for testing if symptoms occur         Patient Education     Coronavirus Disease 2019 (COVID-19): Caring for Yourself or Others   If you or a household member have symptoms of COVID-19, follow the guidelines below. This will help you manage symptoms and keep the virus from spreading.  If you have symptoms of COVID-19    Stay home and contact your care team. They will tell you what to do.    Don t panic. Keep in mind that other illnesses can cause similar symptoms.    Stay away from work, school, and public places.    Limit physical contact with others in your home. Limit visitors. No kissing.  Clean surfaces you touch with disinfectant.  If you need to cough or sneeze, do it into a tissue. Then throw the tissue into the trash. If you don't have tissues, cough or sneeze into the bend of your elbow.  Don t share food or personal items with people in your household. This includes items like eating and drinking utensils, towels, and bedding.  Wear a cloth face mask around other people. During a public health emergency, medical face masks may be reserved for healthcare workers. You may need to make a cloth face mask of your own. You can do this using a bandana, T-shirt, or other cloth. The CDC has instructions on how to make a face mask. Wear the mask so that it covers both your nose and mouth.  If you need to go to a hospital or clinic, call ahead to let them know. Expect the care team to wear masks, gowns, gloves, and eye protection. You may need to come to a different entrance or wait in a separate room.  Follow all instructions from your care team.    If you ve been diagnosed with COVID-19    Stay home and away from others, including other people in your home. (This is  called self-isolation.) Don t leave home unless you need to get medical care. Don t go to work, school, or public places. Don t use buses, taxis, or other public transportation.    Follow all instructions from your care team.    If you need to go to a hospital or clinic, call ahead to let them know. Expect the care team to wear masks, gowns, gloves, and eye protection. You may need to come to a different entrance or wait in a separate room.    Wear a face mask over your nose and mouth. This is to protect others from your germs. If you can t wear a mask, your caregivers should wear one. You may need to make your own mask using a bandana, T-shirt, or other cloth. See the CDC s instructions on how to make a face mask.    Have no contact with pets and other animals.    Don t share food or personal items with people in your household. This includes items like eating and drinking utensils, towels, and bedding.    If you need to cough or sneeze, do it into a tissue. Then throw the tissue into the trash. If you don't have tissues, cough or sneeze into the bend of your elbow.    Wash your hands often.    Self-care at home   At this time, there is no medicine approved to prevent or treat COVID-19. The FDA has approved an antiviral medicine (called remdesivir) for people being treated in the hospital. This is for people 12 years and older who weigh more than about 88 pounds (40 kgs). In certain cases, it may also be used for people younger than 12 years or who weigh less than about 88 pounds (40 kgs)..  Currently, treatment is mostly aimed at helping your body while it fights the virus.    Getting extra rest.    Drink extra fluids 6 to 8 glasses of liquids each day), unless a doctor has told you not to. Ask your care team which fluids are best for you. Avoid fluids that contain caffeine or alcohol.    Taking over-the-counter (OTC) medicine to reduce pain and fever. Your care team will tell you which medicine to use.  If you ve  been in the hospital for COVID-19, follow your care team s instructions. They will tell you when to stop self-isolation. They may also have you change positions to help your breathing (such as lying on your belly).  If a test showed that you have COVID-19, you may be asked to donate plasma after you ve recovered. (This is called COVID-19 convalescent plasma donation.) The plasma may contain antibodies to help fight the virus in others. Experts don't know if the plasma will work well as a treatment. Research continues, and the FDA has approved it for emergency use in certain people with serious or life-threatening COVID-19. For more information, talk to your care team.  Caring for a sick person     Follow all instructions from the care team.    Wash your hands often.    Wear protective clothing as advised.    Make sure the sick person wears a mask. If they can't wear a mask, don t stay in the same room with them. If you must be in the same room, wear a face mask. Make sure the mask covers both the nose and mouth.    Keep track of the sick person s symptoms.    Clean surfaces often with disinfectant. This includes phones, kitchen counters, fridge door handles, bathroom surfaces, and others.    Don t let anyone share household items with the sick person. This includes eating and drinking tools, towels, sheets, or blankets.    Clean fabrics and laundry well.    Keep other people and pets away from the sick person.    When you can stop self-isolation  When you are sick with COVID-19, you should stay away from other people. This is called self-isolation. The rules for ending self-isolation depend on your health in general.  If you are normally healthy:  You can stop self-isolation when all 3 of these are true:    You ve had no fever--and no medicine that reduces fever--for at least 24 hours. And     Your symptoms are better, such as cough or trouble breathing. And     At least 10 days have passed since your symptoms first  started.  Talk with your care team before you leave home. They may tell you it s okay to leave, or they may give you different advice. You do not need to be re-tested.  If you have a weak immune system, or you ve had severe COVID-19:  Follow your care team s instructions. You may be asked to self-isolate for 10 days to 20 days after your symptoms first started. Your care team may want to re-test you for COVID-19.  Note: If you re being treated for cancer, have an immune disorder (such as HIV), or have had a transplant (organ or bone marrow), you may have a weak immune system.  If you've already had COVID-19 once:  If you had the virus over 3 months ago, and you ve been exposed again, treat it like you've never had COVID-19. Stay home, limit your contact with others, call your care team, and watch for symptoms.  If it s been less than 3 months since you had the virus, you re symptom-free, and you ve been exposed again: You don t need to self-isolate or be re-tested. But if you develop new symptoms that can t be linked to another illness, please self-isolate and contact your care team.  When you return to public settings  When you are well enough to go outside your home, follow the CDC s guidance on cloth face masks.    Anyone over age 2 should wear a face mask in public, especially when it's hard to socially distance. This includes public transit, public protests and marches, and crowded stores, bars, and restaurants.    Face masks are most likely to reduce the spread of COVID-19 when they are widely used by people who are out in the public.  Certain people should not wear a face covering. These include:    Children under 2 years old    Anyone with a health, developmental, or mental health condition that can be made worse by wearing a mask    Anyone who is unconscious or unable to remove the face covering without help. See the CDC's guidance on who should not wear a face mask.  When to call your care team  Call your  care team right away if a sick person has any of these:    Trouble breathing    Pain or pressure in chest  If a sick person has any of these, call 911:    Trouble breathing that gets worse    Pain or pressure in chest that gets worse    Blue tint to lips or face    Fast or irregular heartbeat    Confusion or trouble waking    Fainting or loss of consciousness    Coughing up blood  Going home from the hospital   If you have COVID-19 and were recently in the hospital:    Follow the instructions above for self-care and isolation.    Follow the hospital care team s instructions.    Ask questions if anything is unclear to you. Write down answers so you remember them.  Date last modified: 11/23/2020  [employer] last reviewed this educational content on 4/1/2020  This information has been modified by your health care provider with permission from the publisher.    9612-8207 [employer]. [address] [place] [id number]. All rights reserved. This information is not intended as a substitute for professional medical care. Always follow your healthcare professional's instructions.           Patient Education     Self-Care for Sore Throats     Sore throats happen for many reasons, such as colds, allergies, cigarette smoke, air pollution, and infections caused by viruses or bacteria. In any case, your throat becomes red and sore. Your goal for self-care is to reduce your discomfort while giving your throat a chance to heal.  Moisten and soothe your throat  Tips include the following:    Try a sip of water first thing after waking up.    Keep your throat moist by drinking 6 or more glasses of clear liquids every day.    Run a cool-air humidifier in your room overnight.    Stay away from cigarette smoke.     Check the air quality index,if air pollution gives you a sore throat. On high pollution days, try to limit outdoor time.    Suck on throat lozenges, cough drops, hard candy, ice chips, or frozen  fruit-juice bars. Use the sugar-free versions if your diet or medical condition requires them.  Gargle to ease irritation  Gargling every hour or 2 can ease irritation. Try gargling with 1 of these solutions:    1/4 teaspoon of salt in 1/2 cup of warm water    An over-the-counter anesthetic gargle  Use medicine for more relief  Over-the-counter medicine can reduce sore throat symptoms. Ask your pharmacist if you have questions about which medicine to use. To prevent possible medicine interactions, let the pharmacist know what medicines you take. To decrease symptoms:    Ease pain with anesthetic sprays. Aspirin or an aspirin substitute also helps. Remember, never give aspirin to anyone 18 or younger. Don't take aspirin if you are already taking blood thinners.     For sore throats caused by allergies, try antihistamines to block the allergic reaction.  Unless a sore throat is caused by a bacterial infection, antibiotics won t help you.  Prevent future sore throats  Prevention tips include:    Stop smoking or reduce contact with secondhand smoke. Smoke irritates the tender throat lining.    Limit contact with pets and with allergy-causing substances, such as pollen and mold.    Wash your hands often when you re around someone with a sore throat or cold. This will keep viruses or bacteria from spreading.    Limit outdoor time when air pollution is bad.    Don t strain your vocal cords.  When to call your healthcare provider  Contact your healthcare provider if you have:    Fever of 100.4 F (38.0 C) or higher, or as directed by your healthcare provider    White spots on the throat    Great Trouble swallowing    A skin rash    Recent exposure to someone else with strep bacteria    Severe hoarseness and swollen glands in the neck or jaw  Call 911  Call 911 if any of the following occur:    Trouble breathing or catching your breath    Drooling and problems swallowing    Wheezing    Unable to talk    Feeling dizzy or  faint    [employer] last reviewed this educational content on 9/1/2019 2000-2021 [employer]. All rights reserved. This information is not intended as a substitute for professional medical care. Always follow your healthcare professional's instructions.           Patient Education     Pharyngitis (Sore Throat), Report Pending     Pharyngitis (sore throat) is often due to a virus. It can also be caused by strep (streptococcus) bacteria. This is often called strep throat. Both viral and strep infections can cause throat pain that is worse when swallowing, aching all over, headache, and fever. Both types of infections are contagious. They may be spread by coughing, kissing, or touching others after touching your mouth or nose.   A test has been done to find out if you or your child have strep throat. Often a rapid strep test can be done which gives immediate results and treatment can begin right away. A throat culture may also be done. The culture results take longer. If you have a virus, the culture results will be negative. The facility will call with your culture results. Call this facility or your healthcare provider if you were not given your rapid test results for strep. If a test is positive for strep infection, you will need to take an antibiotic. An antibiotic is a medicine used to treat a bacterial infection. A prescription can be called into your pharmacy or a written copy will be given to you at that time. If both tests are negative, you likely have a virus, usually viral pharyngitis. This does not need to be treated with antibiotics. Until you receive the results of the rapid strep test or the throat culture, you should stay home from work. If your child is being tested, he or she should stay home from school.   Home care    Rest at home. Drink plenty of fluids so you won't get dehydrated.    If the test is positive for strep, you or your child should not go to work or  school for the first 24 hours of taking the antibiotics or as directed by the healthcare provider. After this time, you or your child will not be contagious. You or your child can then return to work or school when feeling better or as directed by this facility or your healthcare provider.     Use the antibiotic medicine (often penicillin or amoxicillin) for the full 10 days or as directed by the healthcare provider. Don't stop the medicine even if you or your child feel better. This is very important to make sure the infection is fully treated. It's also important to prevent medicine-resistant germs from growing. Treatment for 10 days is also the best way to prevent rheumatic fever which affects the heart and other parts of the body. If you or your child were given an antibiotic shot, the healthcare provider will tell you if additional antibiotics are needed.    Use throat lozenges or numbing throat sprays to help reduce pain. Gargling with warm salt water will also help reduce throat pain. Dissolve 1/2 teaspoon of salt in 1 glass of warm water. Discuss this treatment with your healthcare provider.    Children can sip on juice or ice pops. Children 5 years and older can also suck on a lollipop or hard candy.    Don't eat salty or spicy foods or give them to your child. These can irritate the throat.  Other medicine for a child:  You can give your child acetaminophen for fever, fussiness, or discomfort. In babies over 6 months of age, you may use ibuprofen instead of acetaminophen. If your child has chronic liver or kidney disease or ever had a stomach ulcer or gastrointestinal bleeding, talk with your child s healthcare provider before giving these medicines. Aspirin should never be used by any child under 18 years of age who has a fever. It may cause severe liver damage. Always contact your child's healthcare provider before giving him or her over-the-counter medicines for the first time.   Other medicine for an  "adult: You may use acetaminophen or ibuprofen to control pain or fever, unless another medicine was prescribed for this. If you have chronic liver or kidney disease or ever had a stomach ulcer or gastrointestinal bleeding, talk with your healthcare provider before using these medicines.   Follow-up care  Follow up with your healthcare provider or our staff if you or your child don't feel or get better within 72 hours or as directed.   When to get medical advice  Call your healthcare provider right away if any of these occur:     Your child has a fever (see \"Fever and children,\" below)    You have a fever of 100.4 F (38 C) or higher, or as directed    New or worsening ear pain, sinus pain, or headache    Painful lumps in the back of neck    Stiff or swollen neck    Lymph nodes are getting larger or swelling of the neck    Can t open mouth wide due to throat pain    Signs of dehydration, such as very dark urine or no urine or sunken eyes    Noisy breathing    Muffled voice    New rash    Symptoms are worse    New symptoms develop  Call 911  Call 911 if you have any of the following symptoms     Can't swallow, especially saliva, with a lot of drooling    Trouble or difficulty breathing or wheezing    Feeling faint or dizzy    Can't talk    Feeling of doom  Prevention  Here are steps you can take to help prevent an infection:     Keep good hand washing habits.    Don t have close contact with people who have sore throats, colds, or other upper respiratory infections.    Don t smoke, and stay away from secondhand smoke.    Stay up to date with of your vaccines.  Fever and children  Use a digital thermometer to check your child s temperature. Don t use a mercury thermometer. There are different kinds and uses of digital thermometers. They include:     Rectal. For children younger than 3 years, a rectal temperature is the most accurate.    Forehead (temporal). This works for children age 3 months and older. If a child " under 3 months old has signs of illness, this can be used for a first pass. The provider may want to confirm with a rectal temperature.    Ear (tympanic). Ear temperatures are accurate after 6 months of age, but not before.    Armpit (axillary). This is the least reliable but may be used for a first pass to check a child of any age with signs of illness. The provider may want to confirm with a rectal temperature.    Mouth (oral). Don t use a thermometer in your child s mouth until he or she is at least 4 years old.  Use the rectal thermometer with care. Follow the product maker s directions for correct use. Insert it gently. Label it and make sure it s not used in the mouth. It may pass on germs from the stool. If you don t feel OK using a rectal thermometer, ask the healthcare provider what type to use instead. When you talk with any healthcare provider about your child s fever, tell him or her which type you used.   Below are guidelines to know if your young child has a fever. Your child s healthcare provider may give you different numbers for your child. Follow your provider s specific instructions.   Fever readings for a baby under 3 months old:     First, ask your child s healthcare provider how you should take the temperature.    Rectal or forehead: 100.4 F (38 C) or higher    Armpit: 99 F (37.2 C) or higher  Fever readings for a child age 3 months to 36 months (3 years):     Rectal, forehead, or ear: 102 F (38.9 C) or higher    Armpit: 101 F (38.3 C) or higher  Call the healthcare provider in these cases:    Repeated temperature of 104 F (40 C) or higher in a child of any age    Fever of 100.4  (38 C) or higher in a baby younger than 3 months    Fever that lasts more than 24 hours in a child under age 2    Fever that lasts for 3 days in a child age 2 or older    [employer] last reviewed this educational content on 2/1/2020 2000-2021 [employer]. All rights reserved. This information is not  intended as a substitute for professional medical care. Always follow your healthcare professional's instructions.

## 2021-12-29 NOTE — LETTER
[facility]  [address] [address]  Phone: [phone]    December 29, 2021        Princess TRISTIN Machado  [address] [address]          To whom it may concern:    RE: Princess TRISTIN Machado    Patient was seen and treated today at our clinic and missed work.  Please excuse absences through negative test result expected on or around 12/31/21.    Please contact me for questions or concerns.      Sincerely,        Schuyler Mccall PA-C

## 2021-12-30 LAB — SARS-COV-2 RNA RESP QL NAA+PROBE: NEGATIVE

## 2022-01-06 NOTE — PROGRESS NOTES
SUBJECTIVE:   Princess TRISTIN Machado is a 31 year old female presenting with a chief complaint of pharyngitis, coughing, congestion, runny nose, headache, fatigue, all since Monday.   Course of illness is same.    Severity mild  Current and Associated symptoms: as above  Predisposing factors include work exposures    Past Medical History:   Diagnosis Date     Anxiety      Depressive disorder      Headache      Hidradenitis suppurativa 2010     Hypersomnia 04/2019     MS (multiple sclerosis) (H)     diagnosis 10/2016     Recurrent major depression (H)     Pt has tried Zoloft, Prozac, Wellbutrin in the past      Current Outpatient Medications   Medication Sig Dispense Refill     amphetamine-dextroamphetamine (ADDERALL XR) 30 MG 24 hr capsule Take 30 mg by mouth daily       amphetamine-dextroamphetamine (ADDERALL) 10 MG tablet Take 10 mg by mouth 2 times daily        buPROPion (WELLBUTRIN SR) 200 MG 12 hr tablet        Cyanocobalamin (B-12) 5000 MCG CAPS Take 1 tablet by mouth daily        cyclobenzaprine (FLEXERIL) 10 MG tablet Take 10 mg by mouth       EARLINE 30 MG tablet        escitalopram (LEXAPRO) 20 MG tablet Take 1 tablet (20 mg) by mouth daily 90 tablet 1     hydroquinone (MAUDE) 4 % external cream        Interferon Beta-1a (REBIF TITRATION PACK) 6X8.8 & 6X22 MCG SOSY Inject 1 Units Subcutaneous three times a week       mupirocin (BACTROBAN) 2 % ointment        norethindrone (MICRONOR) 0.35 MG per tablet Take 1 tablet by mouth daily       spironolactone (ALDACTONE) 100 MG tablet Take 1 tablet (100 mg) by mouth 2 times daily 180 tablet 3     tiZANidine (ZANAFLEX) 4 MG tablet TAKE ONE TABLET BY MOUTH THREE TIMES A DAY AS NEEDED FOR MUSCLE SPASMS 30 tablet 3     tretinoin (RETIN-A) 0.05 % external cream        vitamin D3 (CHOLECALCIFEROL) 25439 units (250 mcg) capsule Take 1 capsule by mouth daily        FLUoxetine (PROZAC) 10 MG capsule Once off of Lexapro, start 10 mg (1 cap) Fluoxetine daily for 1 week, then  "increase to 20 mg (2 capsules) daily (Patient not taking: Reported on 12/29/2021)       methylPREDNISolone (MEDROL DOSEPAK) 4 MG tablet therapy pack Follow Package Directions 21 tablet 0     valACYclovir (VALTREX) 1000 mg tablet Take 1 tablet (1,000 mg) by mouth 2 times daily for 10 days 20 tablet 0     Social History     Tobacco Use     Smoking status: Passive Smoke Exposure - Never Smoker     Smokeless tobacco: Never Used   Substance Use Topics     Alcohol use: No       ROS:  Review of systems negative except as stated above.    OBJECTIVE:  /80   Pulse 91   Temp 97.9  F (36.6  C) (Oral)   Resp 18   Ht 1.67 m (5' 5.75\")   Wt 129.3 kg (285 lb)   SpO2 100%   BMI 46.35 kg/m    GENERAL APPEARANCE: healthy, alert and no distress  EYES: conjunctiva clear  HENT: ear canals and TM's normal.  Nose and mouth without ulcers, erythema or lesions  NECK: supple, nontender, no lymphadenopathy  RESP: lungs clear to auscultation - no rales, rhonchi or wheezes  CV: regular rates and rhythm, normal S1 S2, no murmur noted  NEURO: Normal strength and tone, sensory exam grossly normal,  normal speech and mentation  SKIN: no suspicious lesions or rashes      Results for orders placed or performed in visit on 12/29/21   Symptomatic; Unknown COVID-19 Virus (Coronavirus) by PCR Nose     Status: Normal    Specimen: Nose; Swab   Result Value Ref Range    SARS CoV2 PCR Negative Negative, Testing sent to reference lab. Results will be returned via unsolicited result    Narrative    Testing was performed using the Aptima SARS-CoV-2 Assay on the  [employer] System. Additional information about this  Emergency Use Authorization (EUA) assay can be found via the Lab  Guide. This test should be ordered for the detection of SARS-CoV-2 in  individuals who meet SARS-CoV-2 clinical and/or epidemiological  criteria. Test performance is unknown in asymptomatic patients. This  test is for in vitro diagnostic use under the FDA EUA " for  laboratories certified under CLIA to perform high complexity testing.  This test has not been FDA cleared or approved. A negative result  does not rule out the presence of PCR inhibitors in the specimen or  target RNA in concentration below the limit of detection for the  assay. The possibility of a false negative should be considered if  the patient's recent exposure or clinical presentation suggests  COVID-19. This test was validated by the [facility]. This laboratory is certified under  the Clinical Laboratory Improvement Amendments of 1988 (CLIA-88) as  qualified to perform high complexity laboratory testing.   Streptococcus A Rapid Screen w/Reflex to PCR     Status: Normal    Specimen: Throat; Swab   Result Value Ref Range    Group A Strep antigen Negative Negative   Influenza A/B antigen     Status: Normal    Specimen: Nose; Swab   Result Value Ref Range    Influenza A antigen Negative Negative    Influenza B antigen Negative Negative    Narrative    Test results must be correlated with clinical data. If necessary, results should be confirmed by a molecular assay or viral culture.   Group A Streptococcus PCR Throat Swab     Status: Normal    Specimen: Throat; Swab   Result Value Ref Range    Group A strep by PCR Not Detected Not Detected    Narrative    The Xpert Xpress Strep A test, performed on the [employer] Systems, is a rapid, qualitative in vitro diagnostic test for the detection of Streptococcus pyogenes (Group A ß-hemolytic Streptococcus, Strep A) in throat swab specimens from patients with signs and symptoms of pharyngitis. The Xpert Xpress Strep A test can be used as an aid in the diagnosis of Group A Streptococcal pharyngitis. The assay is not intended to monitor treatment for Group A Streptococcus infections. The Xpert Xpress Strep A test utilizes an automated real-time polymerase chain reaction (PCR) to detect Streptococcus pyogenes  DNA.       ASSESSMENT:  (J02.9) Pharyngitis  (primary encounter diagnosis)  Plan: Symptomatic; Unknown COVID-19 Virus         (Coronavirus) by PCR Nose, Streptococcus A         Rapid Screen w/Reflex to PCR, Influenza A/B         antigen, Group A Streptococcus PCR Throat Swab      Red flags and emergent follow up discussed, and understood by patient  Follow up with PCP if symptoms worsen or fail to improve      Patient Instructions   Follow up immediately with severe headache, chest pain, or shortness of breath    Rest, isolate for 10 days, hydrate, follow up if worsening or new symptoms  Household members to isolate until test results, if positive isolate for 2 weeks and follow up for testing if symptoms occur         Patient Education     Coronavirus Disease 2019 (COVID-19): Caring for Yourself or Others   If you or a household member have symptoms of COVID-19, follow the guidelines below. This will help you manage symptoms and keep the virus from spreading.  If you have symptoms of COVID-19    Stay home and contact your care team. They will tell you what to do.    Don t panic. Keep in mind that other illnesses can cause similar symptoms.    Stay away from work, school, and public places.    Limit physical contact with others in your home. Limit visitors. No kissing.  Clean surfaces you touch with disinfectant.  If you need to cough or sneeze, do it into a tissue. Then throw the tissue into the trash. If you don't have tissues, cough or sneeze into the bend of your elbow.  Don t share food or personal items with people in your household. This includes items like eating and drinking utensils, towels, and bedding.  Wear a cloth face mask around other people. During a public health emergency, medical face masks may be reserved for healthcare workers. You may need to make a cloth face mask of your own. You can do this using a bandana, T-shirt, or other cloth. The CDC has instructions on how to make a face mask. Wear  the mask so that it covers both your nose and mouth.  If you need to go to a hospital or clinic, call ahead to let them know. Expect the care team to wear masks, gowns, gloves, and eye protection. You may need to come to a different entrance or wait in a separate room.  Follow all instructions from your care team.    If you ve been diagnosed with COVID-19    Stay home and away from others, including other people in your home. (This is called self-isolation.) Don t leave home unless you need to get medical care. Don t go to work, school, or public places. Don t use buses, taxis, or other public transportation.    Follow all instructions from your care team.    If you need to go to a hospital or clinic, call ahead to let them know. Expect the care team to wear masks, gowns, gloves, and eye protection. You may need to come to a different entrance or wait in a separate room.    Wear a face mask over your nose and mouth. This is to protect others from your germs. If you can t wear a mask, your caregivers should wear one. You may need to make your own mask using a bandana, T-shirt, or other cloth. See the CDC s instructions on how to make a face mask.    Have no contact with pets and other animals.    Don t share food or personal items with people in your household. This includes items like eating and drinking utensils, towels, and bedding.    If you need to cough or sneeze, do it into a tissue. Then throw the tissue into the trash. If you don't have tissues, cough or sneeze into the bend of your elbow.    Wash your hands often.    Self-care at home   At this time, there is no medicine approved to prevent or treat COVID-19. The FDA has approved an antiviral medicine (called remdesivir) for people being treated in the hospital. This is for people 12 years and older who weigh more than about 88 pounds (40 kgs). In certain cases, it may also be used for people younger than 12 years or who weigh less than about 88 pounds (40  kgs)..  Currently, treatment is mostly aimed at helping your body while it fights the virus.    Getting extra rest.    Drink extra fluids 6 to 8 glasses of liquids each day), unless a doctor has told you not to. Ask your care team which fluids are best for you. Avoid fluids that contain caffeine or alcohol.    Taking over-the-counter (OTC) medicine to reduce pain and fever. Your care team will tell you which medicine to use.  If you ve been in the hospital for COVID-19, follow your care team s instructions. They will tell you when to stop self-isolation. They may also have you change positions to help your breathing (such as lying on your belly).  If a test showed that you have COVID-19, you may be asked to donate plasma after you ve recovered. (This is called COVID-19 convalescent plasma donation.) The plasma may contain antibodies to help fight the virus in others. Experts don't know if the plasma will work well as a treatment. Research continues, and the FDA has approved it for emergency use in certain people with serious or life-threatening COVID-19. For more information, talk to your care team.  Caring for a sick person     Follow all instructions from the care team.    Wash your hands often.    Wear protective clothing as advised.    Make sure the sick person wears a mask. If they can't wear a mask, don t stay in the same room with them. If you must be in the same room, wear a face mask. Make sure the mask covers both the nose and mouth.    Keep track of the sick person s symptoms.    Clean surfaces often with disinfectant. This includes phones, kitchen counters, fridge door handles, bathroom surfaces, and others.    Don t let anyone share household items with the sick person. This includes eating and drinking tools, towels, sheets, or blankets.    Clean fabrics and laundry well.    Keep other people and pets away from the sick person.    When you can stop self-isolation  When you are sick with COVID-19, you  should stay away from other people. This is called self-isolation. The rules for ending self-isolation depend on your health in general.  If you are normally healthy:  You can stop self-isolation when all 3 of these are true:    You ve had no fever--and no medicine that reduces fever--for at least 24 hours. And     Your symptoms are better, such as cough or trouble breathing. And     At least 10 days have passed since your symptoms first started.  Talk with your care team before you leave home. They may tell you it s okay to leave, or they may give you different advice. You do not need to be re-tested.  If you have a weak immune system, or you ve had severe COVID-19:  Follow your care team s instructions. You may be asked to self-isolate for 10 days to 20 days after your symptoms first started. Your care team may want to re-test you for COVID-19.  Note: If you re being treated for cancer, have an immune disorder (such as HIV), or have had a transplant (organ or bone marrow), you may have a weak immune system.  If you've already had COVID-19 once:  If you had the virus over 3 months ago, and you ve been exposed again, treat it like you've never had COVID-19. Stay home, limit your contact with others, call your care team, and watch for symptoms.  If it s been less than 3 months since you had the virus, you re symptom-free, and you ve been exposed again: You don t need to self-isolate or be re-tested. But if you develop new symptoms that can t be linked to another illness, please self-isolate and contact your care team.  When you return to public settings  When you are well enough to go outside your home, follow the CDC s guidance on cloth face masks.    Anyone over age 2 should wear a face mask in public, especially when it's hard to socially distance. This includes public transit, public protests and marches, and crowded stores, bars, and restaurants.    Face masks are most likely to reduce the spread of COVID-19  when they are widely used by people who are out in the public.  Certain people should not wear a face covering. These include:    Children under 2 years old    Anyone with a health, developmental, or mental health condition that can be made worse by wearing a mask    Anyone who is unconscious or unable to remove the face covering without help. See the CDC's guidance on who should not wear a face mask.  When to call your care team  Call your care team right away if a sick person has any of these:    Trouble breathing    Pain or pressure in chest  If a sick person has any of these, call 911:    Trouble breathing that gets worse    Pain or pressure in chest that gets worse    Blue tint to lips or face    Fast or irregular heartbeat    Confusion or trouble waking    Fainting or loss of consciousness    Coughing up blood  Going home from the hospital   If you have COVID-19 and were recently in the hospital:    Follow the instructions above for self-care and isolation.    Follow the hospital care team s instructions.    Ask questions if anything is unclear to you. Write down answers so you remember them.  Date last modified: 11/23/2020  [employer] last reviewed this educational content on 4/1/2020  This information has been modified by your health care provider with permission from the publisher.    9603-3981 [employer]. [address] [place] [address], [place] [address]. All rights reserved. This information is not intended as a substitute for professional medical care. Always follow your healthcare professional's instructions.           Patient Education     Self-Care for Sore Throats     Sore throats happen for many reasons, such as colds, allergies, cigarette smoke, air pollution, and infections caused by viruses or bacteria. In any case, your throat becomes red and sore. Your goal for self-care is to reduce your discomfort while giving your throat a chance to heal.  Moisten and soothe your throat  Tips include  the following:    Try a sip of water first thing after waking up.    Keep your throat moist by drinking 6 or more glasses of clear liquids every day.    Run a cool-air humidifier in your room overnight.    Stay away from cigarette smoke.     Check the air quality index,if air pollution gives you a sore throat. On high pollution days, try to limit outdoor time.    Suck on throat lozenges, cough drops, hard candy, ice chips, or frozen fruit-juice bars. Use the sugar-free versions if your diet or medical condition requires them.  Gargle to ease irritation  Gargling every hour or 2 can ease irritation. Try gargling with 1 of these solutions:    1/4 teaspoon of salt in 1/2 cup of warm water    An over-the-counter anesthetic gargle  Use medicine for more relief  Over-the-counter medicine can reduce sore throat symptoms. Ask your pharmacist if you have questions about which medicine to use. To prevent possible medicine interactions, let the pharmacist know what medicines you take. To decrease symptoms:    Ease pain with anesthetic sprays. Aspirin or an aspirin substitute also helps. Remember, never give aspirin to anyone 18 or younger. Don't take aspirin if you are already taking blood thinners.     For sore throats caused by allergies, try antihistamines to block the allergic reaction.  Unless a sore throat is caused by a bacterial infection, antibiotics won t help you.  Prevent future sore throats  Prevention tips include:    Stop smoking or reduce contact with secondhand smoke. Smoke irritates the tender throat lining.    Limit contact with pets and with allergy-causing substances, such as pollen and mold.    Wash your hands often when you re around someone with a sore throat or cold. This will keep viruses or bacteria from spreading.    Limit outdoor time when air pollution is bad.    Don t strain your vocal cords.  When to call your healthcare provider  Contact your healthcare provider if you have:    Fever of 100.4 F  (38.0 C) or higher, or as directed by your healthcare provider    White spots on the throat    Great Trouble swallowing    A skin rash    Recent exposure to someone else with strep bacteria    Severe hoarseness and swollen glands in the neck or jaw  Call 911  Call 911 if any of the following occur:    Trouble breathing or catching your breath    Drooling and problems swallowing    Wheezing    Unable to talk    Feeling dizzy or faint    Feeling of doom  [employer] last reviewed this educational content on 9/1/2019 2000-2021 [employer]. All rights reserved. This information is not intended as a substitute for professional medical care. Always follow your healthcare professional's instructions.           Patient Education     Pharyngitis (Sore Throat), Report Pending     Pharyngitis (sore throat) is often due to a virus. It can also be caused by strep (streptococcus) bacteria. This is often called strep throat. Both viral and strep infections can cause throat pain that is worse when swallowing, aching all over, headache, and fever. Both types of infections are contagious. They may be spread by coughing, kissing, or touching others after touching your mouth or nose.   A test has been done to find out if you or your child have strep throat. Often a rapid strep test can be done which gives immediate results and treatment can begin right away. A throat culture may also be done. The culture results take longer. If you have a virus, the culture results will be negative. The facility will call with your culture results. Call this facility or your healthcare provider if you were not given your rapid test results for strep. If a test is positive for strep infection, you will need to take an antibiotic. An antibiotic is a medicine used to treat a bacterial infection. A prescription can be called into your pharmacy or a written copy will be given to you at that time. If both tests are negative, you likely have a  virus, usually viral pharyngitis. This does not need to be treated with antibiotics. Until you receive the results of the rapid strep test or the throat culture, you should stay home from work. If your child is being tested, he or she should stay home from school.   Home care    Rest at home. Drink plenty of fluids so you won't get dehydrated.    If the test is positive for strep, you or your child should not go to work or school for the first 24 hours of taking the antibiotics or as directed by the healthcare provider. After this time, you or your child will not be contagious. You or your child can then return to work or school when feeling better or as directed by this facility or your healthcare provider.     Use the antibiotic medicine (often penicillin or amoxicillin) for the full 10 days or as directed by the healthcare provider. Don't stop the medicine even if you or your child feel better. This is very important to make sure the infection is fully treated. It's also important to prevent medicine-resistant germs from growing. Treatment for 10 days is also the best way to prevent rheumatic fever which affects the heart and other parts of the body. If you or your child were given an antibiotic shot, the healthcare provider will tell you if additional antibiotics are needed.    Use throat lozenges or numbing throat sprays to help reduce pain. Gargling with warm salt water will also help reduce throat pain. Dissolve 1/2 teaspoon of salt in 1 glass of warm water. Discuss this treatment with your healthcare provider.    Children can sip on juice or ice pops. Children 5 years and older can also suck on a lollipop or hard candy.    Don't eat salty or spicy foods or give them to your child. These can irritate the throat.  Other medicine for a child:  You can give your child acetaminophen for fever, fussiness, or discomfort. In babies over 6 months of age, you may use ibuprofen instead of acetaminophen. If your child  "has chronic liver or kidney disease or ever had a stomach ulcer or gastrointestinal bleeding, talk with your child s healthcare provider before giving these medicines. Aspirin should never be used by any child under 18 years of age who has a fever. It may cause severe liver damage. Always contact your child's healthcare provider before giving him or her over-the-counter medicines for the first time.   Other medicine for an adult: You may use acetaminophen or ibuprofen to control pain or fever, unless another medicine was prescribed for this. If you have chronic liver or kidney disease or ever had a stomach ulcer or gastrointestinal bleeding, talk with your healthcare provider before using these medicines.   Follow-up care  Follow up with your healthcare provider or our staff if you or your child don't feel or get better within 72 hours or as directed.   When to get medical advice  Call your healthcare provider right away if any of these occur:     Your child has a fever (see \"Fever and children,\" below)    You have a fever of 100.4 F (38 C) or higher, or as directed    New or worsening ear pain, sinus pain, or headache    Painful lumps in the back of neck    Stiff or swollen neck    Lymph nodes are getting larger or swelling of the neck    Can t open mouth wide due to throat pain    Signs of dehydration, such as very dark urine or no urine or sunken eyes    Noisy breathing    Muffled voice    New rash    Symptoms are worse    New symptoms develop  Call 911  Call 911 if you have any of the following symptoms     Can't swallow, especially saliva, with a lot of drooling    Trouble or difficulty breathing or wheezing    Feeling faint or dizzy    Can't talk    Feeling of doom  Prevention  Here are steps you can take to help prevent an infection:     Keep good hand washing habits.    Don t have close contact with people who have sore throats, colds, or other upper respiratory infections.    Don t smoke, and stay away from " secondhand smoke.    Stay up to date with of your vaccines.  Fever and children  Use a digital thermometer to check your child s temperature. Don t use a mercury thermometer. There are different kinds and uses of digital thermometers. They include:     Rectal. For children younger than 3 years, a rectal temperature is the most accurate.    Forehead (temporal). This works for children age 3 months and older. If a child under 3 months old has signs of illness, this can be used for a first pass. The provider may want to confirm with a rectal temperature.    Ear (tympanic). Ear temperatures are accurate after 6 months of age, but not before.    Armpit (axillary). This is the least reliable but may be used for a first pass to check a child of any age with signs of illness. The provider may want to confirm with a rectal temperature.    Mouth (oral). Don t use a thermometer in your child s mouth until he or she is at least 4 years old.  Use the rectal thermometer with care. Follow the product maker s directions for correct use. Insert it gently. Label it and make sure it s not used in the mouth. It may pass on germs from the stool. If you don t feel OK using a rectal thermometer, ask the healthcare provider what type to use instead. When you talk with any healthcare provider about your child s fever, tell him or her which type you used.   Below are guidelines to know if your young child has a fever. Your child s healthcare provider may give you different numbers for your child. Follow your provider s specific instructions.   Fever readings for a baby under 3 months old:     First, ask your child s healthcare provider how you should take the temperature.    Rectal or forehead: 100.4 F (38 C) or higher    Armpit: 99 F (37.2 C) or higher  Fever readings for a child age 3 months to 36 months (3 years):     Rectal, forehead, or ear: 102 F (38.9 C) or higher    Armpit: 101 F (38.3 C) or higher  Call the healthcare provider in  these cases:    Repeated temperature of 104 F (40 C) or higher in a child of any age    Fever of 100.4  (38 C) or higher in a baby younger than 3 months    Fever that lasts more than 24 hours in a child under age 2    Fever that lasts for 3 days in a child age 2 or older    [employer] last reviewed this educational content on 2/1/2020 2000-2021 [employer]. All rights reserved. This information is not intended as a substitute for professional medical care. Always follow your healthcare professional's instructions.

## 2022-01-09 ENCOUNTER — HEALTH MAINTENANCE LETTER (OUTPATIENT)
Age: 32
End: 2022-01-09

## 2022-01-11 ENCOUNTER — E-VISIT (OUTPATIENT)
Dept: URGENT CARE | Facility: URGENT CARE | Age: 32
End: 2022-01-11
Payer: COMMERCIAL

## 2022-01-11 ENCOUNTER — LAB (OUTPATIENT)
Dept: LAB | Facility: CLINIC | Age: 32
End: 2022-01-11

## 2022-01-11 DIAGNOSIS — N89.8 VAGINAL DISCHARGE: Primary | ICD-10-CM

## 2022-01-11 DIAGNOSIS — N89.8 VAGINAL DISCHARGE: ICD-10-CM

## 2022-01-11 LAB
ALBUMIN UR-MCNC: NEGATIVE MG/DL
APPEARANCE UR: CLEAR
BILIRUB UR QL STRIP: NEGATIVE
CLUE CELLS: ABNORMAL
COLOR UR AUTO: YELLOW
GLUCOSE UR STRIP-MCNC: NEGATIVE MG/DL
HGB UR QL STRIP: NEGATIVE
KETONES UR STRIP-MCNC: ABNORMAL MG/DL
LEUKOCYTE ESTERASE UR QL STRIP: NEGATIVE
NITRATE UR QL: NEGATIVE
PH UR STRIP: 5.5 [PH] (ref 5–7)
SP GR UR STRIP: >=1.03 (ref 1–1.03)
TRICHOMONAS, WET PREP: ABNORMAL
UROBILINOGEN UR STRIP-ACNC: 0.2 E.U./DL
WBC'S/HIGH POWER FIELD, WET PREP: ABNORMAL
YEAST, WET PREP: ABNORMAL

## 2022-01-11 PROCEDURE — 81003 URINALYSIS AUTO W/O SCOPE: CPT

## 2022-01-11 PROCEDURE — 87591 N.GONORRHOEAE DNA AMP PROB: CPT

## 2022-01-11 PROCEDURE — 99421 OL DIG E/M SVC 5-10 MIN: CPT | Performed by: FAMILY MEDICINE

## 2022-01-11 PROCEDURE — 87210 SMEAR WET MOUNT SALINE/INK: CPT | Performed by: FAMILY MEDICINE

## 2022-01-12 LAB — N GONORRHOEA DNA SPEC QL NAA+PROBE: NEGATIVE

## 2022-01-19 ENCOUNTER — NURSE TRIAGE (OUTPATIENT)
Dept: NURSING | Facility: CLINIC | Age: 32
End: 2022-01-19
Payer: COMMERCIAL

## 2022-01-19 DIAGNOSIS — Z11.3 ROUTINE SCREENING FOR STI (SEXUALLY TRANSMITTED INFECTION): Primary | ICD-10-CM

## 2022-01-19 NOTE — TELEPHONE ENCOUNTER
"Added chlamydia order to chart.       Patient calling to request an order for Chlamydia lab.  Had an e-visit 1-11-22 with Dr. David Mendoza for STD testing, but only Gonorrhea, wet prep, and UA came back.    Called lab who said the only order was just the Gonorrhea, though the \"machine automatically does both.\"  RN called [facility] [phone].  They said Dr. Mendoza only ordered the UA, wet prep, and Gonorrhea.    They would be able to add chlamydia onto results if provider adds order.    Patient would like an order for Chlamydia to be added.    Routed to Dr. David Mendoza.    Libra Dyer RN  [facility]       "

## 2022-03-16 ENCOUNTER — OFFICE VISIT (OUTPATIENT)
Dept: FAMILY MEDICINE | Facility: CLINIC | Age: 32
End: 2022-03-16
Payer: COMMERCIAL

## 2022-03-16 VITALS
OXYGEN SATURATION: 97 % | RESPIRATION RATE: 16 BRPM | SYSTOLIC BLOOD PRESSURE: 148 MMHG | BODY MASS INDEX: 47.01 KG/M2 | HEART RATE: 88 BPM | DIASTOLIC BLOOD PRESSURE: 84 MMHG | TEMPERATURE: 98.2 F | WEIGHT: 292.5 LBS | HEIGHT: 66 IN

## 2022-03-16 DIAGNOSIS — Z11.3 SCREEN FOR STD (SEXUALLY TRANSMITTED DISEASE): Primary | ICD-10-CM

## 2022-03-16 DIAGNOSIS — N76.0 BV (BACTERIAL VAGINOSIS): ICD-10-CM

## 2022-03-16 DIAGNOSIS — N89.8 VAGINAL DISCHARGE: ICD-10-CM

## 2022-03-16 DIAGNOSIS — B96.89 BV (BACTERIAL VAGINOSIS): ICD-10-CM

## 2022-03-16 DIAGNOSIS — Z11.59 SCREENING FOR VIRAL DISEASE: ICD-10-CM

## 2022-03-16 LAB
CLUE CELLS: PRESENT
TRICHOMONAS, WET PREP: ABNORMAL
WBC'S/HIGH POWER FIELD, WET PREP: ABNORMAL
YEAST, WET PREP: ABNORMAL

## 2022-03-16 PROCEDURE — 87389 HIV-1 AG W/HIV-1&-2 AB AG IA: CPT | Performed by: NURSE PRACTITIONER

## 2022-03-16 PROCEDURE — 99213 OFFICE O/P EST LOW 20 MIN: CPT | Performed by: NURSE PRACTITIONER

## 2022-03-16 PROCEDURE — 87210 SMEAR WET MOUNT SALINE/INK: CPT | Performed by: NURSE PRACTITIONER

## 2022-03-16 PROCEDURE — 36415 COLL VENOUS BLD VENIPUNCTURE: CPT | Performed by: NURSE PRACTITIONER

## 2022-03-16 PROCEDURE — 86803 HEPATITIS C AB TEST: CPT | Performed by: NURSE PRACTITIONER

## 2022-03-16 PROCEDURE — 87591 N.GONORRHOEAE DNA AMP PROB: CPT | Performed by: NURSE PRACTITIONER

## 2022-03-16 PROCEDURE — 86780 TREPONEMA PALLIDUM: CPT | Performed by: NURSE PRACTITIONER

## 2022-03-16 RX ORDER — METRONIDAZOLE 500 MG/1
500 TABLET ORAL 2 TIMES DAILY
Qty: 14 TABLET | Refills: 0 | Status: SHIPPED | OUTPATIENT
Start: 2022-03-16 | End: 2022-03-23

## 2022-03-16 ASSESSMENT — PAIN SCALES - GENERAL: PAINLEVEL: NO PAIN (0)

## 2022-03-16 ASSESSMENT — PATIENT HEALTH QUESTIONNAIRE - PHQ9: SUM OF ALL RESPONSES TO PHQ QUESTIONS 1-9: 8

## 2022-03-16 NOTE — PROGRESS NOTES
"  Assessment & Plan   Problem List Items Addressed This Visit     None      Visit Diagnoses     Screen for STD (sexually transmitted disease)    -  Primary    Relevant Orders    Hepatitis C antibody    Treponema Abs w Reflex to RPR and Titer    HIV Antigen Antibody Combo    NEISSERIA GONORRHOEA PCR    CHLAMYDIA TRACHOMATIS PCR    Wet prep - Clinic Collect (Completed)    Vaginal discharge        Relevant Orders    Hepatitis C antibody    Treponema Abs w Reflex to RPR and Titer    HIV Antigen Antibody Combo    NEISSERIA GONORRHOEA PCR    CHLAMYDIA TRACHOMATIS PCR    Wet prep - Clinic Collect (Completed)    BV (bacterial vaginosis)        Relevant Medications    metroNIDAZOLE (FLAGYL) 500 MG tablet         Here for STD screen. Wet prep positive for BV. Tx sent. Follow-up with any concerns       ARIEL Keith CNP  [facility] MOUNIKA Mayer is a 31 year old who presents for the following health issues     HPI     A little more clear discharge   A little itching   No odor   No urinary symptoms, abd pain   No known STD contacts       Review of Systems   Detailed as above         Objective    BP (!) 148/84 (BP Location: Right arm, Cuff Size: Adult Large)   Pulse 88   Temp 98.2  F (36.8  C) (Tympanic)   Resp 16   Ht 1.67 m (5' 5.75\")   Wt 132.7 kg (292 lb 8 oz)   LMP 02/24/2022 (Exact Date)   SpO2 97%   BMI 47.57 kg/m    There is no height or weight on file to calculate BMI.  Physical Exam  Constitutional:       Appearance: Normal appearance.   Pulmonary:      Effort: Pulmonary effort is normal.   Neurological:      Mental Status: She is alert.   Psychiatric:         Mood and Affect: Mood normal.            Results for orders placed or performed in visit on 03/16/22 (from the past 24 hour(s))   Wet prep - Clinic Collect    Specimen: Vagina; Swab   Result Value Ref Range    Trichomonas Absent Absent    Yeast Absent Absent    Clue Cells Present (A) Absent    WBCs/high power " field 4+ (A) None

## 2022-03-16 NOTE — NURSING NOTE
"Princess TRISTIN Machado is a 31 year old patient who comes in today for a clinic visit  Initial BP:  BP (!) 148/84 (BP Location: Right arm, Cuff Size: Adult Large)   Pulse 88   Temp 98.2  F (36.8  C) (Tympanic)   Resp 16   Ht 1.67 m (5' 5.75\")   Wt 132.7 kg (292 lb 8 oz)   LMP 02/24/2022 (Exact Date)   SpO2 97%   BMI 47.57 kg/m         Disposition: provider notified elevated BP while patient in the clinic    Annmarie Villar MA    "

## 2022-03-17 LAB
HIV 1+2 AB+HIV1 P24 AG SERPL QL IA: NONREACTIVE
T PALLIDUM AB SER QL: NONREACTIVE

## 2022-03-18 LAB
HCV AB SERPL QL IA: ABNORMAL
N GONORRHOEA DNA SPEC QL NAA+PROBE: NEGATIVE

## 2022-03-18 NOTE — RESULT ENCOUNTER NOTE
José Manuel Mayer, your Hepatitis C was basically indeterminate. We need to rerun the test. This is something that happens fairly frequently so I wouldn't be worried about it. Just schedule a lab only appt to get this done. Otherwise everything else looks great. Let me know if you have questions  Enrrique

## 2022-03-19 ENCOUNTER — MYC MEDICAL ADVICE (OUTPATIENT)
Dept: FAMILY MEDICINE | Facility: CLINIC | Age: 32
End: 2022-03-19
Payer: COMMERCIAL

## 2022-03-27 ENCOUNTER — LAB (OUTPATIENT)
Dept: LAB | Facility: CLINIC | Age: 32
End: 2022-03-27
Payer: COMMERCIAL

## 2022-03-27 DIAGNOSIS — N89.8 VAGINAL DISCHARGE: ICD-10-CM

## 2022-03-27 DIAGNOSIS — Z11.59 SCREENING FOR VIRAL DISEASE: ICD-10-CM

## 2022-03-27 DIAGNOSIS — Z11.3 SCREEN FOR STD (SEXUALLY TRANSMITTED DISEASE): ICD-10-CM

## 2022-03-27 PROCEDURE — 86803 HEPATITIS C AB TEST: CPT

## 2022-03-27 PROCEDURE — 36415 COLL VENOUS BLD VENIPUNCTURE: CPT

## 2022-03-27 PROCEDURE — 87491 CHLMYD TRACH DNA AMP PROBE: CPT

## 2022-03-28 LAB — HCV AB SERPL QL IA: NONREACTIVE

## 2022-03-29 LAB — C TRACH DNA SPEC QL NAA+PROBE: NEGATIVE

## 2022-06-08 ENCOUNTER — OFFICE VISIT (OUTPATIENT)
Dept: FAMILY MEDICINE | Facility: CLINIC | Age: 32
End: 2022-06-08
Payer: COMMERCIAL

## 2022-06-08 VITALS
WEIGHT: 284 LBS | HEIGHT: 66 IN | BODY MASS INDEX: 45.64 KG/M2 | TEMPERATURE: 97 F | HEART RATE: 82 BPM | OXYGEN SATURATION: 99 % | RESPIRATION RATE: 16 BRPM | DIASTOLIC BLOOD PRESSURE: 84 MMHG | SYSTOLIC BLOOD PRESSURE: 139 MMHG

## 2022-06-08 DIAGNOSIS — N89.8 VAGINAL DISCHARGE: ICD-10-CM

## 2022-06-08 DIAGNOSIS — R39.89 URINARY PROBLEM: Primary | ICD-10-CM

## 2022-06-08 DIAGNOSIS — Z11.3 SCREEN FOR STD (SEXUALLY TRANSMITTED DISEASE): ICD-10-CM

## 2022-06-08 LAB
ALBUMIN UR-MCNC: ABNORMAL MG/DL
APPEARANCE UR: CLEAR
BILIRUB UR QL STRIP: NEGATIVE
CLUE CELLS: NORMAL
COLOR UR AUTO: YELLOW
GLUCOSE UR STRIP-MCNC: NEGATIVE MG/DL
HGB UR QL STRIP: NEGATIVE
KETONES UR STRIP-MCNC: NEGATIVE MG/DL
LEUKOCYTE ESTERASE UR QL STRIP: NEGATIVE
NITRATE UR QL: NEGATIVE
PH UR STRIP: 5.5 [PH] (ref 5–7)
RBC #/AREA URNS AUTO: NORMAL /HPF
SP GR UR STRIP: >=1.03 (ref 1–1.03)
TRICHOMONAS, WET PREP: NORMAL
UROBILINOGEN UR STRIP-ACNC: 1 E.U./DL
WBC #/AREA URNS AUTO: NORMAL /HPF
WBC'S/HIGH POWER FIELD, WET PREP: NORMAL
YEAST, WET PREP: NORMAL

## 2022-06-08 PROCEDURE — 36415 COLL VENOUS BLD VENIPUNCTURE: CPT | Performed by: PHYSICIAN ASSISTANT

## 2022-06-08 PROCEDURE — 86803 HEPATITIS C AB TEST: CPT | Performed by: PHYSICIAN ASSISTANT

## 2022-06-08 PROCEDURE — 81001 URINALYSIS AUTO W/SCOPE: CPT | Performed by: PHYSICIAN ASSISTANT

## 2022-06-08 PROCEDURE — 86780 TREPONEMA PALLIDUM: CPT | Performed by: PHYSICIAN ASSISTANT

## 2022-06-08 PROCEDURE — 99213 OFFICE O/P EST LOW 20 MIN: CPT | Performed by: PHYSICIAN ASSISTANT

## 2022-06-08 PROCEDURE — 87389 HIV-1 AG W/HIV-1&-2 AB AG IA: CPT | Performed by: PHYSICIAN ASSISTANT

## 2022-06-08 PROCEDURE — 87591 N.GONORRHOEAE DNA AMP PROB: CPT | Performed by: PHYSICIAN ASSISTANT

## 2022-06-08 PROCEDURE — 87210 SMEAR WET MOUNT SALINE/INK: CPT | Performed by: PHYSICIAN ASSISTANT

## 2022-06-08 PROCEDURE — 87491 CHLMYD TRACH DNA AMP PROBE: CPT | Performed by: PHYSICIAN ASSISTANT

## 2022-06-08 RX ORDER — ETONOGESTREL AND ETHINYL ESTRADIOL VAGINAL RING .015; .12 MG/D; MG/D
1 RING VAGINAL
COMMUNITY
End: 2022-07-13

## 2022-06-08 ASSESSMENT — PAIN SCALES - GENERAL: PAINLEVEL: NO PAIN (0)

## 2022-06-08 NOTE — PROGRESS NOTES
HPI:  is a 32 yo female here for STD check.  She is having some mild discharge that comes and goes  Looks white.  Denies any itching  She has new partner since March when she was tested.  Denies pelvic pain or fever.      Past Medical History:   Diagnosis Date     Anxiety      Depressive disorder      Headache      Hidradenitis suppurativa 2010     Hypersomnia 04/2019     MS (multiple sclerosis) (H)     diagnosis 10/2016     Recurrent major depression (H)     Pt has tried Zoloft, Prozac, Wellbutrin in the past      Past Surgical History:   Procedure Laterality Date     ENT SURGERY      tubes in ears     MYRINGOTOMY, INSERT TUBE BILATERAL, COMBINED  1990s     Social History     Tobacco Use     Smoking status: Passive Smoke Exposure - Never Smoker     Smokeless tobacco: Never Used   Substance Use Topics     Alcohol use: No     Current Outpatient Medications   Medication Sig Dispense Refill     amphetamine-dextroamphetamine (ADDERALL XR) 30 MG 24 hr capsule Take 30 mg by mouth daily Taking 2 capsules in am and 1 capsule in pm       amphetamine-dextroamphetamine (ADDERALL) 10 MG tablet Take 10 mg by mouth 2 times daily Takes 2 tablets twice a day       buPROPion (WELLBUTRIN SR) 200 MG 12 hr tablet Takes 2 tablets daily       Cyanocobalamin (B-12) 5000 MCG CAPS Take 1 tablet by mouth daily        cyclobenzaprine (FLEXERIL) 10 MG tablet Take 10 mg by mouth       EARLINE 30 MG tablet 30 mg daily as needed        escitalopram (LEXAPRO) 20 MG tablet Take 1 tablet (20 mg) by mouth daily 90 tablet 1     etonogestrel-ethinyl estradiol (NUVARING) 0.12-0.015 MG/24HR vaginal ring Place 1 each vaginally every 28 days       hydroquinone (MAUDE) 4 % external cream        Interferon Beta-1a 6X8.8 & 6X22 MCG SOSY Inject 1 Units Subcutaneous three times a week       mupirocin (BACTROBAN) 2 % ointment        spironolactone (ALDACTONE) 100 MG tablet Take 1 tablet (100 mg) by mouth 2 times daily 180 tablet 3     tiZANidine  "(ZANAFLEX) 4 MG tablet TAKE ONE TABLET BY MOUTH THREE TIMES A DAY AS NEEDED FOR MUSCLE SPASMS 30 tablet 3     tretinoin (RETIN-A) 0.05 % external cream        vitamin D3 (CHOLECALCIFEROL) 72655 units (250 mcg) capsule Take 1 capsule by mouth daily        Allergies   Allergen Reactions     Buspirone Other (See Comments)     Suicidal thoughts     Accutane Rash       PHYSICAL EXAM:    /84 (BP Location: Right arm, Patient Position: Chair, Cuff Size: Adult Large)   Pulse 82   Temp 97  F (36.1  C) (Tympanic)   Resp 16   Ht 1.67 m (5' 5.75\")   Wt 128.8 kg (284 lb)   SpO2 99%   Breastfeeding No   BMI 46.19 kg/m      Patient appears non toxic  Pt has her son with her; will have her self swab    Results for orders placed or performed in visit on 06/08/22   UA Macro with Reflex to Micro and Culture - lab collect     Status: Abnormal    Specimen: Urine, NOS   Result Value Ref Range    Color Urine Yellow Colorless, Straw, Light Yellow, Yellow    Appearance Urine Clear Clear    Glucose Urine Negative Negative mg/dL    Bilirubin Urine Negative Negative    Ketones Urine Negative Negative mg/dL    Specific Gravity Urine >=1.030 1.003 - 1.035    Blood Urine Negative Negative    pH Urine 5.5 5.0 - 7.0    Protein Albumin Urine Trace (A) Negative mg/dL    Urobilinogen Urine 1.0 0.2, 1.0 E.U./dL    Nitrite Urine Negative Negative    Leukocyte Esterase Urine Negative Negative   Urine Microscopic     Status: Normal   Result Value Ref Range    RBC Urine 0-2 0-2 /HPF /HPF    WBC Urine 0-5 0-5 /HPF /HPF    Narrative    Urine Culture not indicated   Wet prep - lab collect     Status: Normal    Specimen: Vagina; Swab   Result Value Ref Range    Trichomonas Absent Absent    Yeast Absent Absent    Clue Cells Absent Absent    WBCs/high power field None None     Assessment and Plan:     (R39.89) Urinary problem  (primary encounter diagnosis)  Comment: UA neg  Plan: UA Macro with Reflex to Micro and Culture - lab        collect, Urine " Microscopic            (Z11.3) Screen for STD (sexually transmitted disease)  Comment:   Plan: Chlamydia trachomatis PCR, Neisseria         gonorrhoeae PCR, HIV Antigen Antibody Combo,         Treponema Abs w Reflex to RPR and Titer,         Hepatitis C antibody            (N89.8) Vaginal discharge  Comment:   Plan: Wet prep - lab collect        No BV      Grazyna Colon PA-C

## 2022-06-10 LAB
C TRACH DNA SPEC QL NAA+PROBE: NEGATIVE
N GONORRHOEA DNA SPEC QL NAA+PROBE: NEGATIVE

## 2022-07-06 ENCOUNTER — ANCILLARY PROCEDURE (OUTPATIENT)
Dept: GENERAL RADIOLOGY | Facility: CLINIC | Age: 32
End: 2022-07-06
Attending: PAIN MEDICINE
Payer: COMMERCIAL

## 2022-07-06 DIAGNOSIS — M54.2 CERVICALGIA: ICD-10-CM

## 2022-07-06 PROCEDURE — 72040 X-RAY EXAM NECK SPINE 2-3 VW: CPT | Mod: TC | Performed by: RADIOLOGY

## 2022-07-13 ENCOUNTER — OFFICE VISIT (OUTPATIENT)
Dept: FAMILY MEDICINE | Facility: CLINIC | Age: 32
End: 2022-07-13
Payer: COMMERCIAL

## 2022-07-13 VITALS
HEIGHT: 66 IN | OXYGEN SATURATION: 97 % | BODY MASS INDEX: 45.51 KG/M2 | RESPIRATION RATE: 16 BRPM | WEIGHT: 283.2 LBS | TEMPERATURE: 98.4 F | DIASTOLIC BLOOD PRESSURE: 84 MMHG | SYSTOLIC BLOOD PRESSURE: 125 MMHG | HEART RATE: 90 BPM

## 2022-07-13 DIAGNOSIS — Z01.818 PREOP GENERAL PHYSICAL EXAM: Primary | ICD-10-CM

## 2022-07-13 DIAGNOSIS — F33.9 RECURRENT MAJOR DEPRESSIVE EPISODES (H): ICD-10-CM

## 2022-07-13 DIAGNOSIS — E66.01 MORBID OBESITY DUE TO EXCESS CALORIES (H): ICD-10-CM

## 2022-07-13 DIAGNOSIS — E55.9 VITAMIN D DEFICIENCY: ICD-10-CM

## 2022-07-13 DIAGNOSIS — G35 MULTIPLE SCLEROSIS (H): ICD-10-CM

## 2022-07-13 DIAGNOSIS — M25.531 RIGHT WRIST PAIN: ICD-10-CM

## 2022-07-13 DIAGNOSIS — F41.1 GAD (GENERALIZED ANXIETY DISORDER): ICD-10-CM

## 2022-07-13 LAB
ERYTHROCYTE [DISTWIDTH] IN BLOOD BY AUTOMATED COUNT: 12.5 % (ref 10–15)
HCT VFR BLD AUTO: 39.1 % (ref 35–47)
HGB BLD-MCNC: 13.4 G/DL (ref 11.7–15.7)
MCH RBC QN AUTO: 31.8 PG (ref 26.5–33)
MCHC RBC AUTO-ENTMCNC: 34.3 G/DL (ref 31.5–36.5)
MCV RBC AUTO: 93 FL (ref 78–100)
PLATELET # BLD AUTO: 256 10E3/UL (ref 150–450)
RBC # BLD AUTO: 4.21 10E6/UL (ref 3.8–5.2)
WBC # BLD AUTO: 8.8 10E3/UL (ref 4–11)

## 2022-07-13 PROCEDURE — 84703 CHORIONIC GONADOTROPIN ASSAY: CPT | Performed by: PHYSICIAN ASSISTANT

## 2022-07-13 PROCEDURE — 80048 BASIC METABOLIC PNL TOTAL CA: CPT | Performed by: PHYSICIAN ASSISTANT

## 2022-07-13 PROCEDURE — 36415 COLL VENOUS BLD VENIPUNCTURE: CPT | Performed by: PHYSICIAN ASSISTANT

## 2022-07-13 PROCEDURE — 99214 OFFICE O/P EST MOD 30 MIN: CPT | Performed by: PHYSICIAN ASSISTANT

## 2022-07-13 PROCEDURE — 85027 COMPLETE CBC AUTOMATED: CPT | Performed by: PHYSICIAN ASSISTANT

## 2022-07-13 ASSESSMENT — PAIN SCALES - GENERAL: PAINLEVEL: NO PAIN (0)

## 2022-07-13 NOTE — H&P (VIEW-ONLY)
[address], [address]  [facility] [address]  Phone: [phone]  Primary Provider: No Ref-Primary, Physician  Pre-op Performing Provider: LIAT GREENE      PREOPERATIVE EVALUATION:  Today's date: 7/13/2022    Princess TRISTIN Machado is a 31 year old female who presents for a preoperative evaluation.    Surgical Information:  Surgery/Procedure: RIGHT WRIST FIRST DORSAL COMPARTMENT RELEASE  Surgery Location:  OR  Surgeon: Michaela Reyes MD  Surgery Date: 7-  Time of Surgery: 10:00am  Where patient plans to recover: At home with family  Fax number for surgical facility: Note does not need to be faxed, will be available electronically in Epic.    Type of Anesthesia Anticipated: to be determined    Assessment & Plan     The proposed surgical procedure is considered LOW risk.    (Z01.818) Preop general physical exam  (primary encounter diagnosis)  Comment: chronic issues stable  Plan: Basic metabolic panel  (Ca, Cl, CO2, Creat,         Gluc, K, Na, BUN), CBC with platelets, HCG         qualitative  Cleared for minor procedure    (M25.531) Right wrist pain  Comment: pain x one year +  Plan: surgery as planned     (F41.1) STACIA (generalized anxiety disorder)  Comment: on wellbutrin and lexapro  Plan: cont above    (E66.01) Morbid obesity due to excess calories (H) BMI 50-60  Comment: at risk for ROSANGELA  Plan: please monitor 02 sat and cardio-pulm status closely in evlein-op period    (F33.9) Recurrent major depressive episodes (H)  Comment: on lexapro and wellbutrin  Plan: cont above    (G35) Multiple sclerosis (H)  Comment: follows with neuro (Hipolito), on subq interferon  Plan: cont above    (E55.9) Vitamin D deficiency  Comment:   Plan:     Risks and Recommendations:  The patient has the following additional risks and recommendations for perioperative complications:   - Consult Hospitalist / IM to assist with post-op medical  management    Medication Instructions:  Stop all NSAIDs (motrin, ibuprofen, naproxen, aleve, advil, naprosyn, aspirin)  for at least 7 days prior to surgery.  Tylenol is okay to take prior to surgery.    Skip adderall and spironolactone the am of procedure (you can take after procedure)    Take the rest of your evening medications as usual the night before surgery.    RECOMMENDATION:  APPROVAL GIVEN to proceed with proposed procedure, without further diagnostic evaluation.    Greer Yates PA-C  30 minutes on the day of the encounter doing chart review, history and exam, documentation and further activities as noted above.          Subjective     HPI related to upcoming procedure:   Will have right hand surgery this month.  She has been having pain x one year.    She otherwise feels well.  She goes to [facility] two times per week for one hour.  She denies chest pain or increased dyspnea with exercise.    She denies history of blood clots or cardiac history.  Preop Questions 7/13/2022   1. Have you ever had a heart attack or stroke? No   2. Have you ever had surgery on your heart or blood vessels, such as a stent placement, a coronary artery bypass, or surgery on an artery in your head, neck, heart, or legs? No   3. Do you have chest pain with activity? No   4. Do you have a history of  heart failure? No   5. Do you currently have a cold, bronchitis or symptoms of other infection? No   6. Do you have a cough, shortness of breath, or wheezing? No   7. Do you or anyone in your family have previous history of blood clots? No   8. Do you or does anyone in your family have a serious bleeding problem such as prolonged bleeding following surgeries or cuts? No   9. Have you ever had problems with anemia or been told to take iron pills? No   10. Have you had any abnormal blood loss such as black, tarry or bloody stools, or abnormal vaginal bleeding? No   11. Have you ever had a blood transfusion? No   12. Are you  willing to have a blood transfusion if it is medically needed before, during, or after your surgery? Yes   13. Have you or any of your relatives ever had problems with anesthesia? No   14. Do you have sleep apnea, excessive snoring or daytime drowsiness? No   15. Do you have any artifical heart valves or other implanted medical devices like a pacemaker, defibrillator, or continuous glucose monitor? No   16. Do you have artificial joints? No   17. Are you allergic to latex? No   18. Is there any chance that you may be pregnant? No     Health Care Directive:  Patient does not have a Health Care Directive or Living Will: Discussed advance care planning with patient; information given to patient to review.     Review of Systems  CONSTITUTIONAL: NEGATIVE for fever, chills, change in weight  INTEGUMENTARY/SKIN: NEGATIVE for worrisome rashes, moles or lesions  EYES: NEGATIVE for vision changes or irritation  ENT/MOUTH: NEGATIVE for ear, mouth and throat problems  RESP: NEGATIVE for significant cough or SOB  CV: NEGATIVE for chest pain, palpitations or peripheral edema  GI: NEGATIVE for nausea, abdominal pain, heartburn, or change in bowel habits  : NEGATIVE for frequency, dysuria, or hematuria  MUSCULOSKELETAL: NEGATIVE for significant arthralgias or myalgia  NEURO: NEGATIVE for weakness, dizziness or paresthesias  HEME: NEGATIVE for bleeding problems    Patient Active Problem List    Diagnosis Date Noted     Midline thoracic back pain 10/20/2021     Priority: Medium     Prediabetes 05/28/2019     Priority: Medium     STACIA (generalized anxiety disorder) 03/13/2018     Priority: Medium     Morbid obesity due to excess calories (H) BMI 50-60 06/16/2017     Priority: Medium     Recurrent major depressive episodes (H) 05/09/2017     Priority: Medium     Multiple sclerosis (H) 05/09/2017     Priority: Medium     Bulimia nervosa 05/09/2017     Priority: Medium     Optic neuritis, left 08/23/2016     Priority: Medium      Hidradenitis suppurativa 04/05/2016     Priority: Medium     Overview:   Dr Lopes, Dermatology, [place].  On Humira and amoxicillin BID daily.        Hypertriglyceridemia 11/10/2015     Priority: Medium     Chronic back pain 07/08/2014     Priority: Medium     Vitamin D deficiency 03/27/2013     Priority: Medium     Migraine without status migrainosus, not intractable 09/14/2009     Priority: Medium      Past Medical History:   Diagnosis Date     Anxiety      Depressive disorder      Headache      Hidradenitis suppurativa 2010     Hypersomnia 04/2019     MS (multiple sclerosis) (H)     diagnosis 10/2016     Recurrent major depression (H)     Pt has tried Zoloft, Prozac, Wellbutrin in the past      Past Surgical History:   Procedure Laterality Date     ENT SURGERY      tubes in ears     MYRINGOTOMY, INSERT TUBE BILATERAL, COMBINED  1990s     Current Outpatient Medications   Medication Sig Dispense Refill     amphetamine-dextroamphetamine (ADDERALL XR) 30 MG 24 hr capsule Take 30 mg by mouth daily Taking 2 capsules in am and 1 capsule in pm       amphetamine-dextroamphetamine (ADDERALL) 10 MG tablet Take 20 mg by mouth 2 times daily       buPROPion (WELLBUTRIN SR) 200 MG 12 hr tablet Takes 2 tablets daily       Cyanocobalamin (B-12) 5000 MCG CAPS Take 1 tablet by mouth daily        cyclobenzaprine (FLEXERIL) 10 MG tablet Take 10 mg by mouth       EARLINE 30 MG tablet 30 mg daily as needed        escitalopram (LEXAPRO) 20 MG tablet Take 1 tablet (20 mg) by mouth daily 90 tablet 1     hydroquinone (MAUDE) 4 % external cream        Interferon Beta-1a 6X8.8 & 6X22 MCG SOSY Inject 1 Units Subcutaneous three times a week       mupirocin (BACTROBAN) 2 % ointment        spironolactone (ALDACTONE) 100 MG tablet Take 1 tablet (100 mg) by mouth 2 times daily 180 tablet 3     tiZANidine (ZANAFLEX) 4 MG tablet TAKE ONE TABLET BY MOUTH THREE TIMES A DAY AS NEEDED FOR MUSCLE SPASMS 30 tablet 3     tretinoin (RETIN-A) 0.05 %  external cream        etonogestrel-ethinyl estradiol (NUVARING) 0.12-0.015 MG/24HR vaginal ring Place 1 each vaginally every 28 days (Patient not taking: Reported on 7/13/2022)       vitamin D3 (CHOLECALCIFEROL) 47167 units (250 mcg) capsule Take 5 capsules by mouth once a week weekly         Allergies   Allergen Reactions     Buspirone Other (See Comments)     Suicidal thoughts     Accutane Rash        Social History     Tobacco Use     Smoking status: Passive Smoke Exposure - Never Smoker     Smokeless tobacco: Never Used   Substance Use Topics     Alcohol use: No     Family History   Problem Relation Age of Onset     Cerebrovascular Disease Mother         heart attack     Cerebrovascular Disease Father         heart attack     Hypertension Father      Hyperlipidemia Father      Obesity Father      Mental Illness Sister      Post-Traumatic Stress Disorder (PTSD) Brother      Mental Illness Brother      Diabetes Maternal Grandmother      Obesity Maternal Grandmother      Other Cancer Other         Paternal uncle     Depression Sister      Anxiety Disorder Sister      Mental Illness Sister      Substance Abuse Sister      Obesity Sister      Depression Brother      Anxiety Disorder Brother      Mental Illness Brother      Asthma Nephew      Obesity Sister      Glaucoma No family hx of      Macular Degeneration No family hx of      Coronary Artery Disease No family hx of      Hyperlipidemia No family hx of      Breast Cancer No family hx of      Colon Cancer No family hx of      Prostate Cancer No family hx of      Other Cancer No family hx of      Depression No family hx of      Anxiety Disorder No family hx of      Substance Abuse No family hx of      Anesthesia Reaction No family hx of      Asthma No family hx of      Osteoporosis No family hx of      Genetic Disorder No family hx of      Thyroid Disease No family hx of      Obesity No family hx of      Unknown/Adopted No family hx of      History   Drug Use No  "        Objective     /84 (BP Location: Right arm, Cuff Size: Adult Large)   Pulse 90   Temp 98.4  F (36.9  C) (Tympanic)   Resp 16   Ht 1.676 m (5' 6\")   Wt 128.5 kg (283 lb 3.2 oz)   LMP 06/24/2022 (Exact Date)   SpO2 97%   BMI 45.71 kg/m      Physical Exam      GENERAL APPEARANCE: healthy, alert and no distress     EYES: no scleral icterus     HENT: OP clear mouth without ulcers or lesions     RESP: lungs clear to auscultation - no rales, rhonchi or wheezes     CV: regular rates and rhythm, normal S1 S2, no S3 or S4 and no murmur, click or rub     ABDOMEN:  soft, nontender, no HSM or masses and bowel sounds normal     MS: extremities normal- no gross deformities noted, no edema     NEURO: Normal mentation, gait and speechnormal     PSYCH: mentation appears normal. and affect normal/bright    Diagnostics:  Labs pending at this time.  Results will be reviewed when available.   No EKG required, no history of coronary heart disease, significant arrhythmia, peripheral arterial disease or other structural heart disease.    Revised Cardiac Risk Index (RCRI):  The patient has the following serious cardiovascular risks for perioperative complications:   - No serious cardiac risks = 0 points     RCRI Interpretation: 0 points: Class I (very low risk - 0.4% complication rate)           Signed Electronically by: Greer Yates PA-C  Copy of this evaluation report is provided to requesting physician.      "

## 2022-07-13 NOTE — RESULT ENCOUNTER NOTE
Dear ,     Here are your recent complete blood count results which are within the expected range. Please continue with your current plan of care and let us know if you have any questions or concerns.    Regards,  Greer Yates PA-C

## 2022-07-13 NOTE — PATIENT INSTRUCTIONS
Stop all NSAIDs (motrin, ibuprofen, naproxen, aleve, advil, naprosyn, aspirin)  for at least 7 days prior to surgery.  Tylenol is okay to take prior to surgery.    Skip adderall and spironolactone the am of procedure (you can take after procedure)    Take the rest of your evening medications as usual the night before surgery.

## 2022-07-13 NOTE — PROGRESS NOTES
[address], [address]  [facility] [address]  Phone: [phone]  Primary Provider: No Ref-Primary, Physician  Pre-op Performing Provider: LIAT GREENE      PREOPERATIVE EVALUATION:  Today's date: 7/13/2022    Princess TRISTIN Machado is a 31 year old female who presents for a preoperative evaluation.    Surgical Information:  Surgery/Procedure: RIGHT WRIST FIRST DORSAL COMPARTMENT RELEASE  Surgery Location:  OR  Surgeon: Michaela Reyes MD  Surgery Date: 7-  Time of Surgery: 10:00am  Where patient plans to recover: At home with family  Fax number for surgical facility: Note does not need to be faxed, will be available electronically in Epic.    Type of Anesthesia Anticipated: to be determined    Assessment & Plan     The proposed surgical procedure is considered LOW risk.    (Z01.818) Preop general physical exam  (primary encounter diagnosis)  Comment: chronic issues stable  Plan: Basic metabolic panel  (Ca, Cl, CO2, Creat,         Gluc, K, Na, BUN), CBC with platelets, HCG         qualitative  Cleared for minor procedure    (M25.531) Right wrist pain  Comment: pain x one year +  Plan: surgery as planned     (F41.1) STACIA (generalized anxiety disorder)  Comment: on wellbutrin and lexapro  Plan: cont above    (E66.01) Morbid obesity due to excess calories (H) BMI 50-60  Comment: at risk for ROSANGELA  Plan: please monitor 02 sat and cardio-pulm status closely in evelin-op period    (F33.9) Recurrent major depressive episodes (H)  Comment: on lexapro and wellbutrin  Plan: cont above    (G35) Multiple sclerosis (H)  Comment: follows with neuro (Hipolito), on subq interferon  Plan: cont above    (E55.9) Vitamin D deficiency  Comment:   Plan:     Risks and Recommendations:  The patient has the following additional risks and recommendations for perioperative complications:   - Consult Hospitalist / IM to assist with post-op medical  management    Medication Instructions:  Stop all NSAIDs (motrin, ibuprofen, naproxen, aleve, advil, naprosyn, aspirin)  for at least 7 days prior to surgery.  Tylenol is okay to take prior to surgery.    Skip adderall and spironolactone the am of procedure (you can take after procedure)    Take the rest of your evening medications as usual the night before surgery.    RECOMMENDATION:  APPROVAL GIVEN to proceed with proposed procedure, without further diagnostic evaluation.    Greer Yates PA-C  30 minutes on the day of the encounter doing chart review, history and exam, documentation and further activities as noted above.          Subjective     HPI related to upcoming procedure:   Will have right hand surgery this month.  She has been having pain x one year.    She otherwise feels well.  She goes to [facility] two times per week for one hour.  She denies chest pain or increased dyspnea with exercise.    She denies history of blood clots or cardiac history.  Preop Questions 7/13/2022   1. Have you ever had a heart attack or stroke? No   2. Have you ever had surgery on your heart or blood vessels, such as a stent placement, a coronary artery bypass, or surgery on an artery in your head, neck, heart, or legs? No   3. Do you have chest pain with activity? No   4. Do you have a history of  heart failure? No   5. Do you currently have a cold, bronchitis or symptoms of other infection? No   6. Do you have a cough, shortness of breath, or wheezing? No   7. Do you or anyone in your family have previous history of blood clots? No   8. Do you or does anyone in your family have a serious bleeding problem such as prolonged bleeding following surgeries or cuts? No   9. Have you ever had problems with anemia or been told to take iron pills? No   10. Have you had any abnormal blood loss such as black, tarry or bloody stools, or abnormal vaginal bleeding? No   11. Have you ever had a blood transfusion? No   12. Are you  willing to have a blood transfusion if it is medically needed before, during, or after your surgery? Yes   13. Have you or any of your relatives ever had problems with anesthesia? No   14. Do you have sleep apnea, excessive snoring or daytime drowsiness? No   15. Do you have any artifical heart valves or other implanted medical devices like a pacemaker, defibrillator, or continuous glucose monitor? No   16. Do you have artificial joints? No   17. Are you allergic to latex? No   18. Is there any chance that you may be pregnant? No     Health Care Directive:  Patient does not have a Health Care Directive or Living Will: Discussed advance care planning with patient; information given to patient to review.     Review of Systems  CONSTITUTIONAL: NEGATIVE for fever, chills, change in weight  INTEGUMENTARY/SKIN: NEGATIVE for worrisome rashes, moles or lesions  EYES: NEGATIVE for vision changes or irritation  ENT/MOUTH: NEGATIVE for ear, mouth and throat problems  RESP: NEGATIVE for significant cough or SOB  CV: NEGATIVE for chest pain, palpitations or peripheral edema  GI: NEGATIVE for nausea, abdominal pain, heartburn, or change in bowel habits  : NEGATIVE for frequency, dysuria, or hematuria  MUSCULOSKELETAL: NEGATIVE for significant arthralgias or myalgia  NEURO: NEGATIVE for weakness, dizziness or paresthesias  HEME: NEGATIVE for bleeding problems    Patient Active Problem List    Diagnosis Date Noted     Midline thoracic back pain 10/20/2021     Priority: Medium     Prediabetes 05/28/2019     Priority: Medium     STACIA (generalized anxiety disorder) 03/13/2018     Priority: Medium     Morbid obesity due to excess calories (H) BMI 50-60 06/16/2017     Priority: Medium     Recurrent major depressive episodes (H) 05/09/2017     Priority: Medium     Multiple sclerosis (H) 05/09/2017     Priority: Medium     Bulimia nervosa 05/09/2017     Priority: Medium     Optic neuritis, left 08/23/2016     Priority: Medium      Hidradenitis suppurativa 04/05/2016     Priority: Medium     Overview:   Dr Lopes, Dermatology, [place].  On Humira and amoxicillin BID daily.        Hypertriglyceridemia 11/10/2015     Priority: Medium     Chronic back pain 07/08/2014     Priority: Medium     Vitamin D deficiency 03/27/2013     Priority: Medium     Migraine without status migrainosus, not intractable 09/14/2009     Priority: Medium      Past Medical History:   Diagnosis Date     Anxiety      Depressive disorder      Headache      Hidradenitis suppurativa 2010     Hypersomnia 04/2019     MS (multiple sclerosis) (H)     diagnosis 10/2016     Recurrent major depression (H)     Pt has tried Zoloft, Prozac, Wellbutrin in the past      Past Surgical History:   Procedure Laterality Date     ENT SURGERY      tubes in ears     MYRINGOTOMY, INSERT TUBE BILATERAL, COMBINED  1990s     Current Outpatient Medications   Medication Sig Dispense Refill     amphetamine-dextroamphetamine (ADDERALL XR) 30 MG 24 hr capsule Take 30 mg by mouth daily Taking 2 capsules in am and 1 capsule in pm       amphetamine-dextroamphetamine (ADDERALL) 10 MG tablet Take 20 mg by mouth 2 times daily       buPROPion (WELLBUTRIN SR) 200 MG 12 hr tablet Takes 2 tablets daily       Cyanocobalamin (B-12) 5000 MCG CAPS Take 1 tablet by mouth daily        cyclobenzaprine (FLEXERIL) 10 MG tablet Take 10 mg by mouth       EARLINE 30 MG tablet 30 mg daily as needed        escitalopram (LEXAPRO) 20 MG tablet Take 1 tablet (20 mg) by mouth daily 90 tablet 1     hydroquinone (MAUDE) 4 % external cream        Interferon Beta-1a 6X8.8 & 6X22 MCG SOSY Inject 1 Units Subcutaneous three times a week       mupirocin (BACTROBAN) 2 % ointment        spironolactone (ALDACTONE) 100 MG tablet Take 1 tablet (100 mg) by mouth 2 times daily 180 tablet 3     tiZANidine (ZANAFLEX) 4 MG tablet TAKE ONE TABLET BY MOUTH THREE TIMES A DAY AS NEEDED FOR MUSCLE SPASMS 30 tablet 3     tretinoin (RETIN-A) 0.05 %  external cream        etonogestrel-ethinyl estradiol (NUVARING) 0.12-0.015 MG/24HR vaginal ring Place 1 each vaginally every 28 days (Patient not taking: Reported on 7/13/2022)       vitamin D3 (CHOLECALCIFEROL) 65976 units (250 mcg) capsule Take 5 capsules by mouth once a week weekly         Allergies   Allergen Reactions     Buspirone Other (See Comments)     Suicidal thoughts     Accutane Rash        Social History     Tobacco Use     Smoking status: Passive Smoke Exposure - Never Smoker     Smokeless tobacco: Never Used   Substance Use Topics     Alcohol use: No     Family History   Problem Relation Age of Onset     Cerebrovascular Disease Mother         heart attack     Cerebrovascular Disease Father         heart attack     Hypertension Father      Hyperlipidemia Father      Obesity Father      Mental Illness Sister      Post-Traumatic Stress Disorder (PTSD) Brother      Mental Illness Brother      Diabetes Maternal Grandmother      Obesity Maternal Grandmother      Other Cancer Other         Paternal uncle     Depression Sister      Anxiety Disorder Sister      Mental Illness Sister      Substance Abuse Sister      Obesity Sister      Depression Brother      Anxiety Disorder Brother      Mental Illness Brother      Asthma Nephew      Obesity Sister      Glaucoma No family hx of      Macular Degeneration No family hx of      Coronary Artery Disease No family hx of      Hyperlipidemia No family hx of      Breast Cancer No family hx of      Colon Cancer No family hx of      Prostate Cancer No family hx of      Other Cancer No family hx of      Depression No family hx of      Anxiety Disorder No family hx of      Substance Abuse No family hx of      Anesthesia Reaction No family hx of      Asthma No family hx of      Osteoporosis No family hx of      Genetic Disorder No family hx of      Thyroid Disease No family hx of      Obesity No family hx of      Unknown/Adopted No family hx of      History   Drug Use No  "        Objective     /84 (BP Location: Right arm, Cuff Size: Adult Large)   Pulse 90   Temp 98.4  F (36.9  C) (Tympanic)   Resp 16   Ht 1.676 m (5' 6\")   Wt 128.5 kg (283 lb 3.2 oz)   LMP 06/24/2022 (Exact Date)   SpO2 97%   BMI 45.71 kg/m      Physical Exam      GENERAL APPEARANCE: healthy, alert and no distress     EYES: no scleral icterus     HENT: OP clear mouth without ulcers or lesions     RESP: lungs clear to auscultation - no rales, rhonchi or wheezes     CV: regular rates and rhythm, normal S1 S2, no S3 or S4 and no murmur, click or rub     ABDOMEN:  soft, nontender, no HSM or masses and bowel sounds normal     MS: extremities normal- no gross deformities noted, no edema     NEURO: Normal mentation, gait and speechnormal     PSYCH: mentation appears normal. and affect normal/bright    Diagnostics:  Labs pending at this time.  Results will be reviewed when available.   No EKG required, no history of coronary heart disease, significant arrhythmia, peripheral arterial disease or other structural heart disease.    Revised Cardiac Risk Index (RCRI):  The patient has the following serious cardiovascular risks for perioperative complications:   - No serious cardiac risks = 0 points     RCRI Interpretation: 0 points: Class I (very low risk - 0.4% complication rate)           Signed Electronically by: Greer Yates PA-C  Copy of this evaluation report is provided to requesting physician.      "

## 2022-07-14 LAB
ANION GAP SERPL CALCULATED.3IONS-SCNC: 6 MMOL/L (ref 3–14)
BUN SERPL-MCNC: 10 MG/DL (ref 7–30)
CALCIUM SERPL-MCNC: 9 MG/DL (ref 8.5–10.1)
CHLORIDE BLD-SCNC: 106 MMOL/L (ref 94–109)
CO2 SERPL-SCNC: 25 MMOL/L (ref 20–32)
CREAT SERPL-MCNC: 0.85 MG/DL (ref 0.52–1.04)
GFR SERPL CREATININE-BSD FRML MDRD: >90 ML/MIN/1.73M2
GLUCOSE BLD-MCNC: 86 MG/DL (ref 70–99)
HCG SERPL QL: NEGATIVE
POTASSIUM BLD-SCNC: 4.2 MMOL/L (ref 3.4–5.3)
SODIUM SERPL-SCNC: 137 MMOL/L (ref 133–144)

## 2022-07-15 NOTE — RESULT ENCOUNTER NOTE
Dear ,     Here are your recent basic metabolic panel results which are within the expected range. Your pregnancy test was negative.  Please continue with your current plan of care and let us know if you have any questions or concerns.    Regards,  Greer Yates PA-C

## 2022-07-20 ENCOUNTER — ANESTHESIA (OUTPATIENT)
Dept: SURGERY | Facility: CLINIC | Age: 32
End: 2022-07-20
Payer: COMMERCIAL

## 2022-07-20 ENCOUNTER — ANESTHESIA EVENT (OUTPATIENT)
Dept: SURGERY | Facility: CLINIC | Age: 32
End: 2022-07-20
Payer: COMMERCIAL

## 2022-07-20 ENCOUNTER — HOSPITAL ENCOUNTER (OUTPATIENT)
Facility: CLINIC | Age: 32
Discharge: HOME | End: 2022-07-20
Attending: ORTHOPAEDIC SURGERY | Admitting: ORTHOPAEDIC SURGERY
Payer: COMMERCIAL

## 2022-07-20 ENCOUNTER — MEDICAL CORRESPONDENCE (OUTPATIENT)
Dept: HEALTH INFORMATION MANAGEMENT | Facility: CLINIC | Age: 32
End: 2022-07-20

## 2022-07-20 VITALS
TEMPERATURE: 97.3 F | WEIGHT: 283.2 LBS | SYSTOLIC BLOOD PRESSURE: 115 MMHG | BODY MASS INDEX: 45.51 KG/M2 | HEIGHT: 66 IN | DIASTOLIC BLOOD PRESSURE: 69 MMHG | HEART RATE: 72 BPM | OXYGEN SATURATION: 100 % | RESPIRATION RATE: 18 BRPM

## 2022-07-20 DIAGNOSIS — G89.18 POSTOPERATIVE PAIN: Primary | ICD-10-CM

## 2022-07-20 LAB — HCG UR QL: NEGATIVE

## 2022-07-20 PROCEDURE — 258N000003 HC RX IP 258 OP 636: Performed by: NURSE ANESTHETIST, CERTIFIED REGISTERED

## 2022-07-20 PROCEDURE — 360N000075 HC SURGERY LEVEL 2, PER MIN: Performed by: ORTHOPAEDIC SURGERY

## 2022-07-20 PROCEDURE — 250N000009 HC RX 250: Performed by: NURSE ANESTHETIST, CERTIFIED REGISTERED

## 2022-07-20 PROCEDURE — 999N000141 HC STATISTIC PRE-PROCEDURE NURSING ASSESSMENT: Performed by: ORTHOPAEDIC SURGERY

## 2022-07-20 PROCEDURE — 250N000009 HC RX 250: Performed by: ORTHOPAEDIC SURGERY

## 2022-07-20 PROCEDURE — 370N000017 HC ANESTHESIA TECHNICAL FEE, PER MIN: Performed by: ORTHOPAEDIC SURGERY

## 2022-07-20 PROCEDURE — 710N000012 HC RECOVERY PHASE 2, PER MINUTE: Performed by: ORTHOPAEDIC SURGERY

## 2022-07-20 PROCEDURE — 81025 URINE PREGNANCY TEST: CPT | Performed by: ANESTHESIOLOGY

## 2022-07-20 PROCEDURE — 250N000011 HC RX IP 250 OP 636: Performed by: NURSE ANESTHETIST, CERTIFIED REGISTERED

## 2022-07-20 PROCEDURE — 710N000009 HC RECOVERY PHASE 1, LEVEL 1, PER MIN: Performed by: ORTHOPAEDIC SURGERY

## 2022-07-20 PROCEDURE — 272N000001 HC OR GENERAL SUPPLY STERILE: Performed by: ORTHOPAEDIC SURGERY

## 2022-07-20 PROCEDURE — 250N000011 HC RX IP 250 OP 636: Performed by: PHYSICIAN ASSISTANT

## 2022-07-20 RX ORDER — OXYCODONE HYDROCHLORIDE 5 MG/1
5 TABLET ORAL EVERY 4 HOURS PRN
Status: DISCONTINUED | OUTPATIENT
Start: 2022-07-20 | End: 2022-07-20 | Stop reason: HOSPADM

## 2022-07-20 RX ORDER — MAGNESIUM HYDROXIDE 1200 MG/15ML
LIQUID ORAL PRN
Status: DISCONTINUED | OUTPATIENT
Start: 2022-07-20 | End: 2022-07-20 | Stop reason: HOSPADM

## 2022-07-20 RX ORDER — ONDANSETRON 4 MG/1
4 TABLET, ORALLY DISINTEGRATING ORAL EVERY 30 MIN PRN
Status: DISCONTINUED | OUTPATIENT
Start: 2022-07-20 | End: 2022-07-20 | Stop reason: HOSPADM

## 2022-07-20 RX ORDER — PROPOFOL 10 MG/ML
INJECTION, EMULSION INTRAVENOUS CONTINUOUS PRN
Status: DISCONTINUED | OUTPATIENT
Start: 2022-07-20 | End: 2022-07-20

## 2022-07-20 RX ORDER — SODIUM CHLORIDE, SODIUM LACTATE, POTASSIUM CHLORIDE, CALCIUM CHLORIDE 600; 310; 30; 20 MG/100ML; MG/100ML; MG/100ML; MG/100ML
INJECTION, SOLUTION INTRAVENOUS CONTINUOUS
Status: DISCONTINUED | OUTPATIENT
Start: 2022-07-20 | End: 2022-07-20 | Stop reason: HOSPADM

## 2022-07-20 RX ORDER — FENTANYL CITRATE 50 UG/ML
INJECTION, SOLUTION INTRAMUSCULAR; INTRAVENOUS PRN
Status: DISCONTINUED | OUTPATIENT
Start: 2022-07-20 | End: 2022-07-20

## 2022-07-20 RX ORDER — LIDOCAINE HYDROCHLORIDE 10 MG/ML
INJECTION, SOLUTION INFILTRATION; PERINEURAL
Status: DISCONTINUED
Start: 2022-07-20 | End: 2022-07-20 | Stop reason: WASHOUT

## 2022-07-20 RX ORDER — LIDOCAINE HYDROCHLORIDE AND EPINEPHRINE 10; 10 MG/ML; UG/ML
INJECTION, SOLUTION INFILTRATION; PERINEURAL PRN
Status: DISCONTINUED | OUTPATIENT
Start: 2022-07-20 | End: 2022-07-20 | Stop reason: HOSPADM

## 2022-07-20 RX ORDER — HYDROMORPHONE HCL IN WATER/PF 6 MG/30 ML
0.2 PATIENT CONTROLLED ANALGESIA SYRINGE INTRAVENOUS EVERY 5 MIN PRN
Status: DISCONTINUED | OUTPATIENT
Start: 2022-07-20 | End: 2022-07-20 | Stop reason: HOSPADM

## 2022-07-20 RX ORDER — ONDANSETRON 2 MG/ML
INJECTION INTRAMUSCULAR; INTRAVENOUS PRN
Status: DISCONTINUED | OUTPATIENT
Start: 2022-07-20 | End: 2022-07-20

## 2022-07-20 RX ORDER — LIDOCAINE 40 MG/G
CREAM TOPICAL
Status: DISCONTINUED | OUTPATIENT
Start: 2022-07-20 | End: 2022-07-20 | Stop reason: HOSPADM

## 2022-07-20 RX ORDER — PROPOFOL 10 MG/ML
INJECTION, EMULSION INTRAVENOUS PRN
Status: DISCONTINUED | OUTPATIENT
Start: 2022-07-20 | End: 2022-07-20

## 2022-07-20 RX ORDER — ONDANSETRON 2 MG/ML
4 INJECTION INTRAMUSCULAR; INTRAVENOUS EVERY 30 MIN PRN
Status: DISCONTINUED | OUTPATIENT
Start: 2022-07-20 | End: 2022-07-20 | Stop reason: HOSPADM

## 2022-07-20 RX ORDER — LIDOCAINE HCL/EPINEPHRINE/PF 2%-1:200K
VIAL (ML) INJECTION
Status: DISCONTINUED
Start: 2022-07-20 | End: 2022-07-20 | Stop reason: HOSPADM

## 2022-07-20 RX ORDER — FENTANYL CITRATE 50 UG/ML
25 INJECTION, SOLUTION INTRAMUSCULAR; INTRAVENOUS
Status: DISCONTINUED | OUTPATIENT
Start: 2022-07-20 | End: 2022-07-20 | Stop reason: HOSPADM

## 2022-07-20 RX ORDER — BUPIVACAINE HYDROCHLORIDE AND EPINEPHRINE 5; 5 MG/ML; UG/ML
INJECTION, SOLUTION PERINEURAL PRN
Status: DISCONTINUED | OUTPATIENT
Start: 2022-07-20 | End: 2022-07-20 | Stop reason: HOSPADM

## 2022-07-20 RX ORDER — SODIUM CHLORIDE, SODIUM LACTATE, POTASSIUM CHLORIDE, CALCIUM CHLORIDE 600; 310; 30; 20 MG/100ML; MG/100ML; MG/100ML; MG/100ML
INJECTION, SOLUTION INTRAVENOUS CONTINUOUS PRN
Status: DISCONTINUED | OUTPATIENT
Start: 2022-07-20 | End: 2022-07-20

## 2022-07-20 RX ORDER — FENTANYL CITRATE 50 UG/ML
25 INJECTION, SOLUTION INTRAMUSCULAR; INTRAVENOUS EVERY 5 MIN PRN
Status: DISCONTINUED | OUTPATIENT
Start: 2022-07-20 | End: 2022-07-20 | Stop reason: HOSPADM

## 2022-07-20 RX ORDER — MEPERIDINE HYDROCHLORIDE 25 MG/ML
12.5 INJECTION INTRAMUSCULAR; INTRAVENOUS; SUBCUTANEOUS
Status: DISCONTINUED | OUTPATIENT
Start: 2022-07-20 | End: 2022-07-20 | Stop reason: HOSPADM

## 2022-07-20 RX ORDER — CEFAZOLIN SODIUM/WATER 3 G/30 ML
3 SYRINGE (ML) INTRAVENOUS SEE ADMIN INSTRUCTIONS
Status: DISCONTINUED | OUTPATIENT
Start: 2022-07-20 | End: 2022-07-20 | Stop reason: HOSPADM

## 2022-07-20 RX ORDER — LIDOCAINE HYDROCHLORIDE 20 MG/ML
INJECTION, SOLUTION INFILTRATION; PERINEURAL PRN
Status: DISCONTINUED | OUTPATIENT
Start: 2022-07-20 | End: 2022-07-20

## 2022-07-20 RX ORDER — CEFAZOLIN SODIUM/WATER 3 G/30 ML
3 SYRINGE (ML) INTRAVENOUS
Status: COMPLETED | OUTPATIENT
Start: 2022-07-20 | End: 2022-07-20

## 2022-07-20 RX ORDER — GINSENG 100 MG
CAPSULE ORAL
Status: DISCONTINUED
Start: 2022-07-20 | End: 2022-07-20 | Stop reason: WASHOUT

## 2022-07-20 RX ADMIN — LIDOCAINE HYDROCHLORIDE 60 MG: 20 INJECTION, SOLUTION INFILTRATION; PERINEURAL at 10:27

## 2022-07-20 RX ADMIN — PROPOFOL 30 MG: 10 INJECTION, EMULSION INTRAVENOUS at 10:43

## 2022-07-20 RX ADMIN — ONDANSETRON 4 MG: 2 INJECTION INTRAMUSCULAR; INTRAVENOUS at 10:53

## 2022-07-20 RX ADMIN — FENTANYL CITRATE 25 MCG: 50 INJECTION, SOLUTION INTRAMUSCULAR; INTRAVENOUS at 10:27

## 2022-07-20 RX ADMIN — PROPOFOL 100 MG: 10 INJECTION, EMULSION INTRAVENOUS at 10:28

## 2022-07-20 RX ADMIN — Medication 3 G: at 10:26

## 2022-07-20 RX ADMIN — FENTANYL CITRATE 25 MCG: 50 INJECTION, SOLUTION INTRAMUSCULAR; INTRAVENOUS at 10:43

## 2022-07-20 RX ADMIN — PROPOFOL 100 MCG/KG/MIN: 10 INJECTION, EMULSION INTRAVENOUS at 10:30

## 2022-07-20 RX ADMIN — SODIUM CHLORIDE, SODIUM LACTATE, POTASSIUM CHLORIDE, CALCIUM CHLORIDE: 600; 310; 30; 20 INJECTION, SOLUTION INTRAVENOUS at 10:25

## 2022-07-20 ASSESSMENT — COPD QUESTIONNAIRES: COPD: 0

## 2022-07-20 NOTE — ANESTHESIA POSTPROCEDURE EVALUATION
Patient: Princess TIRSTIN Machado    Procedure: Procedure(s):  RIGHT WRIST FIRST DORSAL COMPARTMENT RELEASE       Anesthesia Type:  MAC    Note:  Disposition: Outpatient   Postop Pain Control: Uneventful            Sign Out: Well controlled pain   PONV: No   Neuro/Psych: Uneventful            Sign Out: Acceptable/Baseline neuro status   Airway/Respiratory: Uneventful            Sign Out: Acceptable/Baseline resp. status   CV/Hemodynamics: Uneventful            Sign Out: Acceptable CV status   Other NRE:    DID A NON-ROUTINE EVENT OCCUR? No           Last vitals:  Vitals Value Taken Time   /59 07/20/22 1130   Temp 36.7  C (98  F) 07/20/22 1130   Pulse 80 07/20/22 1137   Resp 19 07/20/22 1137   SpO2 96 % 07/20/22 1135   Vitals shown include unvalidated device data.    Electronically Signed By: Mckenna Jeffers  July 20, 2022  1:42 PM

## 2022-07-20 NOTE — OP NOTE
SURGEON: SAILAJA BRUNER M.D.  ASSISTANT: ISRA CHAUDHARI PA-C.    NEED FOR ASSISTANT  A skilled first assistant was necessary through all portions of the case in order to assist with patient positioning, careful retracting to avoid nerve damage, wound closure, and dressing and splint application.     PREOPERATIVE DIAGNOSIS  Right wrist first dorsal compartment tendonitis.                       POSTOPERATIVE DIAGNOSIS  Right wrist first dorsal compartment tendonitis.                       OPERATION  Right wrist first dorsal compartment release.     ANESTHESIA  LLocal/MAC.     TOURNIQUET TIME  12  minutes.     INFORMED CONSENT  Informed consent was obtained in the holding area.     SURGICAL SITE SIGNED  The surgical site was signed by me prior to entering the OR.     TIME OUT  A time-out was performed confirming the surgical site prior to incision.     PROPHYLACTIC ANTIBIOTICS  Ancef 3 gms given prior to tourniquet inflation.     DVT INDICATIONS  SCD's were not applied due to the short nature of the case.     PROCEDURE AND FINDINGS  The patient was then brought to the operating room and given a local/MAC  anesthetic.  The arm was prepped and draped in a normal standard manner.     An oblique incision was made in the skin over the first dorsal compartment and the compartment was visualized. The superficial radial nerve was identified and retracted dorsally. The roof of the compartment was cut at its most dorsal aspect. The compartment was inspected for any bony prominences and the tendons did not demonstrate any fraying. The wrist and thumb were pulled through range of motion to ensure that the tendons would not sublux volarly. Then, Marcaine with epinephrine was injected at the incision site and the skin was closed with 4-0 Monocryl.  A sterile dressing was applied and the patient was transferred to the recovery room in stable condition.     Cc: my office

## 2022-07-20 NOTE — ANESTHESIA CARE TRANSFER NOTE
Patient: Princess TRISTIN Machado    Procedure: Procedure(s):  RIGHT WRIST FIRST DORSAL COMPARTMENT RELEASE       Diagnosis: Tendinitis, de Quervain's [M65.4]  Diagnosis Additional Information: No value filed.    Anesthesia Type:   MAC     Note:    Oropharynx: oropharynx clear of all foreign objects  Level of Consciousness: awake and drowsy  Oxygen Supplementation: face mask  Level of Supplemental Oxygen (L/min / FiO2): 8  Independent Airway: airway patency satisfactory and stable  Dentition: dentition unchanged  Vital Signs Stable: post-procedure vital signs reviewed and stable  Report to RN Given: handoff report given  Patient transferred to: PACU    Handoff Report: Identifed the Patient, Identified the Reponsible Provider, Reviewed the pertinent medical history, Discussed the surgical course, Reviewed Intra-OP anesthesia mangement and issues during anesthesia, Set expectations for post-procedure period and Allowed opportunity for questions and acknowledgement of understanding      Vitals:  Vitals Value Taken Time   /81 07/20/22 1107   Temp 36.3    Pulse 74 07/20/22 1110   Resp 18 07/20/22 1110   SpO2 95 % 07/20/22 1109   Vitals shown include unvalidated device data.    Electronically Signed By: ARIEL Mcmillan CRNA  July 20, 2022  11:11 AM

## 2022-07-20 NOTE — ANESTHESIA PREPROCEDURE EVALUATION
Anesthesia Pre-Procedure Evaluation    Patient: Princess TRISTIN Machado   MRN: [id number] : 1990        Procedure : Procedure(s):  RIGHT WRIST FIRST DORSAL COMPARTMENT RELEASE          Past Medical History:   Diagnosis Date     Anxiety      Depressive disorder      Headache      Hidradenitis suppurativa 2010     Hypersomnia 2019     MS (multiple sclerosis) (H)     diagnosis 10/2016     Recurrent major depression (H)     Pt has tried Zoloft, Prozac, Wellbutrin in the past       Past Surgical History:   Procedure Laterality Date     ENT SURGERY      tubes in ears     MYRINGOTOMY, INSERT TUBE BILATERAL, COMBINED  1990s      Allergies   Allergen Reactions     Buspirone Other (See Comments)     Suicidal thoughts     Accutane Rash      Social History     Tobacco Use     Smoking status: Passive Smoke Exposure - Never Smoker     Smokeless tobacco: Never Used   Substance Use Topics     Alcohol use: No      Wt Readings from Last 1 Encounters:   22 128.5 kg (283 lb 3.2 oz)        Anesthesia Evaluation            ROS/MED HX  ENT/Pulmonary:    (-) asthma, COPD and sleep apnea   Neurologic:     (+) migraines, Multiple Sclerosis,     Cardiovascular:       METS/Exercise Tolerance:     Hematologic:       Musculoskeletal:       GI/Hepatic:    (-) GERD   Renal/Genitourinary:       Endo:     (+) Obesity,     Psychiatric/Substance Use:     (+) psychiatric history anxiety and depression     Infectious Disease:       Malignancy:       Other:            Physical Exam    Airway        Mallampati: I   TM distance: > 3 FB   Neck ROM: full     Respiratory Devices and Support         Dental       (+) upper retainer and lower retainer      Cardiovascular   cardiovascular exam normal          Pulmonary           breath sounds clear to auscultation           OUTSIDE LABS:  CBC:   Lab Results   Component Value Date    WBC 8.8 2022    WBC 8.3 2019    HGB 13.4 2022    HGB 12.3 2019    HCT 39.1 2022    HCT  35.3 08/02/2019     07/13/2022     08/02/2019     BMP:   Lab Results   Component Value Date     07/13/2022     05/28/2019    POTASSIUM 4.2 07/13/2022    POTASSIUM 4.4 05/28/2019    CHLORIDE 106 07/13/2022    CHLORIDE 107 05/28/2019    CO2 25 07/13/2022    CO2 29 05/28/2019    BUN 10 07/13/2022    BUN 12 05/28/2019    CR 0.85 07/13/2022    CR 0.93 05/28/2019    GLC 86 07/13/2022    GLC 87 05/28/2019     COAGS:   Lab Results   Component Value Date    PTT 31 08/19/2019    INR 1.09 08/19/2019     POC:   Lab Results   Component Value Date    HCG Negative 03/22/2021    HCGS Negative 07/13/2022     HEPATIC:   Lab Results   Component Value Date    ALBUMIN 3.5 05/28/2019    PROTTOTAL 7.8 05/28/2019    ALT 22 05/28/2019    AST 14 05/28/2019    ALKPHOS 81 05/28/2019    BILITOTAL 0.2 05/28/2019     OTHER:   Lab Results   Component Value Date    A1C 5.6 05/28/2019    SUMAN 9.0 07/13/2022    LIPASE 144 01/14/2018    AMYLASE 39 01/14/2018    TSH 3.36 08/02/2019    T4 7.9 10/26/2017    CRP 17.9 (H) 08/22/2016    SED 58 (H) 08/22/2016       Anesthesia Plan    ASA Status:  2      Anesthesia Type: MAC.              Consents    Anesthesia Plan(s) and associated risks, benefits, and realistic alternatives discussed. Questions answered and patient/representative(s) expressed understanding.    - Discussed:     - Discussed with:  Patient         Postoperative Care       PONV prophylaxis: Ondansetron (or other 5HT-3)     Comments:                Mckenna Jeffers

## 2022-07-20 NOTE — BRIEF OP NOTE
[facility]    Brief Operative Note    Pre-operative diagnosis: Tendinitis, de Quervain's [M65.4]  Post-operative diagnosis same  Procedure: Procedure(s):  RIGHT WRIST FIRST DORSAL COMPARTMENT RELEASE  Surgeon: Surgeon(s) and Role:     * Michaela Reyes MD - Primary     * Dayan Stephenson PA-C - Assisting  Anesthesia: MAC with Local   Estimated Blood Loss: Less than 10 ml    Drains: None  Specimens: * No specimens in log *  Findings:   None.  Complications: None.  Implants: * No implants in log *

## 2022-07-20 NOTE — DISCHARGE INSTRUCTIONS
Same Day Surgery Discharge Instructions for  Sedation and General Anesthesia     It's not unusual to feel dizzy, light-headed or faint for up to 24 hours after surgery or while taking pain medication.  If you have these symptoms: sit for a few minutes before standing and have someone assist you when you get up to walk or use the bathroom.    You should rest and relax for the next 24 hours. We recommend you make arrangements to have an adult stay with you for at least 24 hours after your discharge.  Avoid hazardous and strenuous activity.    DO NOT DRIVE any vehicle or operate mechanical equipment for 24 hours following the end of your surgery.  Even though you may feel normal, your reactions may be affected by the medication you have received.    Do not drink alcoholic beverages for 24 hours following surgery.     Slowly progress to your regular diet as you feel able. It's not unusual to feel nauseated and/or vomit after receiving anesthesia.  If you develop these symptoms, drink clear liquids (apple juice, ginger ale, broth, 7-up, etc. ) until you feel better.  If your nausea and vomiting persists for 24 hours, please notify your surgeon.      All narcotic pain medications, along with inactivity and anesthesia, can cause constipation. Drinking plenty of liquids and increasing fiber intake will help.    For any questions of a medical nature, call your surgeon.    Do not make important decisions for 24 hours.    If you had general anesthesia, you may have a sore throat for a couple of days related to the breathing tube used during surgery.  You may use Cepacol lozenges to help with this discomfort.  If it worsens or if you develop a fever, contact your surgeon.     If you feel your pain is not well managed with the pain medications prescribed by your surgeon, please contact your surgeon's office to let them know so they can address your concerns.        **If you have questions or concerns about your procedure,  call  Dr. Reyes at [phone]**

## 2022-09-12 ENCOUNTER — OFFICE VISIT (OUTPATIENT)
Dept: URGENT CARE | Facility: URGENT CARE | Age: 32
End: 2022-09-12
Payer: COMMERCIAL

## 2022-09-12 VITALS
OXYGEN SATURATION: 100 % | HEART RATE: 78 BPM | SYSTOLIC BLOOD PRESSURE: 126 MMHG | TEMPERATURE: 98.4 F | DIASTOLIC BLOOD PRESSURE: 81 MMHG

## 2022-09-12 DIAGNOSIS — R79.89 ELEVATED D-DIMER: ICD-10-CM

## 2022-09-12 DIAGNOSIS — M79.662 PAIN OF LEFT LOWER LEG: Primary | ICD-10-CM

## 2022-09-12 LAB
BASOPHILS # BLD AUTO: 0 10E3/UL (ref 0–0.2)
BASOPHILS NFR BLD AUTO: 0 %
D DIMER PPP FEU-MCNC: 0.63 UG/ML FEU (ref 0–0.5)
EOSINOPHIL # BLD AUTO: 0.1 10E3/UL (ref 0–0.7)
EOSINOPHIL NFR BLD AUTO: 1 %
ERYTHROCYTE [DISTWIDTH] IN BLOOD BY AUTOMATED COUNT: 12.1 % (ref 10–15)
HCT VFR BLD AUTO: 39.2 % (ref 35–47)
HGB BLD-MCNC: 13 G/DL (ref 11.7–15.7)
LYMPHOCYTES # BLD AUTO: 3.4 10E3/UL (ref 0.8–5.3)
LYMPHOCYTES NFR BLD AUTO: 39 %
MCH RBC QN AUTO: 31.2 PG (ref 26.5–33)
MCHC RBC AUTO-ENTMCNC: 33.2 G/DL (ref 31.5–36.5)
MCV RBC AUTO: 94 FL (ref 78–100)
MONOCYTES # BLD AUTO: 0.8 10E3/UL (ref 0–1.3)
MONOCYTES NFR BLD AUTO: 9 %
NEUTROPHILS # BLD AUTO: 4.4 10E3/UL (ref 1.6–8.3)
NEUTROPHILS NFR BLD AUTO: 50 %
PLATELET # BLD AUTO: 267 10E3/UL (ref 150–450)
RBC # BLD AUTO: 4.17 10E6/UL (ref 3.8–5.2)
WBC # BLD AUTO: 8.8 10E3/UL (ref 4–11)

## 2022-09-12 PROCEDURE — 99215 OFFICE O/P EST HI 40 MIN: CPT | Performed by: PHYSICIAN ASSISTANT

## 2022-09-12 PROCEDURE — 85379 FIBRIN DEGRADATION QUANT: CPT | Performed by: PHYSICIAN ASSISTANT

## 2022-09-12 PROCEDURE — 36415 COLL VENOUS BLD VENIPUNCTURE: CPT | Performed by: PHYSICIAN ASSISTANT

## 2022-09-12 PROCEDURE — 85025 COMPLETE CBC W/AUTO DIFF WBC: CPT | Performed by: PHYSICIAN ASSISTANT

## 2022-09-12 ASSESSMENT — PAIN SCALES - GENERAL: PAINLEVEL: MILD PAIN (2)

## 2022-09-13 ENCOUNTER — HOSPITAL ENCOUNTER (EMERGENCY)
Facility: CLINIC | Age: 32
Discharge: HOME | End: 2022-09-13
Attending: EMERGENCY MEDICINE | Admitting: EMERGENCY MEDICINE
Payer: COMMERCIAL

## 2022-09-13 ENCOUNTER — APPOINTMENT (OUTPATIENT)
Dept: ULTRASOUND IMAGING | Facility: CLINIC | Age: 32
End: 2022-09-13
Attending: EMERGENCY MEDICINE
Payer: COMMERCIAL

## 2022-09-13 VITALS
BODY MASS INDEX: 44.87 KG/M2 | RESPIRATION RATE: 16 BRPM | WEIGHT: 278 LBS | DIASTOLIC BLOOD PRESSURE: 86 MMHG | OXYGEN SATURATION: 100 % | HEART RATE: 86 BPM | SYSTOLIC BLOOD PRESSURE: 155 MMHG | TEMPERATURE: 98.9 F

## 2022-09-13 DIAGNOSIS — I83.93 VARICOSE VEINS OF BOTH LOWER EXTREMITIES, UNSPECIFIED WHETHER COMPLICATED: ICD-10-CM

## 2022-09-13 DIAGNOSIS — R03.0 ELEVATED BLOOD PRESSURE READING WITHOUT DIAGNOSIS OF HYPERTENSION: ICD-10-CM

## 2022-09-13 LAB
ANION GAP SERPL CALCULATED.3IONS-SCNC: 6 MMOL/L (ref 3–14)
B-HCG SERPL-ACNC: <1 IU/L (ref 0–5)
BASOPHILS # BLD AUTO: 0.1 10E3/UL (ref 0–0.2)
BASOPHILS NFR BLD AUTO: 1 %
BUN SERPL-MCNC: 9 MG/DL (ref 7–30)
CALCIUM SERPL-MCNC: 8.7 MG/DL (ref 8.5–10.1)
CHLORIDE BLD-SCNC: 105 MMOL/L (ref 94–109)
CO2 SERPL-SCNC: 28 MMOL/L (ref 20–32)
CREAT SERPL-MCNC: 0.94 MG/DL (ref 0.52–1.04)
EOSINOPHIL # BLD AUTO: 0.2 10E3/UL (ref 0–0.7)
EOSINOPHIL NFR BLD AUTO: 2 %
ERYTHROCYTE [DISTWIDTH] IN BLOOD BY AUTOMATED COUNT: 12.1 % (ref 10–15)
GFR SERPL CREATININE-BSD FRML MDRD: 81 ML/MIN/1.73M2
GLUCOSE BLD-MCNC: 108 MG/DL (ref 70–99)
HCT VFR BLD AUTO: 38.7 % (ref 35–47)
HGB BLD-MCNC: 13.2 G/DL (ref 11.7–15.7)
HOLD SPECIMEN: NORMAL
IMM GRANULOCYTES # BLD: 0 10E3/UL
IMM GRANULOCYTES NFR BLD: 0 %
LYMPHOCYTES # BLD AUTO: 3.4 10E3/UL (ref 0.8–5.3)
LYMPHOCYTES NFR BLD AUTO: 37 %
MCH RBC QN AUTO: 31.6 PG (ref 26.5–33)
MCHC RBC AUTO-ENTMCNC: 34.1 G/DL (ref 31.5–36.5)
MCV RBC AUTO: 93 FL (ref 78–100)
MONOCYTES # BLD AUTO: 0.6 10E3/UL (ref 0–1.3)
MONOCYTES NFR BLD AUTO: 6 %
NEUTROPHILS # BLD AUTO: 5.1 10E3/UL (ref 1.6–8.3)
NEUTROPHILS NFR BLD AUTO: 54 %
NRBC # BLD AUTO: 0 10E3/UL
NRBC BLD AUTO-RTO: 0 /100
PLATELET # BLD AUTO: 274 10E3/UL (ref 150–450)
POTASSIUM BLD-SCNC: 3.5 MMOL/L (ref 3.4–5.3)
RBC # BLD AUTO: 4.18 10E6/UL (ref 3.8–5.2)
SODIUM SERPL-SCNC: 139 MMOL/L (ref 133–144)
WBC # BLD AUTO: 9.3 10E3/UL (ref 4–11)

## 2022-09-13 PROCEDURE — 84702 CHORIONIC GONADOTROPIN TEST: CPT | Performed by: EMERGENCY MEDICINE

## 2022-09-13 PROCEDURE — 85014 HEMATOCRIT: CPT | Performed by: EMERGENCY MEDICINE

## 2022-09-13 PROCEDURE — 85025 COMPLETE CBC W/AUTO DIFF WBC: CPT | Performed by: EMERGENCY MEDICINE

## 2022-09-13 PROCEDURE — 36415 COLL VENOUS BLD VENIPUNCTURE: CPT | Performed by: EMERGENCY MEDICINE

## 2022-09-13 PROCEDURE — 93971 EXTREMITY STUDY: CPT | Mod: LT

## 2022-09-13 PROCEDURE — 80048 BASIC METABOLIC PNL TOTAL CA: CPT | Performed by: EMERGENCY MEDICINE

## 2022-09-13 PROCEDURE — 82310 ASSAY OF CALCIUM: CPT | Performed by: EMERGENCY MEDICINE

## 2022-09-13 PROCEDURE — 99284 EMERGENCY DEPT VISIT MOD MDM: CPT | Mod: 25

## 2022-09-13 ASSESSMENT — ENCOUNTER SYMPTOMS
SHORTNESS OF BREATH: 0
MYALGIAS: 1
FEVER: 0

## 2022-09-13 NOTE — PROGRESS NOTES
"  Assessment & Plan     Pain of left lower leg    A hematoma is a collection of blood trapped outside of a blood vessel. It is what we think of as a bruise or a contusion. It is usually seen under the skin as a black and blue spot on your arm or leg, or a bump on your head after an injury. It can be almost anywhere on or in your body. It can also occur in an internal organ where it can be more serious.   A hematoma is caused by an injury with damage to small blood vessels. This causes blood to leak into the tissues. Blood forms a pocket under the skin that swells and looks like a purplish patch. Hematomas sometimes form under the skin from bleeding during childbirth and can be particularly serious. Another serious form of hematoma forms after a fall on the head, called a subdural hematoma.   Gradually the blood in the hematoma is absorbed back into the body. The swelling and pain of the hematoma will go away. This takes from 1 to 4 weeks, depending on the size of the hematoma. The skin over the hematoma may turn bluish then brown and yellow as the blood is dissolved and absorbed. Usually, this only takes a couple of weeks but can last months.     Elevated D-dimer  D-dimer is Positive , concern for DVT due to increased swelling in ankle and leg  Patient to go to the ED tonight for ultrasound  CBC is negative for signs of infection or low platelets  Alternate warm moist heat and ice  Leg elevation for   -  - CBC with platelets and differential; Future  - D dimer, quantitative is elevated  - CBC with platelets and differential    Review of external notes as documented elsewhere in note         BMI:   Estimated body mass index is 45.71 kg/m  as calculated from the following:    Height as of 7/20/22: 1.676 m (5' 6\").    Weight as of 7/20/22: 128.5 kg (283 lb 3.2 oz).     At today's visit with Princess TRISTIN Machado , we discussed results, diagnosis, medications and formulated a plan.  We also discussed red flags for " immediate return to clinic/ER, as well as indications for follow up with PCP if not improved in 3 days. Patient understood and agreed to plan. Princess TRISTIN Machado was discharged with stable vitals and has no further questions.       No follow-ups on file.    Valdez Pascual, [facility], PA-C  [facility] URGENT [facility]    Faby Mayer is a 32 year old, presenting for the following health issues:  Leg Pain (X 3 days)      HPI   Review of Systems   Constitutional, HEENT, cardiovascular, pulmonary, gi and gu systems are negative, except as otherwise noted.      Objective    /81   Pulse 78   Temp 98.4  F (36.9  C)   SpO2 100%   There is no height or weight on file to calculate BMI.  Physical Exam   GENERAL: healthy, alert and no distress  MS: Positive for left lateral calf hematoma with bruising  SKIN: positive for left lateral leg bruise, localized swelling in foot secondary to swelling  NEURO: Normal strength and tone, mentation intact and speech normal  PSYCH: mentation appears normal, affect normal/bright
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
## 2022-09-13 NOTE — ED TRIAGE NOTES
Pt presents ambulatory due to a visit to urgent care earlier today. Pt concerned for increased pain and bruising to RLE that she noticed x1 week. Denies blood thinners. Taking oral birth control. Was told at urgent care that she had an elevated ddimer and would need further workup,. Denies CP and SOB

## 2022-11-21 ENCOUNTER — HEALTH MAINTENANCE LETTER (OUTPATIENT)
Age: 32
End: 2022-11-21

## 2022-12-06 ENCOUNTER — OFFICE VISIT (OUTPATIENT)
Dept: URGENT CARE | Facility: URGENT CARE | Age: 32
End: 2022-12-06
Payer: COMMERCIAL

## 2022-12-06 VITALS
SYSTOLIC BLOOD PRESSURE: 128 MMHG | OXYGEN SATURATION: 100 % | WEIGHT: 270 LBS | HEART RATE: 90 BPM | BODY MASS INDEX: 43.58 KG/M2 | RESPIRATION RATE: 18 BRPM | DIASTOLIC BLOOD PRESSURE: 76 MMHG | TEMPERATURE: 98.8 F

## 2022-12-06 DIAGNOSIS — R30.0 DYSURIA: ICD-10-CM

## 2022-12-06 DIAGNOSIS — N76.0 BACTERIAL VAGINOSIS: ICD-10-CM

## 2022-12-06 DIAGNOSIS — B96.89 BACTERIAL VAGINOSIS: ICD-10-CM

## 2022-12-06 DIAGNOSIS — N30.00 ACUTE CYSTITIS WITHOUT HEMATURIA: Primary | ICD-10-CM

## 2022-12-06 LAB
ALBUMIN UR-MCNC: 30 MG/DL
APPEARANCE UR: CLEAR
BACTERIA #/AREA URNS HPF: ABNORMAL /HPF
BILIRUB UR QL STRIP: NEGATIVE
CLUE CELLS: PRESENT
COLOR UR AUTO: YELLOW
GLUCOSE UR STRIP-MCNC: NEGATIVE MG/DL
HGB UR QL STRIP: ABNORMAL
KETONES UR STRIP-MCNC: NEGATIVE MG/DL
LEUKOCYTE ESTERASE UR QL STRIP: NEGATIVE
NITRATE UR QL: POSITIVE
PH UR STRIP: 6 [PH] (ref 5–7)
RBC #/AREA URNS AUTO: ABNORMAL /HPF
SP GR UR STRIP: 1.02 (ref 1–1.03)
SQUAMOUS #/AREA URNS AUTO: ABNORMAL /LPF
TRICHOMONAS, WET PREP: ABNORMAL
UROBILINOGEN UR STRIP-ACNC: 1 E.U./DL
WBC #/AREA URNS AUTO: ABNORMAL /HPF
WBC'S/HIGH POWER FIELD, WET PREP: ABNORMAL
YEAST, WET PREP: ABNORMAL

## 2022-12-06 PROCEDURE — 99214 OFFICE O/P EST MOD 30 MIN: CPT

## 2022-12-06 PROCEDURE — 81001 URINALYSIS AUTO W/SCOPE: CPT

## 2022-12-06 PROCEDURE — 87210 SMEAR WET MOUNT SALINE/INK: CPT

## 2022-12-06 PROCEDURE — 87491 CHLMYD TRACH DNA AMP PROBE: CPT

## 2022-12-06 PROCEDURE — 87186 SC STD MICRODIL/AGAR DIL: CPT

## 2022-12-06 PROCEDURE — 87086 URINE CULTURE/COLONY COUNT: CPT

## 2022-12-06 PROCEDURE — 87591 N.GONORRHOEAE DNA AMP PROB: CPT

## 2022-12-06 RX ORDER — METRONIDAZOLE 500 MG/1
500 TABLET ORAL 2 TIMES DAILY
Qty: 14 TABLET | Refills: 0 | Status: SHIPPED | OUTPATIENT
Start: 2022-12-06 | End: 2022-12-13

## 2022-12-06 RX ORDER — NITROFURANTOIN 25; 75 MG/1; MG/1
100 CAPSULE ORAL 2 TIMES DAILY
Qty: 10 CAPSULE | Refills: 0 | Status: SHIPPED | OUTPATIENT
Start: 2022-12-06 | End: 2022-12-11

## 2022-12-07 NOTE — PROGRESS NOTES
ASSESSMENT:   (N30.00) Acute cystitis without hematuria  (primary encounter diagnosis)  Plan: nitroFURantoin macrocrystal-monohydrate         (MACROBID) 100 MG capsule    (R30.0) Dysuria  Plan: UA Macro with Reflex to Micro and Culture - lab        collect, Urine Microscopic Exam, Urine Culture,        NEISSERIA GONORRHOEA PCR, CHLAMYDIA TRACHOMATIS        PCR, Wet prep - lab collect    (N76.0,  B96.89) Bacterial vaginosis  Plan: metroNIDAZOLE (FLAGYL) 500 MG tablet    PLAN:  Bacterial vaginosis and urinary tract infection patient instructions discussed and provided for the patient.  Informed her to take the antibiotics as prescribed and finish the full course even if feeling better.  Discussed the need to return to clinic with any new or worsening symptoms.  Patient acknowledged her understanding of the above plan.     ARIEL Jasso CNP     SUBJECTIVE:   Princess TRISTIN Machado is a 32 year old female who  presents today for a possible UTI. Symptoms of dysuria and vaginal discharge with an odor have been going on for 2day(s).  Hematuria no.  gradual onset and mild.  There is no history of fever, chills, nausea or vomiting.  This patient does have a history of urinary tract infections. Patient denies pregnancy.   First day of LMP 12/1    ROS:   Review of systems negative except as stated above.    OBJECTIVE:  /76   Pulse 90   Temp 98.8  F (37.1  C) (Oral)   Resp 18   Wt 122.5 kg (270 lb)   SpO2 100%   BMI 43.58 kg/m    GENERAL APPEARANCE: healthy, alert and no distress  RESP: lungs clear to auscultation - no rales, rhonchi or wheezes  CV: regular rates and rhythm, normal S1 S2, no murmur noted  ABDOMEN:  soft, nontender, no HSM or masses and bowel sounds normal  BACK: No CVA tenderness  SKIN: no suspicious lesions or rashes    UA: positive for WBC's, positive for RBC's, positive for protein, positive for nitrates and positive for bacturia

## 2022-12-08 LAB
C TRACH DNA SPEC QL NAA+PROBE: NEGATIVE
N GONORRHOEA DNA SPEC QL NAA+PROBE: NEGATIVE

## 2022-12-09 LAB — BACTERIA UR CULT: ABNORMAL

## 2023-02-07 ENCOUNTER — OFFICE VISIT (OUTPATIENT)
Dept: URGENT CARE | Facility: URGENT CARE | Age: 33
End: 2023-02-07
Payer: MEDICAID

## 2023-02-07 VITALS
OXYGEN SATURATION: 100 % | TEMPERATURE: 98.2 F | DIASTOLIC BLOOD PRESSURE: 90 MMHG | SYSTOLIC BLOOD PRESSURE: 144 MMHG | RESPIRATION RATE: 20 BRPM | HEART RATE: 72 BPM

## 2023-02-07 DIAGNOSIS — N39.0 URINARY TRACT INFECTION WITHOUT HEMATURIA, SITE UNSPECIFIED: ICD-10-CM

## 2023-02-07 DIAGNOSIS — R30.0 DYSURIA: Primary | ICD-10-CM

## 2023-02-07 DIAGNOSIS — B96.89 BV (BACTERIAL VAGINOSIS): ICD-10-CM

## 2023-02-07 DIAGNOSIS — R10.9 FLANK PAIN: ICD-10-CM

## 2023-02-07 DIAGNOSIS — N89.8 VAGINAL DISCHARGE: ICD-10-CM

## 2023-02-07 DIAGNOSIS — N76.0 BV (BACTERIAL VAGINOSIS): ICD-10-CM

## 2023-02-07 LAB

## 2023-02-07 PROCEDURE — 81003 URINALYSIS AUTO W/O SCOPE: CPT

## 2023-02-07 PROCEDURE — 87086 URINE CULTURE/COLONY COUNT: CPT | Performed by: FAMILY MEDICINE

## 2023-02-07 PROCEDURE — 99214 OFFICE O/P EST MOD 30 MIN: CPT | Performed by: FAMILY MEDICINE

## 2023-02-07 PROCEDURE — 87210 SMEAR WET MOUNT SALINE/INK: CPT

## 2023-02-07 RX ORDER — METRONIDAZOLE 500 MG/1
500 TABLET ORAL 2 TIMES DAILY
Qty: 14 TABLET | Refills: 0 | Status: SHIPPED | OUTPATIENT
Start: 2023-02-07 | End: 2023-02-14

## 2023-02-07 RX ORDER — SULFAMETHOXAZOLE/TRIMETHOPRIM 800-160 MG
1 TABLET ORAL 2 TIMES DAILY
Qty: 14 TABLET | Refills: 0 | Status: SHIPPED | OUTPATIENT
Start: 2023-02-07 | End: 2023-02-14

## 2023-02-08 NOTE — PROGRESS NOTES
SUBJECTIVE: Princess TRISTIN Machado is a 32 year old female who  presents today for a possible UTI.   Symptoms of dysuria, frequency and vag dc have been going on forday(s).    Also flank pain    Past Medical History:   Diagnosis Date     Anxiety      Bulimia      Depressive disorder      Headache      Hidradenitis suppurativa 2010     Hypersomnia 04/2019     MS (multiple sclerosis) (H)     diagnosis 10/2016     Obese      Optic neuritis      Recurrent major depression (H)     Pt has tried Zoloft, Prozac, Wellbutrin in the past      Allergies   Allergen Reactions     Buspirone Other (See Comments)     Suicidal thoughts     Accutane Rash     Social History     Tobacco Use     Smoking status: Never     Passive exposure: Yes     Smokeless tobacco: Never   Substance Use Topics     Alcohol use: No       ROS: CONSTITUTIONAL:NEGATIVE for fever, chills, change in weight    OBJECTIVE:  BP (!) 144/90   Pulse 72   Temp 98.2  F (36.8  C)   Resp 20   LMP  (LMP Unknown)   SpO2 100%     No Flank/abd pain      ICD-10-CM    1. Dysuria  R30.0 UA Macro with Reflex to Micro and Culture - lab collect     sulfamethoxazole-trimethoprim (BACTRIM DS) 800-160 MG tablet      2. Vaginal discharge  N89.8 Wet prep - Clinic Collect     metroNIDAZOLE (FLAGYL) 500 MG tablet      3. Urinary tract infection without hematuria, site unspecified  N39.0 sulfamethoxazole-trimethoprim (BACTRIM DS) 800-160 MG tablet     Urine Culture Aerobic Bacterial - lab collect      4. BV (bacterial vaginosis)  N76.0 metroNIDAZOLE (FLAGYL) 500 MG tablet    B96.89       5. Flank pain  R10.9 sulfamethoxazole-trimethoprim (BACTRIM DS) 800-160 MG tablet          Drink plenty of fluids.  Prevention and treatment of UTI's discussed.Signs and symptoms of pyelonephritis mentioned.  Follow up with primary care physician if not improving

## 2023-02-09 LAB — BACTERIA UR CULT: NO GROWTH

## 2023-02-22 ENCOUNTER — E-VISIT (OUTPATIENT)
Dept: URGENT CARE | Facility: CLINIC | Age: 33
End: 2023-02-22
Payer: MEDICAID

## 2023-02-22 DIAGNOSIS — J06.9 VIRAL URI: Primary | ICD-10-CM

## 2023-02-22 PROCEDURE — 99207 PR NO CHARGE LOS: CPT | Performed by: NURSE PRACTITIONER

## 2023-02-23 NOTE — PATIENT INSTRUCTIONS
Dealaney Mayer,      Based on your responses, you may have COVID-19.     Will I be tested for COVID-19?  We would like to test you for COVID. I have placed orders for this test. I also placed a test for flu and strep    To schedule: go to your Outcome Referrals home page and scroll down to the section that says  You have an appointment that needs to be scheduled  and click the large green button that says  Schedule Now  and follow the steps to find the next available openings.    If you are unable to complete these Outcome Referrals scheduling steps, please call [phone] to schedule your testing.     How do I self-isolate?  You isolate when you have symptoms of COVID or a test shows you have COVID, even if you don t have symptoms.     If you DO have symptoms:  o Stay home and away from others  - For at least 5 days after your symptoms started, AND   - You are fever free for 24 hours (with no medicine that reduces fever), AND  - Your other symptoms are better.  o Wear a mask for 10 full days any time you are around others.    If you DON T have symptoms:  o Stay at home and away from others for at least 5 days after your positive test.  o Wear a mask for 10 full days any time you are around others.    How can I take care of myself?  Over the counter medications may help with your symptoms such as runny or stuffy nose, cough, chills, or fever.  Talk to your care team about your options.     Some people are at high risk of severe illness (for example, you have a weak immune system, you re 50 years or older, or you have certain medical problems). If your risk is high and your symptoms started in the last 5 days, we strongly recommend for you to get COVID treatment as soon as possible.There are safe and effective medicines available that can make you feel better faster, and prevent hospitalization and death.       To discuss COVID treatment you can:    Call your clinic OR [phone] ([phone]) and ask to speak to a nurse about  "a positive COVID test.    Send a [employer] message to your care team. In [employer], select \"Ask a Medical Question\" then \"Do I need an appointment\" and put \"COVID\" in the subject line. Please include a phone number to call you back in the message.               Get lots of rest. Drink extra fluids (unless a doctor has told you not to)    Take Tylenol (acetaminophen) or ibuprofen for fever or pain. If you have liver or kidney problems, ask your family doctor if it's okay to take Tylenol or ibuprofen    Take over the counter medications for your symptoms, as directed by your doctor. You may also talk to your pharmacist.      If you have other health problems (like cancer, heart failure, an organ transplant or severe kidney disease): Call your specialty clinic if you don't feel better in the next 2 days.    Know when to call 911. Emergency warning signs include:  o Trouble breathing or shortness of breath  o Pain or pressure in the chest that doesn't go away  o Feeling confused like you haven't felt before, or not being able to wake up  o Bluish-colored lips or face    Where can I get more information?    [facility] [place] - About COVID-19: [email or web address]     CDC - What to Do If You're Sick: https://www.cdc.gov/coronavirus/2019-ncov/if-you-are-sick/index.html     CDC -  Isolation https://www.cdc.gov/coronavirus/2019-ncov/your-health/isolation.html  "

## 2023-03-08 ENCOUNTER — OFFICE VISIT (OUTPATIENT)
Dept: URGENT CARE | Facility: URGENT CARE | Age: 33
End: 2023-03-08
Payer: MEDICAID

## 2023-03-08 VITALS
SYSTOLIC BLOOD PRESSURE: 146 MMHG | RESPIRATION RATE: 25 BRPM | DIASTOLIC BLOOD PRESSURE: 83 MMHG | BODY MASS INDEX: 43.58 KG/M2 | WEIGHT: 270 LBS | TEMPERATURE: 97.5 F | OXYGEN SATURATION: 100 % | HEART RATE: 110 BPM

## 2023-03-08 DIAGNOSIS — N89.8 VAGINAL DISCHARGE: ICD-10-CM

## 2023-03-08 DIAGNOSIS — J20.8 VIRAL BRONCHITIS: Primary | ICD-10-CM

## 2023-03-08 LAB
CLUE CELLS: ABNORMAL
DEPRECATED S PYO AG THROAT QL EIA: NEGATIVE
FLUAV AG SPEC QL IA: NEGATIVE
FLUBV AG SPEC QL IA: NEGATIVE
TRICHOMONAS, WET PREP: ABNORMAL
WBC'S/HIGH POWER FIELD, WET PREP: ABNORMAL
YEAST, WET PREP: ABNORMAL

## 2023-03-08 PROCEDURE — 87210 SMEAR WET MOUNT SALINE/INK: CPT | Performed by: PHYSICIAN ASSISTANT

## 2023-03-08 PROCEDURE — 87651 STREP A DNA AMP PROBE: CPT | Performed by: PHYSICIAN ASSISTANT

## 2023-03-08 PROCEDURE — 87804 INFLUENZA ASSAY W/OPTIC: CPT | Performed by: PHYSICIAN ASSISTANT

## 2023-03-08 PROCEDURE — U0003 INFECTIOUS AGENT DETECTION BY NUCLEIC ACID (DNA OR RNA); SEVERE ACUTE RESPIRATORY SYNDROME CORONAVIRUS 2 (SARS-COV-2) (CORONAVIRUS DISEASE [COVID-19]), AMPLIFIED PROBE TECHNIQUE, MAKING USE OF HIGH THROUGHPUT TECHNOLOGIES AS DESCRIBED BY CMS-2020-01-R: HCPCS | Performed by: PHYSICIAN ASSISTANT

## 2023-03-08 PROCEDURE — 99214 OFFICE O/P EST MOD 30 MIN: CPT | Mod: CS | Performed by: PHYSICIAN ASSISTANT

## 2023-03-08 PROCEDURE — U0005 INFEC AGEN DETEC AMPLI PROBE: HCPCS | Performed by: PHYSICIAN ASSISTANT

## 2023-03-08 RX ORDER — PREDNISONE 20 MG/1
40 TABLET ORAL DAILY
Qty: 10 TABLET | Refills: 0 | Status: SHIPPED | OUTPATIENT
Start: 2023-03-08 | End: 2023-03-13

## 2023-03-08 RX ORDER — ALBUTEROL SULFATE 90 UG/1
1-2 AEROSOL, METERED RESPIRATORY (INHALATION) EVERY 6 HOURS
Qty: 8.5 G | Refills: 0 | Status: SHIPPED | OUTPATIENT
Start: 2023-03-08 | End: 2023-03-13

## 2023-03-08 RX ORDER — BENZONATATE 100 MG/1
100 CAPSULE ORAL 3 TIMES DAILY PRN
Qty: 30 CAPSULE | Refills: 0 | Status: SHIPPED | OUTPATIENT
Start: 2023-03-08 | End: 2023-03-15

## 2023-03-08 NOTE — PROGRESS NOTES
URGENT CARE VISIT:    SUBJECTIVE:   Princess TRISTIN Machado is a 32 year old female presenting with a chief complaint of stuffy nose and cough - productive. Onset was 1 week(s) ago. She denies the following symptoms: fever, shortness of breath, vomiting and diarrhea. Course of illness is same.  Treatment measures tried include None tried with no relief of symptoms. Predisposing factors include None.    She has been having vaginal discharge since last month. She was treated for BV but did not finish antibiotic. She is wondering if she got rid of the BV. She denies itching.    PMH:   Past Medical History:   Diagnosis Date     Anxiety      Bulimia      Depressive disorder      Headache      Hidradenitis suppurativa 2010     Hypersomnia 04/2019     MS (multiple sclerosis) (H)     diagnosis 10/2016     Obese      Optic neuritis      Recurrent major depression (H)     Pt has tried Zoloft, Prozac, Wellbutrin in the past      Allergies: Buspirone and Accutane   Medications:   Current Outpatient Medications   Medication Sig Dispense Refill     albuterol (PROAIR HFA/PROVENTIL HFA/VENTOLIN HFA) 108 (90 Base) MCG/ACT inhaler Inhale 1-2 puffs into the lungs every 6 hours for 5 days 8.5 g 0     benzonatate (TESSALON) 100 MG capsule Take 1 capsule (100 mg) by mouth 3 times daily as needed for cough 30 capsule 0     predniSONE (DELTASONE) 20 MG tablet Take 2 tablets (40 mg) by mouth daily for 5 days 10 tablet 0     amphetamine-dextroamphetamine (ADDERALL XR) 30 MG 24 hr capsule Take 30 mg by mouth daily Taking 2 capsules in am and 1 capsule in pm       amphetamine-dextroamphetamine (ADDERALL) 10 MG tablet Take 20 mg by mouth 2 times daily       buPROPion (WELLBUTRIN SR) 200 MG 12 hr tablet Takes 2 tablets daily       Cyanocobalamin (B-12) 5000 MCG CAPS Take 1 tablet by mouth daily        cyclobenzaprine (FLEXERIL) 10 MG tablet Take 10 mg by mouth       EARLINE 30 MG tablet 30 mg daily as needed        escitalopram (LEXAPRO) 20 MG tablet  Take 1 tablet (20 mg) by mouth daily 90 tablet 1     hydroquinone (MAUDE) 4 % external cream        Interferon Beta-1a 6X8.8 & 6X22 MCG SOSY Inject 1 Units Subcutaneous three times a week       mupirocin (BACTROBAN) 2 % ointment        spironolactone (ALDACTONE) 100 MG tablet Take 1 tablet (100 mg) by mouth 2 times daily 180 tablet 3     tiZANidine (ZANAFLEX) 4 MG tablet TAKE ONE TABLET BY MOUTH THREE TIMES A DAY AS NEEDED FOR MUSCLE SPASMS 30 tablet 3     tretinoin (RETIN-A) 0.05 % external cream        vitamin D3 (CHOLECALCIFEROL) 64909 units (250 mcg) capsule Take 50,000 capsules by mouth once a week weekly       Social History:   Social History     Tobacco Use     Smoking status: Never     Passive exposure: Yes     Smokeless tobacco: Never   Substance Use Topics     Alcohol use: No       ROS:  General: negative  Skin: negative  Eyes: negative  Ears/Nose/Throat: nasal congestion  Respiratory: Cough  Cardiovascular: negative  Gastrointestinal: negative  Genitourinary: negative  Musculoskeletal: negative  Neurologic: negative      OBJECTIVE:  BP (!) 146/83   Pulse 110   Temp 97.5  F (36.4  C) (Tympanic)   Resp 25   Wt 122.5 kg (270 lb)   LMP  (LMP Unknown)   SpO2 100%   BMI 43.58 kg/m    GENERAL APPEARANCE: healthy, alert and no distress  EYES: EOMI,  PERRL, conjunctiva clear  HENT: ear canals and TM's normal.  Nose and mouth without ulcers, erythema or lesions  NECK: supple, nontender, no lymphadenopathy  RESP: lungs clear to auscultation - no rales, rhonchi or wheezes  CV: regular rates and rhythm, normal S1 S2, no murmur noted  SKIN: no suspicious lesions or rashes    Labs:    Results for orders placed or performed in visit on 03/08/23   Streptococcus A Rapid Screen w/Reflex to PCR - Clinic Collect     Status: Normal    Specimen: Throat; Swab   Result Value Ref Range    Group A Strep antigen Negative Negative   Influenza A & B Antigen - Clinic Collect     Status: Normal    Specimen: Nose; Swab   Result  Value Ref Range    Influenza A antigen Negative Negative    Influenza B antigen Negative Negative    Narrative    Test results must be correlated with clinical data. If necessary, results should be confirmed by a molecular assay or viral culture.   Wet prep - Clinic Collect     Status: Abnormal    Specimen: Vagina; Swab   Result Value Ref Range    Trichomonas Absent Absent    Yeast Absent Absent    Clue Cells Absent Absent    WBCs/high power field 1+ (A) None       ASSESSMENT:    ICD-10-CM    1. Viral bronchitis  J20.8 Streptococcus A Rapid Screen w/Reflex to PCR - Clinic Collect     Symptomatic COVID-19 Virus (Coronavirus) by PCR Nose     Influenza A & B Antigen - Clinic Collect     Group A Streptococcus PCR Throat Swab     albuterol (PROAIR HFA/PROVENTIL HFA/VENTOLIN HFA) 108 (90 Base) MCG/ACT inhaler     predniSONE (DELTASONE) 20 MG tablet     benzonatate (TESSALON) 100 MG capsule      2. Vaginal discharge  N89.8 Wet prep - Clinic Collect          PLAN:  Patient Instructions   Bronchitis:  Patient was educated on the natural course of condition. Strep and COVID PCR are pending.Take medications as directed. Side effects discussed. Conservative measures discussed including rest, increased fluids, humidifier, and over-the-counter cough suppressants. See your primary care provider if symptoms do not improve in 7 days. Seek emergency care if you develop shortness of breath or fever over 103.     Vaginal discharge:  Normal wet prep. Likely normal physiologic discharge  Patient verbalized understanding and is agreeable to plan. The patient was discharged ambulatory and in stable condition.    Martha Gomez PA-C ....................  3/8/2023   6:48 PM

## 2023-03-09 LAB
GROUP A STREP BY PCR: NOT DETECTED
SARS-COV-2 RNA RESP QL NAA+PROBE: NEGATIVE

## 2023-03-09 NOTE — PATIENT INSTRUCTIONS
Bronchitis:  Patient was educated on the natural course of condition. Take medications as directed. Side effects discussed. Conservative measures discussed including rest, increased fluids, humidifier, and over-the-counter cough suppressants. See your primary care provider if symptoms do not improve in 7 days. Seek emergency care if you develop shortness of breath or fever over 103.     Vaginal discharge:  Normal wet prep. Likely normal physiologic discharge

## 2023-03-22 ENCOUNTER — LAB (OUTPATIENT)
Dept: URGENT CARE | Facility: URGENT CARE | Age: 33
End: 2023-03-22
Attending: NURSE PRACTITIONER
Payer: MEDICAID

## 2023-03-22 DIAGNOSIS — J06.9 VIRAL URI: ICD-10-CM

## 2023-03-22 LAB
DEPRECATED S PYO AG THROAT QL EIA: NEGATIVE
FLUAV AG SPEC QL IA: NEGATIVE
FLUBV AG SPEC QL IA: NEGATIVE
GROUP A STREP BY PCR: NOT DETECTED
SARS-COV-2 RNA RESP QL NAA+PROBE: POSITIVE

## 2023-03-22 PROCEDURE — 99207 PR DROP WITH A PROCEDURE: CPT

## 2023-03-22 PROCEDURE — U0003 INFECTIOUS AGENT DETECTION BY NUCLEIC ACID (DNA OR RNA); SEVERE ACUTE RESPIRATORY SYNDROME CORONAVIRUS 2 (SARS-COV-2) (CORONAVIRUS DISEASE [COVID-19]), AMPLIFIED PROBE TECHNIQUE, MAKING USE OF HIGH THROUGHPUT TECHNOLOGIES AS DESCRIBED BY CMS-2020-01-R: HCPCS

## 2023-03-22 PROCEDURE — 87804 INFLUENZA ASSAY W/OPTIC: CPT

## 2023-03-22 PROCEDURE — 87651 STREP A DNA AMP PROBE: CPT

## 2023-03-22 PROCEDURE — U0005 INFEC AGEN DETEC AMPLI PROBE: HCPCS

## 2023-03-23 ENCOUNTER — TELEPHONE (OUTPATIENT)
Dept: NURSING | Facility: CLINIC | Age: 33
End: 2023-03-23
Payer: MEDICAID

## 2023-03-23 NOTE — TELEPHONE ENCOUNTER
Coronavirus (COVID-19) Notification    Caller Name (Patient, parent, daughter/son, grandparent, etc)  patient    Reason for call  Notify of Positive Coronavirus (COVID-19) lab results, assess symptoms,  review [facility] recommendations    Lab Result    Lab test:  2019-nCoV rRt-PCR or SARS-CoV-2 PCR    Oropharyngeal AND/OR nasopharyngeal swabs is POSITIVE for 2019-nCoV RNA/SARS-COV-2 PCR (COVID-19 virus)      Gather patient reported symptoms   Assessment   Current Symptoms at time of phone call, reported by patient: (if no symptoms, document: No symptoms] Loss of smell and taste, congestion   Date of symptom(s) onset (if applicable) 3/15/23     If at time of call, Patients symptoms have worsened, the Patient should contact 911 or have someone drive them to Emergency Dept promptly:      If Patient calling 911, inform 911 personal that you have tested positive for the Coronavirus (COVID-19).  Place mask on and await 911 to arrive.    If Emergency Dept, If possible, please have another adult drive you to the Emergency Dept but you need to wear mask when in contact with other people.      Treatment Options:   Is patient interested in discussing COVID treatment? No.        Review information with Patient    Your result was positive. This means you have COVID-19 (coronavirus).    How can I protect others?    These guidelines are for isolating before returning to work, school or .    If you DO have symptoms    Stay home and away from others     For at least 5 days after your symptoms started, AND    You are fever free for 24 hours (with no medicine that reduces fever), AND    Your other symptoms are better    Wear a mask for 10 full days anytime you are around others    If you DON'T have symptoms    Stay home and away from others for at least 5 days after your positive test    Wear a mask for 10 full days anytime you are around others    There may be different guidelines for healthcare facilities.  Please  check with the specific sites before arriving.    If you have been told by a doctor that you were severely ill with COVID-19 or are immunocompromised, you should isolate for at least 10 days.    You should not go back to work until you meet the guidelines above for ending your home isolation. You don't need to be retested for COVID-19 before going back to work--studies show that you won't spread the virus if it's been at least 10 days since your symptoms started (or 20 days, if you have a weak immune system).    Employers, schools, and daycares: This is an official notice for this person's medical guidelines for returning in-person.  They must meet the above guidelines before going back to work, school or  in person.    You will receive a positive COVID-19 letter via [employer] or the mail soon with additional self-care information.    Would you like me to review some of that information with you now?  No    If you were tested for an upcoming procedure, please contact your provider for next steps.    Anne Trejo

## 2023-04-16 ENCOUNTER — HEALTH MAINTENANCE LETTER (OUTPATIENT)
Age: 33
End: 2023-04-16

## 2023-05-05 ENCOUNTER — E-VISIT (OUTPATIENT)
Dept: URGENT CARE | Facility: CLINIC | Age: 33
End: 2023-05-05
Payer: MEDICAID

## 2023-05-05 DIAGNOSIS — N89.8 VAGINAL DISCHARGE: Primary | ICD-10-CM

## 2023-05-05 PROCEDURE — 99421 OL DIG E/M SVC 5-10 MIN: CPT | Performed by: PHYSICIAN ASSISTANT

## 2023-05-05 NOTE — PATIENT INSTRUCTIONS
Thank you for choosing us for your care. Given your symptoms, I would like you to do a lab-only visit to determine what is causing them.  I have placed the orders.  Please schedule an appointment with the lab right here in [place], or call [phone].  I will let you know when the results are back and next steps to take.    Preventing Vaginitis     Use mild, unscented soap when you bathe or shower to avoid irritating your vagina.      Vaginitis is irritation or infection of the vagina or the outside opening of it (vulva). Vaginitis can be caused by bacteria, viruses, parasites, or yeast. Chemicals such as in perfumes or soaps or in spermicides can sometimes be a cause. Vaginitis can be caused by hormone changes in pregnancy or with menopause. You can help prevent vaginitis. Follow the tips below. And see your healthcare provider if you have any symptoms.   Hygiene    Stay away from chemicals. Don't use vaginal sprays. Don't use scented toilet paper or tampons that are scented. Sprays and scents have chemicals that can irritate your vagina.    Don't douche unless you are told to by your healthcare provider. Douching is rarely needed. And it upsets the normal balance in the vagina.    Wash yourself well. Wash the outer vaginal area (vulva) every day with mild, unscented soap. Keep it as dry as possible.    Wipe correctly. Make sure to wipe from front to back after a bowel movement. This helps keep from spreading bacteria from your anus to your vagina.    Change your tampon often. During your period, make sure to change your tampon as often as directed on the package. This allows the normal flow of vaginal discharge and blood.    Lifestyle    Limit your number of sexual partners. The more partners you have, the greater your risk of infection. Using condoms helps reduce your risk.    Get enough sleep. Sleep helps keep your body s immune system healthy. This helps you fight infection.    Lose weight, if needed.  Excess weight can reduce air circulation around your vagina. This can increase your risk of infection.    Exercise regularly. Regular activity helps keep your body healthy.    Take antibiotics only as directed. Antibiotics can change the normal chemical balance in the vagina.      Clothing    Don t sit in wet clothes. Yeast thrives when it s warm and damp.    Don t wear tight pants. And don t wear tights, leggings, or hose without a cotton crotch. These types of clothing trap warmth and moisture.    Wear cotton underwear. Cotton lets air circulate around the vagina.    Symptoms of vaginitis    Irritation, swelling, or itching of the genital area    Vaginal discharge    Bad vaginal odor    Pain or burning during urination    [employer] last reviewed this educational content on 11/1/2019 2000-2022 [employer]. All rights reserved. This information is not intended as a substitute for professional medical care. Always follow your healthcare professional's instructions.

## 2023-05-09 ENCOUNTER — LAB (OUTPATIENT)
Dept: LAB | Facility: CLINIC | Age: 33
End: 2023-05-09
Payer: COMMERCIAL

## 2023-05-09 DIAGNOSIS — N89.8 VAGINAL DISCHARGE: ICD-10-CM

## 2023-05-09 LAB
ALBUMIN UR-MCNC: NEGATIVE MG/DL
APPEARANCE UR: CLEAR
BILIRUB UR QL STRIP: NEGATIVE
CLUE CELLS: ABNORMAL
COLOR UR AUTO: YELLOW
GLUCOSE UR STRIP-MCNC: NEGATIVE MG/DL
HGB UR QL STRIP: NEGATIVE
KETONES UR STRIP-MCNC: NEGATIVE MG/DL
LEUKOCYTE ESTERASE UR QL STRIP: NEGATIVE
NITRATE UR QL: NEGATIVE
PH UR STRIP: 6 [PH] (ref 5–7)
SP GR UR STRIP: >=1.03 (ref 1–1.03)
TRICHOMONAS, WET PREP: ABNORMAL
UROBILINOGEN UR STRIP-ACNC: 1 E.U./DL
WBC'S/HIGH POWER FIELD, WET PREP: ABNORMAL
YEAST, WET PREP: ABNORMAL

## 2023-05-09 PROCEDURE — 81003 URINALYSIS AUTO W/O SCOPE: CPT

## 2023-05-09 PROCEDURE — 87210 SMEAR WET MOUNT SALINE/INK: CPT | Performed by: PHYSICIAN ASSISTANT

## 2023-09-17 ENCOUNTER — HEALTH MAINTENANCE LETTER (OUTPATIENT)
Age: 33
End: 2023-09-17

## 2023-10-03 NOTE — PROGRESS NOTES
Subjective     Princess TRISTIN Machado is a 29 year old female who presents to clinic today for the following health issues:    HPI   Chief Complaint   Patient presents with     Derm Problem     has redness of tattoo on lt lower arm       Duration: 1 week    Description (location/character/radiation):  left forearm tender, warmth    Intensity:  moderate    Accompanying signs and symptoms: no fever, chills, sore throat, runny nose    History (similar episodes/previous evaluation): had tattoo about 1 week ago     Precipitating or alleviating factors: None    Therapies tried and outcome: ibuprofen          Patient Active Problem List   Diagnosis     Optic neuritis, left     Recurrent major depressive episodes (H)     Multiple sclerosis (H)     Bulimia nervosa     Chronic back pain     Hidradenitis suppurativa     Hypertriglyceridemia     Migraine without status migrainosus, not intractable     Vitamin D deficiency     Morbid obesity due to excess calories (H) BMI 50-60     STACIA (generalized anxiety disorder)     Prediabetes     Past Surgical History:   Procedure Laterality Date     ENT SURGERY      tubes in ears     MYRINGOTOMY, INSERT TUBE BILATERAL, COMBINED  1990s       Social History     Tobacco Use     Smoking status: Passive Smoke Exposure - Never Smoker     Smokeless tobacco: Never Used   Substance Use Topics     Alcohol use: No     Frequency: Never     Drinks per session: 5 or 6     Binge frequency: Less than monthly     Family History   Problem Relation Age of Onset     Cerebrovascular Disease Mother         heart attack     Cerebrovascular Disease Father         heart attack     Hypertension Father      Hyperlipidemia Father      Obesity Father      Mental Illness Sister      Post-Traumatic Stress Disorder (PTSD) Brother      Mental Illness Brother      Diabetes Maternal Grandmother      Obesity Maternal Grandmother      Other Cancer Other         Paternal uncle     Depression Sister      Anxiety Disorder Sister       Mental Illness Sister      Substance Abuse Sister      Obesity Sister      Depression Brother      Anxiety Disorder Brother      Mental Illness Brother      Asthma Nephew      Obesity Sister      Glaucoma No family hx of      Macular Degeneration No family hx of      Coronary Artery Disease No family hx of      Hyperlipidemia No family hx of      Breast Cancer No family hx of      Colon Cancer No family hx of      Prostate Cancer No family hx of      Other Cancer No family hx of      Depression No family hx of      Anxiety Disorder No family hx of      Substance Abuse No family hx of      Anesthesia Reaction No family hx of      Asthma No family hx of      Osteoporosis No family hx of      Genetic Disorder No family hx of      Thyroid Disease No family hx of      Obesity No family hx of      Unknown/Adopted No family hx of          Current Outpatient Medications   Medication Sig Dispense Refill     amphetamine-dextroamphetamine (ADDERALL XR) 20 MG 24 hr capsule Take 1 capsule by mouth 3 times daily        BIOTIN PO Take 10,000 mcg by mouth Reported on 5/9/2017       Cyanocobalamin (B-12) 5000 MCG CAPS Take 1 tablet by mouth daily        escitalopram (LEXAPRO) 20 MG tablet Take 1 tablet (20 mg) by mouth daily 90 tablet 1     hydroquinone (MAUDE) 4 % external cream        Interferon Beta-1a (REBIF TITRATION PACK) 6X8.8 & 6X22 MCG SOSY Inject 1 Units Subcutaneous three times a week       norethindrone (MICRONOR) 0.35 MG per tablet Take 1 tablet by mouth daily       oxybutynin (DITROPAN-XL) 5 MG 24 hr tablet Take 5 mg by mouth daily as needed for bladder spasms Patient takes 5 mg in the morning and 10 mg at night.       pantoprazole (PROTONIX) 40 MG EC tablet Take 1 tablet (40 mg) by mouth every morning (before breakfast) 30 minutes before meal 90 tablet 3     propranolol (INDERAL) 40 MG tablet Take 1 tablet (40 mg) by mouth 2 times daily 180 tablet 1     spironolactone (ALDACTONE) 50 MG tablet Take 50 mg by mouth  daily       vitamin D3 (CHOLECALCIFEROL) 77986 units (250 mcg) capsule Take 1 capsule (10,000 Units) by mouth daily       vitamin D3 (CHOLECALCIFEROL) 39819 units capsule Take 1 capsule (50,000 Units) by mouth once a week 8 capsule 1     amphetamine-dextroamphetamine (ADDERALL) 10 MG tablet Take 10 mg by mouth 2 times daily        ibuprofen (ADVIL/MOTRIN) 800 MG tablet Take 1 tablet (800 mg) by mouth every 8 hours as needed for moderate pain 30 tablet 0     levonorgestrel (ECONTRA EZ) 1.5 MG TABS tablet Take 1.5 mg by mouth daily as needed       mupirocin (BACTROBAN) 2 % ointment        Sulfamethoxazole-Trimethoprim (BACTRIM PO) Take 1 tablet by mouth 2 times daily       tiZANidine (ZANAFLEX) 4 MG tablet TAKE ONE TABLET BY MOUTH THREE TIMES A DAY AS NEEDED FOR MUSCLE SPASMS 30 tablet 3     Allergies   Allergen Reactions     Accutane Rash     Recent Labs   Lab Test 08/02/19  1643 05/28/19  1025 12/27/18  1442 12/04/18  1053 08/25/18  0940 07/17/18  1036 02/10/18  0813  08/11/16   A1C  --  5.6  --  5.8* 5.7*  --  5.8  --  5.5   LDL  --  62  --   --   --   --  67  --  62   HDL  --  31*  --   --   --   --  40*  --  26   TRIG  --  88  --   --   --   --  79  --  114   ALT  --  22 20  --   --  25 20   < >  --    CR  --  0.93  --   --   --  1.01  --    < >  --    GFRESTIMATED  --  82  --   --   --  64  --    < >  --    GFRESTBLACK  --  >90  --   --   --  78  --    < >  --    POTASSIUM  --  4.4  --   --   --  4.2  --    < >  --    TSH 3.36  --   --   --   --  2.41 3.04   < > 2.00    < > = values in this interval not displayed.      BP Readings from Last 3 Encounters:   10/25/19 116/62   09/24/19 108/68   08/19/19 126/70    Wt Readings from Last 3 Encounters:   10/25/19 142.6 kg (314 lb 6.4 oz)   09/24/19 143.3 kg (316 lb)   07/13/19 148.8 kg (328 lb)                    Reviewed and updated as needed this visit by Provider         Review of Systems   ROS COMP: Constitutional, HEENT, cardiovascular, pulmonary, gi and gu  systems are negative, except as otherwise noted.      Objective    Pulse 56   Temp 97.5  F (36.4  C) (Oral)   Resp 20   There is no height or weight on file to calculate BMI.  Physical Exam   GENERAL: alert and no distress  EYES: Eyes grossly normal to inspection, PERRL and conjunctivae and sclerae normal  NECK: no adenopathy, no asymmetry, masses, or scars and thyroid normal to palpation  RESP: lungs clear to auscultation - no rales, rhonchi or wheezes  CV: regular rates and rhythm, normal S1 S2, no S3 or S4 and no murmur, click or rub  MS: no gross musculoskeletal defects noted, no edema  SKIN: left forearm tattoo slightly tender and warmth on palpation, no discharge noted, small scab, radial pulses 3+, sensation to touch and pressure intact, normal capillary refills          Assessment & Plan     (L03.114) Cellulitis of left upper extremity  (primary encounter diagnosis)  Comment: Suspect left forearm pain, warmth secondary to skin infection, had tattoo done about a week ago, denies any constitutional symptoms.  Treatment options discussed.  Cephalexin prescribed, common side effects discussed.  Suggested to continue well hydration, over-the-counter analgesia and icing.  Return criteria discussed.  All questions answered.  Plan: cephALEXin (KEFLEX) 500 MG capsule           Patient Instructions     Patient Education     Cellulitis  Cellulitis is an infection of the deep layers of skin. A break in the skin, such as a cut or scratch, can let bacteria under the skin. If the bacteria get to deep layers of the skin, it can be serious. If not treated, cellulitis can get into the bloodstream and lymph nodes. The infection can then spread throughout the body. This causes serious illness.  Cellulitis causes the affected skin to become red, swollen, warm, and sore. The reddened areas have a visible border. An open sore may leak fluid (pus). You may have a fever, chills, and pain.  Cellulitis is treated with antibiotics  taken for 7 to 10 days. An open sore may be cleaned and covered with cool wet gauze. Symptoms should get better 1 to 2 days after treatment is started. Make sure to take all the antibiotics for the full number of days until they are gone. Keep taking the medicine even if your symptoms go away.  Home care  Follow these tips:    Limit the use of the part of your body with cellulitis.     If the infection is on your leg, keep your leg raised while sitting. This will help to reduce swelling.    Take all of the antibiotic medicine exactly as directed until it is gone. Do not miss any doses, especially during the first 7 days. Don t stop taking the medicine when your symptoms get better.    Keep the affected area clean and dry.    Wash your hands with soap and warm water before and after touching your skin. Anyone else who touches your skin should also wash his or her hands. Don't share towels.  Follow-up care  Follow up with your healthcare provider, or as advised. If your infection does not go away on the first antibiotic, your healthcare provider will prescribe a different one.  When to seek medical advice  Call your healthcare provider right away if any of these occur:    Red areas that spread    Swelling or pain that gets worse    Fluid leaking from the skin (pus)    Fever higher of 100.4  F (38.0  C) or higher after 2 days on antibiotics  Date Last Reviewed: 9/1/2016 2000-2018 [employer]. [address] [place] [address]. All rights reserved. This information is not intended as a substitute for professional medical care. Always follow your healthcare professional's instructions.                 Manuel Mccullough MD  [facility]         Pt agrees to work with PT

## 2023-12-11 ENCOUNTER — E-VISIT (OUTPATIENT)
Dept: URGENT CARE | Facility: CLINIC | Age: 33
End: 2023-12-11
Payer: COMMERCIAL

## 2023-12-11 DIAGNOSIS — J01.90 ACUTE SINUSITIS, RECURRENCE NOT SPECIFIED, UNSPECIFIED LOCATION: Primary | ICD-10-CM

## 2023-12-11 PROCEDURE — 99207 PR NON-BILLABLE SERV PER CHARTING: CPT | Performed by: FAMILY MEDICINE

## 2023-12-11 RX ORDER — IPRATROPIUM BROMIDE 42 UG/1
2 SPRAY, METERED NASAL 4 TIMES DAILY
Qty: 15 ML | Refills: 0 | Status: SHIPPED | OUTPATIENT
Start: 2023-12-11

## 2023-12-11 RX ORDER — GUAIFENESIN 1200 MG/1
1200 TABLET, EXTENDED RELEASE ORAL 2 TIMES DAILY
Qty: 60 TABLET | Refills: 0 | Status: SHIPPED | OUTPATIENT
Start: 2023-12-11

## 2023-12-11 NOTE — PATIENT INSTRUCTIONS
Viral Respiratory Infection: Care Instructions  Overview     A viral respiratory infection is an infection of the nose, sinuses, or throat caused by a virus. Colds and the flu are common types of viral respiratory infections.  The symptoms of a viral respiratory infection often start quickly. They include a fever, sore throat, and runny nose. You may also just not feel well. Or you may not want to eat much.  Most viral infections can be treated with home care. This may include drinking lots of fluids and taking over-the-counter pain medicine. You will probably feel better in 4 to 10 days.  Antibiotics are not used to treat a viral infection. Antibiotics don't kill viruses, so they won't help cure a viral illness.  In some cases, a doctor may prescribe antiviral medicine to help your body fight a serious viral infection.  Follow-up care is a key part of your treatment and safety. Be sure to make and go to all appointments, and call your doctor if you are having problems. It's also a good idea to know your test results and keep a list of the medicines you take.  How can you care for yourself at home?  To prevent dehydration, drink plenty of fluids. Choose water and other clear liquids until you feel better. If you have kidney, heart, or liver disease and have to limit fluids, talk with your doctor before you increase the amount of fluids you drink.  Ask your doctor if you can take an over-the-counter pain medicine, such as acetaminophen (Tylenol), ibuprofen (Advil, Motrin), or naproxen (Aleve). Be safe with medicines. Read and follow all instructions on the label. No one younger than 20 should take aspirin. It has been linked to Reye syndrome, a serious illness.  Be careful when taking over-the-counter cold or flu medicines and Tylenol at the same time. Many of these medicines have acetaminophen, which is Tylenol. Read the labels to make sure that you are not taking more than the recommended dose. Too much  "acetaminophen (Tylenol) can be harmful.  Get plenty of rest.  Use saline (saltwater) nasal washes to help keep your nasal passages open and wash out mucus and allergens. You can buy saline nose sprays at a grocery store or drugstore. Follow the instructions on the package. Or you can make your own at home. Add 1 teaspoon of non-iodized salt and 1 teaspoon of baking soda to 2 cups of distilled or boiled and cooled water. Fill a squeeze bottle or neti pot with the nasal wash. Then put the tip into your nostril, and lean over the sink. With your mouth open, gently squirt the liquid. Repeat on the other side.  Use a vaporizer or humidifier to add moisture to your bedroom. Follow the instructions for cleaning the machine.  Do not smoke or allow others to smoke around you. If you need help quitting, talk to your doctor about stop-smoking programs and medicines. These can increase your chances of quitting for good.  When should you call for help?   Call 911 anytime you think you may need emergency care. For example, call if:    You have severe trouble breathing.   Call your doctor now or seek immediate medical care if:    You have a new or higher fever.     Your fever lasts more than 48 hours.     You have trouble breathing.     You have a fever with a stiff neck or a severe headache.     You are sensitive to light.     You feel very sleepy or confused.   Watch closely for changes in your health, and be sure to contact your doctor if:    You do not get better as expected.   Where can you learn more?  Go to https://www.[employer].net/patiented  Enter Q795 in the search box to learn more about \"Viral Respiratory Infection: Care Instructions.\"  Current as of: June 13, 2023               Content Version: 13.8    0997-1651 [employer].   Care instructions adapted under license by your healthcare professional. If you have questions about a medical condition or this instruction, always ask your healthcare " professional. [employer] disclaims any warranty or liability for your use of this information.      Dear Princess TRISTIN Machado    After reviewing your responses, I've been able to diagnose you with Acute sinusitis, recurrence not specified, unspecified location.      Based on your responses and diagnosis, I have prescribed   Orders Placed This Encounter   Medications     guaiFENesin 1200 MG TB12     Sig: Take 1 tablet (1,200 mg) by mouth 2 times daily     Dispense:  60 tablet     Refill:  0     ipratropium (ATROVENT) 0.06 % nasal spray     Sig: Spray 2 sprays into both nostrils 4 times daily     Dispense:  15 mL     Refill:  0      to treat your symptoms. I have sent this to your pharmacy.?     It is also important to stay well hydrated, get lots of rest and take over-the-counter decongestants,?tylenol?or ibuprofen if you?are able to?take those medications per your primary care provider to help relieve discomfort.?     It is important that you take?all of?your prescribed medication even if your symptoms are improving after a few doses.? Taking?all of?your medicine helps prevent the symptoms from returning.?     If your symptoms worsen, you develop severe headache, vomiting, high fever (>102), or are not improving in 7 days, please contact your primary care provider for an appointment or visit any of our convenient Walk-in Care or Urgent Care Centers to be seen which can be found on our website?here.?     Thanks again for choosing?us?as your health care partner,?   ?  Cole Floyd MD?

## 2024-11-10 ENCOUNTER — HEALTH MAINTENANCE LETTER (OUTPATIENT)
Age: 34
End: 2024-11-10

## 2025-01-04 ENCOUNTER — HEALTH MAINTENANCE LETTER (OUTPATIENT)
Age: 35
End: 2025-01-04

## 2025-02-09 NOTE — INTERVAL H&P NOTE
"I have reviewed the surgical (or preoperative) H&P that is linked to this encounter, and examined the patient. There are no significant changes    Clinical Conditions Present on Arrival:  Clinically Significant Risk Factors Present on Admission                   # Severe Obesity: Estimated body mass index is 45.71 kg/m  as calculated from the following:    Height as of this encounter: 1.676 m (5' 6\").    Weight as of this encounter: 128.5 kg (283 lb 3.2 oz).       "
Inadequate uterine contraction

## (undated) DEVICE — LINEN TOWEL PACK X5 5464

## (undated) DEVICE — SOL WATER IRRIG 1000ML BOTTLE 2F7114

## (undated) DEVICE — BLADE KNIFE SURG 15 371115

## (undated) DEVICE — CAST PADDING 4" STERILE 9044S

## (undated) DEVICE — GLOVE PROTEXIS MICRO 6.5  2D73PM65

## (undated) DEVICE — DRSG ABDOMINAL 07 1/2X8" 7197D

## (undated) DEVICE — SU MONOCRYL 4-0 PS-2 18" UND Y496G

## (undated) DEVICE — BNDG ELASTIC 4"X5YDS UNSTERILE 6611-40

## (undated) DEVICE — DRSG KERLIX 4 1/2"X4YDS ROLL 6715

## (undated) DEVICE — PACK HAND WRIST SOP15HWFSP

## (undated) DEVICE — GLOVE PROTEXIS BLUE W/NEU-THERA 6.5  2D73EB65

## (undated) DEVICE — CAST PLASTER SPLINT 4X15" 7394

## (undated) RX ORDER — FENTANYL CITRATE 50 UG/ML
INJECTION, SOLUTION INTRAMUSCULAR; INTRAVENOUS
Status: DISPENSED
Start: 2022-07-20

## (undated) RX ORDER — CEFAZOLIN SODIUM/WATER 3 G/30 ML
SYRINGE (ML) INTRAVENOUS
Status: DISPENSED
Start: 2022-07-20

## (undated) RX ORDER — LIDOCAINE HYDROCHLORIDE AND EPINEPHRINE 10; 10 MG/ML; UG/ML
INJECTION, SOLUTION INFILTRATION; PERINEURAL
Status: DISPENSED
Start: 2022-07-20

## (undated) RX ORDER — PROPOFOL 10 MG/ML
INJECTION, EMULSION INTRAVENOUS
Status: DISPENSED
Start: 2022-07-20